# Patient Record
Sex: FEMALE | Race: WHITE | Employment: OTHER | URBAN - METROPOLITAN AREA
[De-identification: names, ages, dates, MRNs, and addresses within clinical notes are randomized per-mention and may not be internally consistent; named-entity substitution may affect disease eponyms.]

---

## 2019-09-11 ENCOUNTER — EXTERNAL FACILITY (OUTPATIENT)
Dept: FAMILY MEDICINE CLINIC | Facility: CLINIC | Age: 74
End: 2019-09-11

## 2019-09-11 DIAGNOSIS — I21.4 NSTEMI (NON-ST ELEVATED MYOCARDIAL INFARCTION) (HCC): Primary | ICD-10-CM

## 2019-09-11 DIAGNOSIS — I25.5 ISCHEMIC CARDIOMYOPATHY: ICD-10-CM

## 2019-09-11 DIAGNOSIS — N17.9 ACUTE RENAL FAILURE, UNSPECIFIED ACUTE RENAL FAILURE TYPE (HCC): ICD-10-CM

## 2019-09-11 PROCEDURE — 99305 1ST NF CARE MODERATE MDM 35: CPT | Performed by: FAMILY MEDICINE

## 2019-09-16 ENCOUNTER — HOSPITAL ENCOUNTER (INPATIENT)
Facility: HOSPITAL | Age: 74
LOS: 6 days | Discharge: NURSING, REHAB OR LONG-TERM CARE | DRG: 246 | End: 2019-09-23
Admitting: HOSPITALIST
Payer: MEDICARE

## 2019-09-16 ENCOUNTER — APPOINTMENT (OUTPATIENT)
Dept: GENERAL RADIOLOGY | Facility: HOSPITAL | Age: 74
DRG: 246 | End: 2019-09-16
Payer: MEDICARE

## 2019-09-16 ENCOUNTER — APPOINTMENT (OUTPATIENT)
Dept: CT IMAGING | Facility: HOSPITAL | Age: 74
DRG: 246 | End: 2019-09-16
Payer: MEDICARE

## 2019-09-16 ENCOUNTER — APPOINTMENT (OUTPATIENT)
Dept: CT IMAGING | Facility: HOSPITAL | Age: 74
DRG: 246 | End: 2019-09-16
Attending: EMERGENCY MEDICINE
Payer: MEDICARE

## 2019-09-16 DIAGNOSIS — I10 ACCELERATED HYPERTENSION: Primary | ICD-10-CM

## 2019-09-16 DIAGNOSIS — I21.4 NSTEMI (NON-ST ELEVATED MYOCARDIAL INFARCTION) (HCC): ICD-10-CM

## 2019-09-16 DIAGNOSIS — R41.0 DELIRIUM: ICD-10-CM

## 2019-09-16 DIAGNOSIS — I42.9 CARDIOMYOPATHY, UNSPECIFIED TYPE (HCC): ICD-10-CM

## 2019-09-16 LAB
ANION GAP SERPL CALC-SCNC: 16 MMOL/L (ref 0–18)
BACTERIA UR QL AUTO: NEGATIVE /HPF
BASOPHILS # BLD AUTO: 0.02 X10(3) UL (ref 0–0.2)
BASOPHILS NFR BLD AUTO: 0.1 %
BILIRUB UR QL: NEGATIVE
BUN BLD-MCNC: 16 MG/DL (ref 7–18)
BUN/CREAT SERPL: 14.2 (ref 10–20)
CALCIUM BLD-MCNC: 9.9 MG/DL (ref 8.5–10.1)
CHLORIDE SERPL-SCNC: 98 MMOL/L (ref 98–112)
CHOLEST SMN-MCNC: 278 MG/DL (ref ?–200)
CLARITY UR: CLEAR
CO2 SERPL-SCNC: 19 MMOL/L (ref 21–32)
COLOR UR: YELLOW
CREAT BLD-MCNC: 1.13 MG/DL (ref 0.55–1.02)
DEPRECATED RDW RBC AUTO: 38.3 FL (ref 35.1–46.3)
EOSINOPHIL # BLD AUTO: 0.01 X10(3) UL (ref 0–0.7)
EOSINOPHIL NFR BLD AUTO: 0.1 %
ERYTHROCYTE [DISTWIDTH] IN BLOOD BY AUTOMATED COUNT: 12.3 % (ref 11–15)
GLUCOSE BLD-MCNC: 156 MG/DL (ref 70–99)
GLUCOSE BLDC GLUCOMTR-MCNC: 179 MG/DL (ref 70–99)
GLUCOSE UR-MCNC: 50 MG/DL
HCT VFR BLD AUTO: 43.5 % (ref 35–48)
HDLC SERPL-MCNC: 100 MG/DL (ref 40–59)
HGB BLD-MCNC: 15.3 G/DL (ref 12–16)
HYALINE CASTS #/AREA URNS AUTO: 5 /LPF
IMM GRANULOCYTES # BLD AUTO: 0.06 X10(3) UL (ref 0–1)
IMM GRANULOCYTES NFR BLD: 0.3 %
KETONES UR-MCNC: 80 MG/DL
LDLC SERPL CALC-MCNC: 145 MG/DL (ref ?–100)
LEUKOCYTE ESTERASE UR QL STRIP.AUTO: NEGATIVE
LYMPHOCYTES # BLD AUTO: 1.16 X10(3) UL (ref 1–4)
LYMPHOCYTES NFR BLD AUTO: 6.7 %
MCH RBC QN AUTO: 30.1 PG (ref 26–34)
MCHC RBC AUTO-ENTMCNC: 35.2 G/DL (ref 31–37)
MCV RBC AUTO: 85.5 FL (ref 80–100)
MONOCYTES # BLD AUTO: 0.77 X10(3) UL (ref 0.1–1)
MONOCYTES NFR BLD AUTO: 4.4 %
NEUTROPHILS # BLD AUTO: 15.31 X10 (3) UL (ref 1.5–7.7)
NEUTROPHILS # BLD AUTO: 15.31 X10(3) UL (ref 1.5–7.7)
NEUTROPHILS NFR BLD AUTO: 88.4 %
NITRITE UR QL STRIP.AUTO: NEGATIVE
NONHDLC SERPL-MCNC: 178 MG/DL (ref ?–130)
OSMOLALITY SERPL CALC.SUM OF ELEC: 280 MOSM/KG (ref 275–295)
PH UR: 6 [PH] (ref 5–8)
PLATELET # BLD AUTO: 421 10(3)UL (ref 150–450)
POTASSIUM SERPL-SCNC: 4 MMOL/L (ref 3.5–5.1)
PROT UR-MCNC: >=500 MG/DL
RBC # BLD AUTO: 5.09 X10(6)UL (ref 3.8–5.3)
RBC #/AREA URNS AUTO: 3 /HPF
SODIUM SERPL-SCNC: 133 MMOL/L (ref 136–145)
SP GR UR STRIP: 1.02 (ref 1–1.03)
TRIGL SERPL-MCNC: 166 MG/DL (ref 30–149)
TROPONIN I SERPL-MCNC: 1.78 NG/ML (ref ?–0.04)
UROBILINOGEN UR STRIP-ACNC: <2
VLDLC SERPL CALC-MCNC: 33 MG/DL (ref 0–30)
WBC # BLD AUTO: 17.3 X10(3) UL (ref 4–11)
WBC #/AREA URNS AUTO: 3 /HPF

## 2019-09-16 PROCEDURE — 70450 CT HEAD/BRAIN W/O DYE: CPT | Performed by: EMERGENCY MEDICINE

## 2019-09-16 PROCEDURE — 71045 X-RAY EXAM CHEST 1 VIEW: CPT

## 2019-09-16 PROCEDURE — 70450 CT HEAD/BRAIN W/O DYE: CPT

## 2019-09-16 RX ORDER — GABAPENTIN 800 MG/1
900 TABLET ORAL 3 TIMES DAILY
Status: ON HOLD | COMMUNITY
End: 2019-09-23

## 2019-09-16 RX ORDER — METOPROLOL TARTRATE 5 MG/5ML
5 INJECTION INTRAVENOUS EVERY 5 MIN PRN
Status: DISCONTINUED | OUTPATIENT
Start: 2019-09-16 | End: 2019-09-23

## 2019-09-16 RX ORDER — ATORVASTATIN CALCIUM 40 MG/1
40 TABLET, FILM COATED ORAL NIGHTLY
Status: ON HOLD | COMMUNITY
End: 2020-09-15

## 2019-09-16 RX ORDER — LISINOPRIL 20 MG/1
20 TABLET ORAL DAILY
Status: ON HOLD | COMMUNITY
End: 2019-09-23

## 2019-09-16 RX ORDER — SUCRALFATE 1 G/1
1 TABLET ORAL NIGHTLY
COMMUNITY

## 2019-09-16 RX ORDER — BUPROPION HYDROCHLORIDE 150 MG/1
150 TABLET, EXTENDED RELEASE ORAL DAILY
COMMUNITY

## 2019-09-16 RX ORDER — TIZANIDINE 2 MG/1
2 TABLET ORAL NIGHTLY
COMMUNITY

## 2019-09-16 RX ORDER — CARVEDILOL 3.12 MG/1
3.12 TABLET ORAL 2 TIMES DAILY WITH MEALS
Status: ON HOLD | COMMUNITY
End: 2019-09-23

## 2019-09-16 RX ORDER — POTASSIUM CHLORIDE 1500 MG/1
TABLET, FILM COATED, EXTENDED RELEASE ORAL DAILY
Status: ON HOLD | COMMUNITY
End: 2019-09-19

## 2019-09-16 RX ORDER — CHOLECALCIFEROL (VITAMIN D3) 1250 MCG
CAPSULE ORAL WEEKLY
Status: ON HOLD | COMMUNITY
End: 2020-09-14

## 2019-09-16 RX ORDER — ENALAPRILAT 2.5 MG/2ML
1.25 INJECTION INTRAVENOUS ONCE
Status: COMPLETED | OUTPATIENT
Start: 2019-09-16 | End: 2019-09-17

## 2019-09-16 RX ORDER — OMEPRAZOLE 40 MG/1
40 CAPSULE, DELAYED RELEASE ORAL 2 TIMES DAILY
COMMUNITY

## 2019-09-17 ENCOUNTER — APPOINTMENT (OUTPATIENT)
Dept: INTERVENTIONAL RADIOLOGY/VASCULAR | Facility: HOSPITAL | Age: 74
DRG: 246 | End: 2019-09-17
Attending: HOSPITALIST
Payer: MEDICARE

## 2019-09-17 ENCOUNTER — APPOINTMENT (OUTPATIENT)
Dept: CV DIAGNOSTICS | Facility: HOSPITAL | Age: 74
DRG: 246 | End: 2019-09-17
Attending: HOSPITALIST
Payer: MEDICARE

## 2019-09-17 PROBLEM — I10 ACCELERATED HYPERTENSION: Status: ACTIVE | Noted: 2019-09-17

## 2019-09-17 PROBLEM — R41.0 DELIRIUM: Status: ACTIVE | Noted: 2019-09-17

## 2019-09-17 PROBLEM — D72.829 LEUKOCYTOSIS: Status: ACTIVE | Noted: 2019-09-17

## 2019-09-17 PROBLEM — E87.1 HYPONATREMIA: Status: ACTIVE | Noted: 2019-09-17

## 2019-09-17 PROBLEM — E87.20 METABOLIC ACIDOSIS: Status: ACTIVE | Noted: 2019-09-17

## 2019-09-17 PROBLEM — I21.4 NSTEMI (NON-ST ELEVATED MYOCARDIAL INFARCTION) (HCC): Status: ACTIVE | Noted: 2019-09-17

## 2019-09-17 PROBLEM — E87.2 METABOLIC ACIDOSIS: Status: ACTIVE | Noted: 2019-09-17

## 2019-09-17 LAB
ALBUMIN SERPL-MCNC: 4.2 G/DL (ref 3.4–5)
ALP LIVER SERPL-CCNC: 119 U/L (ref 55–142)
ALT SERPL-CCNC: 25 U/L (ref 13–56)
ANION GAP SERPL CALC-SCNC: 17 MMOL/L (ref 0–18)
APTT PPP: 30.3 SECONDS (ref 23.2–35.3)
AST SERPL-CCNC: 50 U/L (ref 15–37)
BASOPHILS # BLD AUTO: 0.03 X10(3) UL (ref 0–0.2)
BASOPHILS NFR BLD AUTO: 0.2 %
BILIRUB DIRECT SERPL-MCNC: 0.2 MG/DL (ref 0–0.2)
BILIRUB SERPL-MCNC: 1 MG/DL (ref 0.1–2)
BUN BLD-MCNC: 21 MG/DL (ref 7–18)
BUN/CREAT SERPL: 19.1 (ref 10–20)
CALCIUM BLD-MCNC: 9.6 MG/DL (ref 8.5–10.1)
CHLORIDE SERPL-SCNC: 100 MMOL/L (ref 98–112)
CO2 SERPL-SCNC: 18 MMOL/L (ref 21–32)
CREAT BLD-MCNC: 1.1 MG/DL (ref 0.55–1.02)
DEPRECATED RDW RBC AUTO: 38.7 FL (ref 35.1–46.3)
EOSINOPHIL # BLD AUTO: 0.01 X10(3) UL (ref 0–0.7)
EOSINOPHIL NFR BLD AUTO: 0.1 %
ERYTHROCYTE [DISTWIDTH] IN BLOOD BY AUTOMATED COUNT: 12.4 % (ref 11–15)
GLUCOSE BLD-MCNC: 166 MG/DL (ref 70–99)
HCT VFR BLD AUTO: 43 % (ref 35–48)
HGB BLD-MCNC: 15.2 G/DL (ref 12–16)
IMM GRANULOCYTES # BLD AUTO: 0.1 X10(3) UL (ref 0–1)
IMM GRANULOCYTES NFR BLD: 0.6 %
ISTAT ACTIVATED CLOTTING TIME: 235 SECONDS (ref 125–137)
ISTAT ACTIVATED CLOTTING TIME: 257 SECONDS (ref 125–137)
LYMPHOCYTES # BLD AUTO: 2.08 X10(3) UL (ref 1–4)
LYMPHOCYTES NFR BLD AUTO: 13 %
M PROTEIN MFR SERPL ELPH: 8.7 G/DL (ref 6.4–8.2)
MCH RBC QN AUTO: 30.4 PG (ref 26–34)
MCHC RBC AUTO-ENTMCNC: 35.3 G/DL (ref 31–37)
MCV RBC AUTO: 86 FL (ref 80–100)
MONOCYTES # BLD AUTO: 0.97 X10(3) UL (ref 0.1–1)
MONOCYTES NFR BLD AUTO: 6.1 %
NEUTROPHILS # BLD AUTO: 12.75 X10 (3) UL (ref 1.5–7.7)
NEUTROPHILS # BLD AUTO: 12.75 X10(3) UL (ref 1.5–7.7)
NEUTROPHILS NFR BLD AUTO: 80 %
OSMOLALITY SERPL CALC.SUM OF ELEC: 287 MOSM/KG (ref 275–295)
PLATELET # BLD AUTO: 451 10(3)UL (ref 150–450)
POTASSIUM SERPL-SCNC: 3.4 MMOL/L (ref 3.5–5.1)
POTASSIUM SERPL-SCNC: 4.8 MMOL/L (ref 3.5–5.1)
RBC # BLD AUTO: 5 X10(6)UL (ref 3.8–5.3)
SODIUM SERPL-SCNC: 135 MMOL/L (ref 136–145)
TROPONIN I SERPL-MCNC: 4.92 NG/ML (ref ?–0.04)
TROPONIN I SERPL-MCNC: 6.45 NG/ML (ref ?–0.04)
WBC # BLD AUTO: 15.9 X10(3) UL (ref 4–11)

## 2019-09-17 PROCEDURE — 99223 1ST HOSP IP/OBS HIGH 75: CPT | Performed by: OTHER

## 2019-09-17 PROCEDURE — 027034Z DILATION OF CORONARY ARTERY, ONE ARTERY WITH DRUG-ELUTING INTRALUMINAL DEVICE, PERCUTANEOUS APPROACH: ICD-10-PCS | Performed by: INTERNAL MEDICINE

## 2019-09-17 PROCEDURE — 99223 1ST HOSP IP/OBS HIGH 75: CPT | Performed by: HOSPITALIST

## 2019-09-17 PROCEDURE — 4A023N7 MEASUREMENT OF CARDIAC SAMPLING AND PRESSURE, LEFT HEART, PERCUTANEOUS APPROACH: ICD-10-PCS | Performed by: INTERNAL MEDICINE

## 2019-09-17 PROCEDURE — B2111ZZ FLUOROSCOPY OF MULTIPLE CORONARY ARTERIES USING LOW OSMOLAR CONTRAST: ICD-10-PCS | Performed by: INTERNAL MEDICINE

## 2019-09-17 PROCEDURE — 93306 TTE W/DOPPLER COMPLETE: CPT | Performed by: HOSPITALIST

## 2019-09-17 RX ORDER — ASPIRIN 81 MG/1
81 TABLET, CHEWABLE ORAL DAILY
Status: DISCONTINUED | OUTPATIENT
Start: 2019-09-18 | End: 2019-09-17

## 2019-09-17 RX ORDER — CLOPIDOGREL BISULFATE 75 MG/1
75 TABLET ORAL DAILY
Status: DISCONTINUED | OUTPATIENT
Start: 2019-09-18 | End: 2019-09-23

## 2019-09-17 RX ORDER — ESCITALOPRAM OXALATE 10 MG/1
20 TABLET ORAL DAILY
Status: DISCONTINUED | OUTPATIENT
Start: 2019-09-17 | End: 2019-09-23

## 2019-09-17 RX ORDER — ERGOCALCIFEROL 1.25 MG/1
CAPSULE ORAL
Refills: 0 | Status: ON HOLD | COMMUNITY
Start: 2019-08-26 | End: 2019-09-19

## 2019-09-17 RX ORDER — ASPIRIN 81 MG/1
324 TABLET, CHEWABLE ORAL DAILY
Status: DISCONTINUED | OUTPATIENT
Start: 2019-09-17 | End: 2019-09-17

## 2019-09-17 RX ORDER — MAGNESIUM 200 MG
400 TABLET ORAL
Status: ON HOLD | COMMUNITY
End: 2019-09-23

## 2019-09-17 RX ORDER — CARVEDILOL 6.25 MG/1
6.25 TABLET ORAL 2 TIMES DAILY WITH MEALS
Status: DISCONTINUED | OUTPATIENT
Start: 2019-09-17 | End: 2019-09-17

## 2019-09-17 RX ORDER — ENALAPRILAT 2.5 MG/2ML
1.25 INJECTION INTRAVENOUS ONCE
Status: DISCONTINUED | OUTPATIENT
Start: 2019-09-17 | End: 2019-09-17

## 2019-09-17 RX ORDER — LABETALOL HYDROCHLORIDE 5 MG/ML
10 INJECTION, SOLUTION INTRAVENOUS EVERY 4 HOURS PRN
Status: DISCONTINUED | OUTPATIENT
Start: 2019-09-17 | End: 2019-09-23

## 2019-09-17 RX ORDER — ATORVASTATIN CALCIUM 40 MG/1
80 TABLET, FILM COATED ORAL DAILY
Status: DISCONTINUED | OUTPATIENT
Start: 2019-09-17 | End: 2019-09-18

## 2019-09-17 RX ORDER — ASPIRIN 81 MG/1
81 TABLET, CHEWABLE ORAL DAILY
Status: DISCONTINUED | OUTPATIENT
Start: 2019-09-17 | End: 2019-09-17

## 2019-09-17 RX ORDER — CARVEDILOL 12.5 MG/1
12.5 TABLET ORAL 2 TIMES DAILY WITH MEALS
Status: DISCONTINUED | OUTPATIENT
Start: 2019-09-17 | End: 2019-09-21

## 2019-09-17 RX ORDER — CLOPIDOGREL BISULFATE 75 MG/1
600 TABLET ORAL ONCE
Status: COMPLETED | OUTPATIENT
Start: 2019-09-17 | End: 2019-09-17

## 2019-09-17 RX ORDER — CHLORHEXIDINE GLUCONATE 4 G/100ML
30 SOLUTION TOPICAL
Status: ACTIVE | OUTPATIENT
Start: 2019-09-18 | End: 2019-09-18

## 2019-09-17 RX ORDER — DULOXETIN HYDROCHLORIDE 30 MG/1
60 CAPSULE, DELAYED RELEASE ORAL DAILY
Status: DISCONTINUED | OUTPATIENT
Start: 2019-09-17 | End: 2019-09-19

## 2019-09-17 RX ORDER — LIDOCAINE HYDROCHLORIDE 20 MG/ML
INJECTION, SOLUTION EPIDURAL; INFILTRATION; INTRACAUDAL; PERINEURAL
Status: COMPLETED
Start: 2019-09-17 | End: 2019-09-17

## 2019-09-17 RX ORDER — PANTOPRAZOLE SODIUM 40 MG/1
40 TABLET, DELAYED RELEASE ORAL
Status: DISCONTINUED | OUTPATIENT
Start: 2019-09-17 | End: 2019-09-23

## 2019-09-17 RX ORDER — ATORVASTATIN CALCIUM 40 MG/1
40 TABLET, FILM COATED ORAL DAILY
Status: DISCONTINUED | OUTPATIENT
Start: 2019-09-17 | End: 2019-09-17

## 2019-09-17 RX ORDER — ATORVASTATIN CALCIUM 40 MG/1
TABLET, FILM COATED ORAL
Status: ON HOLD | COMMUNITY
End: 2019-09-19

## 2019-09-17 RX ORDER — ACETAMINOPHEN 325 MG/1
650 TABLET ORAL EVERY 6 HOURS PRN
Status: DISCONTINUED | OUTPATIENT
Start: 2019-09-17 | End: 2019-09-23

## 2019-09-17 RX ORDER — HEPARIN SODIUM AND DEXTROSE 10000; 5 [USP'U]/100ML; G/100ML
12 INJECTION INTRAVENOUS ONCE
Status: COMPLETED | OUTPATIENT
Start: 2019-09-17 | End: 2019-09-17

## 2019-09-17 RX ORDER — POTASSIUM CHLORIDE 20 MEQ/1
40 TABLET, EXTENDED RELEASE ORAL EVERY 4 HOURS
Status: COMPLETED | OUTPATIENT
Start: 2019-09-17 | End: 2019-09-17

## 2019-09-17 RX ORDER — HYDRALAZINE HYDROCHLORIDE 20 MG/ML
10 INJECTION INTRAMUSCULAR; INTRAVENOUS EVERY 4 HOURS PRN
Status: DISCONTINUED | OUTPATIENT
Start: 2019-09-17 | End: 2019-09-23

## 2019-09-17 RX ORDER — NITROGLYCERIN 20 MG/100ML
INJECTION INTRAVENOUS
Status: DISCONTINUED
Start: 2019-09-17 | End: 2019-09-17 | Stop reason: WASHOUT

## 2019-09-17 RX ORDER — NITROGLYCERIN 20 MG/100ML
INJECTION INTRAVENOUS
Status: COMPLETED
Start: 2019-09-17 | End: 2019-09-17

## 2019-09-17 RX ORDER — AMLODIPINE BESYLATE 5 MG/1
5 TABLET ORAL DAILY
Status: DISCONTINUED | OUTPATIENT
Start: 2019-09-17 | End: 2019-09-18

## 2019-09-17 RX ORDER — ASPIRIN 81 MG/1
324 TABLET, CHEWABLE ORAL ONCE
Status: COMPLETED | OUTPATIENT
Start: 2019-09-17 | End: 2019-09-17

## 2019-09-17 RX ORDER — ONDANSETRON 2 MG/ML
4 INJECTION INTRAMUSCULAR; INTRAVENOUS EVERY 6 HOURS PRN
Status: DISCONTINUED | OUTPATIENT
Start: 2019-09-17 | End: 2019-09-23

## 2019-09-17 RX ORDER — ASPIRIN 81 MG/1
81 TABLET ORAL DAILY
Status: DISCONTINUED | OUTPATIENT
Start: 2019-09-18 | End: 2019-09-23

## 2019-09-17 RX ORDER — HEPARIN SODIUM 1000 [USP'U]/ML
INJECTION, SOLUTION INTRAVENOUS; SUBCUTANEOUS
Status: COMPLETED
Start: 2019-09-17 | End: 2019-09-17

## 2019-09-17 RX ORDER — VERAPAMIL HYDROCHLORIDE 2.5 MG/ML
INJECTION, SOLUTION INTRAVENOUS
Status: DISCONTINUED
Start: 2019-09-17 | End: 2019-09-17 | Stop reason: WASHOUT

## 2019-09-17 RX ORDER — HEPARIN SODIUM AND DEXTROSE 10000; 5 [USP'U]/100ML; G/100ML
INJECTION INTRAVENOUS CONTINUOUS
Status: DISCONTINUED | OUTPATIENT
Start: 2019-09-17 | End: 2019-09-19

## 2019-09-17 RX ORDER — MIDAZOLAM HYDROCHLORIDE 1 MG/ML
INJECTION INTRAMUSCULAR; INTRAVENOUS
Status: COMPLETED
Start: 2019-09-17 | End: 2019-09-17

## 2019-09-17 RX ORDER — LISINOPRIL 20 MG/1
20 TABLET ORAL DAILY
Status: DISCONTINUED | OUTPATIENT
Start: 2019-09-17 | End: 2019-09-20

## 2019-09-17 RX ORDER — HEPARIN SODIUM 1000 [USP'U]/ML
60 INJECTION, SOLUTION INTRAVENOUS; SUBCUTANEOUS ONCE
Status: COMPLETED | OUTPATIENT
Start: 2019-09-17 | End: 2019-09-17

## 2019-09-17 RX ORDER — BUPROPION HYDROCHLORIDE 150 MG/1
150 TABLET, EXTENDED RELEASE ORAL DAILY
Status: DISCONTINUED | OUTPATIENT
Start: 2019-09-17 | End: 2019-09-23

## 2019-09-17 RX ORDER — METOPROLOL TARTRATE 5 MG/5ML
5 INJECTION INTRAVENOUS EVERY 6 HOURS
Status: DISCONTINUED | OUTPATIENT
Start: 2019-09-17 | End: 2019-09-18

## 2019-09-17 RX ORDER — SODIUM CHLORIDE 9 MG/ML
INJECTION, SOLUTION INTRAVENOUS
Status: ACTIVE | OUTPATIENT
Start: 2019-09-18 | End: 2019-09-18

## 2019-09-17 RX ORDER — CLOPIDOGREL BISULFATE 75 MG/1
75 TABLET ORAL DAILY
Status: DISCONTINUED | OUTPATIENT
Start: 2019-09-18 | End: 2019-09-17

## 2019-09-17 RX ORDER — OMEPRAZOLE 40 MG/1
CAPSULE, DELAYED RELEASE ORAL
Status: ON HOLD | COMMUNITY
End: 2019-09-17

## 2019-09-17 NOTE — H&P
[address] Edwardo Hinton Patient Status:  Emergency    1945 MRN [id number]   Location [address] Attending Sender, Polly Vickers MD   Hosp Day # 0 DOIR Perez     Date:  2019 Sig: Take by mouth once a week. Citalopram Hydrobromide (CELEXA) 20 MG Oral Tab   Yes No   Sig: Take 40 mg by mouth daily. DULoxetine HCl (CYMBALTA) 60 MG Oral Cap DR Particles   Yes No   Sig: Take 60 mg by mouth daily.    FENTANYL   Yes No   Sig: b Oral Tab Taking  Yes Yes   Sig: Take 1 g by mouth nightly. tiZANidine HCl 2 MG Oral Tab Taking  Yes Yes   Sig: Take 2 mg by mouth nightly as needed.       Facility-Administered Medications: None       Review of Systems:  Constitutional:  Weakness, Fatigue 09/16/2019     09/16/2019    CA 9.9 09/16/2019    TROP 4.920 09/16/2019       Imaging:  Ct Brain Or Head ([id number])    Result Date: 9/16/2019  CONCLUSION:  1. Cerebral atrophy, age appropriate. Moderate chronic white matter microvascular ischemia.  2.

## 2019-09-17 NOTE — PROCEDURES
[facility]    PATIENT'S NAME: Rian Terry   ATTENDING PHYSICIAN: Marie Hui MD   OPERATING PHYSICIAN: Juan José Herrera DO   PATIENT ACCOUNT#:   [de-identified]    LOCATION:  [address] [place] [address] RECORD #:   [id number]       DATE OF BIRTH:  [birth year] with a mean of 14. Left ventricular pressure was 118/7 with an end-diastolic of 12. The cardiac output was 2.6 liters/minute, with an index of 1.5 liters/minute/sq m. Contrast injection in the left main demonstrates patent left main.   Left anterior related. I recommend a followup echocardiogram in a few months after optimal medical management and hopefully improved dynamics of the left ventricle will reduce the mitral regurgitation.   At that point, a transesophageal echocardiogram can also be consid

## 2019-09-17 NOTE — PLAN OF CARE
Problem: Patient/Family Goals  Goal: Patient/Family Long Term Goal  Description  Patient's Long Term Goal: To continue to have independence and live on her own    Interventions:  -reorient patient to unit  -ambulate patient to decrease decline  - See add

## 2019-09-17 NOTE — PLAN OF CARE
Pt a/o x2 (person/place). Follows commands, speaking in clear sentences, appropriate answers/conversations. She is a little anxious and needs reassurance and reinforcement at times. Otherwise she is calm, cooperative. Heparin infusing.    Troponin trendin minimize risk of hemorrhage  - Monitor temperature, glucose, and sodium.  Initiate appropriate interventions as ordered  Outcome: Progressing  Goal: Absence of seizures  Description  INTERVENTIONS  - Monitor for seizure activity  - Administer anti-seizure m

## 2019-09-17 NOTE — PROGRESS NOTES
ADDENDUM:  patient seen by Dr. Charmaine Louise overnight. I saw patient in follow up. Patient still confused, but she knows she's in the hospital, the year, and that she's getting a test on her heart. Angiogram planned for today for NSTEMI.  Possible hypertensive ence

## 2019-09-17 NOTE — ED INITIAL ASSESSMENT (HPI)
Pt confused on arrival. Pt alert to self. Daughter called and stated pt was confused per telephone call. Unknown last well. Pt denies head injury or fall.

## 2019-09-17 NOTE — PROGRESS NOTES
Inpatient Throughput Communication:    Called inpatient RN Agueda Milan and notified of scheduled procedure [facility] today     Verified that appropriate consent is signed: No  Appropriate Consent Signed:  No will call dtr, POA  Access Site Hair Clipped and skin preppe

## 2019-09-17 NOTE — CONSULTS
[facility]    Report of Consultation    Lili Kurtz Patient Status:  Inpatient    1945 MRN [id number]   Location [facility] 2W/SW Attending Ernesto Rudolph MD   Hosp Day # 0 PCP Stefania Calhoun     Date of Admission:  2019 (COREG) tab 6.25 mg 6.25 mg Oral BID with meals   escitalopram (LEXAPRO) tablet 20 mg 20 mg Oral Daily   DULoxetine HCl (CYMBALTA) DR particles cap 60 mg 60 mg Oral Daily   Pantoprazole Sodium (PROTONIX) EC tab 40 mg 40 mg Oral QAM AC   lisinopril (PRINIVI Morphine Sulfate ER 15 MG Oral Tablet Extended Release 12 hour Abuse-Deterrent Take by mouth 2 (two) times daily. lisinopril 20 MG Oral Tab Take 20 mg by mouth daily. tiZANidine HCl 2 MG Oral Tab Take 2 mg by mouth nightly as needed.    AmLODIPine Bes commands of lifting eyes or arms and legs appropriately. But not able to provide much of the history    Cranial Nerves:  II.- Visual fields full to confrontation        Fundoscopically-unable to visualize. III, IV, VI- EOM intact, BERNARDA  V.  Facial sensat on 9/17/2019 at 6:29     Approved by (CST): Lenin Lake MD on 9/17/2019 at 6:33          Ct Brain Or Head ([id number])    Result Date: 9/16/2019  CONCLUSION:  1. Cerebral atrophy, age appropriate. Moderate chronic white matter microvascular ischemia.  2.

## 2019-09-17 NOTE — ED NOTES
Pt brought in by daughters for confusion and noncompliance with medications. Per daughters, pt lives alone and in the past has developed confusion and then stopped taking her medications and became dehydrated. Last known normal was Thursday (x4days ago).  U

## 2019-09-17 NOTE — CONSULTS
[facility]    Report of Consultation    Bob Ford Patient Status:  Inpatient    1945 MRN [id number]   Location [facility] 2W/SW Attending Hina Collazo MD   Hosp Day # 0 PCP Franklin Lazo     Date of Admission:  2019 Intravenous Q6H   Labetalol HCl (TRANDATE) injection 10 mg 10 mg Intravenous Q4H PRN   heparin (PORCINE) drip 39164isyen/250mL infusion CONTINUOUS 200-3,000 Units/hr Intravenous Continuous   atorvastatin (LIPITOR) tab 80 mg 80 mg Oral Daily   [START ON 9/1 Tab Take 4 mg by mouth every 8 (eight) hours as needed. DULoxetine HCl (CYMBALTA) 60 MG Oral Cap DR Particles Take 60 mg by mouth daily. Lidocaine (LIDODERM) 5 % [facility] 1 Patch onto the skin daily.    Metoclopramide HCl (REGLAN) 1 extremities normal, atraumatic, no cyanosis or edema    Results:     Laboratory Data:  Lab Results   Component Value Date    WBC 15.9 (H) 09/17/2019    HGB 15.2 09/17/2019    HCT 43.0 09/17/2019    .0 (H) 09/17/2019    CREATSERUM 1.10 (H) 09/17/2019 in a pattern of mild LVH. Systolic function was     moderately to severely reduced. The estimated ejection fraction     was 30-35%. Doppler parameters are consistent with abnormal left     ventricular relaxation (grade 1 diastolic dysfunction).   2. Aortic

## 2019-09-17 NOTE — OPERATIVE REPORT
Preop diagnosis: nstemi  Post op diagnosis: cad  Procedures: [facility] cors  Findings: rca 70% eccentric mid lesion s/p 2.75 x 12 Xience ZIYAD  EDP 12 PA 19/10/14  Rec: Med rx for cardiomyopathy for MR.  PA pressures and EDP not high currently and may be volume/HTN

## 2019-09-17 NOTE — ED PROVIDER NOTES
Patient Seen in: [facility] AND [facility] Emergency Department    History   Patient presents with:  Altered Mental Status (neurologic)    Stated Complaint: Confusion, AMS     HPI    Patient is a 72-year-old female who presents to the emergency department with h (36.7 °C)   Temp src Oral   SpO2 97 %   O2 Device None (Room air)       Current:BP (!) 196/120   Pulse 78   Temp 98 °F (36.7 °C) (Oral)   Resp 20   Ht 165.1 cm (5' 5\")   Wt 68 kg   SpO2 98%   BMI 24.96 kg/m²         Physical Exam   Constitutional: She thierry normal limits   POCT GLUCOSE - Abnormal; Notable for the following components:    POC Glucose  179 (*)     All other components within normal limits   CBC W/ DIFFERENTIAL - Abnormal; Notable for the following components:    WBC 17.3 (*)     Neutrophil Abso Medication List              Present on Admission           ICD-10-CM Noted POA    Accelerated hypertension I10 9/17/2019 Unknown    Hyponatremia E87.1 9/17/2019 Yes    Leukocytosis D72.829 0/56/4061 Yes    Metabolic acidosis X86.2 8/13/9879 Yes

## 2019-09-18 LAB
ANION GAP SERPL CALC-SCNC: 13 MMOL/L (ref 0–18)
BASOPHILS # BLD AUTO: 0.02 X10(3) UL (ref 0–0.2)
BASOPHILS NFR BLD AUTO: 0.1 %
BUN BLD-MCNC: 32 MG/DL (ref 7–18)
BUN/CREAT SERPL: 30.5 (ref 10–20)
CALCIUM BLD-MCNC: 9 MG/DL (ref 8.5–10.1)
CHLORIDE SERPL-SCNC: 105 MMOL/L (ref 98–112)
CO2 SERPL-SCNC: 15 MMOL/L (ref 21–32)
CREAT BLD-MCNC: 1.05 MG/DL (ref 0.55–1.02)
DEPRECATED RDW RBC AUTO: 40.2 FL (ref 35.1–46.3)
EOSINOPHIL # BLD AUTO: 0 X10(3) UL (ref 0–0.7)
EOSINOPHIL NFR BLD AUTO: 0 %
ERYTHROCYTE [DISTWIDTH] IN BLOOD BY AUTOMATED COUNT: 12.8 % (ref 11–15)
GLUCOSE BLD-MCNC: 95 MG/DL (ref 70–99)
HCT VFR BLD AUTO: 37.1 % (ref 35–48)
HGB BLD-MCNC: 13.1 G/DL (ref 12–16)
IMM GRANULOCYTES # BLD AUTO: 0.04 X10(3) UL (ref 0–1)
IMM GRANULOCYTES NFR BLD: 0.3 %
LYMPHOCYTES # BLD AUTO: 2.15 X10(3) UL (ref 1–4)
LYMPHOCYTES NFR BLD AUTO: 15.3 %
MCH RBC QN AUTO: 30.5 PG (ref 26–34)
MCHC RBC AUTO-ENTMCNC: 35.3 G/DL (ref 31–37)
MCV RBC AUTO: 86.5 FL (ref 80–100)
MONOCYTES # BLD AUTO: 0.83 X10(3) UL (ref 0.1–1)
MONOCYTES NFR BLD AUTO: 5.9 %
NEUTROPHILS # BLD AUTO: 11.04 X10 (3) UL (ref 1.5–7.7)
NEUTROPHILS # BLD AUTO: 11.04 X10(3) UL (ref 1.5–7.7)
NEUTROPHILS NFR BLD AUTO: 78.4 %
OSMOLALITY SERPL CALC.SUM OF ELEC: 283 MOSM/KG (ref 275–295)
PLATELET # BLD AUTO: 337 10(3)UL (ref 150–450)
POTASSIUM SERPL-SCNC: 3.8 MMOL/L (ref 3.5–5.1)
RBC # BLD AUTO: 4.29 X10(6)UL (ref 3.8–5.3)
SODIUM SERPL-SCNC: 133 MMOL/L (ref 136–145)
WBC # BLD AUTO: 14.1 X10(3) UL (ref 4–11)

## 2019-09-18 PROCEDURE — 99233 SBSQ HOSP IP/OBS HIGH 50: CPT | Performed by: HOSPITALIST

## 2019-09-18 PROCEDURE — 99232 SBSQ HOSP IP/OBS MODERATE 35: CPT | Performed by: OTHER

## 2019-09-18 RX ORDER — SPIRONOLACTONE 25 MG/1
25 TABLET ORAL DAILY
Status: DISCONTINUED | OUTPATIENT
Start: 2019-09-19 | End: 2019-09-21

## 2019-09-18 RX ORDER — ATORVASTATIN CALCIUM 40 MG/1
40 TABLET, FILM COATED ORAL DAILY
Status: DISCONTINUED | OUTPATIENT
Start: 2019-09-19 | End: 2019-09-19

## 2019-09-18 RX ORDER — POTASSIUM CHLORIDE 20 MEQ/1
40 TABLET, EXTENDED RELEASE ORAL ONCE
Status: COMPLETED | OUTPATIENT
Start: 2019-09-18 | End: 2019-09-18

## 2019-09-18 NOTE — PLAN OF CARE
Pt up to chair, cath rt groin site dry and intact, no hemotoma present. Pt had 2 stools dark brown to black in color, no tl blood noted. BP improved. Appetite fair, Refusing SCD's aware of use and preventative for blood clots.        Problem: Patient/Fam volume within ordered parameters to optimize cerebral perfusion and minimize risk of hemorrhage  - Monitor temperature, glucose, and sodium.  Initiate appropriate interventions as ordered  Outcome: Progressing  Goal: Absence of seizures  Description  INTERV on assessment  - Modify environment to reduce risk of injury  - Provide assistive devices as appropriate  - Consider OT/PT consult to assist with strengthening/mobility  - Encourage toileting schedule  Outcome: Progressing     Problem: Patient Centered Car

## 2019-09-18 NOTE — PROGRESS NOTES
[facility] AND [facility]  Neurology Progress Note    Kelsey Calle Patient Status:  Inpatient    1945 MRN [id number]   Location [facility] 2W/SW Attending Lacie Robbins, [address] [facility] [address] Day # 1 PCP Candis Ventura     Subjective:  Kelsey Calle is a(n Exam:   09/18/19  0800 09/18/19  0900 09/18/19  0906 09/18/19  0928   BP: (!) 150/94 (!) 157/92 (!) 152/94 139/88   Pulse: 71 75 68 67   Resp: 19 21 12 21   Temp: 97.6 °F (36.4 °C)      TempSrc: Temporal      SpO2: 98% 100% 100% 100%   Weight:       Height ALB 4.2 09/17/2019    ALKPHO 119 09/17/2019    BILT 1.0 09/17/2019    TP 8.7 (H) 09/17/2019    AST 50 (H) 09/17/2019    ALT 25 09/17/2019    PTT 30.3 09/17/2019    TROP 6.450 (HH) 09/17/2019       Ct Brain Or Head ([id number])    Result Date: 9/17/2019  CONCLUS Shante Corrales 12-lead    Result Date: 9/16/2019  ECG Report  Interpretation  -------------------------- Sinus Tachycardia -First degree A-V block Courtney = 223 Possible left ventricular hypertrophy on non-voltage basis.  -Old anteroseptal infarct.   -ST burnette assessment of that condition she will need to follow-up in the clinic in a month. Otherwise no other additional neurological work-up is needed at this time. Will sign off.     Renetta Mar  9/18/2019  10:01 AM

## 2019-09-18 NOTE — PLAN OF CARE
VSS. No complaints of chest pain. Pt neuro intact. Forgetful at times. Cath side CDI, no hematoma. Will continue to monitor.       Problem: CARDIOVASCULAR - ADULT  Goal: Maintains optimal cardiac output and hemodynamic stability  Description  INTERVENTI injury  Description  INTERVENTIONS:  - Assess pt frequently for physical needs  - Identify cognitive and physical deficits and behaviors that affect risk of falls.   - [place]idian fall precautions as indicated by assessment.  - Educate pt/family on patient sa

## 2019-09-19 LAB
ANION GAP SERPL CALC-SCNC: 11 MMOL/L (ref 0–18)
BASOPHILS # BLD AUTO: 0.02 X10(3) UL (ref 0–0.2)
BASOPHILS NFR BLD AUTO: 0.1 %
BILIRUB UR QL: NEGATIVE
BUN BLD-MCNC: 36 MG/DL (ref 7–18)
BUN/CREAT SERPL: 31.9 (ref 10–20)
CALCIUM BLD-MCNC: 8.7 MG/DL (ref 8.5–10.1)
CHLORIDE SERPL-SCNC: 105 MMOL/L (ref 98–112)
CO2 SERPL-SCNC: 17 MMOL/L (ref 21–32)
COLOR UR: YELLOW
CREAT BLD-MCNC: 1.13 MG/DL (ref 0.55–1.02)
DEPRECATED RDW RBC AUTO: 42.6 FL (ref 35.1–46.3)
EOSINOPHIL # BLD AUTO: 0.03 X10(3) UL (ref 0–0.7)
EOSINOPHIL NFR BLD AUTO: 0.2 %
ERYTHROCYTE [DISTWIDTH] IN BLOOD BY AUTOMATED COUNT: 12.7 % (ref 11–15)
GLUCOSE BLD-MCNC: 100 MG/DL (ref 70–99)
GLUCOSE UR-MCNC: NEGATIVE MG/DL
HCT VFR BLD AUTO: 39.3 % (ref 35–48)
HGB BLD-MCNC: 12.8 G/DL (ref 12–16)
IMM GRANULOCYTES # BLD AUTO: 0.06 X10(3) UL (ref 0–1)
IMM GRANULOCYTES NFR BLD: 0.4 %
LYMPHOCYTES # BLD AUTO: 2.26 X10(3) UL (ref 1–4)
LYMPHOCYTES NFR BLD AUTO: 16.5 %
MCH RBC QN AUTO: 30 PG (ref 26–34)
MCHC RBC AUTO-ENTMCNC: 32.6 G/DL (ref 31–37)
MCV RBC AUTO: 92 FL (ref 80–100)
MONOCYTES # BLD AUTO: 0.82 X10(3) UL (ref 0.1–1)
MONOCYTES NFR BLD AUTO: 6 %
NEUTROPHILS # BLD AUTO: 10.54 X10 (3) UL (ref 1.5–7.7)
NEUTROPHILS # BLD AUTO: 10.54 X10(3) UL (ref 1.5–7.7)
NEUTROPHILS NFR BLD AUTO: 76.8 %
NITRITE UR QL STRIP.AUTO: NEGATIVE
OSMOLALITY SERPL CALC.SUM OF ELEC: 284 MOSM/KG (ref 275–295)
PH UR: 5 [PH] (ref 5–8)
PLATELET # BLD AUTO: 294 10(3)UL (ref 150–450)
POTASSIUM SERPL-SCNC: 4.2 MMOL/L (ref 3.5–5.1)
PROT UR-MCNC: 30 MG/DL
RBC # BLD AUTO: 4.27 X10(6)UL (ref 3.8–5.3)
RBC #/AREA URNS AUTO: 373 /HPF
SODIUM SERPL-SCNC: 133 MMOL/L (ref 136–145)
SP GR UR STRIP: 1.02 (ref 1–1.03)
UROBILINOGEN UR STRIP-ACNC: <2
WBC # BLD AUTO: 13.7 X10(3) UL (ref 4–11)
WBC #/AREA URNS AUTO: 60 /HPF

## 2019-09-19 PROCEDURE — 99233 SBSQ HOSP IP/OBS HIGH 50: CPT | Performed by: HOSPITALIST

## 2019-09-19 RX ORDER — MORPHINE SULFATE 15 MG/1
15 TABLET ORAL EVERY 4 HOURS PRN
Status: ON HOLD | COMMUNITY
End: 2019-09-23

## 2019-09-19 RX ORDER — ATORVASTATIN CALCIUM 40 MG/1
40 TABLET, FILM COATED ORAL DAILY
Status: DISCONTINUED | OUTPATIENT
Start: 2019-09-20 | End: 2019-09-23

## 2019-09-19 NOTE — PROGRESS NOTES
[facility] HOSP - [facility]    This note is for 19       Progress Note    Juan Pablo Purcellen Patient Status:  Inpatient    1945 MRN [id number]   Location [facility] 2W/SW Attending Kim Carbajal MD   Hosp Day # 2 PCP Shira Ramos accelerated HTN.   - hold all psychoactive meds  - probably some level of baseline dementia  - PT/OT/SW consults  - neurology on consult  - MRI brain if pt does not improve    NSTEMI  - cards on consult  - s/p heparin drip  - s/p emergent cath with stent to

## 2019-09-19 NOTE — PHYSICAL THERAPY NOTE
PHYSICAL THERAPY EVALUATION - INPATIENT     Room Number: 222/222-A  Evaluation Date: 9/19/2019  Type of Evaluation: Initial   Physician Order: PT Eval and Treat    Presenting Problem: accelerated HTN and new life vest  Reason for Therapy: Mobility Dysfunc Good  Frequency (Obs): 5x/week       PHYSICAL THERAPY MEDICAL/SOCIAL HISTORY   Problem List  Principal Problem:    Accelerated hypertension  Active Problems:    Leukocytosis    Hyponatremia    Metabolic acidosis    NSTEMI (non-ST elevated myocardial infarc -   Moving from lying on back to sitting on the side of the bed?: A Little   How much help from another person does the patient currently need. ..   -   Moving to and from a bed to a chair (including a wheelchair)?: A Little   -   Need to walk in hospital

## 2019-09-19 NOTE — CM/SW NOTE
9/19: MICHELLE received MDO for discharge planning. Pt is from home alone and has been independent up to this point. MICHELLE received v/m from daughter, Caresse Hodgkin to discuss elder resources.      MICHELLE called back and l/m for dtr Caresse Hodgkin to discuss social history, home h

## 2019-09-19 NOTE — PROGRESS NOTES
Patient seen in follow up. Patient denies any chest pain or sob. Off O2. AAO today.    09/18/19 1927   BP: 119/80   Pulse: 77   Resp: 15   Temp:        Intake/Output Summary (Last 24 hours) at 9/18/2019 1936  Last data filed at 9/18/2019 1300  Gross Cholecalciferol (VITAMIN D3) 06168 units Oral Cap Take by mouth once a week. Omeprazole 40 MG Oral Capsule Delayed Release Take 40 mg by mouth 2 (two) times daily. gabapentin 800 MG Oral Tab Take 900 mg by mouth 3 (three) times daily.    atorvastatin 40 CONCLUSION:   No acute intracranial abnormality by noncontrast CT. Generalized atrophy with nonspecific white matter changes most likely related to chronic microvascular ischemia.    The preliminary report was provided by [facility] and there is no s Phone: [phone]  [email or web address]

## 2019-09-19 NOTE — PROGRESS NOTES
Patient seen in follow up. Patient denies any chest pain or sob. Off O2. AAO today.    09/19/19  0840   BP: 119/84   Pulse:    Resp: 22   Temp:        Intake/Output Summary (Last 24 hours) at 9/19/2019 1250  Last data filed at 9/18/2019 2200  Gross pe gabapentin 800 MG Oral Tab Take 900 mg by mouth 3 (three) times daily. atorvastatin 40 MG Oral Tab Take 40 mg by mouth daily. sucralfate 1 g Oral Tab Take 1 g by mouth nightly.    carvedilol 3.125 MG Oral Tab Take 3.125 mg by mouth 2 (two) times daily w Severe MR by echo. By cath normal filling pressures and right heart pressures. This is likely a volume related problem that probably changes with hemodynamics. PLAN:  Ok to discharge on current meds from cardio standpoint.     Juan José Singh DO FACC  340

## 2019-09-19 NOTE — PLAN OF CARE
VSS. Denies pain. Up to bathroom with walker. Tele overflow. PT/OT ordered for tmrw.       Problem: CARDIOVASCULAR - ADULT  Goal: Maintains optimal cardiac output and hemodynamic stability  Description  INTERVENTIONS:  - Monitor vital signs, rhythm, and changes in neurological status  - Encourage and assist patient to increase activity and self care with guidance from PT/OT  - Encourage visually impaired, hearing impaired and aphasic patients to use assistive/communication devices  Outcome: Progressing

## 2019-09-19 NOTE — OCCUPATIONAL THERAPY NOTE
OCCUPATIONAL THERAPY EVALUATION - INPATIENT     Room Number: 222/222-A  Evaluation Date: 9/19/2019  Type of Evaluation: Initial  Presenting Problem: (hypertensive emergency)    Physician Order: IP Consult to Occupational Therapy  Reason for Therapy: ADL/IA Research supports that patients with this level of impairment may benefit from [facility] and initial spv from family. Pt reports that her 2 daughters live nearby.       DISCHARGE RECOMMENDATIONS  OT Discharge Recommendations: Home with home health PT/OT;Other (C Risk: Standard fall risk    PAIN ASSESSMENT  Ratin          ACTIVITY TOLERANCE  Good    COGNITION  Overall Cognitive Status:  WFL - within functional limits    VISION  Current Vision: no visual deficits  Has reading glasses    Communication: Receptive self stated goal is: go home      Patient will complete functional transfer with SPV  Comment:     Patient will complete toileting with INDEP  Comment:     Patient will tolerate standing for 3-5 minutes in prep for adls with SPV   Comment:    Patient will

## 2019-09-19 NOTE — PROGRESS NOTES
[place] [facility] - [facility]    Progress Note    Rudy Booth Patient Status:  Inpatient    1945 MRN [id number]   Location [facility] 2W/SW Attending Blank Corral MD   [address] Day # 2 PCP Guy Escobar       Subjective:     Pt c/o weakness. as above  - LifeVest     Accelerated HTN  Improved   - cont coreg, lisinopril, aldactone     Severe MR. Per cards:    Severe MR by echo. By cath normal filling pressures and right heart pressures.  This is likely a volume related problem that probably oliva

## 2019-09-19 NOTE — PLAN OF CARE
Answered all questions correctly. Drowsy this afternoon but awakens to name. Patient provided list of her medications. Reviewed medication list  with her and Vickie mcleod called for verification. PTA med list in [place] reviewed and changed.  Dr. Keiko Parra Progressing  9/19/2019 1712 by Arlin Carmona RN  Outcome: Progressing  Goal: Absence of seizures  Description  INTERVENTIONS  - Monitor for seizure activity  - Administer anti-seizure medications as ordered  - Monitor neurological status  Outcome: Complet

## 2019-09-20 LAB
ANION GAP SERPL CALC-SCNC: 8 MMOL/L (ref 0–18)
BASOPHILS # BLD AUTO: 0.02 X10(3) UL (ref 0–0.2)
BASOPHILS NFR BLD AUTO: 0.2 %
BUN BLD-MCNC: 32 MG/DL (ref 7–18)
BUN/CREAT SERPL: 25.6 (ref 10–20)
CALCIUM BLD-MCNC: 8.9 MG/DL (ref 8.5–10.1)
CHLORIDE SERPL-SCNC: 104 MMOL/L (ref 98–112)
CO2 SERPL-SCNC: 23 MMOL/L (ref 21–32)
CREAT BLD-MCNC: 1.25 MG/DL (ref 0.55–1.02)
DEPRECATED RDW RBC AUTO: 41.5 FL (ref 35.1–46.3)
EOSINOPHIL # BLD AUTO: 0.08 X10(3) UL (ref 0–0.7)
EOSINOPHIL NFR BLD AUTO: 0.7 %
ERYTHROCYTE [DISTWIDTH] IN BLOOD BY AUTOMATED COUNT: 12.7 % (ref 11–15)
GLUCOSE BLD-MCNC: 113 MG/DL (ref 70–99)
HCT VFR BLD AUTO: 37.7 % (ref 35–48)
HGB BLD-MCNC: 12.7 G/DL (ref 12–16)
IMM GRANULOCYTES # BLD AUTO: 0.03 X10(3) UL (ref 0–1)
IMM GRANULOCYTES NFR BLD: 0.3 %
LYMPHOCYTES # BLD AUTO: 2.53 X10(3) UL (ref 1–4)
LYMPHOCYTES NFR BLD AUTO: 23.6 %
MCH RBC QN AUTO: 30.3 PG (ref 26–34)
MCHC RBC AUTO-ENTMCNC: 33.7 G/DL (ref 31–37)
MCV RBC AUTO: 90 FL (ref 80–100)
MONOCYTES # BLD AUTO: 0.65 X10(3) UL (ref 0.1–1)
MONOCYTES NFR BLD AUTO: 6.1 %
NEUTROPHILS # BLD AUTO: 7.39 X10 (3) UL (ref 1.5–7.7)
NEUTROPHILS # BLD AUTO: 7.39 X10(3) UL (ref 1.5–7.7)
NEUTROPHILS NFR BLD AUTO: 69.1 %
OSMOLALITY SERPL CALC.SUM OF ELEC: 288 MOSM/KG (ref 275–295)
PLATELET # BLD AUTO: 329 10(3)UL (ref 150–450)
POTASSIUM SERPL-SCNC: 4.3 MMOL/L (ref 3.5–5.1)
RBC # BLD AUTO: 4.19 X10(6)UL (ref 3.8–5.3)
SODIUM SERPL-SCNC: 135 MMOL/L (ref 136–145)
WBC # BLD AUTO: 10.7 X10(3) UL (ref 4–11)

## 2019-09-20 PROCEDURE — 99233 SBSQ HOSP IP/OBS HIGH 50: CPT | Performed by: HOSPITALIST

## 2019-09-20 RX ORDER — GABAPENTIN 600 MG/1
600 TABLET ORAL 3 TIMES DAILY
Status: DISCONTINUED | OUTPATIENT
Start: 2019-09-20 | End: 2019-09-23

## 2019-09-20 RX ORDER — MORPHINE SULFATE 2 MG/ML
2 INJECTION, SOLUTION INTRAMUSCULAR; INTRAVENOUS EVERY 4 HOURS PRN
Status: DISCONTINUED | OUTPATIENT
Start: 2019-09-20 | End: 2019-09-20

## 2019-09-20 RX ORDER — LISINOPRIL 10 MG/1
10 TABLET ORAL DAILY
Status: DISCONTINUED | OUTPATIENT
Start: 2019-09-21 | End: 2019-09-21

## 2019-09-20 RX ORDER — ROPINIROLE 1 MG/1
1 TABLET, FILM COATED ORAL NIGHTLY
Status: DISCONTINUED | OUTPATIENT
Start: 2019-09-20 | End: 2019-09-23

## 2019-09-20 RX ORDER — TRAMADOL HYDROCHLORIDE 50 MG/1
50 TABLET ORAL EVERY 6 HOURS PRN
Status: DISCONTINUED | OUTPATIENT
Start: 2019-09-20 | End: 2019-09-23

## 2019-09-20 NOTE — PROGRESS NOTES
Patient Bp low post Morphine injection. Pt awake and orient and not symptomatic. Personally assessed patient and updated daughter at bedside.

## 2019-09-20 NOTE — PLAN OF CARE
Problem: CARDIOVASCULAR - ADULT  Goal: Maintains optimal cardiac output and hemodynamic stability  Description  INTERVENTIONS:  - Monitor vital signs, rhythm, and trends  - Monitor for bleeding, hypotension and signs of decreased cardiac output  - Evalua Cognition  Goal: Patient will exhibit improved attention, thought processing and/or memory  Description  Interventions:  - Minimize distractions in the room when full attention is required  Outcome: Progressing     Problem: SAFETY ADULT - FALL  Goal: Free

## 2019-09-20 NOTE — PROGRESS NOTES
Heart failure packet given to patient. Time for reading material given. Will answer questions later in the day after she has read through some of it.  Patient understands

## 2019-09-20 NOTE — PROGRESS NOTES
Turned patient and checked skin post BSSR. Left inner buttock reddened stage I noted. Documented and patient turned. Skin is intact and is blanchable.

## 2019-09-20 NOTE — DISCHARGE PLANNING
OT bedside and Daughter Trish requesting Rehab for the patient. Requesting call from MICHELLE on possible placement to [facility]. Please call Daughter Augusta Talbot for information.   MICHELLE Cross notified of information and request call to Augusta Talbot

## 2019-09-20 NOTE — PHYSICAL THERAPY NOTE
Attempted to see patient for physical therapy session. Patient with blood pressure 78 systolic and then 81 systolic when re-taken. Patient supine in bed. Patient had been given morphine per RN.  Will attempt to see patient at later time for physical therapy

## 2019-09-20 NOTE — PROGRESS NOTES
[facility]    Progress Note    Quincy Sylvester Patient Status:  Inpatient    1945 MRN [id number]   Location [facility] 2W/SW Attending Buzz Wharton MD   Hosp Day # 3 PCP Venkata Romero       Subjective:     Pt feeling much LifeVest     Accelerated HTN  Resolved. BP on low side currently. - cont coreg, lisinopril, aldactone. Lisinopril reduced.     Severe MR. Per cards:    Severe MR by echo. By cath normal filling pressures and right heart pressures.  This is likely a vol

## 2019-09-20 NOTE — CM/SW NOTE
1100 [facility] P[facility] able to accept without heparin gtt. Per nurse informed patient and family requesting referral to [facility] in [place] for [address]. DON screen requested. MAYTE/Lynette sent referral to Jonny/Shravan Garduno/Jonny espinal

## 2019-09-20 NOTE — PROGRESS NOTES
Patient stated to this RN she has her Morphine sulfate in her purse & can she take it. I advised her that she cannot take any home medications she has on her possession.   I informed her I would page the Hospitalist and obtain pain medication orders for he

## 2019-09-20 NOTE — PROGRESS NOTES
Patient seen in follow up.  Patient denies any chest pain or sob.    09/20/19  1300   BP: 99/78   Pulse: 78   Resp: 17   Temp:        Intake/Output Summary (Last 24 hours) at 9/20/2019 1451  Last data filed at 9/20/2019 1400  Gross per 24 hour   Intak morphINE Sulfate IR 15 MG Oral Tab Take 15 mg by mouth every 4 (four) hours as needed for Pain. Magnesium 200 MG Oral Tab Take 400 mg by mouth. Cholecalciferol (VITAMIN D3) 23219 units Oral Cap Take by mouth once a week.    Omeprazole 40 MG Oral Capsule 9/20/19patietn doing well, BP mildly low continue same treatment, ok to discharge . Thank you for allowing me to participate in the care of your patient. please call if you have any question.      Kristyn Rothman MD   General Cardiology & Advanced Heart Fail

## 2019-09-20 NOTE — OCCUPATIONAL THERAPY NOTE
OCCUPATIONAL THERAPY TREATMENT NOTE - INPATIENT        Room Number: 222/222-A           Presenting Problem: (hypertensive emergency)    Problem List  Principal Problem:    Accelerated hypertension  Active Problems:    Leukocytosis    Hyponatremia    [place] techniques;ADL training;Functional transfer training; Endurance training;Patient/Family education;Patient/Family training;Equipment eval/education; Compensatory technique education    SUBJECTIVE  \"my legs feel like jello\"     OBJECTIVE  Precautions: Fely royer Gandara OTR/L  [facility]   #[id number]

## 2019-09-21 LAB
ANION GAP SERPL CALC-SCNC: 10 MMOL/L (ref 0–18)
BASOPHILS # BLD AUTO: 0.04 X10(3) UL (ref 0–0.2)
BASOPHILS NFR BLD AUTO: 0.4 %
BUN BLD-MCNC: 34 MG/DL (ref 7–18)
BUN/CREAT SERPL: 21.8 (ref 10–20)
CALCIUM BLD-MCNC: 8.5 MG/DL (ref 8.5–10.1)
CHLORIDE SERPL-SCNC: 106 MMOL/L (ref 98–112)
CO2 SERPL-SCNC: 17 MMOL/L (ref 21–32)
CREAT BLD-MCNC: 1.56 MG/DL (ref 0.55–1.02)
DEPRECATED RDW RBC AUTO: 42.5 FL (ref 35.1–46.3)
EOSINOPHIL # BLD AUTO: 0.13 X10(3) UL (ref 0–0.7)
EOSINOPHIL NFR BLD AUTO: 1.4 %
ERYTHROCYTE [DISTWIDTH] IN BLOOD BY AUTOMATED COUNT: 13.2 % (ref 11–15)
GLUCOSE BLD-MCNC: 102 MG/DL (ref 70–99)
HCT VFR BLD AUTO: 36 % (ref 35–48)
HGB BLD-MCNC: 12.4 G/DL (ref 12–16)
IMM GRANULOCYTES # BLD AUTO: 0.05 X10(3) UL (ref 0–1)
IMM GRANULOCYTES NFR BLD: 0.6 %
LYMPHOCYTES # BLD AUTO: 2.29 X10(3) UL (ref 1–4)
LYMPHOCYTES NFR BLD AUTO: 25.4 %
MCH RBC QN AUTO: 30.8 PG (ref 26–34)
MCHC RBC AUTO-ENTMCNC: 34.4 G/DL (ref 31–37)
MCV RBC AUTO: 89.3 FL (ref 80–100)
MONOCYTES # BLD AUTO: 0.59 X10(3) UL (ref 0.1–1)
MONOCYTES NFR BLD AUTO: 6.6 %
NEUTROPHILS # BLD AUTO: 5.9 X10 (3) UL (ref 1.5–7.7)
NEUTROPHILS # BLD AUTO: 5.9 X10(3) UL (ref 1.5–7.7)
NEUTROPHILS NFR BLD AUTO: 65.6 %
OSMOLALITY SERPL CALC.SUM OF ELEC: 284 MOSM/KG (ref 275–295)
PLATELET # BLD AUTO: 334 10(3)UL (ref 150–450)
POTASSIUM SERPL-SCNC: 4.4 MMOL/L (ref 3.5–5.1)
RBC # BLD AUTO: 4.03 X10(6)UL (ref 3.8–5.3)
SODIUM SERPL-SCNC: 133 MMOL/L (ref 136–145)
WBC # BLD AUTO: 9 X10(3) UL (ref 4–11)

## 2019-09-21 PROCEDURE — 99233 SBSQ HOSP IP/OBS HIGH 50: CPT | Performed by: HOSPITALIST

## 2019-09-21 RX ORDER — SODIUM CHLORIDE 9 MG/ML
INJECTION, SOLUTION INTRAVENOUS CONTINUOUS
Status: DISCONTINUED | OUTPATIENT
Start: 2019-09-21 | End: 2019-09-22

## 2019-09-21 RX ORDER — CARVEDILOL 6.25 MG/1
6.25 TABLET ORAL 2 TIMES DAILY WITH MEALS
Status: DISCONTINUED | OUTPATIENT
Start: 2019-09-22 | End: 2019-09-23

## 2019-09-21 NOTE — PROGRESS NOTES
[place] [facility] - [facility]    Progress Note    [facility] Patient Status:  Inpatient    1945 MRN [id number]   Location [facility] 2W/SW Attending Yousif Pardo, [address] [facility]  Day # 4 PCP Romero Mg       Subjective:     Pt feels well an coreg  - aldactone and lisinopril held for AFRAZ    FARAZ  Could be due to ATN due to transient hypotension as well as meds. - hold aldactone and lisinopril  - gentle IVF:  NS at 50 ml/hr  - bmp in am     Severe MR. Per cards:    Severe MR by echo.  By cath n

## 2019-09-21 NOTE — PROGRESS NOTES
Below note from [facility]. Patient was interviewed and examined. Agree with assessment and plan with edits to the document performed as necessary.     [facility]  Progress Note    Sang Dan Patient Status:  Inpatient    1945 MRN  Wall thickness was     increased in a pattern of mild LVH. Systolic function was     moderately to severely reduced. The estimated ejection fraction     was 30-35%.  Doppler parameters are consistent with abnormal left     ventricular relaxation (grade 1 di mg 40 mg Oral QAM AC   ondansetron HCl (ZOFRAN) injection 4 mg 4 mg Intravenous Q6H PRN   acetaminophen (TYLENOL) tab 650 mg 650 mg Oral Q6H PRN   hydrALAzine HCl (APRESOLINE) injection 10 mg 10 mg Intravenous Q4H PRN   Labetalol HCl (TRANDATE) injection 1

## 2019-09-21 NOTE — PHYSICAL THERAPY NOTE
PHYSICAL THERAPY TREATMENT NOTE - INPATIENT     Room Number: 222/222-A       Presenting Problem: accelerated HTN and new life vest    Problem List  Principal Problem:    Accelerated hypertension  Active Problems:    Leukocytosis    Hyponatremia    Metaboli tested    ACTIVITY TOLERANCE                         O2 WALK                  AM-PAC '6-Clicks' INPATIENT SHORT FORM - BASIC MOBILITY  How much difficulty does the patient currently have. ..  -   Turning over in bed (including adjusting bedclothes, sheets a Status

## 2019-09-21 NOTE — HOME CARE LIAISON
Received referral from [address] [place] [address]. Met with patient at the bedside. Patient is agreeable to [facility], pending orders. Residential brochure provided with contact information. All questions addressed and answered.      Patient will need

## 2019-09-22 LAB
ANION GAP SERPL CALC-SCNC: 8 MMOL/L (ref 0–18)
BUN BLD-MCNC: 26 MG/DL (ref 7–18)
BUN/CREAT SERPL: 19.8 (ref 10–20)
CALCIUM BLD-MCNC: 8.6 MG/DL (ref 8.5–10.1)
CHLORIDE SERPL-SCNC: 112 MMOL/L (ref 98–112)
CO2 SERPL-SCNC: 19 MMOL/L (ref 21–32)
CREAT BLD-MCNC: 1.31 MG/DL (ref 0.55–1.02)
DEPRECATED HBV CORE AB SER IA-ACNC: 92.8 NG/ML (ref 18–340)
GLUCOSE BLD-MCNC: 101 MG/DL (ref 70–99)
IRON SATURATION: 12 % (ref 15–50)
IRON SERPL-MCNC: 38 UG/DL (ref 50–170)
OSMOLALITY SERPL CALC.SUM OF ELEC: 293 MOSM/KG (ref 275–295)
POTASSIUM SERPL-SCNC: 4.5 MMOL/L (ref 3.5–5.1)
SODIUM SERPL-SCNC: 139 MMOL/L (ref 136–145)
TOTAL IRON BINDING CAPACITY: 325 UG/DL (ref 240–450)
TRANSFERRIN SERPL-MCNC: 218 MG/DL (ref 200–360)

## 2019-09-22 PROCEDURE — 99233 SBSQ HOSP IP/OBS HIGH 50: CPT | Performed by: HOSPITALIST

## 2019-09-22 RX ORDER — LISINOPRIL 2.5 MG/1
2.5 TABLET ORAL DAILY
Status: DISCONTINUED | OUTPATIENT
Start: 2019-09-23 | End: 2019-09-23

## 2019-09-22 NOTE — PLAN OF CARE
Pt transferred to room 342. Belongings sent with the pt. Life vest wearing. Skin checked with Mustapha REHMAN. No other new skin issues.

## 2019-09-22 NOTE — CM/SW NOTE
RN informed MICHELLE that pt's family now requested that pt go to [facility], due to Dr. Ty File follows at that SNF. Referral sent to [facility] for review. Updated clinicals attached.     PLAN: [place], pending review    Severiano Arias, YOHAN - ext. 13

## 2019-09-22 NOTE — PROGRESS NOTES
[facility]    Progress Note    Bob Ford Patient Status:  Inpatient    1945 MRN [id number]   Location [facility] 3W/SW Attending Tatianna Gregory MD   Hosp Day # 5 PCP Franklin Lazo       Subjective:     Pt feeling carin HTN  Resolved. BP on low side currently. - cont coreg  - stop aldactone  - cont lisinopril     FARAZ  Could be due to ATN due to transient hypotension as well as meds. Improved.   - hold aldactone   - resume lisinopril at low dose  - stop IVF  - bmp in am

## 2019-09-22 NOTE — PROGRESS NOTES
Below note from [facility]. Patient was interviewed and examined. Agree with assessment and plan with edits to the document performed as necessary.     [facility]  Progress Note    Jorgito Carter Patient Status:  Inpatient    1945 MRN  Wall thickness was     increased in a pattern of mild LVH. Systolic function was     moderately to severely reduced. The estimated ejection fraction     was 30-35%.  Doppler parameters are consistent with abnormal left     ventricular relaxation (grade 1 di Q6H PRN   acetaminophen (TYLENOL) tab 650 mg 650 mg Oral Q6H PRN   hydrALAzine HCl (APRESOLINE) injection 10 mg 10 mg Intravenous Q4H PRN   Labetalol HCl (TRANDATE) injection 10 mg 10 mg Intravenous Q4H PRN   aspirin EC tab 81 mg 81 mg Oral Daily   [place]

## 2019-09-22 NOTE — PLAN OF CARE
Upon arrival to floor skin check done with Fuad JOEL. Patient noted to have large red rash to abdominal area approx 7.5\" long by 1.5\" at its widest, with a slight yellow discharge. Area cleaned with foam cleanser and wipes, miconazole powder applied.

## 2019-09-22 NOTE — PLAN OF CARE
Problem: Patient/Family Goals  Goal: Patient/Family Long Term Goal  Description  Patient's Long Term Goal: go home safely after rehab center    Interventions:  - work with PT/OT  - take medications as directed  - See additional Care Plan goals for specif minimize risk of hemorrhage  - Monitor temperature, glucose, and sodium.  Initiate appropriate interventions as ordered  Outcome: Progressing  Goal: Achieves maximal functionality and self care  Description  INTERVENTIONS  - Monitor swallowing and airway pa

## 2019-09-23 VITALS
HEIGHT: 65 IN | TEMPERATURE: 99 F | SYSTOLIC BLOOD PRESSURE: 158 MMHG | HEART RATE: 70 BPM | DIASTOLIC BLOOD PRESSURE: 92 MMHG | WEIGHT: 142 LBS | OXYGEN SATURATION: 97 % | BODY MASS INDEX: 23.66 KG/M2 | RESPIRATION RATE: 16 BRPM

## 2019-09-23 LAB
ANION GAP SERPL CALC-SCNC: 8 MMOL/L (ref 0–18)
BUN BLD-MCNC: 17 MG/DL (ref 7–18)
BUN/CREAT SERPL: 15 (ref 10–20)
CALCIUM BLD-MCNC: 8.7 MG/DL (ref 8.5–10.1)
CHLORIDE SERPL-SCNC: 108 MMOL/L (ref 98–112)
CO2 SERPL-SCNC: 23 MMOL/L (ref 21–32)
CREAT BLD-MCNC: 1.13 MG/DL (ref 0.55–1.02)
GLUCOSE BLD-MCNC: 110 MG/DL (ref 70–99)
OSMOLALITY SERPL CALC.SUM OF ELEC: 290 MOSM/KG (ref 275–295)
POTASSIUM SERPL-SCNC: 4.2 MMOL/L (ref 3.5–5.1)
SODIUM SERPL-SCNC: 139 MMOL/L (ref 136–145)

## 2019-09-23 PROCEDURE — 99239 HOSP IP/OBS DSCHRG MGMT >30: CPT | Performed by: HOSPITALIST

## 2019-09-23 RX ORDER — SPIRONOLACTONE 25 MG/1
12.5 TABLET ORAL DAILY
Qty: 30 TABLET | Refills: 0 | Status: SHIPPED | OUTPATIENT
Start: 2019-09-24

## 2019-09-23 RX ORDER — ASPIRIN 81 MG/1
81 TABLET ORAL DAILY
Qty: 30 TABLET | Refills: 0 | Status: SHIPPED | OUTPATIENT
Start: 2019-09-24

## 2019-09-23 RX ORDER — ROPINIROLE 1 MG/1
1 TABLET, FILM COATED ORAL NIGHTLY
Qty: 30 TABLET | Refills: 0 | Status: SHIPPED | OUTPATIENT
Start: 2019-09-23

## 2019-09-23 RX ORDER — GABAPENTIN 600 MG/1
600 TABLET ORAL 3 TIMES DAILY
Qty: 90 TABLET | Refills: 0 | Status: SHIPPED | OUTPATIENT
Start: 2019-09-23

## 2019-09-23 RX ORDER — LISINOPRIL 2.5 MG/1
2.5 TABLET ORAL DAILY
Qty: 30 TABLET | Refills: 0 | Status: ON HOLD | OUTPATIENT
Start: 2019-09-24 | End: 2020-09-15

## 2019-09-23 RX ORDER — LEVOFLOXACIN 250 MG/1
250 TABLET ORAL DAILY
Qty: 4 TABLET | Refills: 0 | Status: SHIPPED | OUTPATIENT
Start: 2019-09-23 | End: 2019-09-27

## 2019-09-23 RX ORDER — SPIRONOLACTONE 25 MG/1
12.5 TABLET ORAL DAILY
Status: DISCONTINUED | OUTPATIENT
Start: 2019-09-23 | End: 2019-09-23

## 2019-09-23 RX ORDER — TRAMADOL HYDROCHLORIDE 50 MG/1
50 TABLET ORAL EVERY 6 HOURS PRN
Qty: 20 TABLET | Refills: 0 | Status: ON HOLD | OUTPATIENT
Start: 2019-09-23 | End: 2020-09-14

## 2019-09-23 RX ORDER — CLOPIDOGREL BISULFATE 75 MG/1
75 TABLET ORAL DAILY
Qty: 30 TABLET | Refills: 0 | Status: SHIPPED | OUTPATIENT
Start: 2019-09-24

## 2019-09-23 RX ORDER — CARVEDILOL 6.25 MG/1
6.25 TABLET ORAL 2 TIMES DAILY WITH MEALS
Qty: 60 TABLET | Refills: 0 | Status: ON HOLD | OUTPATIENT
Start: 2019-09-23 | End: 2020-09-15

## 2019-09-23 NOTE — CM/SW NOTE
12:00PM  Per RN rounds - pt ready for d/c today. MICHELLE contacted [facility] w/ admissions at [facility]. Per [facility] - any time after 1PM.     MICHELLE contacted pt's RN - RN to arsalan SORIA to confirm d/c today.     12:45PM  SW received call from pt's RN - ready for d/c toda

## 2019-09-23 NOTE — PLAN OF CARE
Patient discharged on 9/23 at 2:05 pm via [facility] to [facility]. Patient alert and oriented. Belongings gathered and sent with patient. Discharge instructions and prescriptions sent with patient. Tele monitor and IV removed.  Report given to [facility]

## 2019-09-23 NOTE — PLAN OF CARE
Problem: Patient/Family Goals  Goal: Patient/Family Long Term Goal  Description  Patient's Long Term Goal: go home safely after rehab center    Interventions:  - work with PT/OT  - take medications as directed  - See additional Care Plan goals for specif minimize risk of hemorrhage  - Monitor temperature, glucose, and sodium.  Initiate appropriate interventions as ordered  Outcome: Progressing  Goal: Achieves maximal functionality and self care  Description  INTERVENTIONS  - Monitor swallowing and airway pa used. Patient demonstrated insulin pen self injections. Patient being discharged.

## 2019-09-23 NOTE — OCCUPATIONAL THERAPY NOTE
OCCUPATIONAL THERAPY TREATMENT NOTE - INPATIENT        Room Number: 267/720-E           Presenting Problem: (hypertensive emergency)    Problem List  Principal Problem:    Accelerated hypertension  Active Problems:    Leukocytosis    Hyponatremia    [facility] rehab today. \"     OBJECTIVE  Precautions: Cardiac(HOB elevated 30 degrees)    WEIGHT BEARING RESTRICTION  Weight Bearing Restriction: None                PAIN ASSESSMENT  Ratin  Location: back (chronic)  Management Techniques:  Activity promotion;Repos minutes in prep for adls with SPV   Comment: ~2min with SB/CGA    Patient will complete item retrieval at mod I   Comment: Progressing            Goals  on: 19  Frequency: 3-5x week    Irving Leary MA, OTR/L  Occupational Therapist

## 2019-09-23 NOTE — PLAN OF CARE
Problem: Patient/Family Goals  Goal: Patient/Family Long Term Goal  Description  Patient's Long Term Goal: go home safely after rehab center    Interventions:  - work with PT/OT  - take medications as directed  - See additional Care Plan goals for specif Marilia Castelan RN  Outcome: Progressing     Problem: NEUROLOGICAL - ADULT  Goal: Achieves stable or improved neurological status  Description  INTERVENTIONS  - Assess for and report changes in neurological status  - Initiate measures to prevent increased i strengthening/mobility  - Encourage toileting schedule  9/23/2019 1252 by Benjamin Sánchez RN  Outcome: Progressing  9/23/2019 1046 by Benjamin Sánchez RN  Outcome: Progressing     Problem: Patient Centered Care  Goal: Patient preferences are identified and in

## 2019-09-23 NOTE — PLAN OF CARE
Problem: Patient/Family Goals  Goal: Patient/Family Long Term Goal  Description  Patient's Long Term Goal: go home safely after rehab center    Interventions:  - work with PT/OT  - take medications as directed  - See additional Care Plan goals for specif minimize risk of hemorrhage  - Monitor temperature, glucose, and sodium.  Initiate appropriate interventions as ordered  Outcome: Progressing  Goal: Achieves maximal functionality and self care  Description  INTERVENTIONS  - Monitor swallowing and airway pa patient appearing to understand better how to deal with the warning beeping. Info sent to [facility] extended care as patient's new choice for rehab at this time. Monitoring.

## 2019-09-23 NOTE — PROGRESS NOTES
[facility] AND [facility]  Progress Note    Quincy Sylvester Patient Status:  Inpatient    1945 MRN [id number]   Location [facility] 3W/SW Attending Buzz Wharton MD   Hosp Day # 6 PCP Venkata Romero     Subjective:  Patient seen in follow up.   Sh Mitral valve: Mildly calcified annulus. Normal thickened, mildly     calcified leaflets. Severe regurgitation.   4. Pulmonary arteries: Systolic pressure could not be accurately     estimated.     9/17/19 EKG  Sinus Rhythm -First degree A-V block  Courtney = 300 12 mm 9/17/19  - continue aspirin, atorvastatin 40 mg daily, plavix     2. HTN emergency  - normal     3. Acute systolic CHF  - EF 27-96%  - lifevest  - carvedilol 6.25 mg bid, not on lisinopril or spironolactone  - volume status stable     4.  Severe MR  -

## 2019-09-24 NOTE — DISCHARGE SUMMARY
[facility]    Discharge Summary    Stewart Akhtar Patient Status:  Inpatient    1945 MRN [id number]   Location [facility] 3W/SW Attending No att. providers found   Hosp Day # 6 PCP Deb Perez     Date of Admission:  why she is in the hospital, denies any complaints at this time. Hospital Course:     Acute encephalopathy  Resolved. Likely due to polypharmacy or not taking her meds together with her NSTEMI and accelerated HTN and UTI.   - cont lexapro (for celexa) an proph:   heparin     Code status:   Full    Consultations:   Cardiology, neurology     Discharge Condition:  Good    Discharge Medications:      Discharge Medications      START taking these medications      Instructions Prescription details   aspirin 81 M · medication strength  · how much to take      Take 1 tablet (2.5 mg total) by mouth daily.    Quantity:  30 tablet  Refills:  0        CONTINUE taking these medications      Instructions Prescription details   atorvastatin 40 MG Tabs  Commonly known as: medications  · levofloxacin 250 MG Tabs  · traMADol HCl 50 MG Tabs         Follow up Visits: Follow-up Information     Isaías Marina MD In 1 week.     Specialty:  Cardiovascular Diseases  Contact information:  [address]  [place] [address]

## 2019-09-27 ENCOUNTER — SNF VISIT (OUTPATIENT)
Dept: INTERNAL MEDICINE CLINIC | Facility: SKILLED NURSING FACILITY | Age: 74
End: 2019-09-27

## 2019-09-27 DIAGNOSIS — K59.00 CONSTIPATION, UNSPECIFIED CONSTIPATION TYPE: ICD-10-CM

## 2019-09-27 DIAGNOSIS — M54.50 CHRONIC BILATERAL LOW BACK PAIN WITHOUT SCIATICA: ICD-10-CM

## 2019-09-27 DIAGNOSIS — Z71.89 ENCOUNTER FOR MEDICATION REVIEW AND COUNSELING: ICD-10-CM

## 2019-09-27 DIAGNOSIS — G89.29 CHRONIC BILATERAL LOW BACK PAIN WITHOUT SCIATICA: ICD-10-CM

## 2019-09-27 PROCEDURE — 99310 SBSQ NF CARE HIGH MDM 45: CPT | Performed by: NURSE PRACTITIONER

## 2019-09-27 RX ORDER — ACETAMINOPHEN 500 MG
500 TABLET ORAL EVERY 6 HOURS PRN
COMMUNITY

## 2019-09-27 NOTE — PROGRESS NOTES
Dee Saucedo  : 1945  Age 69year old  female patient is admitted to [facility] for ZEINAB. Chief complaint: Initial APRN Assessment/follow up Acute Encephalopathy secondary to Polypharmacy, NSTEMI and UTI. C/O hard stools.     HP fever, chills, unintentional weight loss or gain. CV: Denies SOB, dizziness,palpitations or CP. Respiratory: Denies SOB, cough or wheezing   GI: + constipation with hard stools. Denies black or bloody stools. Denies Abd tenderness, or diarrhea .  Denies LifeVest     FARAZ-Resolved. Aldactone and lisinopril were held and then resumed. Pt given IVF.     Severe MR.   Severe MR by echo.      RLS  - CPM neurontin and requip  -CPM tizanidine PRN  - Pt says she was on MS contin for RLS however there is no indicat

## 2019-10-01 PROCEDURE — 99315 NF DSCHRG MGMT 30 MIN/LESS: CPT | Performed by: FAMILY MEDICINE

## 2019-10-04 ENCOUNTER — SNF VISIT (OUTPATIENT)
Dept: INTERNAL MEDICINE CLINIC | Facility: SKILLED NURSING FACILITY | Age: 74
End: 2019-10-04

## 2019-10-04 DIAGNOSIS — Z09 FOLLOW UP: ICD-10-CM

## 2019-10-04 DIAGNOSIS — K59.03 DRUG-INDUCED CONSTIPATION: ICD-10-CM

## 2019-10-04 PROCEDURE — 99309 SBSQ NF CARE MODERATE MDM 30: CPT | Performed by: NURSE PRACTITIONER

## 2019-10-04 RX ORDER — DOCUSATE SODIUM 100 MG/1
100 CAPSULE, LIQUID FILLED ORAL 2 TIMES DAILY PRN
COMMUNITY

## 2019-10-04 NOTE — PROGRESS NOTES
Martha Beach  : 1945  Age 69year old  female patient is admitted to [facility] for ZEINAB. Chief complaint: Follow up Acute Encephalopathy secondary to Polypharmacy, NSTEMI, UTI and hard stools.     HPI  Patient is a(n) 69 year o major abnormality. REVIEW OF SYSTEMS:   Constitutional: Denies fever, chills, unintentional weight loss or gain. CV: Denies SOB, dizziness,palpitations or CP. Respiratory: Denies SOB, cough or wheezing   GI: + constipation with hard stools. Denies to RCA  - cont aspirin, plavix, coreg  - cont aldactone  - cont lisinopril  - cont lipitor     Ischemic cardiomyopathy  - cards consult  -EF 30-35%  - LifeVest     FARAZ-Resolved. Aldactone and lisinopril were held and then resumed.   Pt given IVF.     Sever

## 2019-10-11 ENCOUNTER — SNF DISCHARGE (OUTPATIENT)
Dept: INTERNAL MEDICINE CLINIC | Facility: SKILLED NURSING FACILITY | Age: 74
End: 2019-10-11

## 2019-10-11 DIAGNOSIS — Z02.9 DISCHARGE PLANNING ISSUES: ICD-10-CM

## 2019-10-11 DIAGNOSIS — Z71.89 ENCOUNTER FOR MEDICATION REVIEW AND COUNSELING: ICD-10-CM

## 2019-10-11 DIAGNOSIS — Z09 FOLLOW UP: ICD-10-CM

## 2019-10-11 PROCEDURE — 99309 SBSQ NF CARE MODERATE MDM 30: CPT | Performed by: NURSE PRACTITIONER

## 2019-10-11 RX ORDER — FUROSEMIDE 20 MG/1
40 TABLET ORAL DAILY
Status: ON HOLD | COMMUNITY
End: 2020-09-15

## 2019-10-11 NOTE — PROGRESS NOTES
Ann Marie Ashley  : 1945  Age 69year old  female patient is admitted to [address] [place] [address] for ZEINAB. Chief complaint: Follow up fluid overload and Discharge planning.     HPI  Patient is a(n) 69year old female, with PMH significant for hy REVIEW OF SYSTEMS:   Constitutional: Denies fever, chills, unintentional weight loss or gain. CV: Denies SOB, dizziness,palpitations or CP. Respiratory: Denies SOB, cough or wheezing   GI: + constipation with hard stools. Denies black or bloody stools. encephalopathy-Resolved. Likely due to polypharmacy or not taking her meds together with her NSTEMI and accelerated HTN and UTI. - probably some level of baseline dementia     - neurology was on consult    UTI  Ucx +Proteus vulgaris.   Had ceftriaxone and

## 2020-09-14 ENCOUNTER — APPOINTMENT (OUTPATIENT)
Dept: GENERAL RADIOLOGY | Facility: HOSPITAL | Age: 75
End: 2020-09-14
Attending: EMERGENCY MEDICINE
Payer: MEDICARE

## 2020-09-14 ENCOUNTER — HOSPITAL ENCOUNTER (OUTPATIENT)
Facility: HOSPITAL | Age: 75
Setting detail: OBSERVATION
Discharge: HOME | End: 2020-09-15
Attending: EMERGENCY MEDICINE | Admitting: HOSPITALIST
Payer: MEDICARE

## 2020-09-14 DIAGNOSIS — R07.9 ACUTE CHEST PAIN: ICD-10-CM

## 2020-09-14 DIAGNOSIS — I44.7 NEW ONSET LEFT BUNDLE BRANCH BLOCK (LBBB): Primary | ICD-10-CM

## 2020-09-14 LAB
ALBUMIN SERPL-MCNC: 3.8 G/DL (ref 3.4–5)
ALP LIVER SERPL-CCNC: 152 U/L (ref 55–142)
ALT SERPL-CCNC: 20 U/L (ref 13–56)
ANION GAP SERPL CALC-SCNC: 16 MMOL/L (ref 0–18)
AST SERPL-CCNC: 27 U/L (ref 15–37)
BASOPHILS # BLD AUTO: 0.03 X10(3) UL (ref 0–0.2)
BASOPHILS NFR BLD AUTO: 0.4 %
BILIRUB DIRECT SERPL-MCNC: 0.2 MG/DL (ref 0–0.2)
BILIRUB SERPL-MCNC: 0.6 MG/DL (ref 0.1–2)
BUN BLD-MCNC: 17 MG/DL (ref 7–18)
BUN/CREAT SERPL: 14 (ref 10–20)
CALCIUM BLD-MCNC: 9.3 MG/DL (ref 8.5–10.1)
CHLORIDE SERPL-SCNC: 99 MMOL/L (ref 98–112)
CO2 SERPL-SCNC: 18 MMOL/L (ref 21–32)
CREAT BLD-MCNC: 1.21 MG/DL (ref 0.55–1.02)
DEPRECATED RDW RBC AUTO: 42.7 FL (ref 35.1–46.3)
EOSINOPHIL # BLD AUTO: 0.01 X10(3) UL (ref 0–0.7)
EOSINOPHIL NFR BLD AUTO: 0.1 %
ERYTHROCYTE [DISTWIDTH] IN BLOOD BY AUTOMATED COUNT: 13.6 % (ref 11–15)
GLUCOSE BLD-MCNC: 110 MG/DL (ref 70–99)
HCT VFR BLD AUTO: 35.4 % (ref 35–48)
HGB BLD-MCNC: 12.2 G/DL (ref 12–16)
IMM GRANULOCYTES # BLD AUTO: 0.02 X10(3) UL (ref 0–1)
IMM GRANULOCYTES NFR BLD: 0.3 %
LIPASE SERPL-CCNC: 99 U/L (ref 73–393)
LYMPHOCYTES # BLD AUTO: 1.1 X10(3) UL (ref 1–4)
LYMPHOCYTES NFR BLD AUTO: 14.9 %
M PROTEIN MFR SERPL ELPH: 7.9 G/DL (ref 6.4–8.2)
MCH RBC QN AUTO: 30.3 PG (ref 26–34)
MCHC RBC AUTO-ENTMCNC: 34.5 G/DL (ref 31–37)
MCV RBC AUTO: 87.8 FL (ref 80–100)
MONOCYTES # BLD AUTO: 0.35 X10(3) UL (ref 0.1–1)
MONOCYTES NFR BLD AUTO: 4.7 %
NEUTROPHILS # BLD AUTO: 5.88 X10 (3) UL (ref 1.5–7.7)
NEUTROPHILS # BLD AUTO: 5.88 X10(3) UL (ref 1.5–7.7)
NEUTROPHILS NFR BLD AUTO: 79.6 %
NT-PROBNP SERPL-MCNC: 1777 PG/ML (ref ?–125)
OSMOLALITY SERPL CALC.SUM OF ELEC: 278 MOSM/KG (ref 275–295)
PLATELET # BLD AUTO: 404 10(3)UL (ref 150–450)
POTASSIUM SERPL-SCNC: 3.9 MMOL/L (ref 3.5–5.1)
RBC # BLD AUTO: 4.03 X10(6)UL (ref 3.8–5.3)
SODIUM SERPL-SCNC: 133 MMOL/L (ref 136–145)
TROPONIN I SERPL-MCNC: <0.045 NG/ML (ref ?–0.04)
WBC # BLD AUTO: 7.4 X10(3) UL (ref 4–11)

## 2020-09-14 PROCEDURE — 71045 X-RAY EXAM CHEST 1 VIEW: CPT | Performed by: EMERGENCY MEDICINE

## 2020-09-14 PROCEDURE — 99220 INITIAL OBSERVATION CARE,LEVL III: CPT | Performed by: HOSPITALIST

## 2020-09-14 RX ORDER — BUPROPION HYDROCHLORIDE 150 MG/1
150 TABLET, EXTENDED RELEASE ORAL DAILY
Status: DISCONTINUED | OUTPATIENT
Start: 2020-09-15 | End: 2020-09-15

## 2020-09-14 RX ORDER — MAGNESIUM OXIDE 400 MG (241.3 MG MAGNESIUM) TABLET
400 TABLET DAILY
Status: DISCONTINUED | OUTPATIENT
Start: 2020-09-14 | End: 2020-09-15

## 2020-09-14 RX ORDER — LOSARTAN POTASSIUM 50 MG/1
50 TABLET ORAL DAILY
Status: DISCONTINUED | OUTPATIENT
Start: 2020-09-15 | End: 2020-09-15

## 2020-09-14 RX ORDER — ERYTHROMYCIN 5 MG/G
1 OINTMENT OPHTHALMIC EVERY 6 HOURS
Status: DISCONTINUED | OUTPATIENT
Start: 2020-09-14 | End: 2020-09-15

## 2020-09-14 RX ORDER — FUROSEMIDE 40 MG/1
40 TABLET ORAL DAILY
Status: DISCONTINUED | OUTPATIENT
Start: 2020-09-14 | End: 2020-09-15

## 2020-09-14 RX ORDER — MAGNESIUM 200 MG
400 TABLET ORAL DAILY
COMMUNITY

## 2020-09-14 RX ORDER — ASPIRIN 325 MG
325 TABLET ORAL DAILY
Status: DISCONTINUED | OUTPATIENT
Start: 2020-09-14 | End: 2020-09-14

## 2020-09-14 RX ORDER — MELATONIN
325
COMMUNITY

## 2020-09-14 RX ORDER — ACETAMINOPHEN 500 MG
500 TABLET ORAL EVERY 6 HOURS PRN
Status: DISCONTINUED | OUTPATIENT
Start: 2020-09-14 | End: 2020-09-15

## 2020-09-14 RX ORDER — DIFLUPREDNATE 0.5 MG/ML
1 EMULSION OPHTHALMIC DAILY
Status: DISCONTINUED | OUTPATIENT
Start: 2020-09-14 | End: 2020-09-14

## 2020-09-14 RX ORDER — PANTOPRAZOLE SODIUM 20 MG/1
20 TABLET, DELAYED RELEASE ORAL
Status: DISCONTINUED | OUTPATIENT
Start: 2020-09-15 | End: 2020-09-15

## 2020-09-14 RX ORDER — TRAMADOL HYDROCHLORIDE 300 MG/1
300 TABLET, EXTENDED RELEASE ORAL DAILY
COMMUNITY

## 2020-09-14 RX ORDER — MELATONIN
325
Status: DISCONTINUED | OUTPATIENT
Start: 2020-09-15 | End: 2020-09-15

## 2020-09-14 RX ORDER — PREDNISOLONE ACETATE 10 MG/ML
1 SUSPENSION/ DROPS OPHTHALMIC 2 TIMES DAILY
Status: DISCONTINUED | OUTPATIENT
Start: 2020-09-15 | End: 2020-09-15

## 2020-09-14 RX ORDER — LIDOCAINE 50 MG/G
OINTMENT TOPICAL AS NEEDED
COMMUNITY

## 2020-09-14 RX ORDER — CARVEDILOL 3.12 MG/1
3.12 TABLET ORAL 2 TIMES DAILY WITH MEALS
Status: DISCONTINUED | OUTPATIENT
Start: 2020-09-14 | End: 2020-09-15

## 2020-09-14 RX ORDER — ASPIRIN 81 MG/1
81 TABLET, CHEWABLE ORAL DAILY
Status: DISCONTINUED | OUTPATIENT
Start: 2020-09-15 | End: 2020-09-15

## 2020-09-14 RX ORDER — SODIUM CHLORIDE 0.9 % (FLUSH) 0.9 %
3 SYRINGE (ML) INJECTION AS NEEDED
Status: DISCONTINUED | OUTPATIENT
Start: 2020-09-14 | End: 2020-09-15

## 2020-09-14 RX ORDER — TIZANIDINE 2 MG/1
2 TABLET ORAL NIGHTLY
Status: DISCONTINUED | OUTPATIENT
Start: 2020-09-14 | End: 2020-09-15

## 2020-09-14 RX ORDER — ATORVASTATIN CALCIUM 40 MG/1
40 TABLET, FILM COATED ORAL NIGHTLY
Status: DISCONTINUED | OUTPATIENT
Start: 2020-09-14 | End: 2020-09-14

## 2020-09-14 RX ORDER — METOCLOPRAMIDE HYDROCHLORIDE 5 MG/ML
5 INJECTION INTRAMUSCULAR; INTRAVENOUS EVERY 8 HOURS PRN
Status: DISCONTINUED | OUTPATIENT
Start: 2020-09-14 | End: 2020-09-15

## 2020-09-14 RX ORDER — ATORVASTATIN CALCIUM 40 MG/1
80 TABLET, FILM COATED ORAL NIGHTLY
Status: DISCONTINUED | OUTPATIENT
Start: 2020-09-14 | End: 2020-09-15

## 2020-09-14 RX ORDER — HEPARIN SODIUM 5000 [USP'U]/ML
5000 INJECTION, SOLUTION INTRAVENOUS; SUBCUTANEOUS EVERY 12 HOURS SCHEDULED
Status: DISCONTINUED | OUTPATIENT
Start: 2020-09-14 | End: 2020-09-15

## 2020-09-14 RX ORDER — SUCRALFATE 1 G/1
1 TABLET ORAL NIGHTLY
Status: DISCONTINUED | OUTPATIENT
Start: 2020-09-14 | End: 2020-09-15

## 2020-09-14 RX ORDER — ERGOCALCIFEROL 1.25 MG/1
50000 CAPSULE ORAL WEEKLY
COMMUNITY
Start: 2020-09-13

## 2020-09-14 RX ORDER — NITROGLYCERIN 0.4 MG/1
0.4 TABLET SUBLINGUAL EVERY 5 MIN PRN
Status: DISCONTINUED | OUTPATIENT
Start: 2020-09-14 | End: 2020-09-15

## 2020-09-14 RX ORDER — GABAPENTIN 600 MG/1
600 TABLET ORAL 3 TIMES DAILY
Status: DISCONTINUED | OUTPATIENT
Start: 2020-09-14 | End: 2020-09-15

## 2020-09-14 RX ORDER — DOCUSATE SODIUM 100 MG/1
100 CAPSULE, LIQUID FILLED ORAL 2 TIMES DAILY PRN
Status: DISCONTINUED | OUTPATIENT
Start: 2020-09-14 | End: 2020-09-15

## 2020-09-14 RX ORDER — TRAMADOL HYDROCHLORIDE 50 MG/1
100 TABLET ORAL EVERY 8 HOURS PRN
Status: DISCONTINUED | OUTPATIENT
Start: 2020-09-15 | End: 2020-09-15

## 2020-09-14 RX ORDER — LOSARTAN POTASSIUM 50 MG/1
50 TABLET ORAL DAILY
COMMUNITY

## 2020-09-14 RX ORDER — ESCITALOPRAM OXALATE 10 MG/1
10 TABLET ORAL DAILY
Status: DISCONTINUED | OUTPATIENT
Start: 2020-09-15 | End: 2020-09-15

## 2020-09-14 RX ORDER — POTASSIUM CHLORIDE 1.5 G/1.77G
20 POWDER, FOR SOLUTION ORAL DAILY
COMMUNITY

## 2020-09-14 RX ORDER — CLOPIDOGREL BISULFATE 75 MG/1
75 TABLET ORAL DAILY
Status: DISCONTINUED | OUTPATIENT
Start: 2020-09-15 | End: 2020-09-15

## 2020-09-14 RX ORDER — TRAMADOL HYDROCHLORIDE 300 MG/1
300 TABLET, EXTENDED RELEASE ORAL DAILY
Status: DISCONTINUED | OUTPATIENT
Start: 2020-09-15 | End: 2020-09-14

## 2020-09-14 RX ORDER — ONDANSETRON 2 MG/ML
4 INJECTION INTRAMUSCULAR; INTRAVENOUS EVERY 6 HOURS PRN
Status: DISCONTINUED | OUTPATIENT
Start: 2020-09-14 | End: 2020-09-15

## 2020-09-14 RX ORDER — MORPHINE SULFATE 4 MG/ML
2 INJECTION, SOLUTION INTRAMUSCULAR; INTRAVENOUS ONCE
Status: COMPLETED | OUTPATIENT
Start: 2020-09-14 | End: 2020-09-14

## 2020-09-14 RX ORDER — ERYTHROMYCIN 5 MG/G
1 OINTMENT OPHTHALMIC EVERY 6 HOURS
COMMUNITY

## 2020-09-14 RX ORDER — DIFLUPREDNATE 0.5 MG/ML
1 EMULSION OPHTHALMIC DAILY
COMMUNITY

## 2020-09-14 RX ORDER — ROPINIROLE 1 MG/1
3 TABLET, FILM COATED ORAL NIGHTLY
Status: DISCONTINUED | OUTPATIENT
Start: 2020-09-14 | End: 2020-09-15

## 2020-09-14 RX ORDER — ACETAMINOPHEN 325 MG/1
650 TABLET ORAL EVERY 6 HOURS PRN
Status: DISCONTINUED | OUTPATIENT
Start: 2020-09-14 | End: 2020-09-15

## 2020-09-14 NOTE — ED INITIAL ASSESSMENT (HPI)
Pt to the ed with diarrhea x2 since yesterday and epigastric with mid sternal chest pain that started today.

## 2020-09-14 NOTE — CONSULTS
[facility]    Report of Consultation    Rober Morenci Patient Status:  Observation    1945 MRN [id number]   Location [facility] 3W/SW Attending Kendra Calero MD   Hosp Day # 0 PCP Patricia Knowlesr     Date of Admission:   Application into the right eye every 6 (six) hours. losartan Potassium 50 MG Oral Tab, Take 50 mg by mouth daily. lidocaine 5 % External Ointment, Apply topically as needed. traMADol HCl  MG Oral Tablet 24 Hr, Take 300 mg by mouth daily.   ferrous HPI.    Physical Exam:   Blood pressure (!) 174/85, pulse 92, temperature 97.9 °F (36.6 °C), temperature source Oral, resp. rate 18, height 165.1 cm (5' 5\"), weight 175 lb (79.4 kg), SpO2 99 %.   Intake/Output:   Last 3 shifts: No intake/output data record reduced. The estimated ejection fraction     was 30-35%. Doppler parameters are consistent with abnormal left     ventricular relaxation (grade 1 diastolic dysfunction). 2. Aortic valve: Trivial regurgitation. Peak velocity (S):     1.17m/sec.  Mean gradie mild MR      Thank you for allowing me to participate in the care of your patient.     Casper Madsen  9/14/2020

## 2020-09-14 NOTE — ED PROVIDER NOTES
Patient Seen in: [facility] AND [facility] Emergency Department      History   Patient presents with:  Chest Pain Angina    Stated Complaint: chest pain and diarrhea    HPI    53-year-old female presents the ER with complaint of chest pressure, shortness of harsh reactive to light. Neck:      Musculoskeletal: Normal range of motion and neck supple. Cardiovascular:      Rate and Rhythm: Normal rate and regular rhythm. Pulses: Normal pulses. Heart sounds: Normal heart sounds.    Pulmonary:      Effort: P I   TROPONIN I   RAINBOW DRAW BLUE   RAINBOW DRAW LAVENDER   RAINBOW DRAW LIGHT GREEN   RAINBOW DRAW GOLD   SARS-COV-2 BY PCR ()   CBC W/ DIFFERENTIAL     EKG    Rate, intervals and axes as noted on EKG Report.   Rate: 85  Rhythm: Sinus Rhythm  Reading

## 2020-09-14 NOTE — H&P
[facility]    PATIENT'S NAME: Pete Corrales   ATTENDING PHYSICIAN: Juan Farley MD   PATIENT ACCOUNT#:   [id number]    LOCATION:  [place] [address]  MEDICAL RECORD #:   [id number]       YOB: 1945  ADMISSION DATE:       09/14/2 her up from sleep last night and again was noted by her since this morning. Upon arrival to the emergency room, she was still having symptoms, but then symptoms resolved. She received full dose aspirin by paramedics en route. Currently asymptomatic.   Ot

## 2020-09-14 NOTE — ED NOTES
Orders for admission, patient is aware of plan and ready to go upstairs. Any questions, please call ED RN Charmaine Mitchell  at extension [phone].    Type of COVID test sent:  COVID Suspicion level: Low/High    Titratable drug(s) infusing:  Rate:    LOC at time of ricardo

## 2020-09-14 NOTE — PLAN OF CARE
Pt in room #317. Pt ambulated to bathroom with walker and became short of breath. O2 sats were rechecked and remained at 97-98%. Cardiac stress test ordered.        Problem: Patient Centered Care  Goal: Patient preferences are identified and integrated

## 2020-09-15 ENCOUNTER — APPOINTMENT (OUTPATIENT)
Dept: NUCLEAR MEDICINE | Facility: HOSPITAL | Age: 75
End: 2020-09-15
Attending: HOSPITALIST
Payer: MEDICARE

## 2020-09-15 ENCOUNTER — APPOINTMENT (OUTPATIENT)
Dept: CV DIAGNOSTICS | Facility: HOSPITAL | Age: 75
End: 2020-09-15
Attending: HOSPITALIST
Payer: MEDICARE

## 2020-09-15 VITALS
DIASTOLIC BLOOD PRESSURE: 78 MMHG | HEIGHT: 65 IN | SYSTOLIC BLOOD PRESSURE: 144 MMHG | OXYGEN SATURATION: 97 % | WEIGHT: 169.69 LBS | HEART RATE: 78 BPM | RESPIRATION RATE: 14 BRPM | BODY MASS INDEX: 28.27 KG/M2 | TEMPERATURE: 99 F

## 2020-09-15 LAB
ANION GAP SERPL CALC-SCNC: 7 MMOL/L (ref 0–18)
BASOPHILS # BLD AUTO: 0.06 X10(3) UL (ref 0–0.2)
BASOPHILS NFR BLD AUTO: 0.8 %
BUN BLD-MCNC: 19 MG/DL (ref 7–18)
BUN/CREAT SERPL: 16 (ref 10–20)
CALCIUM BLD-MCNC: 9 MG/DL (ref 8.5–10.1)
CHLORIDE SERPL-SCNC: 98 MMOL/L (ref 98–112)
CHOLEST SMN-MCNC: 221 MG/DL (ref ?–200)
CO2 SERPL-SCNC: 28 MMOL/L (ref 21–32)
CREAT BLD-MCNC: 1.19 MG/DL (ref 0.55–1.02)
D DIMER PPP FEU-MCNC: 0.35 UG/ML FEU (ref ?–0.73)
DEPRECATED RDW RBC AUTO: 45 FL (ref 35.1–46.3)
EOSINOPHIL # BLD AUTO: 0.09 X10(3) UL (ref 0–0.7)
EOSINOPHIL NFR BLD AUTO: 1.2 %
ERYTHROCYTE [DISTWIDTH] IN BLOOD BY AUTOMATED COUNT: 13.8 % (ref 11–15)
GLUCOSE BLD-MCNC: 104 MG/DL (ref 70–99)
HCT VFR BLD AUTO: 33.7 % (ref 35–48)
HDLC SERPL-MCNC: 62 MG/DL (ref 40–59)
HGB BLD-MCNC: 11.5 G/DL (ref 12–16)
IMM GRANULOCYTES # BLD AUTO: 0.03 X10(3) UL (ref 0–1)
IMM GRANULOCYTES NFR BLD: 0.4 %
LDLC SERPL CALC-MCNC: 104 MG/DL (ref ?–100)
LYMPHOCYTES # BLD AUTO: 1.72 X10(3) UL (ref 1–4)
LYMPHOCYTES NFR BLD AUTO: 23.9 %
MCH RBC QN AUTO: 30.8 PG (ref 26–34)
MCHC RBC AUTO-ENTMCNC: 34.1 G/DL (ref 31–37)
MCV RBC AUTO: 90.3 FL (ref 80–100)
MONOCYTES # BLD AUTO: 0.6 X10(3) UL (ref 0.1–1)
MONOCYTES NFR BLD AUTO: 8.3 %
NEUTROPHILS # BLD AUTO: 4.71 X10 (3) UL (ref 1.5–7.7)
NEUTROPHILS # BLD AUTO: 4.71 X10(3) UL (ref 1.5–7.7)
NEUTROPHILS NFR BLD AUTO: 65.4 %
NONHDLC SERPL-MCNC: 159 MG/DL (ref ?–130)
OSMOLALITY SERPL CALC.SUM OF ELEC: 279 MOSM/KG (ref 275–295)
PLATELET # BLD AUTO: 334 10(3)UL (ref 150–450)
POTASSIUM SERPL-SCNC: 4.1 MMOL/L (ref 3.5–5.1)
RBC # BLD AUTO: 3.73 X10(6)UL (ref 3.8–5.3)
SARS-COV-2 RNA RESP QL NAA+PROBE: NOT DETECTED
SODIUM SERPL-SCNC: 133 MMOL/L (ref 136–145)
TRIGL SERPL-MCNC: 277 MG/DL (ref 30–149)
VLDLC SERPL CALC-MCNC: 55 MG/DL (ref 0–30)
WBC # BLD AUTO: 7.2 X10(3) UL (ref 4–11)

## 2020-09-15 PROCEDURE — 93017 CV STRESS TEST TRACING ONLY: CPT | Performed by: HOSPITALIST

## 2020-09-15 PROCEDURE — 78452 HT MUSCLE IMAGE SPECT MULT: CPT | Performed by: HOSPITALIST

## 2020-09-15 PROCEDURE — 99217 OBSERVATION CARE DISCHARGE: CPT | Performed by: HOSPITALIST

## 2020-09-15 RX ORDER — ATORVASTATIN CALCIUM 40 MG/1
40 TABLET, FILM COATED ORAL NIGHTLY
COMMUNITY

## 2020-09-15 RX ORDER — FUROSEMIDE 40 MG/1
40 TABLET ORAL
Qty: 60 TABLET | Refills: 0 | Status: ON HOLD | OUTPATIENT
Start: 2020-09-16 | End: 2020-10-02

## 2020-09-15 RX ORDER — FUROSEMIDE 40 MG/1
40 TABLET ORAL
Status: DISCONTINUED | OUTPATIENT
Start: 2020-09-15 | End: 2020-09-15

## 2020-09-15 RX ORDER — ATORVASTATIN CALCIUM 40 MG/1
40 TABLET, FILM COATED ORAL NIGHTLY
Status: DISCONTINUED | OUTPATIENT
Start: 2020-09-15 | End: 2020-09-15

## 2020-09-15 RX ORDER — CARVEDILOL 3.12 MG/1
3.12 TABLET ORAL 2 TIMES DAILY WITH MEALS
Qty: 60 TABLET | Refills: 0 | Status: SHIPPED | OUTPATIENT
Start: 2020-09-16

## 2020-09-15 NOTE — PLAN OF CARE
Pt in room #317. A/O x4. Ambulated with walker to bathroom. Shortness of breath getting back into bed. O2 sats remain 97-98%. Cardiac stress test and D-Dimer was negative. Armando Duncan called for patient transportation to home.   Pt ready for discharge thi Progressing     Problem: CARDIOVASCULAR - ADULT  Goal: Maintains optimal cardiac output and hemodynamic stability  Description  INTERVENTIONS:  - Monitor vital signs, rhythm, and trends  - Monitor for bleeding, hypotension and signs of decreased cardiac ou

## 2020-09-15 NOTE — PROGRESS NOTES
[facility] AND CLINICS  Progress Note    Stewart Akhtar Patient Status:  Observation    1945 MRN [id number]   Location [facility] 3W/SW Attending Jarrell Peabody, MD   Hosp Day # 0 PCP Deb Perez     Subjective:  Patient continues to feel shor MCV 90.3 09/15/2020    MCH 30.8 09/15/2020    MCHC 34.1 09/15/2020    RDW 13.8 09/15/2020    .0 09/15/2020     No results found for: PT, INR    Medications:  Normal Saline Flush 0.9 % injection 3 mL, 3 mL, Intravenous, PRN  Heparin Sodium (Porcine) troponins negative  - echo to be repeated and stress test today  - continues with sob on exertion for she usually has some component of shortness of breath    2.  CAD  - 75% mid RCA stenosis at RV branch s/p 2.75 x 12 ZIYAD with dilation of RV branch with 2.0

## 2020-09-15 NOTE — CM/SW NOTE
Received call from pt's RN, pt is medically cleared for d/c today. Pt informed RN that pt may need a [facility] transport home.  set [address] on [address] for tonight 9/15.  PCS completed in Epic - pt's RN to print w/ pt's AVS. Pt is quoted at $39 for ride

## 2020-09-15 NOTE — PLAN OF CARE
Problem: Patient Centered Care  Goal: Patient preferences are identified and integrated in the patient's plan of care  Description  Interventions:  - What would you like us to know as we care for you?  Wants to understand why she gets so short of breath a weights  Outcome: Progressing  Goal: Absence of cardiac arrhythmias or at baseline  Description  INTERVENTIONS:  - Continuous cardiac monitoring, monitor vital signs, obtain 12 lead EKG if indicated  - Evaluate effectiveness of antiarrhythmic and heart rat

## 2020-09-15 NOTE — DISCHARGE SUMMARY
[facility] Discharge Summary   Patient ID:  Dee Saucedo  [id number]  62 year old  2/16/1945    Admit date: 9/14/2020  Discharge date: 9/21/2020  Primary Care Physician: Sean Hdez   Attending Physician: No att. providers found   Consults: ND  EXTREMITIES: no edema, no calf tenderness    Operative Procedures:   Radiology:   No results found.     Discharge Instructions     Medication List      CHANGE how you take these medications    atorvastatin 40 MG Tabs  Commonly known as: LIPITOR  What ch Tabs  Commonly known as: ALDACTONE  Take 0.5 tablets (12.5 mg total) by mouth daily.      sucralfate 1 g Tabs  Commonly known as: CARAFATE     tiZANidine HCl 2 MG Tabs  Commonly known as: ZANAFLEX     traMADol HCl  MG Tb24  Commonly known as: ULTRAM-E

## 2020-10-01 ENCOUNTER — APPOINTMENT (OUTPATIENT)
Dept: INTERVENTIONAL RADIOLOGY/VASCULAR | Facility: HOSPITAL | Age: 75
End: 2020-10-01
Attending: INTERNAL MEDICINE
Payer: MEDICARE

## 2020-10-01 ENCOUNTER — APPOINTMENT (OUTPATIENT)
Dept: GENERAL RADIOLOGY | Facility: HOSPITAL | Age: 75
End: 2020-10-01
Payer: MEDICARE

## 2020-10-01 ENCOUNTER — HOSPITAL ENCOUNTER (OUTPATIENT)
Facility: HOSPITAL | Age: 75
Setting detail: OBSERVATION
Discharge: HOME | End: 2020-10-02
Attending: EMERGENCY MEDICINE | Admitting: HOSPITALIST
Payer: MEDICARE

## 2020-10-01 DIAGNOSIS — R07.9 ACUTE CHEST PAIN: Primary | ICD-10-CM

## 2020-10-01 PROCEDURE — 71045 X-RAY EXAM CHEST 1 VIEW: CPT | Performed by: EMERGENCY MEDICINE

## 2020-10-01 PROCEDURE — B2111ZZ FLUOROSCOPY OF MULTIPLE CORONARY ARTERIES USING LOW OSMOLAR CONTRAST: ICD-10-PCS | Performed by: INTERNAL MEDICINE

## 2020-10-01 PROCEDURE — 4A023N7 MEASUREMENT OF CARDIAC SAMPLING AND PRESSURE, LEFT HEART, PERCUTANEOUS APPROACH: ICD-10-PCS | Performed by: INTERNAL MEDICINE

## 2020-10-01 PROCEDURE — 99220 INITIAL OBSERVATION CARE,LEVL III: CPT | Performed by: HOSPITALIST

## 2020-10-01 RX ORDER — ESCITALOPRAM OXALATE 20 MG/1
20 TABLET ORAL DAILY
Status: DISCONTINUED | OUTPATIENT
Start: 2020-10-01 | End: 2020-10-02

## 2020-10-01 RX ORDER — HEPARIN SODIUM 1000 [USP'U]/ML
INJECTION, SOLUTION INTRAVENOUS; SUBCUTANEOUS
Status: COMPLETED
Start: 2020-10-01 | End: 2020-10-01

## 2020-10-01 RX ORDER — ATORVASTATIN CALCIUM 40 MG/1
40 TABLET, FILM COATED ORAL NIGHTLY
Status: DISCONTINUED | OUTPATIENT
Start: 2020-10-01 | End: 2020-10-02

## 2020-10-01 RX ORDER — LIDOCAINE HYDROCHLORIDE 20 MG/ML
INJECTION, SOLUTION EPIDURAL; INFILTRATION; INTRACAUDAL; PERINEURAL
Status: COMPLETED
Start: 2020-10-01 | End: 2020-10-01

## 2020-10-01 RX ORDER — ACETAMINOPHEN 500 MG
500 TABLET ORAL EVERY 6 HOURS PRN
Status: DISCONTINUED | OUTPATIENT
Start: 2020-10-01 | End: 2020-10-02

## 2020-10-01 RX ORDER — SODIUM CHLORIDE 9 MG/ML
INJECTION, SOLUTION INTRAVENOUS
Status: ACTIVE | OUTPATIENT
Start: 2020-10-02 | End: 2020-10-02

## 2020-10-01 RX ORDER — MELATONIN
325
Status: DISCONTINUED | OUTPATIENT
Start: 2020-10-02 | End: 2020-10-02

## 2020-10-01 RX ORDER — SUCRALFATE 1 G/1
1 TABLET ORAL NIGHTLY
Status: DISCONTINUED | OUTPATIENT
Start: 2020-10-01 | End: 2020-10-02

## 2020-10-01 RX ORDER — HYDROCODONE BITARTRATE AND ACETAMINOPHEN 10; 325 MG/1; MG/1
1 TABLET ORAL EVERY 4 HOURS PRN
Status: DISCONTINUED | OUTPATIENT
Start: 2020-10-01 | End: 2020-10-02

## 2020-10-01 RX ORDER — VERAPAMIL HYDROCHLORIDE 2.5 MG/ML
INJECTION, SOLUTION INTRAVENOUS
Status: COMPLETED
Start: 2020-10-01 | End: 2020-10-01

## 2020-10-01 RX ORDER — GABAPENTIN 600 MG/1
600 TABLET ORAL 3 TIMES DAILY
Status: DISCONTINUED | OUTPATIENT
Start: 2020-10-01 | End: 2020-10-02

## 2020-10-01 RX ORDER — CARVEDILOL 3.12 MG/1
3.12 TABLET ORAL 2 TIMES DAILY WITH MEALS
Status: DISCONTINUED | OUTPATIENT
Start: 2020-10-01 | End: 2020-10-02

## 2020-10-01 RX ORDER — CLOPIDOGREL BISULFATE 75 MG/1
75 TABLET ORAL DAILY
Status: DISCONTINUED | OUTPATIENT
Start: 2020-10-01 | End: 2020-10-01

## 2020-10-01 RX ORDER — ROPINIROLE 1 MG/1
3 TABLET, FILM COATED ORAL NIGHTLY
Status: DISCONTINUED | OUTPATIENT
Start: 2020-10-01 | End: 2020-10-02

## 2020-10-01 RX ORDER — CHLORHEXIDINE GLUCONATE 4 G/100ML
30 SOLUTION TOPICAL
Status: ACTIVE | OUTPATIENT
Start: 2020-10-02 | End: 2020-10-02

## 2020-10-01 RX ORDER — MIDAZOLAM HYDROCHLORIDE 1 MG/ML
INJECTION INTRAMUSCULAR; INTRAVENOUS
Status: COMPLETED
Start: 2020-10-01 | End: 2020-10-01

## 2020-10-01 RX ORDER — PANTOPRAZOLE SODIUM 40 MG/1
40 TABLET, DELAYED RELEASE ORAL
Status: DISCONTINUED | OUTPATIENT
Start: 2020-10-02 | End: 2020-10-02

## 2020-10-01 RX ORDER — TIZANIDINE 2 MG/1
2 TABLET ORAL NIGHTLY
Status: DISCONTINUED | OUTPATIENT
Start: 2020-10-01 | End: 2020-10-02

## 2020-10-01 RX ORDER — SPIRONOLACTONE 25 MG/1
12.5 TABLET ORAL DAILY
Status: DISCONTINUED | OUTPATIENT
Start: 2020-10-01 | End: 2020-10-02

## 2020-10-01 RX ORDER — TRAMADOL HYDROCHLORIDE 50 MG/1
75 TABLET ORAL 4 TIMES DAILY
Status: DISCONTINUED | OUTPATIENT
Start: 2020-10-01 | End: 2020-10-01

## 2020-10-01 RX ORDER — MORPHINE SULFATE 15 MG/1
15 TABLET ORAL EVERY 12 HOURS
COMMUNITY

## 2020-10-01 RX ORDER — BUPROPION HYDROCHLORIDE 150 MG/1
150 TABLET ORAL DAILY
Status: DISCONTINUED | OUTPATIENT
Start: 2020-10-01 | End: 2020-10-02

## 2020-10-01 RX ORDER — ASPIRIN 81 MG/1
81 TABLET ORAL DAILY
Status: DISCONTINUED | OUTPATIENT
Start: 2020-10-01 | End: 2020-10-02

## 2020-10-01 RX ORDER — ERYTHROMYCIN 5 MG/G
1 OINTMENT OPHTHALMIC EVERY 6 HOURS
Status: DISCONTINUED | OUTPATIENT
Start: 2020-10-01 | End: 2020-10-02

## 2020-10-01 RX ORDER — DIFLUPREDNATE 0.5 MG/ML
1 EMULSION OPHTHALMIC DAILY
Status: DISCONTINUED | OUTPATIENT
Start: 2020-10-01 | End: 2020-10-02

## 2020-10-01 RX ORDER — DOCUSATE SODIUM 100 MG/1
100 CAPSULE, LIQUID FILLED ORAL 2 TIMES DAILY PRN
Status: DISCONTINUED | OUTPATIENT
Start: 2020-10-01 | End: 2020-10-02

## 2020-10-01 RX ORDER — LOSARTAN POTASSIUM 50 MG/1
50 TABLET ORAL DAILY
Status: DISCONTINUED | OUTPATIENT
Start: 2020-10-01 | End: 2020-10-02

## 2020-10-01 RX ORDER — FUROSEMIDE 40 MG/1
40 TABLET ORAL DAILY
Status: DISCONTINUED | OUTPATIENT
Start: 2020-10-02 | End: 2020-10-02

## 2020-10-01 RX ORDER — NITROGLYCERIN 20 MG/100ML
INJECTION INTRAVENOUS
Status: COMPLETED
Start: 2020-10-01 | End: 2020-10-01

## 2020-10-01 NOTE — H&P
[address] Patient Status:  Observation    1945 MRN [id number]   Location [facility] Attending John Mars MD   Hosp Day # 0 DIOR Nguyen     Date:  10/1 into the right eye every 6 (six) hours. •  losartan Potassium 50 MG Oral Tab, Take 50 mg by mouth daily. •  lidocaine 5 % External Ointment, Apply topically as needed. •  traMADol HCl  MG Oral Tablet 24 Hr, Take 300 mg by mouth daily.     • effort  CV: Heart with regular rate and rhythm  Abd: Abdomen soft, nontender, nondistended, bowel sounds present  Neuro: No acute focal deficits  MSK: Full range of motion in extremities  Skin: Warm and dry  Psych: Normal affect  Ext: no c/c/e      Cervica Francisco - no significant stenosis  - plan to cont medical management    HTN  - uncontrolled   - noncompliant with meds  - cont home meds for now    CKD stage 2-3  - stable     DVT proph: heparin sq    Dispo: pending        Chris Magana MD  10/1/2020

## 2020-10-01 NOTE — PROGRESS NOTES
[facility] Pharmacy Note:  Therapeutic Interchange    Sona Quesada was previously taking tramadol  mg PO q24h at home prior to admission. Per therapeutic interchange, the patient will be switched to tramadol 75 mg PO q6h during hospitalization.     Narinder Fernandez

## 2020-10-01 NOTE — ED PROVIDER NOTES
Patient Seen in: [facility] AND [facility] Emergency Department      History   Patient presents with:  Difficulty Breathing    Stated Complaint: sob    HPI    Patient is a 68-year-old female with a history of hypertension and coronary artery disease.   She had a Normocephalic and atraumatic. Right Ear: External ear normal.   Left Ear: External ear normal.   Nose: Nose normal.   Mouth/Throat: Oropharynx is clear and moist.   Eyes: Conjunctivae and EOM are normal. Pupils are equal, round, and reactive to light. limits   TROPONIN I - Normal   CBC WITH DIFFERENTIAL WITH PLATELET    Narrative: The following orders were created for panel order CBC WITH DIFFERENTIAL WITH PLATELET.   Procedure                               Abnormality         Status List                       Present on Admission           ICD-10-CM Noted POA    Acute chest pain R07.9 9/14/2020 Unknown

## 2020-10-01 NOTE — OPERATIVE REPORT
Preop diagnosis: ua  Post op diagnosis: cad  Procedures: [facility] cors  Findings: rca stent widely patent, edp 3, lcx and lad patent. Rec: Reduce lasix to once daily.    EBL: 20 mls  Specimens: None

## 2020-10-01 NOTE — ED NOTES
Orders for admission, patient is aware of plan and ready to go upstairs. Any questions, please call ED ORI Barboza [address]  at extension [phone]. .   Type of COVID test sent: rapid  COVID Suspicion level: Low    Titratable drug(s) infusing: N/A  Rate: N/A    LOC at ti

## 2020-10-01 NOTE — CONSULTS
[facility]    PATIENT'S NAME: Juliet Brown   ATTENDING PHYSICIAN: Tao Pradhan MD   CONSULTING PHYSICIAN: Juan José Singh DO   PATIENT ACCOUNT#:   [de-identified]    LOCATION:  [place] [address]  MEDICAL RECORD #:   [id number]       DATE VITAL SIGNS:  Blood pressure here 144/99 and subsequently 155/79, pulse 102 initially and subsequently 77. HEENT:    Head is atraumatic. No scleral icterus is appreciated. NECK:  Neck veins are not seen. LUNGS:  Clear to auscultation anteriorly. Since she is coming in with possible unstable angina symptoms versus other etiology as described above, we will proceed with a left heart catheterization possible intervention to make sure her stent is patent.   Otherwise, medication compliance will be stre

## 2020-10-01 NOTE — IVS NOTE
Procedure hand off report given to Nikkie Ny RN. Procedure site remains dry and intact with no signs and symptoms of bleeding and hematoma. Bedrest maintained. Dr Josephine Avina spoke with pt post procedure.

## 2020-10-02 VITALS
HEART RATE: 92 BPM | TEMPERATURE: 98 F | SYSTOLIC BLOOD PRESSURE: 128 MMHG | HEIGHT: 65 IN | OXYGEN SATURATION: 97 % | BODY MASS INDEX: 27.99 KG/M2 | RESPIRATION RATE: 18 BRPM | WEIGHT: 168 LBS | DIASTOLIC BLOOD PRESSURE: 62 MMHG

## 2020-10-02 PROCEDURE — 99217 OBSERVATION CARE DISCHARGE: CPT | Performed by: HOSPITALIST

## 2020-10-02 RX ORDER — FUROSEMIDE 40 MG/1
40 TABLET ORAL DAILY
Qty: 90 TABLET | Refills: 3 | Status: SHIPPED | OUTPATIENT
Start: 2020-10-03

## 2020-10-02 NOTE — PROGRESS NOTES
Spoke with DICK Solo for Dr. Alina Anne. Pt is ok to discharge. Pt made aware. Discharge instructions, medications, side effects, and follow up appointments all discussed with pt.  Right radial catheter insertion site discharge instructions discussed with pt

## 2020-10-02 NOTE — PLAN OF CARE
Pt received awake and alert. Denies chest pain. C/o slight SOB with exertion. S/P cath with right radial insertion site. Site CDI. No hematoma noted. Pt cleared for discharge. Waiting to be seen by Dr. Reagan Cobian this afternoon. Able to ambulate with walker. arterial and/or venous puncture sites for bleeding and/or hematoma  - Assess quality of pulses, skin color and temperature  - Assess for signs of decreased coronary artery perfusion - ex.  Angina  - Evaluate fluid balance, assess for edema, trend weights  O

## 2020-10-02 NOTE — PROCEDURES
[facility]    PATIENT'S NAME: Jarrodcasandraryanne Meza   ATTENDING PHYSICIAN: Velvet Alvarado MD   OPERATING PHYSICIAN: Juan José Wei DO   PATIENT ACCOUNT#:   [de-identified]    LOCATION:  [address] [facility]  MEDICAL RECORD #:   [id number]       DATE OF BIRTH appearance. IMPRESSION:    1. Patent right coronary artery stent. 2.   Normal left coronary system with extreme tortuosity. 3.   Systemic hypertension.     RECOMMENDATIONS:  Dual-antiplatelet therapy should be continued for 1 year and after that, asp

## 2020-10-02 NOTE — DISCHARGE SUMMARY
[facility]    Discharge Summary    Asia Pappas Patient Status:  Observation    1945 MRN [id number]   Location [facility] Attending Cyril Delgado MD   Hosp Day # 0 PCP Erin Manriquez for cardiac cath   - d/w Dr Soraya Fleming - no significant stenosis  - plan to cont medical management     HTN  - uncontrolled   - noncompliant with meds  - cont home meds for now - lasix dose adjust   - follow up with Dr Soraya Fleming on discharge     CKD stage 2-3 (two) times daily as needed. Refills: 0     ergocalciferol 1.25 MG (58052 UT) Caps  Commonly known as: DRISDOL/VITAMIN D2      Take 50,000 Units by mouth once a week.    Refills: 0     erythromycin 5 MG/GM Oint  Commonly known as: ROMYCIN      Place 1 Arabella 30 minutes spent on preparation and coordination of this discharge

## 2020-10-02 NOTE — PROGRESS NOTES
[facility]    Progress Note    Kodak Mooney Patient Status:  Observation    1945 MRN [id number]   Location 1 Reginaldo Heck Attending Onelia Cueva MD   Hosp Day # 0 PCP 1619 K 66 09/17/2019    LIP 99 09/14/2020    DDIMER 0.35 09/15/2020    TROP <0.045 10/01/2020       Current Medications:    •  Chlorhexidine Gluconate (HIBICLENS) 4 % liquid 30 mL, 30 mL, Topical, On Call    •  0.9% NaCl infusion, , Intravenous, On Call    •  acetam ergocalciferol 1.25 MG (63318 UT) Oral Cap, Take 50,000 Units by mouth once a week. •  erythromycin 5 MG/GM Ophthalmic Ointment, Place 1 Application into the right eye every 6 (six) hours.     •  losartan Potassium 50 MG Oral Tab, Take 50 mg by mouth da hernia unchanged.     Dictated by (CST): Rommel Bourne MD on 10/01/2020 at 8:29 AM     Finalized by (CST): Rommel Bourne MD on 10/01/2020 at 8:31 AM          Ekg 12-lead    Result Date: 10/1/2020  ECG Report  Interpretation  -------------------------- Sin

## 2020-10-02 NOTE — PROGRESS NOTES
[facility] [place]    Progress Note    Katherene Mail Patient Status:  Observation    1945 MRN [id number]   Location 1 Reginaldo Heck Attending Kelley Sanford MD   Hosp Day # 0 PCP 1619 K 66 09/17/2019    LIP 99 09/14/2020    DDIMER 0.35 09/15/2020    TROP <0.045 10/01/2020       Current Medications:    •  Chlorhexidine Gluconate (HIBICLENS) 4 % liquid 30 mL, 30 mL, Topical, On Call    •  0.9% NaCl infusion, , Intravenous, On Call    •  acetam ergocalciferol 1.25 MG (47009 UT) Oral Cap, Take 50,000 Units by mouth once a week. •  erythromycin 5 MG/GM Ophthalmic Ointment, Place 1 Application into the right eye every 6 (six) hours.     •  losartan Potassium 50 MG Oral Tab, Take 50 mg by mouth da hernia unchanged.     Dictated by (CST): Yvrose Cerrato MD on 10/01/2020 at 8:29 AM     Finalized by (CST): Yvrose Cerrato MD on 10/01/2020 at 8:31 AM          Ekg 12-lead    Result Date: 10/1/2020  ECG Report  Interpretation  -------------------------- Sin

## 2020-10-02 NOTE — CM/SW NOTE
10/02/20 1200   CM/SW Referral Data   Referral Source Physician   Reason for Referral Discharge planning   Informant Patient   Patient Info   Patient's Mental Status Alert;Oriented   Patient's [address]   Patient lives with Alone   Patient

## 2020-10-02 NOTE — PLAN OF CARE
Problem: Patient Centered Care  Goal: Patient preferences are identified and integrated in the patient's plan of care  Description: Interventions:  - What would you like us to know as we care for you?  Lives at home alone  - Provide timely, complete, and INTERVENTIONS:  - Continuous cardiac monitoring, monitor vital signs, obtain 12 lead EKG if indicated  - Evaluate effectiveness of antiarrhythmic and heart rate control medications as ordered  - Initiate emergency measures for life threatening arrhythmias

## 2020-10-02 NOTE — HOME CARE LIAISON
Received referral from [address]. Pt is agreeable to residential home chavo services. All questions and concerns addressed. [address] notified.

## 2020-10-29 NOTE — PROGRESS NOTES
[facility] HOSP - [facility]    Progress Note    Dago Hilton Patient Status:  Observation    1945 MRN [id number]   Location 1 Reginaldo Heck Attending Sloan Augustine MD   Hosp Day # 0 PCP 1619 K 66 Current Medications:  No current facility-administered medications for this encounter. No medications prior to admission. No results found. Assessment and Plan:    1.        Episode of chest pain and shortness of breath, whic

## 2021-11-18 ENCOUNTER — LAB ENCOUNTER (OUTPATIENT)
Dept: LAB | Facility: HOSPITAL | Age: 76
End: 2021-11-18
Attending: INTERNAL MEDICINE
Payer: MEDICARE

## 2021-11-18 DIAGNOSIS — I10 HTN (HYPERTENSION): Primary | ICD-10-CM

## 2021-11-18 PROCEDURE — 36415 COLL VENOUS BLD VENIPUNCTURE: CPT

## 2021-11-18 PROCEDURE — 80048 BASIC METABOLIC PNL TOTAL CA: CPT

## 2022-11-22 ENCOUNTER — LAB ENCOUNTER (OUTPATIENT)
Dept: LAB | Facility: HOSPITAL | Age: 77
End: 2022-11-22
Attending: INTERNAL MEDICINE
Payer: MEDICARE

## 2022-11-22 DIAGNOSIS — E78.5 HYPERLIPEMIA: ICD-10-CM

## 2022-11-22 LAB
CHOLEST SERPL-MCNC: 171 MG/DL (ref ?–200)
FASTING PATIENT LIPID ANSWER: YES
HDLC SERPL-MCNC: 59 MG/DL (ref 40–59)
LDLC SERPL CALC-MCNC: 72 MG/DL (ref ?–100)
NONHDLC SERPL-MCNC: 112 MG/DL (ref ?–130)
TRIGL SERPL-MCNC: 248 MG/DL (ref 30–149)
VLDLC SERPL CALC-MCNC: 38 MG/DL (ref 0–30)

## 2022-11-22 PROCEDURE — 36415 COLL VENOUS BLD VENIPUNCTURE: CPT

## 2022-11-22 PROCEDURE — 80061 LIPID PANEL: CPT

## 2022-12-20 ENCOUNTER — APPOINTMENT (OUTPATIENT)
Dept: GENERAL RADIOLOGY | Facility: HOSPITAL | Age: 77
End: 2022-12-20
Attending: EMERGENCY MEDICINE
Payer: MEDICARE

## 2022-12-20 ENCOUNTER — HOSPITAL ENCOUNTER (INPATIENT)
Facility: HOSPITAL | Age: 77
LOS: 3 days | Discharge: HOME HEALTH | End: 2022-12-23
Attending: EMERGENCY MEDICINE | Admitting: INTERNAL MEDICINE
Payer: MEDICARE

## 2022-12-20 DIAGNOSIS — I50.9 ACUTE HEART FAILURE, UNSPECIFIED HEART FAILURE TYPE (HCC): Primary | ICD-10-CM

## 2022-12-20 DIAGNOSIS — J96.01 ACUTE RESPIRATORY FAILURE WITH HYPOXIA (HCC): ICD-10-CM

## 2022-12-20 LAB
ANION GAP SERPL CALC-SCNC: 10 MMOL/L (ref 0–18)
BASOPHILS # BLD AUTO: 0.06 X10(3) UL (ref 0–0.2)
BASOPHILS NFR BLD AUTO: 0.7 %
BUN BLD-MCNC: 24 MG/DL (ref 7–18)
BUN/CREAT SERPL: 15.4 (ref 10–20)
CALCIUM BLD-MCNC: 8.8 MG/DL (ref 8.5–10.1)
CHLORIDE SERPL-SCNC: 107 MMOL/L (ref 98–112)
CO2 SERPL-SCNC: 22 MMOL/L (ref 21–32)
CREAT BLD-MCNC: 1.56 MG/DL
DEPRECATED RDW RBC AUTO: 45.1 FL (ref 35.1–46.3)
EOSINOPHIL # BLD AUTO: 0.32 X10(3) UL (ref 0–0.7)
EOSINOPHIL NFR BLD AUTO: 3.9 %
ERYTHROCYTE [DISTWIDTH] IN BLOOD BY AUTOMATED COUNT: 13.3 % (ref 11–15)
GFR SERPLBLD BASED ON 1.73 SQ M-ARVRAT: 34 ML/MIN/1.73M2 (ref 60–?)
GLUCOSE BLD-MCNC: 136 MG/DL (ref 70–99)
HCT VFR BLD AUTO: 35.5 %
HGB BLD-MCNC: 11.2 G/DL
IMM GRANULOCYTES # BLD AUTO: 0.01 X10(3) UL (ref 0–1)
IMM GRANULOCYTES NFR BLD: 0.1 %
LYMPHOCYTES # BLD AUTO: 1.61 X10(3) UL (ref 1–4)
LYMPHOCYTES NFR BLD AUTO: 19.5 %
MCH RBC QN AUTO: 29.2 PG (ref 26–34)
MCHC RBC AUTO-ENTMCNC: 31.5 G/DL (ref 31–37)
MCV RBC AUTO: 92.7 FL
MONOCYTES # BLD AUTO: 0.45 X10(3) UL (ref 0.1–1)
MONOCYTES NFR BLD AUTO: 5.5 %
NEUTROPHILS # BLD AUTO: 5.8 X10 (3) UL (ref 1.5–7.7)
NEUTROPHILS # BLD AUTO: 5.8 X10(3) UL (ref 1.5–7.7)
NEUTROPHILS NFR BLD AUTO: 70.3 %
NT-PROBNP SERPL-MCNC: 3240 PG/ML (ref ?–450)
OSMOLALITY SERPL CALC.SUM OF ELEC: 294 MOSM/KG (ref 275–295)
PLATELET # BLD AUTO: 261 10(3)UL (ref 150–450)
POTASSIUM SERPL-SCNC: 4.6 MMOL/L (ref 3.5–5.1)
PROCALCITONIN SERPL-MCNC: <0.02 NG/ML (ref ?–0.16)
RBC # BLD AUTO: 3.83 X10(6)UL
SARS-COV-2 RNA RESP QL NAA+PROBE: NOT DETECTED
SODIUM SERPL-SCNC: 139 MMOL/L (ref 136–145)
TROPONIN I HIGH SENSITIVITY: 18 NG/L
WBC # BLD AUTO: 8.3 X10(3) UL (ref 4–11)

## 2022-12-20 PROCEDURE — 93010 ELECTROCARDIOGRAM REPORT: CPT | Performed by: INTERNAL MEDICINE

## 2022-12-20 PROCEDURE — 94002 VENT MGMT INPAT INIT DAY: CPT

## 2022-12-20 PROCEDURE — 84484 ASSAY OF TROPONIN QUANT: CPT | Performed by: EMERGENCY MEDICINE

## 2022-12-20 PROCEDURE — 84145 PROCALCITONIN (PCT): CPT | Performed by: EMERGENCY MEDICINE

## 2022-12-20 PROCEDURE — 83880 ASSAY OF NATRIURETIC PEPTIDE: CPT | Performed by: EMERGENCY MEDICINE

## 2022-12-20 PROCEDURE — 85025 COMPLETE CBC W/AUTO DIFF WBC: CPT | Performed by: EMERGENCY MEDICINE

## 2022-12-20 PROCEDURE — 93005 ELECTROCARDIOGRAM TRACING: CPT

## 2022-12-20 PROCEDURE — 71045 X-RAY EXAM CHEST 1 VIEW: CPT | Performed by: EMERGENCY MEDICINE

## 2022-12-20 PROCEDURE — 99285 EMERGENCY DEPT VISIT HI MDM: CPT

## 2022-12-20 PROCEDURE — 93010 ELECTROCARDIOGRAM REPORT: CPT

## 2022-12-20 PROCEDURE — 96374 THER/PROPH/DIAG INJ IV PUSH: CPT

## 2022-12-20 PROCEDURE — 80048 BASIC METABOLIC PNL TOTAL CA: CPT | Performed by: EMERGENCY MEDICINE

## 2022-12-20 RX ORDER — LOSARTAN POTASSIUM 50 MG/1
50 TABLET ORAL DAILY
Status: DISCONTINUED | OUTPATIENT
Start: 2022-12-21 | End: 2022-12-21

## 2022-12-20 RX ORDER — ASPIRIN 81 MG/1
81 TABLET ORAL DAILY
Status: DISCONTINUED | OUTPATIENT
Start: 2022-12-21 | End: 2022-12-23

## 2022-12-20 RX ORDER — ROPINIROLE 1 MG/1
3 TABLET, FILM COATED ORAL NIGHTLY
Status: DISCONTINUED | OUTPATIENT
Start: 2022-12-20 | End: 2022-12-23

## 2022-12-20 RX ORDER — GABAPENTIN 600 MG/1
600 TABLET ORAL 3 TIMES DAILY
Status: DISCONTINUED | OUTPATIENT
Start: 2022-12-20 | End: 2022-12-23

## 2022-12-20 RX ORDER — FUROSEMIDE 20 MG/1
20 TABLET ORAL DAILY
COMMUNITY
End: 2022-12-23

## 2022-12-20 RX ORDER — IPRATROPIUM BROMIDE AND ALBUTEROL SULFATE 2.5; .5 MG/3ML; MG/3ML
3 SOLUTION RESPIRATORY (INHALATION) EVERY 6 HOURS PRN
Status: DISCONTINUED | OUTPATIENT
Start: 2022-12-20 | End: 2022-12-23

## 2022-12-20 RX ORDER — DIFLUPREDNATE 0.5 MG/ML
1 EMULSION OPHTHALMIC DAILY
Status: DISCONTINUED | OUTPATIENT
Start: 2022-12-20 | End: 2022-12-20

## 2022-12-20 RX ORDER — ERYTHROMYCIN 5 MG/G
1 OINTMENT OPHTHALMIC EVERY 6 HOURS
Status: DISCONTINUED | OUTPATIENT
Start: 2022-12-20 | End: 2022-12-20

## 2022-12-20 RX ORDER — METOCLOPRAMIDE HYDROCHLORIDE 5 MG/ML
5 INJECTION INTRAMUSCULAR; INTRAVENOUS EVERY 8 HOURS PRN
Status: DISCONTINUED | OUTPATIENT
Start: 2022-12-20 | End: 2022-12-23

## 2022-12-20 RX ORDER — DOCUSATE SODIUM 100 MG/1
100 CAPSULE, LIQUID FILLED ORAL 2 TIMES DAILY PRN
Status: DISCONTINUED | OUTPATIENT
Start: 2022-12-20 | End: 2022-12-23

## 2022-12-20 RX ORDER — MAGNESIUM OXIDE 400 MG/1
200 TABLET ORAL DAILY
Status: DISCONTINUED | OUTPATIENT
Start: 2022-12-21 | End: 2022-12-23

## 2022-12-20 RX ORDER — ATORVASTATIN CALCIUM 40 MG/1
40 TABLET, FILM COATED ORAL NIGHTLY
Status: DISCONTINUED | OUTPATIENT
Start: 2022-12-20 | End: 2022-12-23

## 2022-12-20 RX ORDER — SPIRONOLACTONE 25 MG/1
12.5 TABLET ORAL DAILY
Status: DISCONTINUED | OUTPATIENT
Start: 2022-12-21 | End: 2022-12-23

## 2022-12-20 RX ORDER — ACETAMINOPHEN 500 MG
500 TABLET ORAL EVERY 4 HOURS PRN
Status: DISCONTINUED | OUTPATIENT
Start: 2022-12-20 | End: 2022-12-23

## 2022-12-20 RX ORDER — PANTOPRAZOLE SODIUM 40 MG/1
40 TABLET, DELAYED RELEASE ORAL
Status: DISCONTINUED | OUTPATIENT
Start: 2022-12-21 | End: 2022-12-23

## 2022-12-20 RX ORDER — NITROGLYCERIN 0.4 MG/1
0.4 TABLET SUBLINGUAL EVERY 5 MIN PRN
Status: DISCONTINUED | OUTPATIENT
Start: 2022-12-20 | End: 2022-12-23

## 2022-12-20 RX ORDER — MELATONIN
325
Status: DISCONTINUED | OUTPATIENT
Start: 2022-12-21 | End: 2022-12-23

## 2022-12-20 RX ORDER — CLOPIDOGREL BISULFATE 75 MG/1
75 TABLET ORAL DAILY
Status: DISCONTINUED | OUTPATIENT
Start: 2022-12-21 | End: 2022-12-21

## 2022-12-20 RX ORDER — CELECOXIB 100 MG/1
100 CAPSULE ORAL DAILY
COMMUNITY

## 2022-12-20 RX ORDER — ACETAMINOPHEN AND CODEINE PHOSPHATE 300; 30 MG/1; MG/1
1 TABLET ORAL EVERY 4 HOURS PRN
Status: DISCONTINUED | OUTPATIENT
Start: 2022-12-20 | End: 2022-12-23

## 2022-12-20 RX ORDER — FUROSEMIDE 10 MG/ML
40 INJECTION INTRAMUSCULAR; INTRAVENOUS
Status: DISCONTINUED | OUTPATIENT
Start: 2022-12-20 | End: 2022-12-22

## 2022-12-20 RX ORDER — MORPHINE SULFATE 15 MG/1
15 TABLET ORAL EVERY 12 HOURS
Status: DISCONTINUED | OUTPATIENT
Start: 2022-12-21 | End: 2022-12-23

## 2022-12-20 RX ORDER — ONDANSETRON 2 MG/ML
4 INJECTION INTRAMUSCULAR; INTRAVENOUS EVERY 6 HOURS PRN
Status: DISCONTINUED | OUTPATIENT
Start: 2022-12-20 | End: 2022-12-23

## 2022-12-20 RX ORDER — SUCRALFATE 1 G/1
1 TABLET ORAL NIGHTLY
Status: DISCONTINUED | OUTPATIENT
Start: 2022-12-20 | End: 2022-12-23

## 2022-12-20 RX ORDER — ACETAMINOPHEN 325 MG/1
650 TABLET ORAL EVERY 4 HOURS PRN
Status: DISCONTINUED | OUTPATIENT
Start: 2022-12-20 | End: 2022-12-23

## 2022-12-20 RX ORDER — CARVEDILOL 3.12 MG/1
3.12 TABLET ORAL 2 TIMES DAILY WITH MEALS
Status: DISCONTINUED | OUTPATIENT
Start: 2022-12-20 | End: 2022-12-23

## 2022-12-20 RX ORDER — TRAMADOL HYDROCHLORIDE 300 MG/1
300 TABLET, EXTENDED RELEASE ORAL DAILY
Status: DISCONTINUED | OUTPATIENT
Start: 2022-12-20 | End: 2022-12-20

## 2022-12-20 RX ORDER — BUPROPION HYDROCHLORIDE 150 MG/1
150 TABLET ORAL DAILY
Status: DISCONTINUED | OUTPATIENT
Start: 2022-12-21 | End: 2022-12-23

## 2022-12-20 RX ORDER — TIZANIDINE 2 MG/1
2 TABLET ORAL NIGHTLY
Status: DISCONTINUED | OUTPATIENT
Start: 2022-12-20 | End: 2022-12-23

## 2022-12-20 RX ORDER — FUROSEMIDE 10 MG/ML
40 INJECTION INTRAMUSCULAR; INTRAVENOUS ONCE
Status: COMPLETED | OUTPATIENT
Start: 2022-12-20 | End: 2022-12-20

## 2022-12-20 RX ORDER — POTASSIUM CHLORIDE 1.5 G/1.77G
20 POWDER, FOR SOLUTION ORAL DAILY
Status: DISCONTINUED | OUTPATIENT
Start: 2022-12-21 | End: 2022-12-23

## 2022-12-20 RX ORDER — ACETAMINOPHEN AND CODEINE PHOSPHATE 300; 30 MG/1; MG/1
2 TABLET ORAL EVERY 4 HOURS PRN
Status: DISCONTINUED | OUTPATIENT
Start: 2022-12-20 | End: 2022-12-23

## 2022-12-20 NOTE — ED INITIAL ASSESSMENT (HPI)
Pt arrives for c/o sob x30 minutes. Pt 70% on RA per EMS, pt arrives on cpap. Pt aox4/4, reports hx of chf. Pt denies home oxygen use.

## 2022-12-21 LAB
ANION GAP SERPL CALC-SCNC: 4 MMOL/L (ref 0–18)
ATRIAL RATE: 105 BPM
BASOPHILS # BLD AUTO: 0.03 X10(3) UL (ref 0–0.2)
BASOPHILS NFR BLD AUTO: 0.4 %
BUN BLD-MCNC: 26 MG/DL (ref 7–18)
BUN/CREAT SERPL: 17.3 (ref 10–20)
CALCIUM BLD-MCNC: 8.5 MG/DL (ref 8.5–10.1)
CHLORIDE SERPL-SCNC: 105 MMOL/L (ref 98–112)
CO2 SERPL-SCNC: 25 MMOL/L (ref 21–32)
CREAT BLD-MCNC: 1.5 MG/DL
DEPRECATED RDW RBC AUTO: 47.9 FL (ref 35.1–46.3)
EOSINOPHIL # BLD AUTO: 0.03 X10(3) UL (ref 0–0.7)
EOSINOPHIL NFR BLD AUTO: 0.4 %
ERYTHROCYTE [DISTWIDTH] IN BLOOD BY AUTOMATED COUNT: 13.4 % (ref 11–15)
GFR SERPLBLD BASED ON 1.73 SQ M-ARVRAT: 36 ML/MIN/1.73M2 (ref 60–?)
GLUCOSE BLD-MCNC: 107 MG/DL (ref 70–99)
HCT VFR BLD AUTO: 34.7 %
HGB BLD-MCNC: 10.5 G/DL
IMM GRANULOCYTES # BLD AUTO: 0.01 X10(3) UL (ref 0–1)
IMM GRANULOCYTES NFR BLD: 0.1 %
LYMPHOCYTES # BLD AUTO: 1.25 X10(3) UL (ref 1–4)
LYMPHOCYTES NFR BLD AUTO: 16.6 %
MCH RBC QN AUTO: 29.6 PG (ref 26–34)
MCHC RBC AUTO-ENTMCNC: 30.3 G/DL (ref 31–37)
MCV RBC AUTO: 97.7 FL
MONOCYTES # BLD AUTO: 0.48 X10(3) UL (ref 0.1–1)
MONOCYTES NFR BLD AUTO: 6.4 %
NEUTROPHILS # BLD AUTO: 5.71 X10 (3) UL (ref 1.5–7.7)
NEUTROPHILS # BLD AUTO: 5.71 X10(3) UL (ref 1.5–7.7)
NEUTROPHILS NFR BLD AUTO: 76.1 %
OSMOLALITY SERPL CALC.SUM OF ELEC: 283 MOSM/KG (ref 275–295)
P AXIS: 59 DEGREES
P-R INTERVAL: 164 MS
PLATELET # BLD AUTO: 198 10(3)UL (ref 150–450)
POTASSIUM SERPL-SCNC: 4.8 MMOL/L (ref 3.5–5.1)
Q-T INTERVAL: 364 MS
QRS DURATION: 136 MS
QTC CALCULATION (BEZET): 481 MS
R AXIS: -3 DEGREES
RBC # BLD AUTO: 3.55 X10(6)UL
SODIUM SERPL-SCNC: 134 MMOL/L (ref 136–145)
T AXIS: 130 DEGREES
VENTRICULAR RATE: 105 BPM
WBC # BLD AUTO: 7.5 X10(3) UL (ref 4–11)

## 2022-12-21 PROCEDURE — 97166 OT EVAL MOD COMPLEX 45 MIN: CPT

## 2022-12-21 PROCEDURE — 80048 BASIC METABOLIC PNL TOTAL CA: CPT | Performed by: INTERNAL MEDICINE

## 2022-12-21 PROCEDURE — 85025 COMPLETE CBC W/AUTO DIFF WBC: CPT | Performed by: INTERNAL MEDICINE

## 2022-12-21 PROCEDURE — 97116 GAIT TRAINING THERAPY: CPT

## 2022-12-21 PROCEDURE — 97535 SELF CARE MNGMENT TRAINING: CPT

## 2022-12-21 PROCEDURE — 97161 PT EVAL LOW COMPLEX 20 MIN: CPT

## 2022-12-21 NOTE — ED QUICK NOTES
Orders for admission, patient is aware of plan and ready to go upstairs. Any questions, please call ED RN [facility] at [address].      Patient Covid vaccination status: Unvaccinated     COVID Test Ordered in ED: Rapid SARS-CoV-2 by PCR    COVID Suspicion at Admission: N/A    Running Infusions:  None    Mental Status/LOC at time of transport: aox4/4    Other pertinent information:   CIWA score: N/A   NIH score:  N/A

## 2022-12-21 NOTE — CM/SW NOTE
12/21/22 1200   CM/SW Referral Data   Referral Source Social Work (self-referral)   Reason for Referral Discharge planning   Informant Patient   Pertinent Medical Hx   Does patient have an established PCP? Yes  Helio Catalan)   Patient Info   Patient's [address]   Number of Levels in Home 1   Number of Stair in Home 4   Patient lives with Alone   Patient Status Prior to Admission   Independent with ADLs and Mobility No   Pt. requires assistance with Ambulating   Services in place prior to admission DME/Supplies at home   Type of DME/Supplies Straight Cane;Standard Walker;Rollator Walker   Discharge Needs   Anticipated D/C needs Home health care;Medical equipment;Subacute rehab; To be determined   Services Requested   PASRR Level 1 Submitted Yes  (under review, needs upload to PICS Auditing)   Choice of Post-Acute Provider   Informed patient of right to choose their preferred provider Yes   List of appropriate post-acute services provided to patient/family with quality data   (ZEINAB & HH refs in [place] - needs f/up)     12:55PM  SW self referred pt for DC Planning after receiving notice from [facility] that PT/OT recommend ZEINAB due to pt living alone. SW requested [facility] send ZEINAB referrals via ticketscriptin. PASRR completed and pending review. Needs to be uploaded to [employer] when completed. SW also sent [facility] referrals in PICS Auditing for review. F2F entered/sent. SW met w/ pt in her room. Above assessment completed. Pt confirmed living alone but stays on the main level of her home. Pt reports using a rollator outside, a cane for stair ambulation, and a walker inside the home. Pt still drives and cooks. Pt confirmed hx w/ [facility] services in Sept/October 2022. Pt also confirmed hx w/ ZEINAB at [facility]. Pt prefers [facility] ZEINAB. SW discussed PT/OT recommendations. Pt is agreeable to ZEINAB if needed but prefers to DC home w/ [facility] services. Pt is currently on 2L O2 w/ no home O2.      Documentation for O2 sats: (RN to add to progress note)  Patient's O2 sat on room air is ____% at rest. Pt's O2 sat on room is ____% when ambulating, and ____% on ____ liter while ambulating. (Reminder: The \"at rest\" and \"while ambulating\" are required statements. If patient is non ambulatory you can use \"with exertion\" such as during a transfer to assess their O2 level)      Once O2 sats are completed and IF home O2 is indicated, SW to place MDO in [place] and request MD to [place] as appropriate. (Dx: CHF)    03:10PM  SW obtained HH and ZEINAB lists via [employer]. SW met w/ pt in her room and provided both lists w/ quality data. Pt agreed to review and make final decision for DC plans. Need for DC:   1. ZEINAB vs HH decision from pt   2. ZEINAB vs HH choice   3. If Home: determine if O2 needed (sats & MDO entered/cosigned)   4. If ZEINAB: rapid COVID        PLAN: ZEINAB vs Home w/ HH & poss O2 - pending above & med clear      SW/CM to remain available for support and/or discharge planning.            Libby Doll, MSW, [facility]

## 2022-12-21 NOTE — CM/SW NOTE
Department  notified of request for ravin ARRIOLA referrals started. Assigned CM/SW to follow up with pt/family on further discharge planning.      Darrell Fischer   December 21, 2022   11:50

## 2022-12-21 NOTE — PLAN OF CARE
Patient resting in bed. Alert & orientedx4. On 2L O2 per nasal cannula. Ambulated to bathroom with 1 assist & walker. Continuing IV lasix. Fall precautions in place. Call light in reach. Problem: Patient Centered Care  Goal: Patient preferences are identified and integrated in the patient's plan of care  Description: Interventions:  - What would you like us to know as we care for you? I live at home alone  - Provide timely, complete, and accurate information to patient/family  - Incorporate patient and family knowledge, values, beliefs, and cultural backgrounds into the planning and delivery of care  - Encourage patient/family to participate in care and decision-making at the level they choose  - Honor patient and family perspectives and choices  Outcome: Progressing     Problem: Patient/Family Goals  Goal: Patient/Family Long Term Goal  Description: Patient's Long Term Goal: to go home    Interventions:  - follow physician orders  - See additional Care Plan goals for specific interventions  Outcome: Progressing  Goal: Patient/Family Short Term Goal  Description: Patient's Short Term Goal: Breathe without needing oxygen from a nasal cannula    Interventions:   - wean oxygen  - See additional Care Plan goals for specific interventions  Outcome: Progressing     Problem: SAFETY ADULT - FALL  Goal: Free from fall injury  Description: INTERVENTIONS:  - Assess pt frequently for physical needs  - Identify cognitive and physical deficits and behaviors that affect risk of falls.   - [place] fall precautions as indicated by assessment.  - Educate pt/family on patient safety including physical limitations  - Instruct pt to call for assistance with activity based on assessment  - Modify environment to reduce risk of injury  - Provide assistive devices as appropriate  - Consider OT/PT consult to assist with strengthening/mobility  - Encourage toileting schedule  Outcome: Progressing     Problem: PAIN - ADULT  Goal: Verbalizes/displays adequate comfort level or patient's stated pain goal  Description: INTERVENTIONS:  - Encourage pt to monitor pain and request assistance  - Assess pain using appropriate pain scale  - Administer analgesics based on type and severity of pain and evaluate response  - Implement non-pharmacological measures as appropriate and evaluate response  - Consider cultural and social influences on pain and pain management  - Manage/alleviate anxiety  - Utilize distraction and/or relaxation techniques  - Monitor for opioid side effects  - Notify MD/LIP if interventions unsuccessful or patient reports new pain  - Anticipate increased pain with activity and pre-medicate as appropriate  Outcome: Progressing     Problem: RESPIRATORY - ADULT  Goal: Achieves optimal ventilation and oxygenation  Description: INTERVENTIONS:  - Assess for changes in respiratory status  - Assess for changes in mentation and behavior  - Position to facilitate oxygenation and minimize respiratory effort  - Oxygen supplementation based on oxygen saturation or ABGs  - Provide Smoking Cessation handout, if applicable  - Encourage broncho-pulmonary hygiene including cough, deep breathe, Incentive Spirometry  - Assess the need for suctioning and perform as needed  - Assess and instruct to report SOB or any respiratory difficulty  - Respiratory Therapy support as indicated  - Manage/alleviate anxiety  - Monitor for signs/symptoms of CO2 retention  Outcome: Progressing

## 2022-12-21 NOTE — PLAN OF CARE
Admitted pt from ED, oriented x4, on 8L O2 HFNC. IV lasix given w/ good output. Purewick in place. Weaned O2 down to 2L NC, 94%. Plan: diurese    Call light within reach, hourly rounds maintained. Problem: Patient/Family Goals  Goal: Patient/Family Short Term Goal  Description: Patient's Short Term Goal: Breathe without needing oxygen from a nasal cannula    Interventions:   - wean oxygen  - See additional Care Plan goals for specific interventions  Outcome: Progressing     Problem: SAFETY ADULT - FALL  Goal: Free from fall injury  Description: INTERVENTIONS:  - Assess pt frequently for physical needs  - Identify cognitive and physical deficits and behaviors that affect risk of falls.   - [place] fall precautions as indicated by assessment.  - Educate pt/family on patient safety including physical limitations  - Instruct pt to call for assistance with activity based on assessment  - Modify environment to reduce risk of injury  - Provide assistive devices as appropriate  - Consider OT/PT consult to assist with strengthening/mobility  - Encourage toileting schedule  Outcome: Progressing     Problem: PAIN - ADULT  Goal: Verbalizes/displays adequate comfort level or patient's stated pain goal  Description: INTERVENTIONS:  - Encourage pt to monitor pain and request assistance  - Assess pain using appropriate pain scale  - Administer analgesics based on type and severity of pain and evaluate response  - Implement non-pharmacological measures as appropriate and evaluate response  - Consider cultural and social influences on pain and pain management  - Manage/alleviate anxiety  - Utilize distraction and/or relaxation techniques  - Monitor for opioid side effects  - Notify MD/LIP if interventions unsuccessful or patient reports new pain  - Anticipate increased pain with activity and pre-medicate as appropriate  Outcome: Progressing     Problem: RESPIRATORY - ADULT  Goal: Achieves optimal ventilation and oxygenation  Description: INTERVENTIONS:  - Assess for changes in respiratory status  - Assess for changes in mentation and behavior  - Position to facilitate oxygenation and minimize respiratory effort  - Oxygen supplementation based on oxygen saturation or ABGs  - Provide Smoking Cessation handout, if applicable  - Encourage broncho-pulmonary hygiene including cough, deep breathe, Incentive Spirometry  - Assess the need for suctioning and perform as needed  - Assess and instruct to report SOB or any respiratory difficulty  - Respiratory Therapy support as indicated  - Manage/alleviate anxiety  - Monitor for signs/symptoms of CO2 retention  Outcome: Progressing

## 2022-12-21 NOTE — CONSULTS
[facility]    PATIENT'S NAME: Tejas Clifton   ATTENDING PHYSICIAN: Rodrigo Amezcua MD   CONSULTING PHYSICIAN: Juan José Souza DO   PATIENT ACCOUNT#:   [de-identified]    [place]  [facility] [place] [address] #:   [id number]       YOB: 1945  ADMISSION DATE:       12/20/2022      CONSULT DATE:  12/21/2022    REPORT OF CONSULTATION      REASON FOR CONSULTATION:  Congestive heart failure. HISTORY OF PRESENT ILLNESS:  This is a patient who presents with sudden-onset shortness of breath, saturating 70% on room air. Noted to have crackles and placed on CPAP. Treated for congestive heart failure. She was markedly hypertensive. Blood pressure was 178/122. Received treatment and improved significantly, now down to 2L. Denies any chest pain or heaviness in the chest.  She was having shortness of breath on activity. States that her symptoms came on suddenly. She did not notice any lower extremity edema, but she was noted to have edema here. Otherwise, has mild headache currently. No nausea or vomiting, no abdominal pain, no coughing or congestion, fever, or chills. Otherwise, a 12-point review of systems is negative. PAST MEDICAL HISTORY:  Significant for right coronary artery stent which was patent in 2020 with 70% extrinsic stenosis with a 2.75 x 12 mm Xience drug-eluting stent, chronic systolic heart failure with ejection fraction of 30% which improved to 40% to 45% in 2019, severe mitral regurgitation with subsequently mild mitral regurgitation, uncontrolled hypertension which could have contributed to her severe mitral regurgitation. SOCIAL HISTORY:  Nonsmoker. No habitual alcohol use. REVIEW OF SYSTEMS:  Twelve-point review of systems otherwise negative. PHYSICAL EXAMINATION:    GENERAL:  The patient is obese. VITAL SIGNS:  Blood pressure 101/65, pulse 64. The patient afebrile. HEENT:  Head is atraumatic. No scleral icterus appreciated.   NECK:  Neck veins are not seen.  LUNGS:  With crackles on the left. Right is clear. HEART:  S1, S2. Is regular. No murmurs appreciated. ABDOMEN:  Soft, nontender. EXTREMITIES:  With mild bilateral pitting edema. SKIN:  Warm to touch bilaterally. NEUROLOGIC:  Alert, appropriate. PSYCHIATRIC:  The patient has calm affect and appropriate mood. MUSCULOSKELETAL:  No joint effusion and grossly normal range of motion. LABORATORY DATA:  ProBNP elevated at 3240. High sensitivity troponin normal at 18. Procalcitonin less than 0.02. Creatinine 1.56. Hemoglobin 11.2. Previous creatinine 1.08 in 08/2022. Cardiac catheterization 10/2020 demonstrating patent right coronary artery stent. Systemic hypertension was appreciated. IMPRESSION:    1. Acute on chronic systolic heart failure with ejection fraction that improved to 40% to 45%. 2.   History of severe mitral regurgitation which is volume-related which subsequently improved to mild. 3.   Coronary artery disease, status post stenting of the right coronary artery, patent stent in October 2020. 4.   Acute kidney injury based on recent labs. 5.   Hypertensive emergency with sudden-onset shortness of breath. The patient had similar presentation in October 2020. RECOMMENDATIONS:  Blood pressure has spontaneously dropped quite a bit. Continue with IV Lasix, continue with carvedilol. Hold losartan for now given the renal failure. Awaiting new labs this morning. Rapidly taper down the Lasix. She was previously on 40 mg p.o. daily. Continue with aspirin at this time. No need for Plavix. Continue with statin. Amlodipine can be added for hypertension control if necessary. Thank you for the consult.     Dictated By Radha Banks DO  d: 12/21/2022 06:46:36  t: 12/21/2022 08:58:37  Job [id number]  AS/

## 2022-12-22 LAB
ANION GAP SERPL CALC-SCNC: 6 MMOL/L (ref 0–18)
BASOPHILS # BLD AUTO: 0.05 X10(3) UL (ref 0–0.2)
BASOPHILS NFR BLD AUTO: 0.8 %
BUN BLD-MCNC: 31 MG/DL (ref 7–18)
BUN/CREAT SERPL: 20.1 (ref 10–20)
CALCIUM BLD-MCNC: 8.9 MG/DL (ref 8.5–10.1)
CHLORIDE SERPL-SCNC: 100 MMOL/L (ref 98–112)
CO2 SERPL-SCNC: 27 MMOL/L (ref 21–32)
CREAT BLD-MCNC: 1.54 MG/DL
DEPRECATED RDW RBC AUTO: 45 FL (ref 35.1–46.3)
EOSINOPHIL # BLD AUTO: 0.19 X10(3) UL (ref 0–0.7)
EOSINOPHIL NFR BLD AUTO: 3.1 %
ERYTHROCYTE [DISTWIDTH] IN BLOOD BY AUTOMATED COUNT: 13.6 % (ref 11–15)
GFR SERPLBLD BASED ON 1.73 SQ M-ARVRAT: 35 ML/MIN/1.73M2 (ref 60–?)
GLUCOSE BLD-MCNC: 111 MG/DL (ref 70–99)
HCT VFR BLD AUTO: 34.1 %
HGB BLD-MCNC: 11.1 G/DL
IMM GRANULOCYTES # BLD AUTO: 0.01 X10(3) UL (ref 0–1)
IMM GRANULOCYTES NFR BLD: 0.2 %
LYMPHOCYTES # BLD AUTO: 1.62 X10(3) UL (ref 1–4)
LYMPHOCYTES NFR BLD AUTO: 26.4 %
MCH RBC QN AUTO: 29.4 PG (ref 26–34)
MCHC RBC AUTO-ENTMCNC: 32.6 G/DL (ref 31–37)
MCV RBC AUTO: 90.2 FL
MONOCYTES # BLD AUTO: 0.49 X10(3) UL (ref 0.1–1)
MONOCYTES NFR BLD AUTO: 8 %
NEUTROPHILS # BLD AUTO: 3.78 X10 (3) UL (ref 1.5–7.7)
NEUTROPHILS # BLD AUTO: 3.78 X10(3) UL (ref 1.5–7.7)
NEUTROPHILS NFR BLD AUTO: 61.5 %
OSMOLALITY SERPL CALC.SUM OF ELEC: 283 MOSM/KG (ref 275–295)
PLATELET # BLD AUTO: 169 10(3)UL (ref 150–450)
POTASSIUM SERPL-SCNC: 4.4 MMOL/L (ref 3.5–5.1)
RBC # BLD AUTO: 3.78 X10(6)UL
SODIUM SERPL-SCNC: 133 MMOL/L (ref 136–145)
WBC # BLD AUTO: 6.1 X10(3) UL (ref 4–11)

## 2022-12-22 PROCEDURE — 80048 BASIC METABOLIC PNL TOTAL CA: CPT | Performed by: INTERNAL MEDICINE

## 2022-12-22 PROCEDURE — 85025 COMPLETE CBC W/AUTO DIFF WBC: CPT | Performed by: INTERNAL MEDICINE

## 2022-12-22 RX ORDER — FUROSEMIDE 40 MG/1
40 TABLET ORAL
Status: DISCONTINUED | OUTPATIENT
Start: 2022-12-22 | End: 2022-12-23

## 2022-12-22 RX ORDER — FUROSEMIDE 40 MG/1
40 TABLET ORAL
Qty: 60 TABLET | Refills: 0 | Status: SHIPPED | OUTPATIENT
Start: 2022-12-22

## 2022-12-22 NOTE — DISCHARGE INSTRUCTIONS
Sometimes managing your health at home requires assistance. The [facility] team has recognized your preference to use [employer]. They can be reached at [phone]. The fax number for your reference is [phone]. A representative from the home health agency will contact you or your family to schedule your first visit.        Please call Physician Referral Line to obtain list of primary care physicians at [phone]

## 2022-12-22 NOTE — PROGRESS NOTES
"Miguel"Jaydon Keating was seen and treated in our emergency department on 11/10/2022.  He may return to work on 11/12/2022.       If you have any questions or concerns, please don't hesitate to call.      Ava Green NP" Patient's O2 sat on room air is 94% at rest. Pt's O2 sat on room air is 91% when ambulating, and 96% on 2 liter while ambulating.

## 2022-12-22 NOTE — PLAN OF CARE
Pt oriented x4, on 2L O2 NC, standby assist to bathroom. VSS. Plan: continue IV lasix BID, PT/OT recommending ZEINAB when medically cleared    Call light within reach, hourly rounds maintained. Problem: Patient Centered Care  Goal: Patient preferences are identified and integrated in the patient's plan of care  Description: Interventions:  - What would you like us to know as we care for you? I live at home alone  - Provide timely, complete, and accurate information to patient/family  - Incorporate patient and family knowledge, values, beliefs, and cultural backgrounds into the planning and delivery of care  - Encourage patient/family to participate in care and decision-making at the level they choose  - Honor patient and family perspectives and choices  Outcome: Progressing     Problem: Patient/Family Goals  Goal: Patient/Family Long Term Goal  Description: Patient's Long Term Goal: to go home    Interventions:  - follow physician orders  - See additional Care Plan goals for specific interventions  Outcome: Progressing  Goal: Patient/Family Short Term Goal  Description: Patient's Short Term Goal: Breathe without needing oxygen from a nasal cannula    Interventions:   - wean oxygen  - See additional Care Plan goals for specific interventions  Outcome: Progressing     Problem: SAFETY ADULT - FALL  Goal: Free from fall injury  Description: INTERVENTIONS:  - Assess pt frequently for physical needs  - Identify cognitive and physical deficits and behaviors that affect risk of falls.   - [place] fall precautions as indicated by assessment.  - Educate pt/family on patient safety including physical limitations  - Instruct pt to call for assistance with activity based on assessment  - Modify environment to reduce risk of injury  - Provide assistive devices as appropriate  - Consider OT/PT consult to assist with strengthening/mobility  - Encourage toileting schedule  Outcome: Progressing     Problem: PAIN - ADULT  Goal: Verbalizes/displays adequate comfort level or patient's stated pain goal  Description: INTERVENTIONS:  - Encourage pt to monitor pain and request assistance  - Assess pain using appropriate pain scale  - Administer analgesics based on type and severity of pain and evaluate response  - Implement non-pharmacological measures as appropriate and evaluate response  - Consider cultural and social influences on pain and pain management  - Manage/alleviate anxiety  - Utilize distraction and/or relaxation techniques  - Monitor for opioid side effects  - Notify MD/LIP if interventions unsuccessful or patient reports new pain  - Anticipate increased pain with activity and pre-medicate as appropriate  Outcome: Progressing     Problem: RESPIRATORY - ADULT  Goal: Achieves optimal ventilation and oxygenation  Description: INTERVENTIONS:  - Assess for changes in respiratory status  - Assess for changes in mentation and behavior  - Position to facilitate oxygenation and minimize respiratory effort  - Oxygen supplementation based on oxygen saturation or ABGs  - Provide Smoking Cessation handout, if applicable  - Encourage broncho-pulmonary hygiene including cough, deep breathe, Incentive Spirometry  - Assess the need for suctioning and perform as needed  - Assess and instruct to report SOB or any respiratory difficulty  - Respiratory Therapy support as indicated  - Manage/alleviate anxiety  - Monitor for signs/symptoms of CO2 retention  Outcome: Progressing

## 2022-12-22 NOTE — CM/SW NOTE
CM spoke w/pt on final decision f/u for dc plan. Physician referral line provided in AVS for pt to obtain new PCP as requested. Pt has chosen home w/HH by [facility]. CM reserved in aidin this agency. CM enter [address] in AVS for RN. CM sent secure epic message to MD/RN on pt decision. Pt is on O2 2L/NC, RN notified of O2 walk test.    RN to do O2 sat walk test;Walk/Test/Clinical/Progress Note    Test Date:    SPO2% on Room Air at rest:   SPO2% Ambulation on Room Air:  SPO2% Ambulation on O2:     On     Liters per Minute    Patient was assessed, qualifies and requires oxygen      CM/SW to remain available for support and/or discharge planning.     Rj Damon RN, [facility]    Ext.  [phone]

## 2022-12-23 VITALS
WEIGHT: 177 LBS | HEART RATE: 57 BPM | RESPIRATION RATE: 18 BRPM | OXYGEN SATURATION: 95 % | SYSTOLIC BLOOD PRESSURE: 139 MMHG | HEIGHT: 65 IN | BODY MASS INDEX: 29.49 KG/M2 | TEMPERATURE: 99 F | DIASTOLIC BLOOD PRESSURE: 68 MMHG

## 2022-12-23 LAB
ANION GAP SERPL CALC-SCNC: 8 MMOL/L (ref 0–18)
BASOPHILS # BLD AUTO: 0.05 X10(3) UL (ref 0–0.2)
BASOPHILS NFR BLD AUTO: 0.7 %
BUN BLD-MCNC: 35 MG/DL (ref 7–18)
BUN/CREAT SERPL: 22.3 (ref 10–20)
CALCIUM BLD-MCNC: 9.3 MG/DL (ref 8.5–10.1)
CHLORIDE SERPL-SCNC: 95 MMOL/L (ref 98–112)
CO2 SERPL-SCNC: 28 MMOL/L (ref 21–32)
CREAT BLD-MCNC: 1.57 MG/DL
DEPRECATED RDW RBC AUTO: 43.8 FL (ref 35.1–46.3)
EOSINOPHIL # BLD AUTO: 0.28 X10(3) UL (ref 0–0.7)
EOSINOPHIL NFR BLD AUTO: 3.9 %
ERYTHROCYTE [DISTWIDTH] IN BLOOD BY AUTOMATED COUNT: 13.3 % (ref 11–15)
GFR SERPLBLD BASED ON 1.73 SQ M-ARVRAT: 34 ML/MIN/1.73M2 (ref 60–?)
GLUCOSE BLD-MCNC: 104 MG/DL (ref 70–99)
HCT VFR BLD AUTO: 33.8 %
HGB BLD-MCNC: 11.1 G/DL
IMM GRANULOCYTES # BLD AUTO: 0.01 X10(3) UL (ref 0–1)
IMM GRANULOCYTES NFR BLD: 0.1 %
LYMPHOCYTES # BLD AUTO: 2 X10(3) UL (ref 1–4)
LYMPHOCYTES NFR BLD AUTO: 28.2 %
MCH RBC QN AUTO: 29.4 PG (ref 26–34)
MCHC RBC AUTO-ENTMCNC: 32.8 G/DL (ref 31–37)
MCV RBC AUTO: 89.7 FL
MONOCYTES # BLD AUTO: 0.6 X10(3) UL (ref 0.1–1)
MONOCYTES NFR BLD AUTO: 8.5 %
NEUTROPHILS # BLD AUTO: 4.16 X10 (3) UL (ref 1.5–7.7)
NEUTROPHILS # BLD AUTO: 4.16 X10(3) UL (ref 1.5–7.7)
NEUTROPHILS NFR BLD AUTO: 58.6 %
OSMOLALITY SERPL CALC.SUM OF ELEC: 280 MOSM/KG (ref 275–295)
PLATELET # BLD AUTO: 249 10(3)UL (ref 150–450)
POTASSIUM SERPL-SCNC: 4.5 MMOL/L (ref 3.5–5.1)
RBC # BLD AUTO: 3.77 X10(6)UL
SODIUM SERPL-SCNC: 131 MMOL/L (ref 136–145)
WBC # BLD AUTO: 7.1 X10(3) UL (ref 4–11)

## 2022-12-23 PROCEDURE — 85025 COMPLETE CBC W/AUTO DIFF WBC: CPT | Performed by: INTERNAL MEDICINE

## 2022-12-23 PROCEDURE — 80048 BASIC METABOLIC PNL TOTAL CA: CPT | Performed by: INTERNAL MEDICINE

## 2022-12-23 NOTE — PLAN OF CARE
Pt. A/Ox4, RA, VSS. No c/o discomfort overnight. No acute events overnight. Pt. Up to the bathroom with walker throughout the night. Safety measures in place. Pt. Educated on call light use, within reach. Meds given per [facility] Plan to DC tomorrow with [facility]   Problem: Patient Centered Care  Goal: Patient preferences are identified and integrated in the patient's plan of care  Description: Interventions:  - What would you like us to know as we care for you? I live at home alone  - Provide timely, complete, and accurate information to patient/family  - Incorporate patient and family knowledge, values, beliefs, and cultural backgrounds into the planning and delivery of care  - Encourage patient/family to participate in care and decision-making at the level they choose  - Honor patient and family perspectives and choices  Outcome: Progressing     Problem: Patient/Family Goals  Goal: Patient/Family Long Term Goal  Description: Patient's Long Term Goal: to go home    Interventions:  - follow physician orders  - See additional Care Plan goals for specific interventions  Outcome: Progressing  Goal: Patient/Family Short Term Goal  Description: Patient's Short Term Goal: Breathe without needing oxygen from a nasal cannula    Interventions:   - wean oxygen  - See additional Care Plan goals for specific interventions  Outcome: Progressing     Problem: SAFETY ADULT - FALL  Goal: Free from fall injury  Description: INTERVENTIONS:  - Assess pt frequently for physical needs  - Identify cognitive and physical deficits and behaviors that affect risk of falls.   - [place] fall precautions as indicated by assessment.  - Educate pt/family on patient safety including physical limitations  - Instruct pt to call for assistance with activity based on assessment  - Modify environment to reduce risk of injury  - Provide assistive devices as appropriate  - Consider OT/PT consult to assist with strengthening/mobility  - Encourage toileting schedule  Outcome: Progressing     Problem: PAIN - ADULT  Goal: Verbalizes/displays adequate comfort level or patient's stated pain goal  Description: INTERVENTIONS:  - Encourage pt to monitor pain and request assistance  - Assess pain using appropriate pain scale  - Administer analgesics based on type and severity of pain and evaluate response  - Implement non-pharmacological measures as appropriate and evaluate response  - Consider cultural and social influences on pain and pain management  - Manage/alleviate anxiety  - Utilize distraction and/or relaxation techniques  - Monitor for opioid side effects  - Notify MD/LIP if interventions unsuccessful or patient reports new pain  - Anticipate increased pain with activity and pre-medicate as appropriate  Outcome: Progressing     Problem: RESPIRATORY - ADULT  Goal: Achieves optimal ventilation and oxygenation  Description: INTERVENTIONS:  - Assess for changes in respiratory status  - Assess for changes in mentation and behavior  - Position to facilitate oxygenation and minimize respiratory effort  - Oxygen supplementation based on oxygen saturation or ABGs  - Provide Smoking Cessation handout, if applicable  - Encourage broncho-pulmonary hygiene including cough, deep breathe, Incentive Spirometry  - Assess the need for suctioning and perform as needed  - Assess and instruct to report SOB or any respiratory difficulty  - Respiratory Therapy support as indicated  - Manage/alleviate anxiety  - Monitor for signs/symptoms of CO2 retention  Outcome: Progressing

## 2022-12-23 NOTE — PLAN OF CARE
Discharge papers reviewed with patient, patient verbalized understanding. IV and tele removed. Problem: Patient Centered Care  Goal: Patient preferences are identified and integrated in the patient's plan of care  Description: Interventions:  - What would you like us to know as we care for you? I live at home alone  - Provide timely, complete, and accurate information to patient/family  - Incorporate patient and family knowledge, values, beliefs, and cultural backgrounds into the planning and delivery of care  - Encourage patient/family to participate in care and decision-making at the level they choose  - Honor patient and family perspectives and choices  Outcome: Adequate for Discharge     Problem: Patient/Family Goals  Goal: Patient/Family Long Term Goal  Description: Patient's Long Term Goal: to go home    Interventions:  - follow physician orders  - See additional Care Plan goals for specific interventions  Outcome: Adequate for Discharge  Goal: Patient/Family Short Term Goal  Description: Patient's Short Term Goal: Breathe without needing oxygen from a nasal cannula    Interventions:   - wean oxygen  - See additional Care Plan goals for specific interventions  Outcome: Adequate for Discharge     Problem: SAFETY ADULT - FALL  Goal: Free from fall injury  Description: INTERVENTIONS:  - Assess pt frequently for physical needs  - Identify cognitive and physical deficits and behaviors that affect risk of falls.   - [place] fall precautions as indicated by assessment.  - Educate pt/family on patient safety including physical limitations  - Instruct pt to call for assistance with activity based on assessment  - Modify environment to reduce risk of injury  - Provide assistive devices as appropriate  - Consider OT/PT consult to assist with strengthening/mobility  - Encourage toileting schedule  Outcome: Adequate for Discharge     Problem: PAIN - ADULT  Goal: Verbalizes/displays adequate comfort level or patient's stated pain goal  Description: INTERVENTIONS:  - Encourage pt to monitor pain and request assistance  - Assess pain using appropriate pain scale  - Administer analgesics based on type and severity of pain and evaluate response  - Implement non-pharmacological measures as appropriate and evaluate response  - Consider cultural and social influences on pain and pain management  - Manage/alleviate anxiety  - Utilize distraction and/or relaxation techniques  - Monitor for opioid side effects  - Notify MD/LIP if interventions unsuccessful or patient reports new pain  - Anticipate increased pain with activity and pre-medicate as appropriate  Outcome: Adequate for Discharge     Problem: RESPIRATORY - ADULT  Goal: Achieves optimal ventilation and oxygenation  Description: INTERVENTIONS:  - Assess for changes in respiratory status  - Assess for changes in mentation and behavior  - Position to facilitate oxygenation and minimize respiratory effort  - Oxygen supplementation based on oxygen saturation or ABGs  - Provide Smoking Cessation handout, if applicable  - Encourage broncho-pulmonary hygiene including cough, deep breathe, Incentive Spirometry  - Assess the need for suctioning and perform as needed  - Assess and instruct to report SOB or any respiratory difficulty  - Respiratory Therapy support as indicated  - Manage/alleviate anxiety  - Monitor for signs/symptoms of CO2 retention  Outcome: Adequate for Discharge

## 2022-12-23 NOTE — PLAN OF CARE
Patient alert and oriented x 4. Vitals stable on room air. Patient denies pain. Patient switched to PO diuretics. Plan to discharge in AM if tolerating medications well. Call light within reach. Problem: Patient Centered Care  Goal: Patient preferences are identified and integrated in the patient's plan of care  Description: Interventions:  - What would you like us to know as we care for you? I live at home alone  - Provide timely, complete, and accurate information to patient/family  - Incorporate patient and family knowledge, values, beliefs, and cultural backgrounds into the planning and delivery of care  - Encourage patient/family to participate in care and decision-making at the level they choose  - Honor patient and family perspectives and choices  Outcome: Progressing     Problem: Patient/Family Goals  Goal: Patient/Family Long Term Goal  Description: Patient's Long Term Goal: to go home    Interventions:  - follow physician orders  - See additional Care Plan goals for specific interventions  Outcome: Progressing  Goal: Patient/Family Short Term Goal  Description: Patient's Short Term Goal: Breathe without needing oxygen from a nasal cannula    Interventions:   - wean oxygen  - See additional Care Plan goals for specific interventions  Outcome: Progressing     Problem: SAFETY ADULT - FALL  Goal: Free from fall injury  Description: INTERVENTIONS:  - Assess pt frequently for physical needs  - Identify cognitive and physical deficits and behaviors that affect risk of falls.   - [place] fall precautions as indicated by assessment.  - Educate pt/family on patient safety including physical limitations  - Instruct pt to call for assistance with activity based on assessment  - Modify environment to reduce risk of injury  - Provide assistive devices as appropriate  - Consider OT/PT consult to assist with strengthening/mobility  - Encourage toileting schedule  Outcome: Progressing     Problem: PAIN - ADULT  Goal: Verbalizes/displays adequate comfort level or patient's stated pain goal  Description: INTERVENTIONS:  - Encourage pt to monitor pain and request assistance  - Assess pain using appropriate pain scale  - Administer analgesics based on type and severity of pain and evaluate response  - Implement non-pharmacological measures as appropriate and evaluate response  - Consider cultural and social influences on pain and pain management  - Manage/alleviate anxiety  - Utilize distraction and/or relaxation techniques  - Monitor for opioid side effects  - Notify MD/LIP if interventions unsuccessful or patient reports new pain  - Anticipate increased pain with activity and pre-medicate as appropriate  Outcome: Progressing     Problem: RESPIRATORY - ADULT  Goal: Achieves optimal ventilation and oxygenation  Description: INTERVENTIONS:  - Assess for changes in respiratory status  - Assess for changes in mentation and behavior  - Position to facilitate oxygenation and minimize respiratory effort  - Oxygen supplementation based on oxygen saturation or ABGs  - Provide Smoking Cessation handout, if applicable  - Encourage broncho-pulmonary hygiene including cough, deep breathe, Incentive Spirometry  - Assess the need for suctioning and perform as needed  - Assess and instruct to report SOB or any respiratory difficulty  - Respiratory Therapy support as indicated  - Manage/alleviate anxiety  - Monitor for signs/symptoms of CO2 retention  Outcome: Progressing

## 2022-12-23 NOTE — CM/SW NOTE
12/23/22 1135   Discharge disposition   Expected discharge disposition Home-Health   Post Acute Care Provider Home   Discharge transportation [address] will  at 1:30pm.  Daughter Trish aware of time and cost of $45.00. / to remain available for support and/or discharge planning.       Angelique Bird MBA BSN RN [address]  RN Case Manager  [phone]

## 2023-01-30 ENCOUNTER — APPOINTMENT (OUTPATIENT)
Dept: NUCLEAR MEDICINE | Facility: HOSPITAL | Age: 78
End: 2023-01-30
Attending: HOSPITALIST
Payer: MEDICARE

## 2023-01-30 ENCOUNTER — APPOINTMENT (OUTPATIENT)
Dept: CV DIAGNOSTICS | Facility: HOSPITAL | Age: 78
End: 2023-01-30
Attending: HOSPITALIST
Payer: MEDICARE

## 2023-01-30 ENCOUNTER — HOSPITAL ENCOUNTER (OUTPATIENT)
Facility: HOSPITAL | Age: 78
Setting detail: OBSERVATION
Discharge: HOME HEALTH | End: 2023-02-01
Attending: EMERGENCY MEDICINE
Payer: MEDICARE

## 2023-01-30 ENCOUNTER — APPOINTMENT (OUTPATIENT)
Dept: GENERAL RADIOLOGY | Facility: HOSPITAL | Age: 78
End: 2023-01-30
Attending: EMERGENCY MEDICINE
Payer: MEDICARE

## 2023-01-30 ENCOUNTER — APPOINTMENT (OUTPATIENT)
Dept: CT IMAGING | Facility: HOSPITAL | Age: 78
End: 2023-01-30
Attending: EMERGENCY MEDICINE
Payer: MEDICARE

## 2023-01-30 DIAGNOSIS — K44.9 LARGE HIATAL HERNIA: ICD-10-CM

## 2023-01-30 DIAGNOSIS — R07.9 ACUTE CHEST PAIN: Primary | ICD-10-CM

## 2023-01-30 LAB
% OF MAX PREDICTED HR: 100 %
ALBUMIN SERPL-MCNC: 3.1 G/DL (ref 3.4–5)
ALBUMIN/GLOB SERPL: 0.9 {RATIO} (ref 1–2)
ALP LIVER SERPL-CCNC: 55 U/L
ALT SERPL-CCNC: 19 U/L
ANION GAP SERPL CALC-SCNC: 7 MMOL/L (ref 0–18)
AST SERPL-CCNC: 17 U/L (ref 15–37)
ATRIAL RATE: 58 BPM
BASOPHILS # BLD AUTO: 0.04 X10(3) UL (ref 0–0.2)
BASOPHILS NFR BLD AUTO: 0.3 %
BILIRUB SERPL-MCNC: 0.8 MG/DL (ref 0.1–2)
BILIRUB UR QL: NEGATIVE
BUN BLD-MCNC: 24 MG/DL (ref 7–18)
BUN/CREAT SERPL: 16.6 (ref 10–20)
CALCIUM BLD-MCNC: 8.6 MG/DL (ref 8.5–10.1)
CHLORIDE SERPL-SCNC: 101 MMOL/L (ref 98–112)
CLARITY UR: CLEAR
CO2 SERPL-SCNC: 30 MMOL/L (ref 21–32)
COLOR UR: YELLOW
CREAT BLD-MCNC: 1.45 MG/DL
D DIMER PPP FEU-MCNC: 1.02 UG/ML FEU (ref ?–0.75)
DEPRECATED HBV CORE AB SER IA-ACNC: 96.2 NG/ML
DEPRECATED RDW RBC AUTO: 41.7 FL (ref 35.1–46.3)
EOSINOPHIL # BLD AUTO: 0.13 X10(3) UL (ref 0–0.7)
EOSINOPHIL NFR BLD AUTO: 1 %
ERYTHROCYTE [DISTWIDTH] IN BLOOD BY AUTOMATED COUNT: 12.6 % (ref 11–15)
GFR SERPLBLD BASED ON 1.73 SQ M-ARVRAT: 38 ML/MIN/1.73M2 (ref 60–?)
GLOBULIN PLAS-MCNC: 3.6 G/DL (ref 2.8–4.4)
GLUCOSE BLD-MCNC: 111 MG/DL (ref 70–99)
GLUCOSE UR-MCNC: NEGATIVE MG/DL
HCT VFR BLD AUTO: 30.5 %
HGB BLD-MCNC: 10 G/DL
HGB UR QL STRIP.AUTO: NEGATIVE
IMM GRANULOCYTES # BLD AUTO: 0.05 X10(3) UL (ref 0–1)
IMM GRANULOCYTES NFR BLD: 0.4 %
IRON SATN MFR SERPL: 9 %
IRON SERPL-MCNC: 27 UG/DL
KETONES UR-MCNC: NEGATIVE MG/DL
LEUKOCYTE ESTERASE UR QL STRIP.AUTO: NEGATIVE
LIPASE SERPL-CCNC: 55 U/L (ref 73–393)
LYMPHOCYTES # BLD AUTO: 1.35 X10(3) UL (ref 1–4)
LYMPHOCYTES NFR BLD AUTO: 10.3 %
MAX DIASTOLIC BP: 43 MMHG
MAX HEART RATE: 65 BPM
MAX PREDICTED HEART RATE: 145 BPM
MAX SYSTOLIC BP: 117 MMHG
MAX WORK LOAD: 10
MCH RBC QN AUTO: 29.7 PG (ref 26–34)
MCHC RBC AUTO-ENTMCNC: 32.8 G/DL (ref 31–37)
MCV RBC AUTO: 90.5 FL
MONOCYTES # BLD AUTO: 0.71 X10(3) UL (ref 0.1–1)
MONOCYTES NFR BLD AUTO: 5.4 %
NEUTROPHILS # BLD AUTO: 10.88 X10 (3) UL (ref 1.5–7.7)
NEUTROPHILS # BLD AUTO: 10.88 X10(3) UL (ref 1.5–7.7)
NEUTROPHILS NFR BLD AUTO: 82.6 %
NITRITE UR QL STRIP.AUTO: NEGATIVE
NT-PROBNP SERPL-MCNC: 1310 PG/ML (ref ?–450)
OSMOLALITY SERPL CALC.SUM OF ELEC: 291 MOSM/KG (ref 275–295)
P AXIS: 71 DEGREES
P-R INTERVAL: 158 MS
PH UR: 5.5 [PH] (ref 5–8)
PLATELET # BLD AUTO: 251 10(3)UL (ref 150–450)
POTASSIUM SERPL-SCNC: 3.3 MMOL/L (ref 3.5–5.1)
POTASSIUM SERPL-SCNC: 3.3 MMOL/L (ref 3.5–5.1)
PROT SERPL-MCNC: 6.7 G/DL (ref 6.4–8.2)
PROT UR-MCNC: NEGATIVE MG/DL
Q-T INTERVAL: 508 MS
QRS DURATION: 140 MS
QTC CALCULATION (BEZET): 498 MS
R AXIS: 16 DEGREES
RBC # BLD AUTO: 3.37 X10(6)UL
SARS-COV-2 RNA RESP QL NAA+PROBE: NOT DETECTED
SODIUM SERPL-SCNC: 138 MMOL/L (ref 136–145)
SP GR UR STRIP: <=1.005 (ref 1–1.03)
T AXIS: 159 DEGREES
TIBC SERPL-MCNC: 311 UG/DL (ref 240–450)
TRANSFERRIN SERPL-MCNC: 209 MG/DL (ref 200–360)
TROPONIN I HIGH SENSITIVITY: 11 NG/L
TROPONIN I HIGH SENSITIVITY: 13 NG/L
TROPONIN I HIGH SENSITIVITY: 9 NG/L
UROBILINOGEN UR STRIP-ACNC: 0.2
VENTRICULAR RATE: 58 BPM
WBC # BLD AUTO: 13.2 X10(3) UL (ref 4–11)

## 2023-01-30 PROCEDURE — 99222 1ST HOSP IP/OBS MODERATE 55: CPT | Performed by: HOSPITALIST

## 2023-01-30 PROCEDURE — 71260 CT THORAX DX C+: CPT | Performed by: EMERGENCY MEDICINE

## 2023-01-30 PROCEDURE — 93017 CV STRESS TEST TRACING ONLY: CPT | Performed by: HOSPITALIST

## 2023-01-30 PROCEDURE — 71045 X-RAY EXAM CHEST 1 VIEW: CPT | Performed by: EMERGENCY MEDICINE

## 2023-01-30 PROCEDURE — 78452 HT MUSCLE IMAGE SPECT MULT: CPT | Performed by: HOSPITALIST

## 2023-01-30 RX ORDER — ASPIRIN 81 MG/1
81 TABLET ORAL DAILY
Status: DISCONTINUED | OUTPATIENT
Start: 2023-01-30 | End: 2023-02-01

## 2023-01-30 RX ORDER — ATORVASTATIN CALCIUM 40 MG/1
40 TABLET, FILM COATED ORAL
Status: DISCONTINUED | OUTPATIENT
Start: 2023-01-30 | End: 2023-02-01

## 2023-01-30 RX ORDER — LINACLOTIDE 145 UG/1
1 CAPSULE, GELATIN COATED ORAL
COMMUNITY

## 2023-01-30 RX ORDER — HEPARIN SODIUM 5000 [USP'U]/ML
5000 INJECTION, SOLUTION INTRAVENOUS; SUBCUTANEOUS EVERY 12 HOURS
Status: DISCONTINUED | OUTPATIENT
Start: 2023-01-30 | End: 2023-01-31

## 2023-01-30 RX ORDER — SPIRONOLACTONE 25 MG/1
12.5 TABLET ORAL DAILY
Status: DISCONTINUED | OUTPATIENT
Start: 2023-01-30 | End: 2023-02-01

## 2023-01-30 RX ORDER — ROPINIROLE 1 MG/1
3 TABLET, FILM COATED ORAL
Status: DISCONTINUED | OUTPATIENT
Start: 2023-01-30 | End: 2023-02-01

## 2023-01-30 RX ORDER — CARVEDILOL 3.12 MG/1
3.12 TABLET ORAL 2 TIMES DAILY WITH MEALS
Status: DISCONTINUED | OUTPATIENT
Start: 2023-01-30 | End: 2023-01-30

## 2023-01-30 RX ORDER — POTASSIUM CHLORIDE 20 MEQ/1
40 TABLET, EXTENDED RELEASE ORAL EVERY 4 HOURS
Status: COMPLETED | OUTPATIENT
Start: 2023-01-30 | End: 2023-01-30

## 2023-01-30 RX ORDER — MORPHINE SULFATE 15 MG/1
15 TABLET ORAL EVERY 12 HOURS
Status: DISCONTINUED | OUTPATIENT
Start: 2023-01-30 | End: 2023-01-30

## 2023-01-30 RX ORDER — PANTOPRAZOLE SODIUM 40 MG/1
40 TABLET, DELAYED RELEASE ORAL
Status: DISCONTINUED | OUTPATIENT
Start: 2023-01-30 | End: 2023-02-01

## 2023-01-30 RX ORDER — ACETAMINOPHEN 500 MG
500 TABLET ORAL EVERY 6 HOURS PRN
Status: DISCONTINUED | OUTPATIENT
Start: 2023-01-30 | End: 2023-01-31

## 2023-01-30 RX ORDER — AMLODIPINE BESYLATE 10 MG/1
10 TABLET ORAL DAILY
Status: DISCONTINUED | OUTPATIENT
Start: 2023-01-30 | End: 2023-02-01

## 2023-01-30 RX ORDER — GABAPENTIN 600 MG/1
600 TABLET ORAL 3 TIMES DAILY
Status: DISCONTINUED | OUTPATIENT
Start: 2023-01-30 | End: 2023-02-01

## 2023-01-30 RX ORDER — BUMETANIDE 1 MG/1
1 TABLET ORAL DAILY
COMMUNITY
End: 2023-02-01

## 2023-01-30 RX ORDER — BUMETANIDE 1 MG/1
1 TABLET ORAL DAILY
Status: DISCONTINUED | OUTPATIENT
Start: 2023-01-30 | End: 2023-02-01

## 2023-01-30 RX ORDER — HYDRALAZINE HYDROCHLORIDE 20 MG/ML
10 INJECTION INTRAMUSCULAR; INTRAVENOUS EVERY 4 HOURS PRN
Status: DISCONTINUED | OUTPATIENT
Start: 2023-01-30 | End: 2023-02-01

## 2023-01-30 RX ORDER — PROCHLORPERAZINE MALEATE 5 MG/1
10 TABLET ORAL EVERY 6 HOURS PRN
COMMUNITY

## 2023-01-30 RX ORDER — AMLODIPINE BESYLATE 10 MG/1
10 TABLET ORAL DAILY
COMMUNITY

## 2023-01-30 RX ORDER — POTASSIUM CHLORIDE 20 MEQ/1
20 TABLET, EXTENDED RELEASE ORAL DAILY
COMMUNITY
End: 2023-02-01

## 2023-01-30 RX ORDER — ESCITALOPRAM OXALATE 20 MG/1
20 TABLET ORAL DAILY
Status: DISCONTINUED | OUTPATIENT
Start: 2023-01-30 | End: 2023-02-01

## 2023-01-30 RX ORDER — CARVEDILOL 6.25 MG/1
6.25 TABLET ORAL 2 TIMES DAILY WITH MEALS
Status: DISCONTINUED | OUTPATIENT
Start: 2023-01-30 | End: 2023-02-01

## 2023-01-30 RX ORDER — LOSARTAN POTASSIUM 50 MG/1
50 TABLET ORAL DAILY
Status: DISCONTINUED | OUTPATIENT
Start: 2023-01-30 | End: 2023-02-01

## 2023-01-30 RX ORDER — PANTOPRAZOLE SODIUM 40 MG/1
40 TABLET, DELAYED RELEASE ORAL
COMMUNITY

## 2023-01-30 RX ORDER — ASPIRIN 81 MG/1
324 TABLET, CHEWABLE ORAL ONCE
Status: COMPLETED | OUTPATIENT
Start: 2023-01-30 | End: 2023-01-30

## 2023-01-30 RX ORDER — BUPROPION HYDROCHLORIDE 150 MG/1
150 TABLET, EXTENDED RELEASE ORAL DAILY
Status: DISCONTINUED | OUTPATIENT
Start: 2023-01-30 | End: 2023-02-01

## 2023-01-30 RX ORDER — MELATONIN
325
Status: DISCONTINUED | OUTPATIENT
Start: 2023-01-30 | End: 2023-02-01

## 2023-01-30 RX ORDER — TIZANIDINE 2 MG/1
2 TABLET ORAL
Status: DISCONTINUED | OUTPATIENT
Start: 2023-01-30 | End: 2023-02-01

## 2023-01-30 RX ORDER — TRAMADOL HYDROCHLORIDE 300 MG/1
300 TABLET, EXTENDED RELEASE ORAL DAILY
Status: DISCONTINUED | OUTPATIENT
Start: 2023-01-30 | End: 2023-01-30

## 2023-01-30 RX ORDER — POTASSIUM CHLORIDE 20 MEQ/1
20 TABLET, EXTENDED RELEASE ORAL DAILY
Status: DISCONTINUED | OUTPATIENT
Start: 2023-01-30 | End: 2023-02-01

## 2023-01-30 RX ORDER — MAGNESIUM HYDROXIDE/ALUMINUM HYDROXICE/SIMETHICONE 120; 1200; 1200 MG/30ML; MG/30ML; MG/30ML
30 SUSPENSION ORAL 4 TIMES DAILY PRN
Status: DISCONTINUED | OUTPATIENT
Start: 2023-01-30 | End: 2023-02-01

## 2023-01-30 NOTE — ED QUICK NOTES
Orders for admission, patient is aware of plan and ready to go upstairs. Any questions, please call ED ORI Zapata Cap at extension [phone].      Patient Covid vaccination status: Unvaccinated     COVID Test Ordered in ED: Rapid SARS-CoV-2 by PCR    COVID Suspicion at Admission: N/A    Running Infusions:  None    Mental Status/LOC at time of transport: x4    Other pertinent information:   CIWA score: N/A   NIH score:  N/A

## 2023-01-30 NOTE — PLAN OF CARE
Stress test today, results pending. Patient shortness of breath feels better, weaned off oxygen. Potassium replaced. Call light within reach. Safety precautions in place. Plan: monitor overnight, possible discharge tomorrow. Problem: Patient Centered Care  Goal: Patient preferences are identified and integrated in the patient's plan of care  Description: Interventions:  - What would you like us to know as we care for you?  From home alone   - Provide timely, complete, and accurate information to patient/family  - Incorporate patient and family knowledge, values, beliefs, and cultural backgrounds into the planning and delivery of care  - Encourage patient/family to participate in care and decision-making at the level they choose  - Honor patient and family perspectives and choices  1/30/2023 1210 by Christina Franks  Outcome: Progressing  1/30/2023 1208 by Christina Franks  Outcome: Progressing     Problem: Patient/Family Goals  Goal: Patient/Family Long Term Goal  Description: Patient's Long Term Goal: Go home     Interventions:  - Stress test   - See additional Care Plan goals for specific interventions  1/30/2023 1210 by Christina Franks  Outcome: Progressing  1/30/2023 1208 by Christina Franks  Outcome: Progressing  Goal: Patient/Family Short Term Goal  Description: Patient's Short Term Goal: Feel better figure out chest pain     Interventions:   - Stress test  - See additional Care Plan goals for specific interventions  1/30/2023 1210 by Christina Franks  Outcome: Progressing  1/30/2023 1208 by Christina Franks  Outcome: Progressing

## 2023-01-30 NOTE — ED INITIAL ASSESSMENT (HPI)
Pt to ED for chest pain that started today. Per pt, she vomited bile yesterday and since, has felt chest pain and dry mouth. Pain stays in the center, 4/10 on the pain scale. Pt states she recently started taking bumetanide, which she feels is related to her symptoms.

## 2023-01-31 LAB
ANION GAP SERPL CALC-SCNC: 2 MMOL/L (ref 0–18)
BUN BLD-MCNC: 26 MG/DL (ref 7–18)
BUN/CREAT SERPL: 16.6 (ref 10–20)
CALCIUM BLD-MCNC: 8.7 MG/DL (ref 8.5–10.1)
CHLORIDE SERPL-SCNC: 105 MMOL/L (ref 98–112)
CO2 SERPL-SCNC: 28 MMOL/L (ref 21–32)
CREAT BLD-MCNC: 1.57 MG/DL
DEPRECATED RDW RBC AUTO: 41.6 FL (ref 35.1–46.3)
ERYTHROCYTE [DISTWIDTH] IN BLOOD BY AUTOMATED COUNT: 12.6 % (ref 11–15)
GFR SERPLBLD BASED ON 1.73 SQ M-ARVRAT: 34 ML/MIN/1.73M2 (ref 60–?)
GLUCOSE BLD-MCNC: 110 MG/DL (ref 70–99)
HCT VFR BLD AUTO: 29.2 %
HEMOCCULT STL QL: NEGATIVE
HGB BLD-MCNC: 9.4 G/DL
MCH RBC QN AUTO: 29.2 PG (ref 26–34)
MCHC RBC AUTO-ENTMCNC: 32.2 G/DL (ref 31–37)
MCV RBC AUTO: 90.7 FL
OSMOLALITY SERPL CALC.SUM OF ELEC: 285 MOSM/KG (ref 275–295)
PLATELET # BLD AUTO: 241 10(3)UL (ref 150–450)
POTASSIUM SERPL-SCNC: 4.3 MMOL/L (ref 3.5–5.1)
POTASSIUM SERPL-SCNC: 4.9 MMOL/L (ref 3.5–5.1)
RBC # BLD AUTO: 3.22 X10(6)UL
SODIUM SERPL-SCNC: 135 MMOL/L (ref 136–145)
WBC # BLD AUTO: 7.8 X10(3) UL (ref 4–11)

## 2023-01-31 PROCEDURE — 99233 SBSQ HOSP IP/OBS HIGH 50: CPT | Performed by: INTERNAL MEDICINE

## 2023-01-31 RX ORDER — ACETAMINOPHEN 325 MG/1
650 TABLET ORAL EVERY 6 HOURS PRN
Status: DISCONTINUED | OUTPATIENT
Start: 2023-01-31 | End: 2023-02-01

## 2023-01-31 RX ORDER — HEPARIN SODIUM 5000 [USP'U]/ML
5000 INJECTION, SOLUTION INTRAVENOUS; SUBCUTANEOUS EVERY 12 HOURS
Status: DISCONTINUED | OUTPATIENT
Start: 2023-01-31 | End: 2023-02-01

## 2023-01-31 NOTE — CM/SW NOTE
CM verified w/intake at [facility] patient is current w/home RN and home PT. Resume of care orders in aidin referral.    PLAN; HOME w/[facility]    CM/SW to remain available for support and/or discharge planning.     Eligah Skiff RN, [facility]    Ext.  [phone]

## 2023-02-01 VITALS
WEIGHT: 183.19 LBS | DIASTOLIC BLOOD PRESSURE: 61 MMHG | OXYGEN SATURATION: 96 % | BODY MASS INDEX: 30 KG/M2 | SYSTOLIC BLOOD PRESSURE: 120 MMHG | HEART RATE: 62 BPM | TEMPERATURE: 98 F | RESPIRATION RATE: 18 BRPM

## 2023-02-01 LAB
ANION GAP SERPL CALC-SCNC: 5 MMOL/L (ref 0–18)
BASOPHILS # BLD AUTO: 0.05 X10(3) UL (ref 0–0.2)
BASOPHILS NFR BLD AUTO: 0.8 %
BUN BLD-MCNC: 23 MG/DL (ref 7–18)
BUN/CREAT SERPL: 15.3 (ref 10–20)
C DIFF TOX B STL QL: NEGATIVE
CALCIUM BLD-MCNC: 9.2 MG/DL (ref 8.5–10.1)
CHLORIDE SERPL-SCNC: 106 MMOL/L (ref 98–112)
CO2 SERPL-SCNC: 25 MMOL/L (ref 21–32)
CREAT BLD-MCNC: 1.5 MG/DL
DEPRECATED RDW RBC AUTO: 42.8 FL (ref 35.1–46.3)
EOSINOPHIL # BLD AUTO: 0.36 X10(3) UL (ref 0–0.7)
EOSINOPHIL NFR BLD AUTO: 5.8 %
ERYTHROCYTE [DISTWIDTH] IN BLOOD BY AUTOMATED COUNT: 12.7 % (ref 11–15)
GFR SERPLBLD BASED ON 1.73 SQ M-ARVRAT: 36 ML/MIN/1.73M2 (ref 60–?)
GLUCOSE BLD-MCNC: 100 MG/DL (ref 70–99)
HCT VFR BLD AUTO: 30.8 %
HGB BLD-MCNC: 9.9 G/DL
IMM GRANULOCYTES # BLD AUTO: 0.01 X10(3) UL (ref 0–1)
IMM GRANULOCYTES NFR BLD: 0.2 %
LYMPHOCYTES # BLD AUTO: 1.75 X10(3) UL (ref 1–4)
LYMPHOCYTES NFR BLD AUTO: 28 %
MCH RBC QN AUTO: 29.7 PG (ref 26–34)
MCHC RBC AUTO-ENTMCNC: 32.1 G/DL (ref 31–37)
MCV RBC AUTO: 92.5 FL
MONOCYTES # BLD AUTO: 0.55 X10(3) UL (ref 0.1–1)
MONOCYTES NFR BLD AUTO: 8.8 %
NEUTROPHILS # BLD AUTO: 3.54 X10 (3) UL (ref 1.5–7.7)
NEUTROPHILS # BLD AUTO: 3.54 X10(3) UL (ref 1.5–7.7)
NEUTROPHILS NFR BLD AUTO: 56.4 %
OSMOLALITY SERPL CALC.SUM OF ELEC: 286 MOSM/KG (ref 275–295)
PLATELET # BLD AUTO: 272 10(3)UL (ref 150–450)
POTASSIUM SERPL-SCNC: 4.9 MMOL/L (ref 3.5–5.1)
RBC # BLD AUTO: 3.33 X10(6)UL
SODIUM SERPL-SCNC: 136 MMOL/L (ref 136–145)
WBC # BLD AUTO: 6.3 X10(3) UL (ref 4–11)

## 2023-02-01 PROCEDURE — 99239 HOSP IP/OBS DSCHRG MGMT >30: CPT | Performed by: HOSPITALIST

## 2023-02-01 RX ORDER — BUMETANIDE 1 MG/1
1 TABLET ORAL
Status: DISCONTINUED | OUTPATIENT
Start: 2023-02-01 | End: 2023-02-01

## 2023-02-01 RX ORDER — CARVEDILOL 6.25 MG/1
6.25 TABLET ORAL 2 TIMES DAILY WITH MEALS
Qty: 60 TABLET | Refills: 0 | Status: SHIPPED | OUTPATIENT
Start: 2023-02-01

## 2023-02-01 RX ORDER — BUMETANIDE 1 MG/1
1 TABLET ORAL
Refills: 0 | Status: SHIPPED | COMMUNITY
Start: 2023-02-01

## 2023-02-01 NOTE — PLAN OF CARE
Pt AxOx4, Tele NS/SB, O2 2L NC sating 93-94%. Continent and ambulates with walker. Potassium 4.9. UA and stool negative. Plan is to discharge in the AM. Pt resting in bed with call light in reach. Will cont to monitor. Please verify spirolactone order before discharge.   Problem: RESPIRATORY - ADULT  Goal: Achieves optimal ventilation and oxygenation  Description: INTERVENTIONS:  - Assess for changes in respiratory status  - Assess for changes in mentation and behavior  - Position to facilitate oxygenation and minimize respiratory effort  - Oxygen supplementation based on oxygen saturation or ABGs  - Provide Smoking Cessation handout, if applicable  - Encourage broncho-pulmonary hygiene including cough, deep breathe, Incentive Spirometry  - Assess the need for suctioning and perform as needed  - Assess and instruct to report SOB or any respiratory difficulty  - Respiratory Therapy support as indicated  - Manage/alleviate anxiety  - Monitor for signs/symptoms of CO2 retention  Outcome: Progressing     Problem: PAIN - ADULT  Goal: Verbalizes/displays adequate comfort level or patient's stated pain goal  Description: INTERVENTIONS:  - Encourage pt to monitor pain and request assistance  - Assess pain using appropriate pain scale  - Administer analgesics based on type and severity of pain and evaluate response  - Implement non-pharmacological measures as appropriate and evaluate response  - Consider cultural and social influences on pain and pain management  - Manage/alleviate anxiety  - Utilize distraction and/or relaxation techniques  - Monitor for opioid side effects  - Notify MD/LIP if interventions unsuccessful or patient reports new pain  - Anticipate increased pain with activity and pre-medicate as appropriate  Outcome: Progressing     Problem: METABOLIC/FLUID AND ELECTROLYTES - ADULT  Goal: Electrolytes maintained within normal limits  Description: INTERVENTIONS:  - Monitor labs and rhythm and assess patient for signs and symptoms of electrolyte imbalances  - Administer electrolyte replacement as ordered  - Monitor response to electrolyte replacements, including rhythm and repeat lab results as appropriate  - Fluid restriction as ordered  - Instruct patient on fluid and nutrition restrictions as appropriate  Outcome: Progressing  Goal: Hemodynamic stability and optimal renal function maintained  Description: INTERVENTIONS:  - Monitor labs and assess for signs and symptoms of volume excess or deficit  - Monitor intake, output and patient weight  - Monitor urine specific gravity, serum osmolarity and serum sodium as indicated or ordered  - Monitor response to interventions for patient's volume status, including labs, urine output, blood pressure (other measures as available)  - Encourage oral intake as appropriate  - Instruct patient on fluid and nutrition restrictions as appropriate  Outcome: Progressing

## 2023-02-01 NOTE — CM/SW NOTE
02/01/23 1138   Discharge disposition   Expected discharge disposition [address] Provider   ([place])   Discharge transportation [address]     Pt discussed during nursing rounds. Pt is stable for dc today. MD staples order entered. [facility] will resume HH services at [facility], agency notified of pt's dc home today. Pt requesting assist w/transport home and agreeable to cost of medicar which will be $45. [address] scheduled for 3pm . Plan: Home w/[facility] for [facility] today. / to remain available for support and/or discharge planning.      JAY JAY VerduzcoN    [phone]

## 2023-02-01 NOTE — PROGRESS NOTES
Went over discharge instructions with pt. Went over follow up appointments and medications and when to take them next. Returned all bedside belongings and home medication. Removed IV and tele.  Pt discharging with medicar at 3 pm.

## 2023-02-01 NOTE — DISCHARGE INSTRUCTIONS
BUMEX: take 1mg twice a day for 1 week then daily    Sometimes managing your health at home requires assistance. The [facility] team has recognized your preference to use [facility], Phone: [phone]. A representative from the home health agency will contact you or your family to schedule your first visit.

## 2023-02-01 NOTE — PLAN OF CARE
Problem: Patient Centered Care  Goal: Patient preferences are identified and integrated in the patient's plan of care  Description: Interventions:  - What would you like us to know as we care for you?  From home alone   - Provide timely, complete, and accurate information to patient/family  - Incorporate patient and family knowledge, values, beliefs, and cultural backgrounds into the planning and delivery of care  - Encourage patient/family to participate in care and decision-making at the level they choose  - Honor patient and family perspectives and choices  Outcome: Progressing     Problem: Patient/Family Goals  Goal: Patient/Family Long Term Goal  Description: Patient's Long Term Goal: Go home     Interventions:  - Stress test   - See additional Care Plan goals for specific interventions  Outcome: Progressing  Goal: Patient/Family Short Term Goal  Description: Patient's Short Term Goal: Feel better figure out chest pain     Interventions:   - Stress test  - See additional Care Plan goals for specific interventions  Outcome: Progressing     Problem: RESPIRATORY - ADULT  Goal: Achieves optimal ventilation and oxygenation  Description: INTERVENTIONS:  - Assess for changes in respiratory status  - Assess for changes in mentation and behavior  - Position to facilitate oxygenation and minimize respiratory effort  - Oxygen supplementation based on oxygen saturation or ABGs  - Provide Smoking Cessation handout, if applicable  - Encourage broncho-pulmonary hygiene including cough, deep breathe, Incentive Spirometry  - Assess the need for suctioning and perform as needed  - Assess and instruct to report SOB or any respiratory difficulty  - Respiratory Therapy support as indicated  - Manage/alleviate anxiety  - Monitor for signs/symptoms of CO2 retention  Outcome: Progressing     Problem: PAIN - ADULT  Goal: Verbalizes/displays adequate comfort level or patient's stated pain goal  Description: INTERVENTIONS:  - Encourage pt to monitor pain and request assistance  - Assess pain using appropriate pain scale  - Administer analgesics based on type and severity of pain and evaluate response  - Implement non-pharmacological measures as appropriate and evaluate response  - Consider cultural and social influences on pain and pain management  - Manage/alleviate anxiety  - Utilize distraction and/or relaxation techniques  - Monitor for opioid side effects  - Notify MD/LIP if interventions unsuccessful or patient reports new pain  - Anticipate increased pain with activity and pre-medicate as appropriate  Outcome: Progressing     Problem: METABOLIC/FLUID AND ELECTROLYTES - ADULT  Goal: Electrolytes maintained within normal limits  Description: INTERVENTIONS:  - Monitor labs and rhythm and assess patient for signs and symptoms of electrolyte imbalances  - Administer electrolyte replacement as ordered  - Monitor response to electrolyte replacements, including rhythm and repeat lab results as appropriate  - Fluid restriction as ordered  - Instruct patient on fluid and nutrition restrictions as appropriate  Outcome: Progressing  Goal: Hemodynamic stability and optimal renal function maintained  Description: INTERVENTIONS:  - Monitor labs and assess for signs and symptoms of volume excess or deficit  - Monitor intake, output and patient weight  - Monitor urine specific gravity, serum osmolarity and serum sodium as indicated or ordered  - Monitor response to interventions for patient's volume status, including labs, urine output, blood pressure (other measures as available)  - Encourage oral intake as appropriate  - Instruct patient on fluid and nutrition restrictions as appropriate  Outcome: Progressing     Pt alert and oriented X 4. Pt on room air. Pt had complaints of hip pain, PRN medication given- see MAR. Safety precautions in place, call light within reach. Plan is discharge today.

## 2023-02-01 NOTE — PLAN OF CARE
Patient is a/ox4. RA. Up with assist x1 with the walker. Stress test yesterday; see results. Plan for discharge likely tomorrow. Will continue to monitor. Problem: Patient Centered Care  Goal: Patient preferences are identified and integrated in the patient's plan of care  Description: Interventions:  - What would you like us to know as we care for you?  From home alone   - Provide timely, complete, and accurate information to patient/family  - Incorporate patient and family knowledge, values, beliefs, and cultural backgrounds into the planning and delivery of care  - Encourage patient/family to participate in care and decision-making at the level they choose  - Honor patient and family perspectives and choices  Outcome: Progressing     Problem: Patient/Family Goals  Goal: Patient/Family Long Term Goal  Description: Patient's Long Term Goal: Go home     Interventions:  - Stress test   - See additional Care Plan goals for specific interventions  Outcome: Progressing  Goal: Patient/Family Short Term Goal  Description: Patient's Short Term Goal: Feel better figure out chest pain     Interventions:   - Stress test  - See additional Care Plan goals for specific interventions  Outcome: Progressing     Problem: RESPIRATORY - ADULT  Goal: Achieves optimal ventilation and oxygenation  Description: INTERVENTIONS:  - Assess for changes in respiratory status  - Assess for changes in mentation and behavior  - Position to facilitate oxygenation and minimize respiratory effort  - Oxygen supplementation based on oxygen saturation or ABGs  - Provide Smoking Cessation handout, if applicable  - Encourage broncho-pulmonary hygiene including cough, deep breathe, Incentive Spirometry  - Assess the need for suctioning and perform as needed  - Assess and instruct to report SOB or any respiratory difficulty  - Respiratory Therapy support as indicated  - Manage/alleviate anxiety  - Monitor for signs/symptoms of CO2 retention  Outcome: Progressing

## 2023-03-02 ENCOUNTER — LAB ENCOUNTER (OUTPATIENT)
Dept: LAB | Facility: HOSPITAL | Age: 78
End: 2023-03-02
Attending: INTERNAL MEDICINE
Payer: MEDICARE

## 2023-03-02 DIAGNOSIS — I25.10 CORONARY ATHEROSCLEROSIS OF NATIVE CORONARY ARTERY: Primary | ICD-10-CM

## 2023-03-02 LAB
ANION GAP SERPL CALC-SCNC: 8 MMOL/L (ref 0–18)
BUN BLD-MCNC: 24 MG/DL (ref 7–18)
BUN/CREAT SERPL: 13 (ref 10–20)
CALCIUM BLD-MCNC: 9.3 MG/DL (ref 8.5–10.1)
CHLORIDE SERPL-SCNC: 97 MMOL/L (ref 98–112)
CO2 SERPL-SCNC: 31 MMOL/L (ref 21–32)
CREAT BLD-MCNC: 1.85 MG/DL
FASTING STATUS PATIENT QL REPORTED: NO
GFR SERPLBLD BASED ON 1.73 SQ M-ARVRAT: 28 ML/MIN/1.73M2 (ref 60–?)
GLUCOSE BLD-MCNC: 103 MG/DL (ref 70–99)
OSMOLALITY SERPL CALC.SUM OF ELEC: 286 MOSM/KG (ref 275–295)
POTASSIUM SERPL-SCNC: 3.2 MMOL/L (ref 3.5–5.1)
SODIUM SERPL-SCNC: 136 MMOL/L (ref 136–145)

## 2023-03-02 PROCEDURE — 36415 COLL VENOUS BLD VENIPUNCTURE: CPT

## 2023-03-02 PROCEDURE — 80048 BASIC METABOLIC PNL TOTAL CA: CPT

## 2023-03-07 ENCOUNTER — APPOINTMENT (OUTPATIENT)
Dept: GENERAL RADIOLOGY | Facility: HOSPITAL | Age: 78
End: 2023-03-07
Attending: STUDENT IN AN ORGANIZED HEALTH CARE EDUCATION/TRAINING PROGRAM
Payer: MEDICARE

## 2023-03-07 ENCOUNTER — HOSPITAL ENCOUNTER (INPATIENT)
Facility: HOSPITAL | Age: 78
LOS: 3 days | Discharge: HOME HEALTH | End: 2023-03-10
Attending: STUDENT IN AN ORGANIZED HEALTH CARE EDUCATION/TRAINING PROGRAM | Admitting: HOSPITALIST
Payer: MEDICARE

## 2023-03-07 DIAGNOSIS — I50.9 ACUTE ON CHRONIC CONGESTIVE HEART FAILURE, UNSPECIFIED HEART FAILURE TYPE (HCC): Primary | ICD-10-CM

## 2023-03-07 DIAGNOSIS — I50.9 ACUTE HEART FAILURE, UNSPECIFIED HEART FAILURE TYPE (HCC): ICD-10-CM

## 2023-03-07 LAB
ANION GAP SERPL CALC-SCNC: 6 MMOL/L (ref 0–18)
ATRIAL RATE: 79 BPM
BASOPHILS # BLD AUTO: 0.04 X10(3) UL (ref 0–0.2)
BASOPHILS NFR BLD AUTO: 0.3 %
BILIRUB UR QL: NEGATIVE
BUN BLD-MCNC: 26 MG/DL (ref 7–18)
BUN/CREAT SERPL: 13.8 (ref 10–20)
CALCIUM BLD-MCNC: 9.9 MG/DL (ref 8.5–10.1)
CHLORIDE SERPL-SCNC: 102 MMOL/L (ref 98–112)
CLARITY UR: CLEAR
CO2 SERPL-SCNC: 28 MMOL/L (ref 21–32)
COLOR UR: COLORLESS
CREAT BLD-MCNC: 1.88 MG/DL
DEPRECATED RDW RBC AUTO: 39.4 FL (ref 35.1–46.3)
EOSINOPHIL # BLD AUTO: 0 X10(3) UL (ref 0–0.7)
EOSINOPHIL NFR BLD AUTO: 0 %
ERYTHROCYTE [DISTWIDTH] IN BLOOD BY AUTOMATED COUNT: 12 % (ref 11–15)
GFR SERPLBLD BASED ON 1.73 SQ M-ARVRAT: 27 ML/MIN/1.73M2 (ref 60–?)
GLUCOSE BLD-MCNC: 122 MG/DL (ref 70–99)
GLUCOSE UR-MCNC: NORMAL MG/DL
HCT VFR BLD AUTO: 30.1 %
HGB BLD-MCNC: 9.9 G/DL
HGB UR QL STRIP.AUTO: NEGATIVE
IMM GRANULOCYTES # BLD AUTO: 0.05 X10(3) UL (ref 0–1)
IMM GRANULOCYTES NFR BLD: 0.4 %
KETONES UR-MCNC: NEGATIVE MG/DL
LEUKOCYTE ESTERASE UR QL STRIP.AUTO: NEGATIVE
LYMPHOCYTES # BLD AUTO: 1.09 X10(3) UL (ref 1–4)
LYMPHOCYTES NFR BLD AUTO: 9.3 %
MCH RBC QN AUTO: 29.6 PG (ref 26–34)
MCHC RBC AUTO-ENTMCNC: 32.9 G/DL (ref 31–37)
MCV RBC AUTO: 90.1 FL
MONOCYTES # BLD AUTO: 0.42 X10(3) UL (ref 0.1–1)
MONOCYTES NFR BLD AUTO: 3.6 %
NEUTROPHILS # BLD AUTO: 10.15 X10 (3) UL (ref 1.5–7.7)
NEUTROPHILS # BLD AUTO: 10.15 X10(3) UL (ref 1.5–7.7)
NEUTROPHILS NFR BLD AUTO: 86.4 %
NITRITE UR QL STRIP.AUTO: NEGATIVE
NT-PROBNP SERPL-MCNC: 2075 PG/ML (ref ?–450)
OSMOLALITY SERPL CALC.SUM OF ELEC: 288 MOSM/KG (ref 275–295)
P AXIS: 54 DEGREES
P-R INTERVAL: 176 MS
PH UR: 7.5 [PH] (ref 5–8)
PLATELET # BLD AUTO: 333 10(3)UL (ref 150–450)
POTASSIUM SERPL-SCNC: 4.6 MMOL/L (ref 3.5–5.1)
PROT UR-MCNC: NEGATIVE MG/DL
Q-T INTERVAL: 430 MS
QRS DURATION: 150 MS
QTC CALCULATION (BEZET): 493 MS
R AXIS: -9 DEGREES
RBC # BLD AUTO: 3.34 X10(6)UL
SARS-COV-2 RNA RESP QL NAA+PROBE: NOT DETECTED
SODIUM SERPL-SCNC: 136 MMOL/L (ref 136–145)
SP GR UR STRIP: 1 (ref 1–1.03)
T AXIS: 106 DEGREES
TROPONIN I HIGH SENSITIVITY: 6 NG/L
UROBILINOGEN UR STRIP-ACNC: NORMAL
VENTRICULAR RATE: 79 BPM
WBC # BLD AUTO: 11.8 X10(3) UL (ref 4–11)

## 2023-03-07 PROCEDURE — 99223 1ST HOSP IP/OBS HIGH 75: CPT | Performed by: HOSPITALIST

## 2023-03-07 PROCEDURE — 71045 X-RAY EXAM CHEST 1 VIEW: CPT | Performed by: STUDENT IN AN ORGANIZED HEALTH CARE EDUCATION/TRAINING PROGRAM

## 2023-03-07 RX ORDER — BUPROPION HYDROCHLORIDE 150 MG/1
150 TABLET, EXTENDED RELEASE ORAL DAILY
Status: DISCONTINUED | OUTPATIENT
Start: 2023-03-07 | End: 2023-03-10

## 2023-03-07 RX ORDER — BUMETANIDE 0.25 MG/ML
1 INJECTION INTRAMUSCULAR; INTRAVENOUS ONCE
Status: COMPLETED | OUTPATIENT
Start: 2023-03-07 | End: 2023-03-07

## 2023-03-07 RX ORDER — GABAPENTIN 300 MG/1
300 CAPSULE ORAL 2 TIMES DAILY
Status: DISCONTINUED | OUTPATIENT
Start: 2023-03-07 | End: 2023-03-10

## 2023-03-07 RX ORDER — TRAMADOL HYDROCHLORIDE 50 MG/1
100 TABLET ORAL 2 TIMES DAILY
Status: DISCONTINUED | OUTPATIENT
Start: 2023-03-07 | End: 2023-03-10

## 2023-03-07 RX ORDER — ASCORBIC ACID 500 MG
500 TABLET ORAL DAILY
COMMUNITY
Start: 2022-09-01

## 2023-03-07 RX ORDER — MULTIVITAMIN/IRON/FOLIC ACID 18MG-0.4MG
500 TABLET ORAL DAILY
Status: DISCONTINUED | OUTPATIENT
Start: 2023-03-07 | End: 2023-03-10

## 2023-03-07 RX ORDER — ATORVASTATIN CALCIUM 40 MG/1
40 TABLET, FILM COATED ORAL
Status: DISCONTINUED | OUTPATIENT
Start: 2023-03-07 | End: 2023-03-10

## 2023-03-07 RX ORDER — TIZANIDINE 2 MG/1
2 TABLET ORAL
Status: DISCONTINUED | OUTPATIENT
Start: 2023-03-07 | End: 2023-03-10

## 2023-03-07 RX ORDER — HYDRALAZINE HYDROCHLORIDE 10 MG/1
10 TABLET, FILM COATED ORAL EVERY 8 HOURS SCHEDULED
Status: DISCONTINUED | OUTPATIENT
Start: 2023-03-07 | End: 2023-03-10

## 2023-03-07 RX ORDER — PANTOPRAZOLE SODIUM 40 MG/1
40 TABLET, DELAYED RELEASE ORAL
Status: DISCONTINUED | OUTPATIENT
Start: 2023-03-08 | End: 2023-03-10

## 2023-03-07 RX ORDER — ASPIRIN 81 MG/1
81 TABLET ORAL DAILY
Status: DISCONTINUED | OUTPATIENT
Start: 2023-03-07 | End: 2023-03-10

## 2023-03-07 RX ORDER — ACETAMINOPHEN 500 MG
500 TABLET ORAL EVERY 4 HOURS PRN
Status: DISCONTINUED | OUTPATIENT
Start: 2023-03-07 | End: 2023-03-10

## 2023-03-07 RX ORDER — BUMETANIDE 0.25 MG/ML
1 INJECTION INTRAMUSCULAR; INTRAVENOUS
Status: DISCONTINUED | OUTPATIENT
Start: 2023-03-07 | End: 2023-03-10

## 2023-03-07 RX ORDER — ROPINIROLE 1 MG/1
3 TABLET, FILM COATED ORAL
Status: DISCONTINUED | OUTPATIENT
Start: 2023-03-07 | End: 2023-03-10

## 2023-03-07 RX ORDER — ONDANSETRON 2 MG/ML
4 INJECTION INTRAMUSCULAR; INTRAVENOUS EVERY 6 HOURS PRN
Status: DISCONTINUED | OUTPATIENT
Start: 2023-03-07 | End: 2023-03-10

## 2023-03-07 RX ORDER — HEPARIN SODIUM 5000 [USP'U]/ML
5000 INJECTION, SOLUTION INTRAVENOUS; SUBCUTANEOUS EVERY 12 HOURS SCHEDULED
Status: DISCONTINUED | OUTPATIENT
Start: 2023-03-07 | End: 2023-03-10

## 2023-03-07 RX ORDER — NITROGLYCERIN 0.4 MG/1
0.4 TABLET SUBLINGUAL EVERY 5 MIN PRN
Status: DISCONTINUED | OUTPATIENT
Start: 2023-03-07 | End: 2023-03-10

## 2023-03-07 RX ORDER — CARVEDILOL 6.25 MG/1
6.25 TABLET ORAL 2 TIMES DAILY WITH MEALS
Status: DISCONTINUED | OUTPATIENT
Start: 2023-03-07 | End: 2023-03-07

## 2023-03-07 RX ORDER — MELATONIN
325
Status: DISCONTINUED | OUTPATIENT
Start: 2023-03-08 | End: 2023-03-10

## 2023-03-07 RX ORDER — DOCUSATE SODIUM 100 MG/1
100 CAPSULE, LIQUID FILLED ORAL 2 TIMES DAILY PRN
Status: DISCONTINUED | OUTPATIENT
Start: 2023-03-07 | End: 2023-03-10

## 2023-03-07 RX ORDER — LUBIPROSTONE 24 UG/1
24 CAPSULE ORAL 2 TIMES DAILY WITH MEALS
COMMUNITY

## 2023-03-07 RX ORDER — TEMAZEPAM 15 MG/1
15 CAPSULE ORAL NIGHTLY PRN
Status: DISCONTINUED | OUTPATIENT
Start: 2023-03-07 | End: 2023-03-10

## 2023-03-07 RX ORDER — ESCITALOPRAM OXALATE 20 MG/1
20 TABLET ORAL DAILY
Status: DISCONTINUED | OUTPATIENT
Start: 2023-03-07 | End: 2023-03-10

## 2023-03-07 RX ORDER — ISOSORBIDE DINITRATE 10 MG/1
10 TABLET ORAL
Status: DISCONTINUED | OUTPATIENT
Start: 2023-03-07 | End: 2023-03-10

## 2023-03-07 RX ORDER — AMLODIPINE BESYLATE 10 MG/1
10 TABLET ORAL DAILY
Status: DISCONTINUED | OUTPATIENT
Start: 2023-03-07 | End: 2023-03-08

## 2023-03-07 RX ORDER — CARVEDILOL 6.25 MG/1
6.25 TABLET ORAL 2 TIMES DAILY WITH MEALS
Status: DISCONTINUED | OUTPATIENT
Start: 2023-03-07 | End: 2023-03-10

## 2023-03-07 RX ORDER — METOCLOPRAMIDE HYDROCHLORIDE 5 MG/ML
5 INJECTION INTRAMUSCULAR; INTRAVENOUS EVERY 8 HOURS PRN
Status: DISCONTINUED | OUTPATIENT
Start: 2023-03-07 | End: 2023-03-10

## 2023-03-07 RX ORDER — POTASSIUM CHLORIDE 1500 MG/1
20 TABLET, FILM COATED, EXTENDED RELEASE ORAL DAILY
COMMUNITY

## 2023-03-07 NOTE — H&P
[facility]    PATIENT'S NAME: [facility]   ATTENDING PHYSICIAN: Lucio Kothari MD   PATIENT ACCOUNT#:   [id number]    LOCATION:  [address]  MEDICAL RECORD #:   [id number]       YOB: 1945  ADMISSION DATE:       03/07/2023    HISTORY AND PHYSICAL EXAMINATION    CHIEF COMPLAINT:  Acute on chronic diastolic heart failure. HISTORY OF PRESENT ILLNESS:  Patient is a 68-year-old  female with known history of coronary artery disease, left ventricular diastolic dysfunction, and severe mitral regurgitation, who has been progressively getting swollen legs with dyspnea on exertion. She had increased her Bumex dose at home and then she was instructed by a cardiologist per her report to stop the Bumex and start spironolactone. Leg edema did not improve, and today, she was sent to the emergency department for evaluation. CBC showed white blood cell count of 11.8 with left shift. Chemistry was unremarkable. GFR is 27, which is slightly below her baseline. ProBNP 2000. COVID testing was negative. EKG showed sinus rhythm with left bundle-branch block, which is also chronic. Chest x-ray showed no edema or bacterial pneumonia. Started on IV Bumex, and she will be admitted to the hospital for further management. PAST MEDICAL HISTORY:  Coronary artery disease, status post PCI stent to the RCA in 2019. She had stress test in January around 1-1/2 months ago, which showed no reversible ischemia. 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 PAST SURGICAL HISTORY:  Multiple lumbar laminectomies and fusions including surgery for discitis and vertebral osteomyelitis. She had 2 C-sections, hemicolectomy for perforated diverticulitis, spinal cord stimulator.       MEDICATIONS:  Please see medication reconciliation list.     ALLERGIES:  No known drug allergies. FAMILY HISTORY:  Both parents  of old age. She is not able to give me any further details. SOCIAL HISTORY:  No tobacco, alcohol, or drug use. Lives by herself. Independent for basic activities of daily living. Sedentary lifestyle. REVIEW OF SYSTEMS:  Progressive leg edema with dyspnea on exertion. Patient said she lives by herself, and she is compliant with low-salt diet. She has been increasing her Bumex dose without significant improvement in her symptoms. No chest pain. She does have orthopnea. Other 12-point review of systems is negative. PHYSICAL EXAMINATION:    GENERAL:  Alert and oriented to time, place and person. Mild distress. VITAL SIGNS:  Temperature 98.8, pulse 81, respiratory rate 16, blood pressure 124/62, pulse ox 94% on room air. HEENT:  Atraumatic. Oropharynx clear. Moist mucous membranes. Ears and nose normal.  Eyes:  Anicteric sclerae. NECK:  Supple. No lymphadenopathy. Mild jugular venous distention. LUNGS:  Diminished breathing sounds, both lung bases. Otherwise clear to auscultation bilaterally. Normal respiratory effort. HEART:  Regular rate and rhythm. S1 and S2 auscultated. Systolic murmur best heard at the apex. ABDOMEN:  Soft, nondistended. Obese. Positive bowel sounds. EXTREMITIES:  There is +2 to 3 edema, both legs. No clubbing or cyanosis. NEUROLOGIC:  Motor and sensory intact. ASSESSMENT:    1. Fluid overload status with underlying left ventricular diastolic dysfunction and severe mitral regurgitation. 2.   Hypertension. 3.   History of coronary artery disease. 4.   Chronic kidney disease stage 3 to 4. PLAN:  Patient will be admitted to telemetry floor. IV Bumex. Obtain 2D echocardiogram with Doppler and cardiology consult. Monitor her kidney function and electrolytes. Monitor respiratory status and hemodynamic status.   Obtain urinalysis with reflex culture. Fall precautions. Further recommendations to follow.      Dictated By Gloria Bush MD  d: 03/07/2023 15:33:17  t: 03/07/2023 16:05:34  Job [id number]  /    cc: Cayden Arango MD

## 2023-03-07 NOTE — ED QUICK NOTES
Orders for admission, patient is aware of plan and ready to go upstairs. Any questions, please call ED ORI Christopher at extension [phone].      Patient Covid vaccination status: Unvaccinated     COVID Test Ordered in ED: Rapid SARS-CoV-2 by PCR    COVID Suspicion at Admission: Low clinical suspicion for COVID    Running Infusions:  None    Mental Status/LOC at time of transport: A&Ox4     Other pertinent information: Difficult PIV   CIWA score: N/A   NIH score:  N/A

## 2023-03-07 NOTE — ED INITIAL ASSESSMENT (HPI)
Patient sent to ER by Cardiologist for 15lb weight gain in 10 days. History of CHF. Patient reports she is complaint with medication.

## 2023-03-08 ENCOUNTER — APPOINTMENT (OUTPATIENT)
Dept: CV DIAGNOSTICS | Facility: HOSPITAL | Age: 78
End: 2023-03-08
Attending: HOSPITALIST
Payer: MEDICARE

## 2023-03-08 LAB
ANION GAP SERPL CALC-SCNC: 5 MMOL/L (ref 0–18)
BASOPHILS # BLD AUTO: 0.03 X10(3) UL (ref 0–0.2)
BASOPHILS NFR BLD AUTO: 0.4 %
BUN BLD-MCNC: 27 MG/DL (ref 7–18)
BUN/CREAT SERPL: 15.4 (ref 10–20)
CALCIUM BLD-MCNC: 9.2 MG/DL (ref 8.5–10.1)
CHLORIDE SERPL-SCNC: 101 MMOL/L (ref 98–112)
CO2 SERPL-SCNC: 31 MMOL/L (ref 21–32)
CREAT BLD-MCNC: 1.75 MG/DL
DEPRECATED RDW RBC AUTO: 41.3 FL (ref 35.1–46.3)
EOSINOPHIL # BLD AUTO: 0.05 X10(3) UL (ref 0–0.7)
EOSINOPHIL NFR BLD AUTO: 0.6 %
ERYTHROCYTE [DISTWIDTH] IN BLOOD BY AUTOMATED COUNT: 12.3 % (ref 11–15)
GFR SERPLBLD BASED ON 1.73 SQ M-ARVRAT: 30 ML/MIN/1.73M2 (ref 60–?)
GLUCOSE BLD-MCNC: 129 MG/DL (ref 70–99)
HCT VFR BLD AUTO: 27.5 %
HGB BLD-MCNC: 8.8 G/DL
IMM GRANULOCYTES # BLD AUTO: 0.02 X10(3) UL (ref 0–1)
IMM GRANULOCYTES NFR BLD: 0.2 %
LYMPHOCYTES # BLD AUTO: 1.26 X10(3) UL (ref 1–4)
LYMPHOCYTES NFR BLD AUTO: 15.6 %
MAGNESIUM SERPL-MCNC: 2.3 MG/DL (ref 1.6–2.6)
MCH RBC QN AUTO: 29.2 PG (ref 26–34)
MCHC RBC AUTO-ENTMCNC: 32 G/DL (ref 31–37)
MCV RBC AUTO: 91.4 FL
MONOCYTES # BLD AUTO: 0.56 X10(3) UL (ref 0.1–1)
MONOCYTES NFR BLD AUTO: 6.9 %
NEUTROPHILS # BLD AUTO: 6.17 X10 (3) UL (ref 1.5–7.7)
NEUTROPHILS # BLD AUTO: 6.17 X10(3) UL (ref 1.5–7.7)
NEUTROPHILS NFR BLD AUTO: 76.3 %
OSMOLALITY SERPL CALC.SUM OF ELEC: 291 MOSM/KG (ref 275–295)
PLATELET # BLD AUTO: 295 10(3)UL (ref 150–450)
POTASSIUM SERPL-SCNC: 4.7 MMOL/L (ref 3.5–5.1)
RBC # BLD AUTO: 3.01 X10(6)UL
SODIUM SERPL-SCNC: 137 MMOL/L (ref 136–145)
WBC # BLD AUTO: 8.1 X10(3) UL (ref 4–11)

## 2023-03-08 PROCEDURE — 93306 TTE W/DOPPLER COMPLETE: CPT | Performed by: HOSPITALIST

## 2023-03-08 PROCEDURE — 99232 SBSQ HOSP IP/OBS MODERATE 35: CPT | Performed by: INTERNAL MEDICINE

## 2023-03-08 RX ORDER — METOLAZONE 2.5 MG/1
5 TABLET ORAL ONCE
Status: COMPLETED | OUTPATIENT
Start: 2023-03-08 | End: 2023-03-08

## 2023-03-08 RX ORDER — AMLODIPINE BESYLATE 5 MG/1
5 TABLET ORAL DAILY
Status: DISCONTINUED | OUTPATIENT
Start: 2023-03-09 | End: 2023-03-08

## 2023-03-08 NOTE — PLAN OF CARE
Patient admitted to room 322 from ED. IV bumex given in ED. Patient diuresing well. Patient has 8 puncture sites covered with bandaids to lower back. Patient reports she had pain injections done yesterday. Patient updated on POC and verbalized understanding. Problem: Patient Centered Care  Goal: Patient preferences are identified and integrated in the patient's plan of care  Description: Interventions:  - What would you like us to know as we care for you? I live at home alone.   - Provide timely, complete, and accurate information to patient/family  - Incorporate patient and family knowledge, values, beliefs, and cultural backgrounds into the planning and delivery of care  - Encourage patient/family to participate in care and decision-making at the level they choose  - Honor patient and family perspectives and choices  Outcome: Progressing     Problem: Patient/Family Goals  Goal: Patient/Family Long Term Goal  Description: Patient's Long Term Goal: To go home    Interventions:  - Diurese - IV bumex, fluid restriction  - Echo  - Medication compliance  - Follow provider recommendations  - See additional Care Plan goals for specific interventions  Outcome: Progressing  Goal: Patient/Family Short Term Goal  Description: Patient's Short Term Goal: To breathe better    Interventions:   - IV bumex BID  - Strict I & Os  - Medication compliance  - Follow provider recommendations  - See additional Care Plan goals for specific interventions  Outcome: Progressing

## 2023-03-08 NOTE — PLAN OF CARE
Problem: Patient Centered Care  Goal: Patient preferences are identified and integrated in the patient's plan of care  Description: Interventions:  - What would you like us to know as we care for you? I live at home alone.   - Provide timely, complete, and accurate information to patient/family  - Incorporate patient and family knowledge, values, beliefs, and cultural backgrounds into the planning and delivery of care  - Encourage patient/family to participate in care and decision-making at the level they choose  - Honor patient and family perspectives and choices  Outcome: Progressing     Problem: Patient/Family Goals  Goal: Patient/Family Long Term Goal  Description: Patient's Long Term Goal: To go home    Interventions:  - Diurese - IV bumex, fluid restriction  - Echo  - Medication compliance  - Follow provider recommendations  - See additional Care Plan goals for specific interventions  Outcome: Progressing  Goal: Patient/Family Short Term Goal  Description: Patient's Short Term Goal: To breathe better    Interventions:   - IV bumex BID  - Strict I & Os  - Medication compliance  - Follow provider recommendations  - See additional Care Plan goals for specific interventions  Outcome: Progressing     Problem: PAIN - ADULT  Goal: Verbalizes/displays adequate comfort level or patient's stated pain goal  Description: INTERVENTIONS:  - Encourage pt to monitor pain and request assistance  - Assess pain using appropriate pain scale  - Administer analgesics based on type and severity of pain and evaluate response  - Implement non-pharmacological measures as appropriate and evaluate response  - Consider cultural and social influences on pain and pain management  - Manage/alleviate anxiety  - Utilize distraction and/or relaxation techniques  - Monitor for opioid side effects  - Notify MD/LIP if interventions unsuccessful or patient reports new pain  - Anticipate increased pain with activity and pre-medicate as appropriate  Outcome: Progressing     Problem: RISK FOR INFECTION - ADULT  Goal: Absence of fever/infection during anticipated neutropenic period  Description: INTERVENTIONS  - Monitor WBC  - Administer growth factors as ordered  - Implement neutropenic guidelines  Outcome: Progressing     Problem: SAFETY ADULT - FALL  Goal: Free from fall injury  Description: INTERVENTIONS:  - Assess pt frequently for physical needs  - Identify cognitive and physical deficits and behaviors that affect risk of falls. - [place] fall precautions as indicated by assessment.  - Educate pt/family on patient safety including physical limitations  - Instruct pt to call for assistance with activity based on assessment  - Modify environment to reduce risk of injury  - Provide assistive devices as appropriate  - Consider OT/PT consult to assist with strengthening/mobility  - Encourage toileting schedule  Outcome: Progressing     Problem: DISCHARGE PLANNING  Goal: Discharge to home or other facility with appropriate resources  Description: INTERVENTIONS:  - Identify barriers to discharge w/pt and caregiver  - Include patient/family/discharge partner in discharge planning  - Arrange for needed discharge resources and transportation as appropriate  - Identify discharge learning needs (meds, wound care, etc)  - Arrange for interpreters to assist at discharge as needed  - Consider post-discharge preferences of patient/family/discharge partner  - Complete POLST form as appropriate  - Assess patient's ability to be responsible for managing their own health  - Refer to Case Management Department for coordinating discharge planning if the patient needs post-hospital services based on physician/LIP order or complex needs related to functional status, cognitive ability or social support system  Outcome: Progressing     Patient is alert and oriented, RA, VSS, minimal back pain, tramadol given. X1 with a walker to the chair.  IV bumex BID. ECHO pending completion.

## 2023-03-08 NOTE — PLAN OF CARE
Problem: Patient Centered Care  Goal: Patient preferences are identified and integrated in the patient's plan of care  Description: Interventions:  - What would you like us to know as we care for you? I live at home alone.   - Provide timely, complete, and accurate information to patient/family  - Incorporate patient and family knowledge, values, beliefs, and cultural backgrounds into the planning and delivery of care  - Encourage patient/family to participate in care and decision-making at the level they choose  - Honor patient and family perspectives and choices  Outcome: Progressing     Problem: Patient/Family Goals  Goal: Patient/Family Long Term Goal  Description: Patient's Long Term Goal: To go home    Interventions:  - Diurese - IV bumex, fluid restriction  - Echo  - Medication compliance  - Follow provider recommendations  - See additional Care Plan goals for specific interventions  Outcome: Progressing  Goal: Patient/Family Short Term Goal  Description: Patient's Short Term Goal: To breathe better    Interventions:   - IV bumex BID  - Strict I & Os  - Medication compliance  - Follow provider recommendations  - See additional Care Plan goals for specific interventions  Outcome: Progressing     Problem: PAIN - ADULT  Goal: Verbalizes/displays adequate comfort level or patient's stated pain goal  Description: INTERVENTIONS:  - Encourage pt to monitor pain and request assistance  - Assess pain using appropriate pain scale  - Administer analgesics based on type and severity of pain and evaluate response  - Implement non-pharmacological measures as appropriate and evaluate response  - Consider cultural and social influences on pain and pain management  - Manage/alleviate anxiety  - Utilize distraction and/or relaxation techniques  - Monitor for opioid side effects  - Notify MD/LIP if interventions unsuccessful or patient reports new pain  - Anticipate increased pain with activity and pre-medicate as appropriate  Outcome: Progressing     Problem: RISK FOR INFECTION - ADULT  Goal: Absence of fever/infection during anticipated neutropenic period  Description: INTERVENTIONS  - Monitor WBC  - Administer growth factors as ordered  - Implement neutropenic guidelines  Outcome: Progressing     Problem: SAFETY ADULT - FALL  Goal: Free from fall injury  Description: INTERVENTIONS:  - Assess pt frequently for physical needs  - Identify cognitive and physical deficits and behaviors that affect risk of falls. - Blunt fall precautions as indicated by assessment.  - Educate pt/family on patient safety including physical limitations  - Instruct pt to call for assistance with activity based on assessment  - Modify environment to reduce risk of injury  - Provide assistive devices as appropriate  - Consider OT/PT consult to assist with strengthening/mobility  - Encourage toileting schedule  Outcome: Progressing     Problem: DISCHARGE PLANNING  Goal: Discharge to home or other facility with appropriate resources  Description: INTERVENTIONS:  - Identify barriers to discharge w/pt and caregiver  - Include patient/family/discharge partner in discharge planning  - Arrange for needed discharge resources and transportation as appropriate  - Identify discharge learning needs (meds, wound care, etc)  - Arrange for interpreters to assist at discharge as needed  - Consider post-discharge preferences of patient/family/discharge partner  - Complete POLST form as appropriate  - Assess patient's ability to be responsible for managing their own health  - Refer to Case Management Department for coordinating discharge planning if the patient needs post-hospital services based on physician/LIP order or complex needs related to functional status, cognitive ability or social support system  Outcome: Progressing    Patient is A&Ox4. Pain managed with spinal cord stimulator and scheduled tramadol and tizanidine. Heparin for DVT prophylaxis.  Patient voiding with purewick, bumex BID. No acute changes. Vitals signs as charted. Patient in lowest position, bed alarm on, call light within reach, using appropriately. Plan for ECHO today.

## 2023-03-08 NOTE — CM/SW NOTE
03/07/23 1800   CM/SW Referral Data   Referral Source Physician   Reason for Referral Discharge planning   Informant Patient   Medical Hx   Does patient have an established PCP? Yes  (LOUIS Garza)   Significant Past Medical/Mental Health Hx CHF   Patient Info   Advanced directives? Yes  (daughter is POA)   Patient's Current Mental Status at Time of Assessment Alert   Patient's [address]   Number of Levels in Home 2  (stays just on 1st floor)   Number of Stair in Home 3 to enter home   Patient lives with Alone   Patient Status Prior to Admission   Independent with ADLs and Mobility Yes   Services in place prior to admission [address] Provider Info [facility]   Discharge Needs   Anticipated D/C needs Home health care   Choice of Post-Acute Provider   Informed patient of right to choose their preferred provider Yes     Patient is currently not on oxygen here and at home. Drove herself to the hospital due to LE edema. Current with [facility] and wishes to continue with them. F2F order completed and referral sent to [facility] agency. PLAN:  Home with [facility]    / to remain available for support and/or discharge planning.       Eligio Rubin MBA BSN RN [address]  RN Case Manager  [phone]

## 2023-03-09 LAB
ANION GAP SERPL CALC-SCNC: 6 MMOL/L (ref 0–18)
BASOPHILS # BLD AUTO: 0.04 X10(3) UL (ref 0–0.2)
BASOPHILS NFR BLD AUTO: 0.5 %
BUN BLD-MCNC: 29 MG/DL (ref 7–18)
BUN/CREAT SERPL: 16.3 (ref 10–20)
CALCIUM BLD-MCNC: 9.3 MG/DL (ref 8.5–10.1)
CHLORIDE SERPL-SCNC: 100 MMOL/L (ref 98–112)
CO2 SERPL-SCNC: 30 MMOL/L (ref 21–32)
CREAT BLD-MCNC: 1.78 MG/DL
DEPRECATED RDW RBC AUTO: 39.5 FL (ref 35.1–46.3)
EOSINOPHIL # BLD AUTO: 0.07 X10(3) UL (ref 0–0.7)
EOSINOPHIL NFR BLD AUTO: 1 %
ERYTHROCYTE [DISTWIDTH] IN BLOOD BY AUTOMATED COUNT: 11.9 % (ref 11–15)
GFR SERPLBLD BASED ON 1.73 SQ M-ARVRAT: 29 ML/MIN/1.73M2 (ref 60–?)
GLUCOSE BLD-MCNC: 99 MG/DL (ref 70–99)
HCT VFR BLD AUTO: 29.6 %
HGB BLD-MCNC: 9.6 G/DL
IMM GRANULOCYTES # BLD AUTO: 0.02 X10(3) UL (ref 0–1)
IMM GRANULOCYTES NFR BLD: 0.3 %
LYMPHOCYTES # BLD AUTO: 1.55 X10(3) UL (ref 1–4)
LYMPHOCYTES NFR BLD AUTO: 21.3 %
MCH RBC QN AUTO: 29.4 PG (ref 26–34)
MCHC RBC AUTO-ENTMCNC: 32.4 G/DL (ref 31–37)
MCV RBC AUTO: 90.5 FL
MONOCYTES # BLD AUTO: 0.49 X10(3) UL (ref 0.1–1)
MONOCYTES NFR BLD AUTO: 6.7 %
NEUTROPHILS # BLD AUTO: 5.11 X10 (3) UL (ref 1.5–7.7)
NEUTROPHILS # BLD AUTO: 5.11 X10(3) UL (ref 1.5–7.7)
NEUTROPHILS NFR BLD AUTO: 70.2 %
OSMOLALITY SERPL CALC.SUM OF ELEC: 288 MOSM/KG (ref 275–295)
PLATELET # BLD AUTO: 325 10(3)UL (ref 150–450)
POTASSIUM SERPL-SCNC: 4.9 MMOL/L (ref 3.5–5.1)
RBC # BLD AUTO: 3.27 X10(6)UL
SODIUM SERPL-SCNC: 136 MMOL/L (ref 136–145)
WBC # BLD AUTO: 7.3 X10(3) UL (ref 4–11)

## 2023-03-09 PROCEDURE — 99233 SBSQ HOSP IP/OBS HIGH 50: CPT | Performed by: HOSPITALIST

## 2023-03-09 NOTE — PLAN OF CARE
Patient is A&Ox4, RA. Minimal back pain, tramadol given. Denied SOB. No acute changes overnight. Call light within reach and monitoring will continue. Problem: Patient Centered Care  Goal: Patient preferences are identified and integrated in the patient's plan of care  Description: Interventions:  - What would you like us to know as we care for you? I live at home alone.   - Provide timely, complete, and accurate information to patient/family  - Incorporate patient and family knowledge, values, beliefs, and cultural backgrounds into the planning and delivery of care  - Encourage patient/family to participate in care and decision-making at the level they choose  - Honor patient and family perspectives and choices  Outcome: Progressing     Problem: Patient/Family Goals  Goal: Patient/Family Long Term Goal  Description: Patient's Long Term Goal: To go home    Interventions:  - Diurese - IV bumex, fluid restriction  - Echo  - Medication compliance  - Follow provider recommendations  - See additional Care Plan goals for specific interventions  Outcome: Progressing  Goal: Patient/Family Short Term Goal  Description: Patient's Short Term Goal: To breathe better    Interventions:   - IV bumex BID  - Strict I & Os  - Medication compliance  - Follow provider recommendations  - See additional Care Plan goals for specific interventions  Outcome: Progressing     Problem: PAIN - ADULT  Goal: Verbalizes/displays adequate comfort level or patient's stated pain goal  Description: INTERVENTIONS:  - Encourage pt to monitor pain and request assistance  - Assess pain using appropriate pain scale  - Administer analgesics based on type and severity of pain and evaluate response  - Implement non-pharmacological measures as appropriate and evaluate response  - Consider cultural and social influences on pain and pain management  - Manage/alleviate anxiety  - Utilize distraction and/or relaxation techniques  - Monitor for opioid side effects  - Notify MD/LIP if interventions unsuccessful or patient reports new pain  - Anticipate increased pain with activity and pre-medicate as appropriate  Outcome: Progressing     Problem: RISK FOR INFECTION - ADULT  Goal: Absence of fever/infection during anticipated neutropenic period  Description: INTERVENTIONS  - Monitor WBC  - Administer growth factors as ordered  - Implement neutropenic guidelines  Outcome: Progressing     Problem: SAFETY ADULT - FALL  Goal: Free from fall injury  Description: INTERVENTIONS:  - Assess pt frequently for physical needs  - Identify cognitive and physical deficits and behaviors that affect risk of falls.   - Stanley fall precautions as indicated by assessment.  - Educate pt/family on patient safety including physical limitations  - Instruct pt to call for assistance with activity based on assessment  - Modify environment to reduce risk of injury  - Provide assistive devices as appropriate  - Consider OT/PT consult to assist with strengthening/mobility  - Encourage toileting schedule  Outcome: Progressing     Problem: DISCHARGE PLANNING  Goal: Discharge to home or other facility with appropriate resources  Description: INTERVENTIONS:  - Identify barriers to discharge w/pt and caregiver  - Include patient/family/discharge partner in discharge planning  - Arrange for needed discharge resources and transportation as appropriate  - Identify discharge learning needs (meds, wound care, etc)  - Arrange for interpreters to assist at discharge as needed  - Consider post-discharge preferences of patient/family/discharge partner  - Complete POLST form as appropriate  - Assess patient's ability to be responsible for managing their own health  - Refer to Case Management Department for coordinating discharge planning if the patient needs post-hospital services based on physician/LIP order or complex needs related to functional status, cognitive ability or social support system  Outcome: Progressing

## 2023-03-09 NOTE — CM/SW NOTE
02: 15PM  Received MDO for [facility] services/HH RN. Pt is current w/ [facility]. They will be notified when pt is medically cleared for DC so they can resume care at home. 03:25PM  Received another MDO for [facility] RN. Pt is current w/ [facility] and [facility] from [address] [place] [address]. PLAN: Home w/ [facility] - pending med clear      SW/CM to remain available for support and/or discharge planning.        Jodi Chavarria, MSW, [facility]

## 2023-03-09 NOTE — CARDIAC REHAB
CARDIAC REHAB HEART FAILURE EDUCATION    Handouts provided and reviewed: [facility] Booklet. Activity: Chair for all meals:        Ambulation:        Tolerated Activity:          Disease Process: Disease process reviewed. Reviewed the following: DAILY WEIGHT MONITORING: Reviewed      SODIUM RESTRICTION: Reviewed      FLUID RESTRICTION: Reviewed      RISK FACTORS: Reviewed      SMOKING CESSATION: Reviewed      HOME EXERCISE ACTIVITY: Reviewed      OUTPATIENT CARDIAC REHAB: Referred to Cardiac Rehabilitation Phase 2.       WHEN TO CONTACT YOUR PHYSICIAN: Reviewed      HEART FAILURE CLINIC: [phone]

## 2023-03-10 VITALS
OXYGEN SATURATION: 93 % | DIASTOLIC BLOOD PRESSURE: 65 MMHG | WEIGHT: 185.38 LBS | SYSTOLIC BLOOD PRESSURE: 111 MMHG | HEART RATE: 60 BPM | BODY MASS INDEX: 30.89 KG/M2 | RESPIRATION RATE: 16 BRPM | HEIGHT: 65 IN | TEMPERATURE: 98 F

## 2023-03-10 LAB
ANION GAP SERPL CALC-SCNC: 7 MMOL/L (ref 0–18)
BUN BLD-MCNC: 36 MG/DL (ref 7–18)
BUN/CREAT SERPL: 19.3 (ref 10–20)
CALCIUM BLD-MCNC: 9 MG/DL (ref 8.5–10.1)
CHLORIDE SERPL-SCNC: 97 MMOL/L (ref 98–112)
CO2 SERPL-SCNC: 27 MMOL/L (ref 21–32)
CREAT BLD-MCNC: 1.87 MG/DL
DEPRECATED RDW RBC AUTO: 38.1 FL (ref 35.1–46.3)
ERYTHROCYTE [DISTWIDTH] IN BLOOD BY AUTOMATED COUNT: 11.7 % (ref 11–15)
GFR SERPLBLD BASED ON 1.73 SQ M-ARVRAT: 28 ML/MIN/1.73M2 (ref 60–?)
GLUCOSE BLD-MCNC: 196 MG/DL (ref 70–99)
HCT VFR BLD AUTO: 30 %
HGB BLD-MCNC: 9.9 G/DL
MCH RBC QN AUTO: 29.6 PG (ref 26–34)
MCHC RBC AUTO-ENTMCNC: 33 G/DL (ref 31–37)
MCV RBC AUTO: 89.8 FL
NT-PROBNP SERPL-MCNC: 866 PG/ML (ref ?–450)
OSMOLALITY SERPL CALC.SUM OF ELEC: 286 MOSM/KG (ref 275–295)
PLATELET # BLD AUTO: 355 10(3)UL (ref 150–450)
POTASSIUM SERPL-SCNC: 4 MMOL/L (ref 3.5–5.1)
RBC # BLD AUTO: 3.34 X10(6)UL
SODIUM SERPL-SCNC: 131 MMOL/L (ref 136–145)
WBC # BLD AUTO: 7.2 X10(3) UL (ref 4–11)

## 2023-03-10 PROCEDURE — 99239 HOSP IP/OBS DSCHRG MGMT >30: CPT | Performed by: HOSPITALIST

## 2023-03-10 RX ORDER — ISOSORBIDE DINITRATE 10 MG/1
10 TABLET ORAL
Qty: 90 TABLET | Refills: 0 | Status: SHIPPED | OUTPATIENT
Start: 2023-03-10 | End: 2023-04-09

## 2023-03-10 RX ORDER — ROPINIROLE 3 MG/1
3 TABLET, FILM COATED ORAL
Refills: 0 | Status: SHIPPED | COMMUNITY
Start: 2023-03-10

## 2023-03-10 NOTE — CM/SW NOTE
03/10/23 1300   Discharge disposition   Expected discharge disposition Home-Health   Post Acute Care Provider   ([facility])   Discharge transportation Private car     Per chart review, pt has DC order for today. [facility] notified via [facility]. Received response back that [facility] RN will see pt on Saturday 3/11. Pt is cleared from SW/CM stand point.        PLAN: Home w/ [facility] Evelyn Garza 80, [address] [facility]

## 2023-03-10 NOTE — DISCHARGE INSTRUCTIONS
FU with PCP in 1 week  FU with Dr Mago Toscano in 1- 2 weeks.    Weigh yourself daily if you gain 5lbs over 2 days call Dr Ferris Neither office to adjust your diuretics (torsemide) this may be a sign of water retention

## 2023-03-10 NOTE — PLAN OF CARE
Patient alert and oriented x 4. Vitals stable on room air. Patient denies pain. Patient switched to PO diuretics. Patient cleared for discharge. AVS discussed in detail with patient. Patient had no further questions. Problem: Patient Centered Care  Goal: Patient preferences are identified and integrated in the patient's plan of care  Description: Interventions:  - What would you like us to know as we care for you? I live at home alone.   - Provide timely, complete, and accurate information to patient/family  - Incorporate patient and family knowledge, values, beliefs, and cultural backgrounds into the planning and delivery of care  - Encourage patient/family to participate in care and decision-making at the level they choose  - Honor patient and family perspectives and choices  Outcome: Adequate for Discharge     Problem: Patient/Family Goals  Goal: Patient/Family Long Term Goal  Description: Patient's Long Term Goal: To go home    Interventions:  - Diurese - IV bumex, fluid restriction  - Echo  - Medication compliance  - Follow provider recommendations  - See additional Care Plan goals for specific interventions  Outcome: Adequate for Discharge  Goal: Patient/Family Short Term Goal  Description: Patient's Short Term Goal: To breathe better    Interventions:   - IV bumex BID  - Strict I & Os  - Medication compliance  - Follow provider recommendations  - See additional Care Plan goals for specific interventions  Outcome: Adequate for Discharge     Problem: PAIN - ADULT  Goal: Verbalizes/displays adequate comfort level or patient's stated pain goal  Description: INTERVENTIONS:  - Encourage pt to monitor pain and request assistance  - Assess pain using appropriate pain scale  - Administer analgesics based on type and severity of pain and evaluate response  - Implement non-pharmacological measures as appropriate and evaluate response  - Consider cultural and social influences on pain and pain management  - Manage/alleviate anxiety  - Utilize distraction and/or relaxation techniques  - Monitor for opioid side effects  - Notify MD/LIP if interventions unsuccessful or patient reports new pain  - Anticipate increased pain with activity and pre-medicate as appropriate  Outcome: Adequate for Discharge     Problem: RISK FOR INFECTION - ADULT  Goal: Absence of fever/infection during anticipated neutropenic period  Description: INTERVENTIONS  - Monitor WBC  - Administer growth factors as ordered  - Implement neutropenic guidelines  Outcome: Adequate for Discharge     Problem: SAFETY ADULT - FALL  Goal: Free from fall injury  Description: INTERVENTIONS:  - Assess pt frequently for physical needs  - Identify cognitive and physical deficits and behaviors that affect risk of falls.   - [place] fall precautions as indicated by assessment.  - Educate pt/family on patient safety including physical limitations  - Instruct pt to call for assistance with activity based on assessment  - Modify environment to reduce risk of injury  - Provide assistive devices as appropriate  - Consider OT/PT consult to assist with strengthening/mobility  - Encourage toileting schedule  Outcome: Adequate for Discharge     Problem: DISCHARGE PLANNING  Goal: Discharge to home or other facility with appropriate resources  Description: INTERVENTIONS:  - Identify barriers to discharge w/pt and caregiver  - Include patient/family/discharge partner in discharge planning  - Arrange for needed discharge resources and transportation as appropriate  - Identify discharge learning needs (meds, wound care, etc)  - Arrange for interpreters to assist at discharge as needed  - Consider post-discharge preferences of patient/family/discharge partner  - Complete POLST form as appropriate  - Assess patient's ability to be responsible for managing their own health  - Refer to Case Management Department for coordinating discharge planning if the patient needs post-hospital services based on physician/LIP order or complex needs related to functional status, cognitive ability or social support system  Outcome: Adequate for Discharge

## 2023-03-10 NOTE — PLAN OF CARE
Patient is A&Ox4, RA. Denied SOB. No acute changes overnight. Deniz Jair in place with good urine output. Call light within reach and monitoring will continue. Problem: Patient Centered Care  Goal: Patient preferences are identified and integrated in the patient's plan of care  Description: Interventions:  - What would you like us to know as we care for you? I live at home alone.   - Provide timely, complete, and accurate information to patient/family  - Incorporate patient and family knowledge, values, beliefs, and cultural backgrounds into the planning and delivery of care  - Encourage patient/family to participate in care and decision-making at the level they choose  - Honor patient and family perspectives and choices  Outcome: Progressing     Problem: Patient/Family Goals  Goal: Patient/Family Long Term Goal  Description: Patient's Long Term Goal: To go home    Interventions:  - Diurese - IV bumex, fluid restriction  - Echo  - Medication compliance  - Follow provider recommendations  - See additional Care Plan goals for specific interventions  Outcome: Progressing  Goal: Patient/Family Short Term Goal  Description: Patient's Short Term Goal: To breathe better    Interventions:   - IV bumex BID  - Strict I & Os  - Medication compliance  - Follow provider recommendations  - See additional Care Plan goals for specific interventions  Outcome: Progressing     Problem: PAIN - ADULT  Goal: Verbalizes/displays adequate comfort level or patient's stated pain goal  Description: INTERVENTIONS:  - Encourage pt to monitor pain and request assistance  - Assess pain using appropriate pain scale  - Administer analgesics based on type and severity of pain and evaluate response  - Implement non-pharmacological measures as appropriate and evaluate response  - Consider cultural and social influences on pain and pain management  - Manage/alleviate anxiety  - Utilize distraction and/or relaxation techniques  - Monitor for opioid side effects  - Notify MD/LIP if interventions unsuccessful or patient reports new pain  - Anticipate increased pain with activity and pre-medicate as appropriate  Outcome: Progressing     Problem: RISK FOR INFECTION - ADULT  Goal: Absence of fever/infection during anticipated neutropenic period  Description: INTERVENTIONS  - Monitor WBC  - Administer growth factors as ordered  - Implement neutropenic guidelines  Outcome: Progressing     Problem: SAFETY ADULT - FALL  Goal: Free from fall injury  Description: INTERVENTIONS:  - Assess pt frequently for physical needs  - Identify cognitive and physical deficits and behaviors that affect risk of falls.   - [place] fall precautions as indicated by assessment.  - Educate pt/family on patient safety including physical limitations  - Instruct pt to call for assistance with activity based on assessment  - Modify environment to reduce risk of injury  - Provide assistive devices as appropriate  - Consider OT/PT consult to assist with strengthening/mobility  - Encourage toileting schedule  Outcome: Progressing     Problem: DISCHARGE PLANNING  Goal: Discharge to home or other facility with appropriate resources  Description: INTERVENTIONS:  - Identify barriers to discharge w/pt and caregiver  - Include patient/family/discharge partner in discharge planning  - Arrange for needed discharge resources and transportation as appropriate  - Identify discharge learning needs (meds, wound care, etc)  - Arrange for interpreters to assist at discharge as needed  - Consider post-discharge preferences of patient/family/discharge partner  - Complete POLST form as appropriate  - Assess patient's ability to be responsible for managing their own health  - Refer to Case Management Department for coordinating discharge planning if the patient needs post-hospital services based on physician/LIP order or complex needs related to functional status, cognitive ability or social support system  Outcome: Progressing

## 2023-03-27 ENCOUNTER — LAB ENCOUNTER (OUTPATIENT)
Dept: LAB | Facility: HOSPITAL | Age: 78
End: 2023-03-27
Attending: INTERNAL MEDICINE
Payer: MEDICARE

## 2023-03-27 DIAGNOSIS — I25.10 CORONARY ATHEROSCLEROSIS OF NATIVE CORONARY ARTERY: Primary | ICD-10-CM

## 2023-03-27 LAB
ANION GAP SERPL CALC-SCNC: 8 MMOL/L (ref 0–18)
BUN BLD-MCNC: 27 MG/DL (ref 7–18)
BUN/CREAT SERPL: 14.6 (ref 10–20)
CALCIUM BLD-MCNC: 7.1 MG/DL (ref 8.5–10.1)
CHLORIDE SERPL-SCNC: 104 MMOL/L (ref 98–112)
CO2 SERPL-SCNC: 23 MMOL/L (ref 21–32)
CREAT BLD-MCNC: 1.85 MG/DL
FASTING STATUS PATIENT QL REPORTED: YES
GFR SERPLBLD BASED ON 1.73 SQ M-ARVRAT: 28 ML/MIN/1.73M2 (ref 60–?)
GLUCOSE BLD-MCNC: 101 MG/DL (ref 70–99)
OSMOLALITY SERPL CALC.SUM OF ELEC: 285 MOSM/KG (ref 275–295)
POTASSIUM SERPL-SCNC: 4.1 MMOL/L (ref 3.5–5.1)
SODIUM SERPL-SCNC: 135 MMOL/L (ref 136–145)

## 2023-03-27 PROCEDURE — 80048 BASIC METABOLIC PNL TOTAL CA: CPT

## 2023-03-27 PROCEDURE — 36415 COLL VENOUS BLD VENIPUNCTURE: CPT

## 2023-05-27 ENCOUNTER — HOSPITAL ENCOUNTER (INPATIENT)
Facility: HOSPITAL | Age: 78
LOS: 3 days | Discharge: NURSING, REHAB OR LONG-TERM CARE | End: 2023-05-31
Attending: EMERGENCY MEDICINE | Admitting: STUDENT IN AN ORGANIZED HEALTH CARE EDUCATION/TRAINING PROGRAM
Payer: MEDICARE

## 2023-05-27 DIAGNOSIS — E87.6 HYPOKALEMIA: ICD-10-CM

## 2023-05-27 DIAGNOSIS — N17.9 AKI (ACUTE KIDNEY INJURY) (HCC): Primary | ICD-10-CM

## 2023-05-27 DIAGNOSIS — I95.1 ORTHOSTATIC HYPOTENSION: ICD-10-CM

## 2023-05-27 DIAGNOSIS — E87.1 HYPONATREMIA: ICD-10-CM

## 2023-05-28 ENCOUNTER — APPOINTMENT (OUTPATIENT)
Dept: CT IMAGING | Facility: HOSPITAL | Age: 78
End: 2023-05-28
Attending: EMERGENCY MEDICINE
Payer: MEDICARE

## 2023-05-28 ENCOUNTER — APPOINTMENT (OUTPATIENT)
Dept: GENERAL RADIOLOGY | Facility: HOSPITAL | Age: 78
End: 2023-05-28
Attending: EMERGENCY MEDICINE
Payer: MEDICARE

## 2023-05-28 PROBLEM — E87.6 HYPOKALEMIA: Status: ACTIVE | Noted: 2023-05-28

## 2023-05-28 PROBLEM — N17.9 AKI (ACUTE KIDNEY INJURY): Status: ACTIVE | Noted: 2023-05-28

## 2023-05-28 PROBLEM — I95.1 ORTHOSTATIC HYPOTENSION: Status: ACTIVE | Noted: 2023-05-28

## 2023-05-28 PROBLEM — N17.9 AKI (ACUTE KIDNEY INJURY) (HCC): Status: ACTIVE | Noted: 2023-05-28

## 2023-05-28 LAB
ANION GAP SERPL CALC-SCNC: 7 MMOL/L (ref 0–18)
ANION GAP SERPL CALC-SCNC: 9 MMOL/L (ref 0–18)
ATRIAL RATE: 60 BPM
BASOPHILS # BLD AUTO: 0.04 X10(3) UL (ref 0–0.2)
BASOPHILS NFR BLD AUTO: 0.4 %
BILIRUB UR QL: NEGATIVE
BUN BLD-MCNC: 85 MG/DL (ref 7–18)
BUN BLD-MCNC: 88 MG/DL (ref 7–18)
BUN/CREAT SERPL: 26.4 (ref 10–20)
BUN/CREAT SERPL: 28.3 (ref 10–20)
CALCIUM BLD-MCNC: 8.9 MG/DL (ref 8.5–10.1)
CALCIUM BLD-MCNC: 9.2 MG/DL (ref 8.5–10.1)
CHLORIDE SERPL-SCNC: 84 MMOL/L (ref 98–112)
CHLORIDE SERPL-SCNC: 90 MMOL/L (ref 98–112)
CLARITY UR: CLEAR
CO2 SERPL-SCNC: 32 MMOL/L (ref 21–32)
CO2 SERPL-SCNC: 33 MMOL/L (ref 21–32)
COLOR UR: COLORLESS
CORTIS SERPL-MCNC: 26.6 UG/DL
CREAT BLD-MCNC: 3 MG/DL
CREAT BLD-MCNC: 3.33 MG/DL
DEPRECATED RDW RBC AUTO: 43.3 FL (ref 35.1–46.3)
EOSINOPHIL # BLD AUTO: 0.12 X10(3) UL (ref 0–0.7)
EOSINOPHIL NFR BLD AUTO: 1.2 %
ERYTHROCYTE [DISTWIDTH] IN BLOOD BY AUTOMATED COUNT: 13.3 % (ref 11–15)
GFR SERPLBLD BASED ON 1.73 SQ M-ARVRAT: 14 ML/MIN/1.73M2 (ref 60–?)
GFR SERPLBLD BASED ON 1.73 SQ M-ARVRAT: 16 ML/MIN/1.73M2 (ref 60–?)
GLUCOSE BLD-MCNC: 119 MG/DL (ref 70–99)
GLUCOSE BLD-MCNC: 130 MG/DL (ref 70–99)
GLUCOSE UR-MCNC: NORMAL MG/DL
HCT VFR BLD AUTO: 31.5 %
HGB BLD-MCNC: 10.6 G/DL
HGB UR QL STRIP.AUTO: NEGATIVE
IMM GRANULOCYTES # BLD AUTO: 0.03 X10(3) UL (ref 0–1)
IMM GRANULOCYTES NFR BLD: 0.3 %
KETONES UR-MCNC: NEGATIVE MG/DL
LEUKOCYTE ESTERASE UR QL STRIP.AUTO: NEGATIVE
LYMPHOCYTES # BLD AUTO: 1.2 X10(3) UL (ref 1–4)
LYMPHOCYTES NFR BLD AUTO: 12 %
MCH RBC QN AUTO: 29.9 PG (ref 26–34)
MCHC RBC AUTO-ENTMCNC: 33.7 G/DL (ref 31–37)
MCV RBC AUTO: 89 FL
MONOCYTES # BLD AUTO: 0.72 X10(3) UL (ref 0.1–1)
MONOCYTES NFR BLD AUTO: 7.2 %
NEUTROPHILS # BLD AUTO: 7.86 X10 (3) UL (ref 1.5–7.7)
NEUTROPHILS # BLD AUTO: 7.86 X10(3) UL (ref 1.5–7.7)
NEUTROPHILS NFR BLD AUTO: 78.9 %
NITRITE UR QL STRIP.AUTO: NEGATIVE
OSMOLALITY SERPL CALC.SUM OF ELEC: 291 MOSM/KG (ref 275–295)
OSMOLALITY SERPL CALC.SUM OF ELEC: 295 MOSM/KG (ref 275–295)
OSMOLALITY UR: 257 MOSM/KG (ref 300–1100)
P AXIS: 60 DEGREES
P-R INTERVAL: 170 MS
PH UR: 5.5 [PH] (ref 5–8)
PLATELET # BLD AUTO: 246 10(3)UL (ref 150–450)
POTASSIUM SERPL-SCNC: 2.8 MMOL/L (ref 3.5–5.1)
POTASSIUM SERPL-SCNC: 2.9 MMOL/L (ref 3.5–5.1)
POTASSIUM SERPL-SCNC: 3.1 MMOL/L (ref 3.5–5.1)
PROT UR-MCNC: NEGATIVE MG/DL
Q-T INTERVAL: 534 MS
QRS DURATION: 162 MS
QTC CALCULATION (BEZET): 534 MS
R AXIS: 12 DEGREES
RBC # BLD AUTO: 3.54 X10(6)UL
SODIUM SERPL-SCNC: 126 MMOL/L (ref 136–145)
SODIUM SERPL-SCNC: 129 MMOL/L (ref 136–145)
SODIUM SERPL-SCNC: 64 MMOL/L
SP GR UR STRIP: 1.01 (ref 1–1.03)
T AXIS: 130 DEGREES
TROPONIN I HIGH SENSITIVITY: 10 NG/L
TSI SER-ACNC: 3.32 MIU/ML (ref 0.36–3.74)
UROBILINOGEN UR STRIP-ACNC: NORMAL
VENTRICULAR RATE: 60 BPM
WBC # BLD AUTO: 10 X10(3) UL (ref 4–11)

## 2023-05-28 PROCEDURE — 73030 X-RAY EXAM OF SHOULDER: CPT | Performed by: EMERGENCY MEDICINE

## 2023-05-28 PROCEDURE — 99223 1ST HOSP IP/OBS HIGH 75: CPT | Performed by: INTERNAL MEDICINE

## 2023-05-28 PROCEDURE — 70450 CT HEAD/BRAIN W/O DYE: CPT | Performed by: EMERGENCY MEDICINE

## 2023-05-28 RX ORDER — ATORVASTATIN CALCIUM 40 MG/1
40 TABLET, FILM COATED ORAL
Status: DISCONTINUED | OUTPATIENT
Start: 2023-05-28 | End: 2023-05-31

## 2023-05-28 RX ORDER — GABAPENTIN 600 MG/1
600 TABLET ORAL 3 TIMES DAILY
Status: DISCONTINUED | OUTPATIENT
Start: 2023-05-28 | End: 2023-05-31

## 2023-05-28 RX ORDER — ESCITALOPRAM OXALATE 10 MG/1
20 TABLET ORAL DAILY
Status: DISCONTINUED | OUTPATIENT
Start: 2023-05-28 | End: 2023-05-31

## 2023-05-28 RX ORDER — ASPIRIN 81 MG/1
81 TABLET ORAL DAILY
Status: DISCONTINUED | OUTPATIENT
Start: 2023-05-28 | End: 2023-05-31

## 2023-05-28 RX ORDER — SODIUM CHLORIDE 9 MG/ML
INJECTION, SOLUTION INTRAVENOUS CONTINUOUS
Status: DISCONTINUED | OUTPATIENT
Start: 2023-05-28 | End: 2023-05-30

## 2023-05-28 RX ORDER — MELATONIN
325
Status: DISCONTINUED | OUTPATIENT
Start: 2023-05-28 | End: 2023-05-31

## 2023-05-28 RX ORDER — TIZANIDINE 2 MG/1
2 TABLET ORAL
Status: DISCONTINUED | OUTPATIENT
Start: 2023-05-28 | End: 2023-05-31

## 2023-05-28 RX ORDER — PANTOPRAZOLE SODIUM 40 MG/1
40 TABLET, DELAYED RELEASE ORAL
Status: DISCONTINUED | OUTPATIENT
Start: 2023-05-28 | End: 2023-05-31

## 2023-05-28 RX ORDER — ACETAMINOPHEN 500 MG
500 TABLET ORAL EVERY 4 HOURS PRN
Status: DISCONTINUED | OUTPATIENT
Start: 2023-05-28 | End: 2023-05-31

## 2023-05-28 RX ORDER — METOLAZONE 2.5 MG/1
2.5 TABLET ORAL DAILY
COMMUNITY
End: 2023-05-31

## 2023-05-28 RX ORDER — ROPINIROLE 1 MG/1
3 TABLET, FILM COATED ORAL
Status: DISCONTINUED | OUTPATIENT
Start: 2023-05-28 | End: 2023-05-31

## 2023-05-28 RX ORDER — LUBIPROSTONE 24 UG/1
24 CAPSULE ORAL 2 TIMES DAILY WITH MEALS
Status: DISCONTINUED | OUTPATIENT
Start: 2023-05-28 | End: 2023-05-31

## 2023-05-28 RX ORDER — BUPROPION HYDROCHLORIDE 150 MG/1
150 TABLET, EXTENDED RELEASE ORAL DAILY
Status: DISCONTINUED | OUTPATIENT
Start: 2023-05-28 | End: 2023-05-31

## 2023-05-28 RX ORDER — SPIRONOLACTONE 25 MG/1
25 TABLET ORAL DAILY
COMMUNITY
End: 2023-05-31

## 2023-05-28 RX ORDER — POTASSIUM CHLORIDE 20 MEQ/1
40 TABLET, EXTENDED RELEASE ORAL ONCE
Status: COMPLETED | OUTPATIENT
Start: 2023-05-28 | End: 2023-05-28

## 2023-05-28 RX ORDER — POTASSIUM CHLORIDE 1.5 G/1.77G
20 POWDER, FOR SOLUTION ORAL DAILY
Status: DISCONTINUED | OUTPATIENT
Start: 2023-05-28 | End: 2023-05-31

## 2023-05-28 RX ORDER — MULTIVITAMIN/IRON/FOLIC ACID 18MG-0.4MG
500 TABLET ORAL DAILY
Status: DISCONTINUED | OUTPATIENT
Start: 2023-05-28 | End: 2023-05-31

## 2023-05-28 RX ORDER — HEPARIN SODIUM 5000 [USP'U]/ML
5000 INJECTION, SOLUTION INTRAVENOUS; SUBCUTANEOUS EVERY 8 HOURS SCHEDULED
Status: DISCONTINUED | OUTPATIENT
Start: 2023-05-28 | End: 2023-05-31

## 2023-05-28 RX ORDER — ONDANSETRON 2 MG/ML
4 INJECTION INTRAMUSCULAR; INTRAVENOUS EVERY 6 HOURS PRN
Status: DISCONTINUED | OUTPATIENT
Start: 2023-05-28 | End: 2023-05-31

## 2023-05-28 RX ORDER — METOCLOPRAMIDE HYDROCHLORIDE 5 MG/ML
5 INJECTION INTRAMUSCULAR; INTRAVENOUS EVERY 8 HOURS PRN
Status: DISCONTINUED | OUTPATIENT
Start: 2023-05-28 | End: 2023-05-31

## 2023-05-28 RX ORDER — TRAMADOL HYDROCHLORIDE 50 MG/1
100 TABLET ORAL 3 TIMES DAILY PRN
Status: DISCONTINUED | OUTPATIENT
Start: 2023-05-28 | End: 2023-05-31

## 2023-05-28 NOTE — PLAN OF CARE
Problem: Patient Centered Care  Goal: Patient preferences are identified and integrated in the patient's plan of care  Description: Interventions:  - What would you like us to know as we care for you? From home alone   - Provide timely, complete, and accurate information to patient/family  - Incorporate patient and family knowledge, values, beliefs, and cultural backgrounds into the planning and delivery of care  - Encourage patient/family to participate in care and decision-making at the level they choose  - Honor patient and family perspectives and choices  Outcome: Progressing     Problem: Patient/Family Goals  Goal: Patient/Family Long Term Goal  Description: Patient's Long Term Goal: return home     Interventions:  - PT/OT evaluation   - Fall precautions   - nephrology on consult     - See additional Care Plan goals for specific interventions  Outcome: Progressing  Goal: Patient/Family Short Term Goal  Description: Patient's Short Term Goal: feel better     Interventions:   -   - See additional Care Plan goals for specific interventions  Outcome: Progressing     Problem: SAFETY ADULT - FALL  Goal: Free from fall injury  Description: INTERVENTIONS:  - Assess pt frequently for physical needs  - Identify cognitive and physical deficits and behaviors that affect risk of falls.   - [place] fall precautions as indicated by assessment.  - Educate pt/family on patient safety including physical limitations  - Instruct pt to call for assistance with activity based on assessment  - Modify environment to reduce risk of injury  - Provide assistive devices as appropriate  - Consider OT/PT consult to assist with strengthening/mobility  - Encourage toileting schedule  Outcome: Progressing     Problem: PAIN - ADULT  Goal: Verbalizes/displays adequate comfort level or patient's stated pain goal  Description: INTERVENTIONS:  - Encourage pt to monitor pain and request assistance  - Assess pain using appropriate pain scale  - Administer analgesics based on type and severity of pain and evaluate response  - Implement non-pharmacological measures as appropriate and evaluate response  - Consider cultural and social influences on pain and pain management  - Manage/alleviate anxiety  - Utilize distraction and/or relaxation techniques  - Monitor for opioid side effects  - Notify MD/LIP if interventions unsuccessful or patient reports new pain  - Anticipate increased pain with activity and pre-medicate as appropriate  Outcome: Progressing   Patient alert and oriented x 4, patient from home alone,  potassium 2.8 replaced by doctor order, fall precautions in place ,call light within reach

## 2023-05-28 NOTE — OCCUPATIONAL THERAPY NOTE
Attempted to see patient for occupational therapy evaluation. Patient with low BP, symptomatic when standing with RN. Will check back as schedule permits and pt feels better, RN aware.      Naun Dorado OTR/L  [id number]  [facility]

## 2023-05-28 NOTE — ED INITIAL ASSESSMENT (HPI)
Feeling lightheaded all day, about 1hr pta got up and fell, hitting the back of her head and c/o of pain on the right shoulder and behind he ear. EMS thought speech was slurred on arrival but [place] was - and pt. States it's because her mouth is dry. +baby asa every day, pt thinks she is dehydrated.

## 2023-05-28 NOTE — PLAN OF CARE
Orthostatic blood pressure completed patient symptomatic from sitting to standing position. Gentle hydration given per MAR. Potassium replaced. No complaints of pain at rest. Fall risk precautions maintained. Plan of care reviewed with patient, daughter and son-in-law. Problem: Patient Centered Care  Goal: Patient preferences are identified and integrated in the patient's plan of care  Description: Interventions:  - What would you like us to know as we care for you? \"I would like help calling my daughter\"  - Provide timely, complete, and accurate information to patient/family  - Incorporate patient and family knowledge, values, beliefs, and cultural backgrounds into the planning and delivery of care  - Encourage patient/family to participate in care and decision-making at the level they choose  - Honor patient and family perspectives and choices  Outcome: Progressing     Problem: Patient/Family Goals  Goal: Patient/Family Long Term Goal  Description: Patient's Long Term Goal: discharge home    Interventions:  - implement plan of care  - See additional Care Plan goals for specific interventions  Outcome: Progressing  Goal: Patient/Family Short Term Goal  Description: Patient's Short Term Goal: \"call my daughters and update them\"    Interventions:   - give number from demographics  - See additional Care Plan goals for specific interventions  Outcome: Progressing     Problem: SAFETY ADULT - FALL  Goal: Free from fall injury  Description: INTERVENTIONS:  - Assess pt frequently for physical needs  - Identify cognitive and physical deficits and behaviors that affect risk of falls.   - [place] fall precautions as indicated by assessment.  - Educate pt/family on patient safety including physical limitations  - Instruct pt to call for assistance with activity based on assessment  - Modify environment to reduce risk of injury  - Provide assistive devices as appropriate  - Consider OT/PT consult to assist with strengthening/mobility  - Encourage toileting schedule  Outcome: Progressing     Problem: PAIN - ADULT  Goal: Verbalizes/displays adequate comfort level or patient's stated pain goal  Description: INTERVENTIONS:  - Encourage pt to monitor pain and request assistance  - Assess pain using appropriate pain scale  - Administer analgesics based on type and severity of pain and evaluate response  - Implement non-pharmacological measures as appropriate and evaluate response  - Consider cultural and social influences on pain and pain management  - Manage/alleviate anxiety  - Utilize distraction and/or relaxation techniques  - Monitor for opioid side effects  - Notify MD/LIP if interventions unsuccessful or patient reports new pain  - Anticipate increased pain with activity and pre-medicate as appropriate  Outcome: Progressing

## 2023-05-28 NOTE — ED QUICK NOTES
Orders for admission, patient is aware of plan and ready to go upstairs. Any questions, please call ED RN Carli Calhoun at extension [phone].      Patient Covid vaccination status: Unvaccinated     COVID Test Ordered in ED: None    COVID Suspicion at Admission: N/A    Running Infusions:      Mental Status/LOC at time of transport: AOx3    Other pertinent information:   CIWA score: N/A   NIH score:  N/A

## 2023-05-29 LAB
ALBUMIN SERPL-MCNC: 3 G/DL (ref 3.4–5)
ALBUMIN/GLOB SERPL: 0.7 {RATIO} (ref 1–2)
ALP LIVER SERPL-CCNC: 75 U/L
ALT SERPL-CCNC: 18 U/L
ANION GAP SERPL CALC-SCNC: 6 MMOL/L (ref 0–18)
AST SERPL-CCNC: 15 U/L (ref 15–37)
BILIRUB SERPL-MCNC: 0.4 MG/DL (ref 0.1–2)
BUN BLD-MCNC: 69 MG/DL (ref 7–18)
BUN/CREAT SERPL: 31.1 (ref 10–20)
C DIFF TOX B STL QL: NEGATIVE
CALCIUM BLD-MCNC: 8.1 MG/DL (ref 8.5–10.1)
CHLORIDE SERPL-SCNC: 94 MMOL/L (ref 98–112)
CO2 SERPL-SCNC: 31 MMOL/L (ref 21–32)
CREAT BLD-MCNC: 2.22 MG/DL
DEPRECATED RDW RBC AUTO: 44.5 FL (ref 35.1–46.3)
ERYTHROCYTE [DISTWIDTH] IN BLOOD BY AUTOMATED COUNT: 13.6 % (ref 11–15)
GFR SERPLBLD BASED ON 1.73 SQ M-ARVRAT: 22 ML/MIN/1.73M2 (ref 60–?)
GLOBULIN PLAS-MCNC: 4.1 G/DL (ref 2.8–4.4)
GLUCOSE BLD-MCNC: 111 MG/DL (ref 70–99)
HCT VFR BLD AUTO: 31.7 %
HGB BLD-MCNC: 10.5 G/DL
MAGNESIUM SERPL-MCNC: 2 MG/DL (ref 1.6–2.6)
MCH RBC QN AUTO: 30 PG (ref 26–34)
MCHC RBC AUTO-ENTMCNC: 33.1 G/DL (ref 31–37)
MCV RBC AUTO: 90.6 FL
OSMOLALITY SERPL CALC.SUM OF ELEC: 293 MOSM/KG (ref 275–295)
PHOSPHATE SERPL-MCNC: 1.7 MG/DL (ref 2.5–4.9)
PLATELET # BLD AUTO: 232 10(3)UL (ref 150–450)
POTASSIUM SERPL-SCNC: 3.7 MMOL/L (ref 3.5–5.1)
PROT SERPL-MCNC: 7.1 G/DL (ref 6.4–8.2)
RBC # BLD AUTO: 3.5 X10(6)UL
SODIUM SERPL-SCNC: 131 MMOL/L (ref 136–145)
TROPONIN I HIGH SENSITIVITY: 7 NG/L
WBC # BLD AUTO: 8.8 X10(3) UL (ref 4–11)

## 2023-05-29 PROCEDURE — 99233 SBSQ HOSP IP/OBS HIGH 50: CPT | Performed by: INTERNAL MEDICINE

## 2023-05-29 NOTE — PLAN OF CARE
Patient is AO x4, vitals stable on room air. No complaints of pain. Reposition patient frequently. IV fluids ongoing. Frequent rounding done on pt and needs attended to. Problem: Patient Centered Care  Goal: Patient preferences are identified and integrated in the patient's plan of care  Description: Interventions:  - What would you like us to know as we care for you?   - Provide timely, complete, and accurate information to patient/family  - Incorporate patient and family knowledge, values, beliefs, and cultural backgrounds into the planning and delivery of care  - Encourage patient/family to participate in care and decision-making at the level they choose  - Honor patient and family perspectives and choices  Outcome: Progressing     Problem: SAFETY ADULT - FALL  Goal: Free from fall injury  Description: INTERVENTIONS:  - Assess pt frequently for physical needs  - Identify cognitive and physical deficits and behaviors that affect risk of falls.   - [place] fall precautions as indicated by assessment.  - Educate pt/family on patient safety including physical limitations  - Instruct pt to call for assistance with activity based on assessment  - Modify environment to reduce risk of injury  - Provide assistive devices as appropriate  - Consider OT/PT consult to assist with strengthening/mobility  - Encourage toileting schedule  Outcome: Progressing     Problem: PAIN - ADULT  Goal: Verbalizes/displays adequate comfort level or patient's stated pain goal  Description: INTERVENTIONS:  - Encourage pt to monitor pain and request assistance  - Assess pain using appropriate pain scale  - Administer analgesics based on type and severity of pain and evaluate response  - Implement non-pharmacological measures as appropriate and evaluate response  - Consider cultural and social influences on pain and pain management  - Manage/alleviate anxiety  - Utilize distraction and/or relaxation techniques  - Monitor for opioid side effects  - Notify MD/LIP if interventions unsuccessful or patient reports new pain  - Anticipate increased pain with activity and pre-medicate as appropriate  Outcome: Progressing

## 2023-05-29 NOTE — PLAN OF CARE
Problem: Patient Centered Care  Goal: Patient preferences are identified and integrated in the patient's plan of care  Description: Interventions:  - What would you like us to know as we care for you? I am from home , I have to daughters  - Provide timely, complete, and accurate information to patient/family  - Incorporate patient and family knowledge, values, beliefs, and cultural backgrounds into the planning and delivery of care  - Encourage patient/family to participate in care and decision-making at the level they choose  - Honor patient and family perspectives and choices  Outcome: Progressing     Problem: Patient/Family Goals  Goal: Patient/Family Long Term Goal  Description: Patient's Long Term Goal: get my strength back and return home      Interventions:  - follow doctors recommendations   - See additional Care Plan goals for specific interventions  Outcome: Progressing  Goal: Patient/Family Short Term Goal  Description: Patient's Short Term Goal: get my strength back    Interventions:   - PT/OT evaluation and recommendations   - See additional Care Plan goals for specific interventions  Outcome: Progressing     Problem: SAFETY ADULT - FALL  Goal: Free from fall injury  Description: INTERVENTIONS:  - Assess pt frequently for physical needs  - Identify cognitive and physical deficits and behaviors that affect risk of falls.   - [place] fall precautions as indicated by assessment.  - Educate pt/family on patient safety including physical limitations  - Instruct pt to call for assistance with activity based on assessment  - Modify environment to reduce risk of injury  - Provide assistive devices as appropriate  - Consider OT/PT consult to assist with strengthening/mobility  - Encourage toileting schedule  Outcome: Progressing     Problem: PAIN - ADULT  Goal: Verbalizes/displays adequate comfort level or patient's stated pain goal  Description: INTERVENTIONS:  - Encourage pt to monitor pain and request assistance  - Assess pain using appropriate pain scale  - Administer analgesics based on type and severity of pain and evaluate response  - Implement non-pharmacological measures as appropriate and evaluate response  - Consider cultural and social influences on pain and pain management  - Manage/alleviate anxiety  - Utilize distraction and/or relaxation techniques  - Monitor for opioid side effects  - Notify MD/LIP if interventions unsuccessful or patient reports new pain  - Anticipate increased pain with activity and pre-medicate as appropriate  Outcome: Progressing   No acute changes over the night, vital signs stable, patient up to the bathroom with assist and rolling chair, fall precautions in place, call light within reach

## 2023-05-30 LAB
ALBUMIN SERPL-MCNC: 2.9 G/DL (ref 3.4–5)
ANION GAP SERPL CALC-SCNC: 1 MMOL/L (ref 0–18)
BASOPHILS # BLD AUTO: 0.04 X10(3) UL (ref 0–0.2)
BASOPHILS NFR BLD AUTO: 0.6 %
BUN BLD-MCNC: 51 MG/DL (ref 7–18)
BUN/CREAT SERPL: 29.5 (ref 10–20)
CALCIUM BLD-MCNC: 8.2 MG/DL (ref 8.5–10.1)
CHLORIDE SERPL-SCNC: 100 MMOL/L (ref 98–112)
CO2 SERPL-SCNC: 32 MMOL/L (ref 21–32)
CREAT BLD-MCNC: 1.73 MG/DL
DEPRECATED RDW RBC AUTO: 44.7 FL (ref 35.1–46.3)
EOSINOPHIL # BLD AUTO: 0.33 X10(3) UL (ref 0–0.7)
EOSINOPHIL NFR BLD AUTO: 5 %
ERYTHROCYTE [DISTWIDTH] IN BLOOD BY AUTOMATED COUNT: 13.7 % (ref 11–15)
GFR SERPLBLD BASED ON 1.73 SQ M-ARVRAT: 30 ML/MIN/1.73M2 (ref 60–?)
GLUCOSE BLD-MCNC: 115 MG/DL (ref 70–99)
HCT VFR BLD AUTO: 30.5 %
HGB BLD-MCNC: 10.2 G/DL
IMM GRANULOCYTES # BLD AUTO: 0.02 X10(3) UL (ref 0–1)
IMM GRANULOCYTES NFR BLD: 0.3 %
IRON SATN MFR SERPL: 21 %
IRON SERPL-MCNC: 62 UG/DL
LYMPHOCYTES # BLD AUTO: 1.29 X10(3) UL (ref 1–4)
LYMPHOCYTES NFR BLD AUTO: 19.5 %
MAGNESIUM SERPL-MCNC: 1.9 MG/DL (ref 1.6–2.6)
MCH RBC QN AUTO: 30.1 PG (ref 26–34)
MCHC RBC AUTO-ENTMCNC: 33.4 G/DL (ref 31–37)
MCV RBC AUTO: 90 FL
MONOCYTES # BLD AUTO: 0.43 X10(3) UL (ref 0.1–1)
MONOCYTES NFR BLD AUTO: 6.5 %
NEUTROPHILS # BLD AUTO: 4.5 X10 (3) UL (ref 1.5–7.7)
NEUTROPHILS # BLD AUTO: 4.5 X10(3) UL (ref 1.5–7.7)
NEUTROPHILS NFR BLD AUTO: 68.1 %
OSMOLALITY SERPL CALC.SUM OF ELEC: 291 MOSM/KG (ref 275–295)
PHOSPHATE SERPL-MCNC: 2.3 MG/DL (ref 2.5–4.9)
PHOSPHATE SERPL-MCNC: 2.3 MG/DL (ref 2.5–4.9)
PLATELET # BLD AUTO: 236 10(3)UL (ref 150–450)
POTASSIUM SERPL-SCNC: 4.1 MMOL/L (ref 3.5–5.1)
RBC # BLD AUTO: 3.39 X10(6)UL
SODIUM SERPL-SCNC: 133 MMOL/L (ref 136–145)
TIBC SERPL-MCNC: 299 UG/DL (ref 240–450)
TRANSFERRIN SERPL-MCNC: 201 MG/DL (ref 200–360)
WBC # BLD AUTO: 6.6 X10(3) UL (ref 4–11)

## 2023-05-30 PROCEDURE — 99232 SBSQ HOSP IP/OBS MODERATE 35: CPT | Performed by: INTERNAL MEDICINE

## 2023-05-30 NOTE — PLAN OF CARE
Problem: Patient Centered Care  Goal: Patient preferences are identified and integrated in the patient's plan of care  Description: Interventions:  - What would you like us to know as we care for you? From home alone   - Provide timely, complete, and accurate information to patient/family  - Incorporate patient and family knowledge, values, beliefs, and cultural backgrounds into the planning and delivery of care  - Encourage patient/family to participate in care and decision-making at the level they choose  - Honor patient and family perspectives and choices  Outcome: Progressing     Problem: Patient/Family Goals  Goal: Patient/Family Long Term Goal  Description: Patient's Long Term Goal: return home     Interventions:  - follow doctors orders   - See additional Care Plan goals for specific interventions  Outcome: Progressing  Goal: Patient/Family Short Term Goal  Description: Patient's Short Term Goal: feel better     Interventions:   -   - See additional Care Plan goals for specific interventions  Outcome: Progressing     Problem: SAFETY ADULT - FALL  Goal: Free from fall injury  Description: INTERVENTIONS:  - Assess pt frequently for physical needs  - Identify cognitive and physical deficits and behaviors that affect risk of falls.   - [place] fall precautions as indicated by assessment.  - Educate pt/family on patient safety including physical limitations  - Instruct pt to call for assistance with activity based on assessment  - Modify environment to reduce risk of injury  - Provide assistive devices as appropriate  - Consider OT/PT consult to assist with strengthening/mobility  - Encourage toileting schedule  Outcome: Progressing     Problem: PAIN - ADULT  Goal: Verbalizes/displays adequate comfort level or patient's stated pain goal  Description: INTERVENTIONS:  - Encourage pt to monitor pain and request assistance  - Assess pain using appropriate pain scale  - Administer analgesics based on type and severity of pain and evaluate response  - Implement non-pharmacological measures as appropriate and evaluate response  - Consider cultural and social influences on pain and pain management  - Manage/alleviate anxiety  - Utilize distraction and/or relaxation techniques  - Monitor for opioid side effects  - Notify MD/LIP if interventions unsuccessful or patient reports new pain  - Anticipate increased pain with activity and pre-medicate as appropriate  Outcome: Progressing   No change in patient status, vital signs stable , up to the bathroom with rolling chair and assist, fall precautions in place , call light within reach, possible discharge today

## 2023-05-30 NOTE — DIETARY NOTE
Brief Nutrition Note:    Pt admitted for FARAZ. Pt screened at no nutrition risk at admission. RD called as pt requesting to speak with RD regarding renal diet. Pt visited, no family in room. Verbally reviewed renal diet restrictions. Answered questions and provided handout for later reference. Pt reports she sees a nephrologist regularly and no one has told her to be on specific diet in the past and appreciated clarification on diet restrictions. Labs reviewed, hyponatremia and hypophosphatemia noted. Hypokalemia resolved. Discussed with MD to liberalize diet restriction. Diet liberalized to Regular/General diet. Pt reports good appetite and no weight loss. F/u at length of stay per protocol. Please consult nutrition services if earlier intervention is indicated. Wt Readings from Last 6 Encounters:  05/28/23 : 80.8 kg (178 lb 2.1 oz)  03/10/23 : 84.1 kg (185 lb 6.4 oz)  02/01/23 : 83.1 kg (183 lb 3.2 oz)  12/23/22 : 80.3 kg (177 lb)  10/01/20 : 76.2 kg (168 lb)  09/15/20 : 77 kg (169 lb 11.2 oz)    Percent Meals Eaten (last 6 days)     Date/Time Percent Meals Eaten (%)    05/28/23 0941 100 %    05/28/23 1302 100 %    05/28/23 1811 95 %    05/29/23 0802 60 %    05/29/23 1300 90 %    05/29/23 1900 90 %        Dietary Orders (From admission, onward)     Start     Ordered    05/30/23 1456  Regular/General diet Is Patient on Accuchecks? No  Diet effective now        Question:  Is Patient on Accuchecks? Answer:  No    05/30/23 1455    05/28/23 0350  Room Service Eligibility Until Discontinued  Until discontinued        Question:  Is the patient able and willing to participate in room service? Answer:  Yes    05/28/23 0350    05/28/23 0350  Room Service Notify RN Until Discontinued  Until discontinued        Question:  Notify RN when tray delivered?   Answer:  No    05/28/23 0350                Ricky Silva MS, JAIME, RDN, LDN  Clinical Dietitian  P: [phone]

## 2023-05-30 NOTE — CM/SW NOTE
05/30/23 1400   CM/SW Screening   Referral [facility] staff; Chart review;Nursing rounds   Patient's Current Mental Status at Time of Assessment Alert;Oriented   Patient's [address]   Number of Levels in Home 2   Number of Stair in Home 4   Patient lives with Alone   Patient Status Prior to Admission   Independent with ADLs and Mobility No   Pt. requires assistance with Ambulating  (HAS WALKER AND ROLLATOR)   Discharge Needs   Anticipated D/C needs Subacute rehab   CM met with pt who verified address. Patient has hx with HH and SNF at [facility]. Hilda dejesus sent  EDOUARD queued for review/CM-Sw to follow up. CM/SW to remain available for support and/or discharge planning.     Radha Delvalle RN, [facility]    Ext.  [phone]

## 2023-05-30 NOTE — PLAN OF CARE
Patient is Aox4, vitals stable on room air. Meds given as ordered. Repositioned patient thought out shift. Frequent rounding done on pt and needs attended to. Problem: Patient Centered Care  Goal: Patient preferences are identified and integrated in the patient's plan of care  Description: Interventions:    - Provide timely, complete, and accurate information to patient/family  - Incorporate patient and family knowledge, values, beliefs, and cultural backgrounds into the planning and delivery of care  - Encourage patient/family to participate in care and decision-making at the level they choose  - Honor patient and family perspectives and choices  Outcome: Progressing     Problem: SAFETY ADULT - FALL  Goal: Free from fall injury  Description: INTERVENTIONS:  - Assess pt frequently for physical needs  - Identify cognitive and physical deficits and behaviors that affect risk of falls.   - [place] fall precautions as indicated by assessment.  - Educate pt/family on patient safety including physical limitations  - Instruct pt to call for assistance with activity based on assessment  - Modify environment to reduce risk of injury  - Provide assistive devices as appropriate  - Consider OT/PT consult to assist with strengthening/mobility  - Encourage toileting schedule  Outcome: Progressing     Problem: PAIN - ADULT  Goal: Verbalizes/displays adequate comfort level or patient's stated pain goal  Description: INTERVENTIONS:  - Encourage pt to monitor pain and request assistance  - Assess pain using appropriate pain scale  - Administer analgesics based on type and severity of pain and evaluate response  - Implement non-pharmacological measures as appropriate and evaluate response  - Consider cultural and social influences on pain and pain management  - Manage/alleviate anxiety  - Utilize distraction and/or relaxation techniques  - Monitor for opioid side effects  - Notify MD/LIP if interventions unsuccessful or patient reports new pain  - Anticipate increased pain with activity and pre-medicate as appropriate  Outcome: Progressing     Problem: CARDIOVASCULAR - ADULT  Goal: Maintains optimal cardiac output and hemodynamic stability  Description: INTERVENTIONS:  - Monitor vital signs, rhythm, and trends  - Monitor for bleeding, hypotension and signs of decreased cardiac output  - Evaluate effectiveness of vasoactive medications to optimize hemodynamic stability  - Monitor arterial and/or venous puncture sites for bleeding and/or hematoma  - Assess quality of pulses, skin color and temperature  - Assess for signs of decreased coronary artery perfusion - ex.  Angina  - Evaluate fluid balance, assess for edema, trend weights  Outcome: Progressing  Goal: Absence of cardiac arrhythmias or at baseline  Description: INTERVENTIONS:  - Continuous cardiac monitoring, monitor vital signs, obtain 12 lead EKG if indicated  - Evaluate effectiveness of antiarrhythmic and heart rate control medications as ordered  - Initiate emergency measures for life threatening arrhythmias  - Monitor electrolytes and administer replacement therapy as ordered  Outcome: Progressing     Problem: RESPIRATORY - ADULT  Goal: Achieves optimal ventilation and oxygenation  Description: INTERVENTIONS:  - Assess for changes in respiratory status  - Assess for changes in mentation and behavior  - Position to facilitate oxygenation and minimize respiratory effort  - Oxygen supplementation based on oxygen saturation or ABGs  - Provide Smoking Cessation handout, if applicable  - Encourage broncho-pulmonary hygiene including cough, deep breathe, Incentive Spirometry  - Assess the need for suctioning and perform as needed  - Assess and instruct to report SOB or any respiratory difficulty  - Respiratory Therapy support as indicated  - Manage/alleviate anxiety  - Monitor for signs/symptoms of CO2 retention  Outcome: Progressing

## 2023-05-31 VITALS
TEMPERATURE: 98 F | SYSTOLIC BLOOD PRESSURE: 136 MMHG | HEIGHT: 64 IN | OXYGEN SATURATION: 96 % | DIASTOLIC BLOOD PRESSURE: 73 MMHG | BODY MASS INDEX: 30.75 KG/M2 | HEART RATE: 66 BPM | RESPIRATION RATE: 17 BRPM | WEIGHT: 180.13 LBS

## 2023-05-31 LAB
ALBUMIN SERPL-MCNC: 2.8 G/DL (ref 3.4–5)
ANION GAP SERPL CALC-SCNC: 3 MMOL/L (ref 0–18)
BASOPHILS # BLD AUTO: 0.04 X10(3) UL (ref 0–0.2)
BASOPHILS NFR BLD AUTO: 0.6 %
BUN BLD-MCNC: 31 MG/DL (ref 7–18)
BUN/CREAT SERPL: 22.3 (ref 10–20)
CALCIUM BLD-MCNC: 7.8 MG/DL (ref 8.5–10.1)
CHLORIDE SERPL-SCNC: 104 MMOL/L (ref 98–112)
CO2 SERPL-SCNC: 28 MMOL/L (ref 21–32)
CREAT BLD-MCNC: 1.39 MG/DL
DEPRECATED RDW RBC AUTO: 46 FL (ref 35.1–46.3)
EOSINOPHIL # BLD AUTO: 0.28 X10(3) UL (ref 0–0.7)
EOSINOPHIL NFR BLD AUTO: 4 %
ERYTHROCYTE [DISTWIDTH] IN BLOOD BY AUTOMATED COUNT: 13.9 % (ref 11–15)
GFR SERPLBLD BASED ON 1.73 SQ M-ARVRAT: 39 ML/MIN/1.73M2 (ref 60–?)
GLUCOSE BLD-MCNC: 103 MG/DL (ref 70–99)
HCT VFR BLD AUTO: 30.6 %
HGB BLD-MCNC: 9.9 G/DL
IMM GRANULOCYTES # BLD AUTO: 0.02 X10(3) UL (ref 0–1)
IMM GRANULOCYTES NFR BLD: 0.3 %
LYMPHOCYTES # BLD AUTO: 1.98 X10(3) UL (ref 1–4)
LYMPHOCYTES NFR BLD AUTO: 28.5 %
MAGNESIUM SERPL-MCNC: 2.1 MG/DL (ref 1.6–2.6)
MCH RBC QN AUTO: 29.5 PG (ref 26–34)
MCHC RBC AUTO-ENTMCNC: 32.4 G/DL (ref 31–37)
MCV RBC AUTO: 91.1 FL
MONOCYTES # BLD AUTO: 0.46 X10(3) UL (ref 0.1–1)
MONOCYTES NFR BLD AUTO: 6.6 %
NEUTROPHILS # BLD AUTO: 4.16 X10 (3) UL (ref 1.5–7.7)
NEUTROPHILS # BLD AUTO: 4.16 X10(3) UL (ref 1.5–7.7)
NEUTROPHILS NFR BLD AUTO: 60 %
NT-PROBNP SERPL-MCNC: 963 PG/ML (ref ?–450)
OSMOLALITY SERPL CALC.SUM OF ELEC: 287 MOSM/KG (ref 275–295)
PHOSPHATE SERPL-MCNC: 1.6 MG/DL (ref 2.5–4.9)
PHOSPHATE SERPL-MCNC: 1.6 MG/DL (ref 2.5–4.9)
PLATELET # BLD AUTO: 261 10(3)UL (ref 150–450)
POTASSIUM SERPL-SCNC: 4 MMOL/L (ref 3.5–5.1)
RBC # BLD AUTO: 3.36 X10(6)UL
SARS-COV-2 RNA RESP QL NAA+PROBE: NOT DETECTED
SODIUM SERPL-SCNC: 135 MMOL/L (ref 136–145)
WBC # BLD AUTO: 6.9 X10(3) UL (ref 4–11)

## 2023-05-31 PROCEDURE — 99232 SBSQ HOSP IP/OBS MODERATE 35: CPT | Performed by: INTERNAL MEDICINE

## 2023-05-31 RX ORDER — TRAMADOL HYDROCHLORIDE 50 MG/1
100 TABLET ORAL 3 TIMES DAILY PRN
Qty: 10 TABLET | Refills: 0 | Status: SHIPPED | OUTPATIENT
Start: 2023-05-31

## 2023-05-31 NOTE — CM/SW NOTE
05/31/23 1200   Discharge disposition   Expected discharge disposition [id number] Provider [place] Ext   Discharge transportation [address] order entered by Md  Per [facility], pt can arrive by 4pm today    [facility] ETA 4p-4:30pm  PCS form done. RN REPORT PH# [phone]    UQ and DC RN notified of arrangements. CM/SW to remain available for support and/or discharge planning.     Teri Cabrera RN, [facility]    Ext.  [phone]

## 2023-05-31 NOTE — PROGRESS NOTES
Patient okay to be discharged per MD order, report to be called by primary RN, all paperwork filled out and printed to be picked up along with paper script by [facility] at time of transport. Pt to be picked up by [facility] this evening with all belongings, stable at this time.

## 2023-05-31 NOTE — CM/SW NOTE
Pt chose [facility] for [facility]. [facility] [address]. CM/SW to remain available for support and/or discharge planning.     Maik English RN, [facility]    Ext.  [phone]

## 2023-05-31 NOTE — PLAN OF CARE
Patient able to state desired goals, Vital signs stable, Ambulating within the room, Up to the chair with meals, Utilizing call light correctly. Safety precautions in place. Problem: Patient Centered Care  Goal: Patient preferences are identified and integrated in the patient's plan of care  Description: Interventions:  - What would you like us to know as we care for you? I live at home alone   - Provide timely, complete, and accurate information to patient/family  - Incorporate patient and family knowledge, values, beliefs, and cultural backgrounds into the planning and delivery of care  - Encourage patient/family to participate in care and decision-making at the level they choose  - Honor patient and family perspectives and choices  Outcome: Progressing     Problem: Patient/Family Goals  Goal: Patient/Family Long Term Goal  Description: Patient's Long Term Goal:     Interventions:  -   - See additional Care Plan goals for specific interventions  Outcome: Progressing  Goal: Patient/Family Short Term Goal  Description: Patient's Short Term Goal:     Interventions:   -   - See additional Care Plan goals for specific interventions  Outcome: Progressing     Problem: SAFETY ADULT - FALL  Goal: Free from fall injury  Description: INTERVENTIONS:  - Assess pt frequently for physical needs  - Identify cognitive and physical deficits and behaviors that affect risk of falls.   - [place] fall precautions as indicated by assessment.  - Educate pt/family on patient safety including physical limitations  - Instruct pt to call for assistance with activity based on assessment  - Modify environment to reduce risk of injury  - Provide assistive devices as appropriate  - Consider OT/PT consult to assist with strengthening/mobility  - Encourage toileting schedule  Outcome: Progressing     Problem: PAIN - ADULT  Goal: Verbalizes/displays adequate comfort level or patient's stated pain goal  Description: INTERVENTIONS:  - Encourage pt to monitor pain and request assistance  - Assess pain using appropriate pain scale  - Administer analgesics based on type and severity of pain and evaluate response  - Implement non-pharmacological measures as appropriate and evaluate response  - Consider cultural and social influences on pain and pain management  - Manage/alleviate anxiety  - Utilize distraction and/or relaxation techniques  - Monitor for opioid side effects  - Notify MD/LIP if interventions unsuccessful or patient reports new pain  - Anticipate increased pain with activity and pre-medicate as appropriate  Outcome: Progressing     Problem: CARDIOVASCULAR - ADULT  Goal: Maintains optimal cardiac output and hemodynamic stability  Description: INTERVENTIONS:  - Monitor vital signs, rhythm, and trends  - Monitor for bleeding, hypotension and signs of decreased cardiac output  - Evaluate effectiveness of vasoactive medications to optimize hemodynamic stability  - Monitor arterial and/or venous puncture sites for bleeding and/or hematoma  - Assess quality of pulses, skin color and temperature  - Assess for signs of decreased coronary artery perfusion - ex.  Angina  - Evaluate fluid balance, assess for edema, trend weights  Outcome: Progressing  Goal: Absence of cardiac arrhythmias or at baseline  Description: INTERVENTIONS:  - Continuous cardiac monitoring, monitor vital signs, obtain 12 lead EKG if indicated  - Evaluate effectiveness of antiarrhythmic and heart rate control medications as ordered  - Initiate emergency measures for life threatening arrhythmias  - Monitor electrolytes and administer replacement therapy as ordered  Outcome: Progressing     Problem: RESPIRATORY - ADULT  Goal: Achieves optimal ventilation and oxygenation  Description: INTERVENTIONS:  - Assess for changes in respiratory status  - Assess for changes in mentation and behavior  - Position to facilitate oxygenation and minimize respiratory effort  - Oxygen supplementation based on oxygen saturation or ABGs  - Provide Smoking Cessation handout, if applicable  - Encourage broncho-pulmonary hygiene including cough, deep breathe, Incentive Spirometry  - Assess the need for suctioning and perform as needed  - Assess and instruct to report SOB or any respiratory difficulty  - Respiratory Therapy support as indicated  - Manage/alleviate anxiety  - Monitor for signs/symptoms of CO2 retention  Outcome: Progressing

## 2023-06-01 ENCOUNTER — INITIAL APN SNF VISIT (OUTPATIENT)
Dept: INTERNAL MEDICINE CLINIC | Facility: SKILLED NURSING FACILITY | Age: 78
End: 2023-06-01

## 2023-06-01 DIAGNOSIS — I95.9 HYPOTENSION, UNSPECIFIED HYPOTENSION TYPE: ICD-10-CM

## 2023-06-01 DIAGNOSIS — N18.9 CHRONIC KIDNEY DISEASE, UNSPECIFIED CKD STAGE: ICD-10-CM

## 2023-06-01 DIAGNOSIS — M54.40 CHRONIC BILATERAL LOW BACK PAIN WITH SCIATICA, SCIATICA LATERALITY UNSPECIFIED: ICD-10-CM

## 2023-06-01 DIAGNOSIS — G89.29 CHRONIC BILATERAL LOW BACK PAIN WITH SCIATICA, SCIATICA LATERALITY UNSPECIFIED: ICD-10-CM

## 2023-06-01 DIAGNOSIS — N17.9 AKI (ACUTE KIDNEY INJURY) (HCC): ICD-10-CM

## 2023-06-01 DIAGNOSIS — Z79.899 ENCOUNTER FOR MEDICATION REVIEW: ICD-10-CM

## 2023-06-01 DIAGNOSIS — R53.81 PHYSICAL DECONDITIONING: ICD-10-CM

## 2023-06-05 ENCOUNTER — SNF VISIT (OUTPATIENT)
Dept: INTERNAL MEDICINE CLINIC | Facility: SKILLED NURSING FACILITY | Age: 78
End: 2023-06-05

## 2023-06-05 DIAGNOSIS — M54.50 CHRONIC MIDLINE LOW BACK PAIN, UNSPECIFIED WHETHER SCIATICA PRESENT: ICD-10-CM

## 2023-06-05 DIAGNOSIS — I95.9 HYPOTENSION, UNSPECIFIED HYPOTENSION TYPE: ICD-10-CM
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
## 2023-06-05 DIAGNOSIS — Z09 ENCOUNTER FOR FOLLOW-UP: ICD-10-CM

## 2023-06-05 DIAGNOSIS — N17.9 AKI (ACUTE KIDNEY INJURY) (HCC): ICD-10-CM

## 2023-06-05 DIAGNOSIS — G89.29 CHRONIC MIDLINE LOW BACK PAIN, UNSPECIFIED WHETHER SCIATICA PRESENT: ICD-10-CM

## 2023-06-05 PROCEDURE — 99309 SBSQ NF CARE MODERATE MDM 30: CPT | Performed by: CLINICAL NURSE SPECIALIST

## 2023-06-05 PROCEDURE — 1111F DSCHRG MED/CURRENT MED MERGE: CPT | Performed by: CLINICAL NURSE SPECIALIST

## 2023-09-03 ENCOUNTER — HOSPITAL ENCOUNTER (OUTPATIENT)
Facility: HOSPITAL | Age: 78
Setting detail: OBSERVATION
Discharge: HOME | End: 2023-09-05
Attending: STUDENT IN AN ORGANIZED HEALTH CARE EDUCATION/TRAINING PROGRAM | Admitting: HOSPITALIST
Payer: MEDICARE

## 2023-09-03 ENCOUNTER — APPOINTMENT (OUTPATIENT)
Dept: GENERAL RADIOLOGY | Facility: HOSPITAL | Age: 78
End: 2023-09-03
Attending: STUDENT IN AN ORGANIZED HEALTH CARE EDUCATION/TRAINING PROGRAM
Payer: MEDICARE

## 2023-09-03 DIAGNOSIS — R07.9 ACUTE CHEST PAIN: Primary | ICD-10-CM

## 2023-09-03 LAB
ANION GAP SERPL CALC-SCNC: 9 MMOL/L (ref 0–18)
BASOPHILS # BLD AUTO: 0.05 X10(3) UL (ref 0–0.2)
BASOPHILS NFR BLD AUTO: 0.5 %
BUN BLD-MCNC: 26 MG/DL (ref 7–18)
BUN/CREAT SERPL: 12.7 (ref 10–20)
CALCIUM BLD-MCNC: 8.6 MG/DL (ref 8.5–10.1)
CHLORIDE SERPL-SCNC: 100 MMOL/L (ref 98–112)
CO2 SERPL-SCNC: 28 MMOL/L (ref 21–32)
CREAT BLD-MCNC: 2.04 MG/DL
DEPRECATED RDW RBC AUTO: 41.6 FL (ref 35.1–46.3)
EGFRCR SERPLBLD CKD-EPI 2021: 25 ML/MIN/1.73M2 (ref 60–?)
EOSINOPHIL # BLD AUTO: 0.16 X10(3) UL (ref 0–0.7)
EOSINOPHIL NFR BLD AUTO: 1.7 %
ERYTHROCYTE [DISTWIDTH] IN BLOOD BY AUTOMATED COUNT: 12.1 % (ref 11–15)
GLUCOSE BLD-MCNC: 145 MG/DL (ref 70–99)
HCT VFR BLD AUTO: 32.8 %
HGB BLD-MCNC: 10.9 G/DL
IMM GRANULOCYTES # BLD AUTO: 0.03 X10(3) UL (ref 0–1)
IMM GRANULOCYTES NFR BLD: 0.3 %
LYMPHOCYTES # BLD AUTO: 1.76 X10(3) UL (ref 1–4)
LYMPHOCYTES NFR BLD AUTO: 19 %
MCH RBC QN AUTO: 31.1 PG (ref 26–34)
MCHC RBC AUTO-ENTMCNC: 33.2 G/DL (ref 31–37)
MCV RBC AUTO: 93.4 FL
MONOCYTES # BLD AUTO: 0.74 X10(3) UL (ref 0.1–1)
MONOCYTES NFR BLD AUTO: 8 %
NEUTROPHILS # BLD AUTO: 6.53 X10 (3) UL (ref 1.5–7.7)
NEUTROPHILS # BLD AUTO: 6.53 X10(3) UL (ref 1.5–7.7)
NEUTROPHILS NFR BLD AUTO: 70.5 %
OSMOLALITY SERPL CALC.SUM OF ELEC: 291 MOSM/KG (ref 275–295)
PLATELET # BLD AUTO: 264 10(3)UL (ref 150–450)
POTASSIUM SERPL-SCNC: 3.8 MMOL/L (ref 3.5–5.1)
RBC # BLD AUTO: 3.51 X10(6)UL
SARS-COV-2 RNA RESP QL NAA+PROBE: NOT DETECTED
SODIUM SERPL-SCNC: 137 MMOL/L (ref 136–145)
TROPONIN I HIGH SENSITIVITY: 7 NG/L
WBC # BLD AUTO: 9.3 X10(3) UL (ref 4–11)

## 2023-09-03 PROCEDURE — 71045 X-RAY EXAM CHEST 1 VIEW: CPT | Performed by: STUDENT IN AN ORGANIZED HEALTH CARE EDUCATION/TRAINING PROGRAM

## 2023-09-03 PROCEDURE — 99223 1ST HOSP IP/OBS HIGH 75: CPT | Performed by: HOSPITALIST

## 2023-09-04 LAB
ANION GAP SERPL CALC-SCNC: 8 MMOL/L (ref 0–18)
BASOPHILS # BLD AUTO: 0.04 X10(3) UL (ref 0–0.2)
BASOPHILS NFR BLD AUTO: 0.5 %
BILIRUB UR QL: NEGATIVE
BUN BLD-MCNC: 28 MG/DL (ref 7–18)
BUN/CREAT SERPL: 15.6 (ref 10–20)
C DIFF TOX B STL QL: NEGATIVE
CALCIUM BLD-MCNC: 8.5 MG/DL (ref 8.5–10.1)
CHLORIDE SERPL-SCNC: 104 MMOL/L (ref 98–112)
CLARITY UR: CLEAR
CO2 SERPL-SCNC: 26 MMOL/L (ref 21–32)
COLOR UR: COLORLESS
CREAT BLD-MCNC: 1.8 MG/DL
CREAT UR-SCNC: 53.1 MG/DL
DEPRECATED RDW RBC AUTO: 41.1 FL (ref 35.1–46.3)
EGFRCR SERPLBLD CKD-EPI 2021: 29 ML/MIN/1.73M2 (ref 60–?)
EOSINOPHIL # BLD AUTO: 0.17 X10(3) UL (ref 0–0.7)
EOSINOPHIL NFR BLD AUTO: 2.2 %
ERYTHROCYTE [DISTWIDTH] IN BLOOD BY AUTOMATED COUNT: 11.9 % (ref 11–15)
GLUCOSE BLD-MCNC: 115 MG/DL (ref 70–99)
GLUCOSE UR-MCNC: NORMAL MG/DL
HCT VFR BLD AUTO: 30.9 %
HGB BLD-MCNC: 10 G/DL
HGB UR QL STRIP.AUTO: NEGATIVE
IMM GRANULOCYTES # BLD AUTO: 0.02 X10(3) UL (ref 0–1)
IMM GRANULOCYTES NFR BLD: 0.3 %
KETONES UR-MCNC: NEGATIVE MG/DL
LEUKOCYTE ESTERASE UR QL STRIP.AUTO: NEGATIVE
LYMPHOCYTES # BLD AUTO: 1.64 X10(3) UL (ref 1–4)
LYMPHOCYTES NFR BLD AUTO: 21 %
MCH RBC QN AUTO: 30.4 PG (ref 26–34)
MCHC RBC AUTO-ENTMCNC: 32.4 G/DL (ref 31–37)
MCV RBC AUTO: 93.9 FL
MONOCYTES # BLD AUTO: 0.69 X10(3) UL (ref 0.1–1)
MONOCYTES NFR BLD AUTO: 8.8 %
NEUTROPHILS # BLD AUTO: 5.25 X10 (3) UL (ref 1.5–7.7)
NEUTROPHILS # BLD AUTO: 5.25 X10(3) UL (ref 1.5–7.7)
NEUTROPHILS NFR BLD AUTO: 67.2 %
NITRITE UR QL STRIP.AUTO: NEGATIVE
OSMOLALITY SERPL CALC.SUM OF ELEC: 292 MOSM/KG (ref 275–295)
PH UR: 6 [PH] (ref 5–8)
PLATELET # BLD AUTO: 234 10(3)UL (ref 150–450)
POTASSIUM SERPL-SCNC: 3.9 MMOL/L (ref 3.5–5.1)
PROT UR-MCNC: NEGATIVE MG/DL
RBC # BLD AUTO: 3.29 X10(6)UL
SODIUM SERPL-SCNC: 138 MMOL/L (ref 136–145)
SODIUM SERPL-SCNC: 24 MMOL/L
SP GR UR STRIP: 1.01 (ref 1–1.03)
TROPONIN I HIGH SENSITIVITY: 6 NG/L
TROPONIN I HIGH SENSITIVITY: 8 NG/L
UROBILINOGEN UR STRIP-ACNC: NORMAL
WBC # BLD AUTO: 7.8 X10(3) UL (ref 4–11)

## 2023-09-04 PROCEDURE — 99233 SBSQ HOSP IP/OBS HIGH 50: CPT | Performed by: HOSPITALIST

## 2023-09-04 RX ORDER — ASPIRIN 81 MG/1
81 TABLET ORAL DAILY
Status: DISCONTINUED | OUTPATIENT
Start: 2023-09-04 | End: 2023-09-05

## 2023-09-04 RX ORDER — HYDRALAZINE HYDROCHLORIDE 20 MG/ML
10 INJECTION INTRAMUSCULAR; INTRAVENOUS EVERY 4 HOURS PRN
Status: DISCONTINUED | OUTPATIENT
Start: 2023-09-04 | End: 2023-09-05

## 2023-09-04 RX ORDER — ROPINIROLE 1 MG/1
3 TABLET, FILM COATED ORAL
Status: DISCONTINUED | OUTPATIENT
Start: 2023-09-04 | End: 2023-09-05

## 2023-09-04 RX ORDER — MAGNESIUM HYDROXIDE/ALUMINUM HYDROXICE/SIMETHICONE 120; 1200; 1200 MG/30ML; MG/30ML; MG/30ML
30 SUSPENSION ORAL 4 TIMES DAILY PRN
Status: DISCONTINUED | OUTPATIENT
Start: 2023-09-04 | End: 2023-09-05

## 2023-09-04 RX ORDER — TORSEMIDE 40 MG/1
40 TABLET, FILM COATED ORAL DAILY
COMMUNITY

## 2023-09-04 RX ORDER — ISOSORBIDE MONONITRATE 20 MG/1
10 TABLET ORAL 2 TIMES DAILY
Status: DISCONTINUED | OUTPATIENT
Start: 2023-09-04 | End: 2023-09-05

## 2023-09-04 RX ORDER — ISOSORBIDE MONONITRATE 10 MG/1
10 TABLET ORAL 2 TIMES DAILY
COMMUNITY

## 2023-09-04 RX ORDER — HEPARIN SODIUM 5000 [USP'U]/ML
5000 INJECTION, SOLUTION INTRAVENOUS; SUBCUTANEOUS EVERY 12 HOURS SCHEDULED
Status: DISCONTINUED | OUTPATIENT
Start: 2023-09-04 | End: 2023-09-05

## 2023-09-04 RX ORDER — LUBIPROSTONE 24 UG/1
24 CAPSULE ORAL 2 TIMES DAILY WITH MEALS
Status: DISCONTINUED | OUTPATIENT
Start: 2023-09-04 | End: 2023-09-05

## 2023-09-04 RX ORDER — SPIRONOLACTONE 25 MG/1
25 TABLET ORAL DAILY
Status: DISCONTINUED | OUTPATIENT
Start: 2023-09-04 | End: 2023-09-05

## 2023-09-04 RX ORDER — ESCITALOPRAM OXALATE 10 MG/1
10 TABLET ORAL DAILY
Status: DISCONTINUED | OUTPATIENT
Start: 2023-09-04 | End: 2023-09-05

## 2023-09-04 RX ORDER — DOCUSATE SODIUM 100 MG/1
100 CAPSULE, LIQUID FILLED ORAL 2 TIMES DAILY PRN
Status: DISCONTINUED | OUTPATIENT
Start: 2023-09-04 | End: 2023-09-05

## 2023-09-04 RX ORDER — ATORVASTATIN CALCIUM 40 MG/1
40 TABLET, FILM COATED ORAL
Status: DISCONTINUED | OUTPATIENT
Start: 2023-09-04 | End: 2023-09-05

## 2023-09-04 RX ORDER — GABAPENTIN 600 MG/1
600 TABLET ORAL NIGHTLY
Status: DISCONTINUED | OUTPATIENT
Start: 2023-09-04 | End: 2023-09-05

## 2023-09-04 RX ORDER — PANTOPRAZOLE SODIUM 40 MG/1
40 TABLET, DELAYED RELEASE ORAL
Status: DISCONTINUED | OUTPATIENT
Start: 2023-09-04 | End: 2023-09-05

## 2023-09-04 RX ORDER — ONDANSETRON 2 MG/ML
4 INJECTION INTRAMUSCULAR; INTRAVENOUS EVERY 6 HOURS PRN
Status: DISCONTINUED | OUTPATIENT
Start: 2023-09-04 | End: 2023-09-05

## 2023-09-04 RX ORDER — SPIRONOLACTONE 25 MG/1
25 TABLET ORAL DAILY
COMMUNITY

## 2023-09-04 RX ORDER — HYDROCODONE BITARTRATE AND ACETAMINOPHEN 5; 325 MG/1; MG/1
1 TABLET ORAL EVERY 6 HOURS PRN
Status: DISCONTINUED | OUTPATIENT
Start: 2023-09-04 | End: 2023-09-05

## 2023-09-04 RX ORDER — SODIUM CHLORIDE 450 MG/100ML
INJECTION, SOLUTION INTRAVENOUS CONTINUOUS
Status: DISCONTINUED | OUTPATIENT
Start: 2023-09-04 | End: 2023-09-04

## 2023-09-04 RX ORDER — ZOLPIDEM TARTRATE 5 MG/1
5 TABLET ORAL NIGHTLY PRN
Status: DISCONTINUED | OUTPATIENT
Start: 2023-09-04 | End: 2023-09-05

## 2023-09-04 RX ORDER — TRAMADOL HYDROCHLORIDE 50 MG/1
100 TABLET ORAL 3 TIMES DAILY PRN
Status: DISCONTINUED | OUTPATIENT
Start: 2023-09-04 | End: 2023-09-05

## 2023-09-04 RX ORDER — BUPROPION HYDROCHLORIDE 150 MG/1
150 TABLET, EXTENDED RELEASE ORAL DAILY
Status: DISCONTINUED | OUTPATIENT
Start: 2023-09-04 | End: 2023-09-05

## 2023-09-04 RX ORDER — ACETAMINOPHEN 325 MG/1
650 TABLET ORAL EVERY 6 HOURS PRN
Status: DISCONTINUED | OUTPATIENT
Start: 2023-09-04 | End: 2023-09-05

## 2023-09-04 RX ORDER — CARVEDILOL 25 MG/1
25 TABLET ORAL 2 TIMES DAILY WITH MEALS
Status: DISCONTINUED | OUTPATIENT
Start: 2023-09-04 | End: 2023-09-05

## 2023-09-04 RX ORDER — CARVEDILOL 25 MG/1
25 TABLET ORAL DAILY
COMMUNITY
End: 2023-09-05

## 2023-09-04 RX ORDER — MELATONIN
325
Status: DISCONTINUED | OUTPATIENT
Start: 2023-09-04 | End: 2023-09-05

## 2023-09-04 NOTE — PLAN OF CARE
Patient denies pain and discomfort to the chest. Stress test in the morning. Patient verbalizes understanding to not take nicotine. Safety measures in place. call light and  personal belonging within reach. bed locked at lowest level. Problem: Patient Centered Care  Goal: Patient preferences are identified and integrated in the patient's plan of care  Description: Interventions:  - What would you like us to know as we care for you?  Has two Daughters  - Provide timely, complete, and accurate information to patient/family  - Incorporate patient and family knowledge, values, beliefs, and cultural backgrounds into the planning and delivery of care  - Encourage patient/family to participate in care and decision-making at the level they choose  - Honor patient and family perspectives and choices  Outcome: Progressing     Problem: Patient/Family Goals  Goal: Patient/Family Long Term Goal  Description: Patient's Long Term Goal: To go Home    Interventions:  -see MD orders   - See additional Care Plan goals for specific interventions  Outcome: Progressing  Goal: Patient/Family Short Term Goal  Description: Patient's Short Term Goal: To feel better    Interventions:   - see MD orders  - See additional Care Plan goals for specific interventions  Outcome: Progressing     Problem: PAIN - ADULT  Goal: Verbalizes/displays adequate comfort level or patient's stated pain goal  Description: INTERVENTIONS:  - Encourage pt to monitor pain and request assistance  - Assess pain using appropriate pain scale  - Administer analgesics based on type and severity of pain and evaluate response  - Implement non-pharmacological measures as appropriate and evaluate response  - Consider cultural and social influences on pain and pain management  - Manage/alleviate anxiety  - Utilize distraction and/or relaxation techniques  - Monitor for opioid side effects  - Notify MD/LIP if interventions unsuccessful or patient reports new pain  - Anticipate increased pain with activity and pre-medicate as appropriate  Outcome: Progressing     Problem: SAFETY ADULT - FALL  Goal: Free from fall injury  Description: INTERVENTIONS:  - Assess pt frequently for physical needs  - Identify cognitive and physical deficits and behaviors that affect risk of falls.   - [place] fall precautions as indicated by assessment.  - Educate pt/family on patient safety including physical limitations  - Instruct pt to call for assistance with activity based on assessment  - Modify environment to reduce risk of injury  - Provide assistive devices as appropriate  - Consider OT/PT consult to assist with strengthening/mobility  - Encourage toileting schedule  Outcome: Progressing

## 2023-09-04 NOTE — PLAN OF CARE
Problem: Patient Centered Care  Goal: Patient preferences are identified and integrated in the patient's plan of care  Description: Interventions:  - What would you like us to know as we care for you?   - Provide timely, complete, and accurate information to patient/family  - Incorporate patient and family knowledge, values, beliefs, and cultural backgrounds into the planning and delivery of care  - Encourage patient/family to participate in care and decision-making at the level they choose  - Honor patient and family perspectives and choices  Outcome: Progressing     Problem: Patient/Family Goals  Goal: Patient/Family Long Term Goal  Description: Patient's Long Term Goal: Discharge home safely    Interventions:  - medications  -labs  -vitals  - See additional Care Plan goals for specific interventions  Outcome: Progressing  Goal: Patient/Family Short Term Goal  Description: Patient's Short Term Goal: Remain free from chest pain    Interventions:   - vitals  -stress test  - See additional Care Plan goals for specific interventions  Outcome: Progressing     Problem: PAIN - ADULT  Goal: Verbalizes/displays adequate comfort level or patient's stated pain goal  Description: INTERVENTIONS:  - Encourage pt to monitor pain and request assistance  - Assess pain using appropriate pain scale  - Administer analgesics based on type and severity of pain and evaluate response  - Implement non-pharmacological measures as appropriate and evaluate response  - Consider cultural and social influences on pain and pain management  - Manage/alleviate anxiety  - Utilize distraction and/or relaxation techniques  - Monitor for opioid side effects  - Notify MD/LIP if interventions unsuccessful or patient reports new pain  - Anticipate increased pain with activity and pre-medicate as appropriate  Outcome: Progressing     Problem: SAFETY ADULT - FALL  Goal: Free from fall injury  Description: INTERVENTIONS:  - Assess pt frequently for physical needs  - Identify cognitive and physical deficits and behaviors that affect risk of falls. - [place] fall precautions as indicated by assessment.  - Educate pt/family on patient safety including physical limitations  - Instruct pt to call for assistance with activity based on assessment  - Modify environment to reduce risk of injury  - Provide assistive devices as appropriate  - Consider OT/PT consult to assist with strengthening/mobility  - Encourage toileting schedule  Outcome: Progressing     Patient is alert and orientated x 4. Patient is on RA. IVF stopped per orders.  Plan for stress test

## 2023-09-04 NOTE — ED QUICK NOTES
Orders for admission, patient is aware of plan and ready to go upstairs. Any questions, please call ED RN Delmus Ganser  at extension [phone].    Type of COVID test sent:Rapid  COVID Suspicion level: Low    Titratable drug(s) infusing:N/A  Rate:    LOC at time of transport:A&O x 4    Other pertinent information: 2hr repeat troponin due at 00:09    CIWA score=  NIH score=

## 2023-09-04 NOTE — ED INITIAL ASSESSMENT (HPI)
Pt received via EMS from home for eval of CP. Pt reports constant mid-sternal pain 1hr PTA. + Nausea. Also reports dyspnea with exertion, but states this is constant. EMS administered 324mg Aspirin and 1 nitroglycerin with some relief.  Here for further eval.

## 2023-09-05 ENCOUNTER — APPOINTMENT (OUTPATIENT)
Dept: CV DIAGNOSTICS | Facility: HOSPITAL | Age: 78
End: 2023-09-05
Attending: HOSPITALIST
Payer: MEDICARE

## 2023-09-05 ENCOUNTER — APPOINTMENT (OUTPATIENT)
Dept: NUCLEAR MEDICINE | Facility: HOSPITAL | Age: 78
End: 2023-09-05
Attending: HOSPITALIST
Payer: MEDICARE

## 2023-09-05 VITALS
HEIGHT: 65 IN | BODY MASS INDEX: 29.91 KG/M2 | WEIGHT: 179.5 LBS | RESPIRATION RATE: 18 BRPM | HEART RATE: 60 BPM | SYSTOLIC BLOOD PRESSURE: 146 MMHG | TEMPERATURE: 99 F | DIASTOLIC BLOOD PRESSURE: 79 MMHG | OXYGEN SATURATION: 94 %

## 2023-09-05 LAB
% OF MAX PREDICTED HR: 100 %
ANION GAP SERPL CALC-SCNC: 4 MMOL/L (ref 0–18)
BUN BLD-MCNC: 23 MG/DL (ref 7–18)
BUN/CREAT SERPL: 15.4 (ref 10–20)
CALCIUM BLD-MCNC: 8.9 MG/DL (ref 8.5–10.1)
CHLORIDE SERPL-SCNC: 105 MMOL/L (ref 98–112)
CO2 SERPL-SCNC: 29 MMOL/L (ref 21–32)
CREAT BLD-MCNC: 1.49 MG/DL
DEPRECATED RDW RBC AUTO: 44.2 FL (ref 35.1–46.3)
EGFRCR SERPLBLD CKD-EPI 2021: 36 ML/MIN/1.73M2 (ref 60–?)
ERYTHROCYTE [DISTWIDTH] IN BLOOD BY AUTOMATED COUNT: 12 % (ref 11–15)
GLUCOSE BLD-MCNC: 94 MG/DL (ref 70–99)
HCT VFR BLD AUTO: 35.7 %
HGB BLD-MCNC: 11.2 G/DL
MAX DIASTOLIC BP: 51 MMHG
MAX HEART RATE: 73 BPM
MAX PREDICTED HEART RATE: 144 BPM
MAX SYSTOLIC BP: 149 MMHG
MAX WORK LOAD: 10
MCH RBC QN AUTO: 31.2 PG (ref 26–34)
MCHC RBC AUTO-ENTMCNC: 31.4 G/DL (ref 31–37)
MCV RBC AUTO: 99.4 FL
OSMOLALITY SERPL CALC.SUM OF ELEC: 289 MOSM/KG (ref 275–295)
PLATELET # BLD AUTO: 240 10(3)UL (ref 150–450)
POTASSIUM SERPL-SCNC: 4.4 MMOL/L (ref 3.5–5.1)
RBC # BLD AUTO: 3.59 X10(6)UL
SODIUM SERPL-SCNC: 138 MMOL/L (ref 136–145)
WBC # BLD AUTO: 5.8 X10(3) UL (ref 4–11)

## 2023-09-05 PROCEDURE — 93017 CV STRESS TEST TRACING ONLY: CPT | Performed by: HOSPITALIST

## 2023-09-05 PROCEDURE — 78452 HT MUSCLE IMAGE SPECT MULT: CPT | Performed by: HOSPITALIST

## 2023-09-05 RX ORDER — CARVEDILOL 12.5 MG/1
12.5 TABLET ORAL 2 TIMES DAILY WITH MEALS
Qty: 60 TABLET | Refills: 0 | Status: SHIPPED | OUTPATIENT
Start: 2023-09-05

## 2023-09-05 RX ORDER — REGADENOSON 0.08 MG/ML
INJECTION, SOLUTION INTRAVENOUS
Status: COMPLETED
Start: 2023-09-05 | End: 2023-09-05

## 2023-09-05 RX ORDER — CARVEDILOL 12.5 MG/1
12.5 TABLET ORAL 2 TIMES DAILY WITH MEALS
Status: DISCONTINUED | OUTPATIENT
Start: 2023-09-05 | End: 2023-09-05

## 2023-09-05 NOTE — PLAN OF CARE
Problem: PAIN - ADULT  Goal: Verbalizes/displays adequate comfort level or patient's stated pain goal  Description: INTERVENTIONS:  - Encourage pt to monitor pain and request assistance  - Assess pain using appropriate pain scale  - Administer analgesics based on type and severity of pain and evaluate response  - Implement non-pharmacological measures as appropriate and evaluate response  - Consider cultural and social influences on pain and pain management  - Manage/alleviate anxiety  - Utilize distraction and/or relaxation techniques  - Monitor for opioid side effects  - Notify MD/LIP if interventions unsuccessful or patient reports new pain  - Anticipate increased pain with activity and pre-medicate as appropriate  9/5/2023 0431 by Leonor Nguyen RN  Outcome: Progressing     Problem: SAFETY ADULT - FALL  Goal: Free from fall injury  Description: INTERVENTIONS:  - Assess pt frequently for physical needs  - Identify cognitive and physical deficits and behaviors that affect risk of falls.   - [place] fall precautions as indicated by assessment.  - Educate pt/family on patient safety including physical limitations  - Instruct pt to call for assistance with activity based on assessment  - Modify environment to reduce risk of injury  - Provide assistive devices as appropriate  - Consider OT/PT consult to assist with strengthening/mobility  - Encourage toileting schedule  9/5/2023 0431 by Leonor Nguyen RN  Outcome: Progressing     Problem: CARDIOVASCULAR - ADULT  Goal: Maintains optimal cardiac output and hemodynamic stability  Description: INTERVENTIONS:  - Monitor vital signs, rhythm, and trends  - Monitor for bleeding, hypotension and signs of decreased cardiac output  - Evaluate effectiveness of vasoactive medications to optimize hemodynamic stability  - Monitor arterial and/or venous puncture sites for bleeding and/or hematoma  - Assess quality of pulses, skin color and temperature  - Assess for signs of decreased coronary artery perfusion - ex. Angina  - Evaluate fluid balance, assess for edema, trend weights  Outcome: Progressing  Goal: Absence of cardiac arrhythmias or at baseline  Description: INTERVENTIONS:  - Continuous cardiac monitoring, monitor vital signs, obtain 12 lead EKG if indicated  - Evaluate effectiveness of antiarrhythmic and heart rate control medications as ordered  - Initiate emergency measures for life threatening arrhythmias  - Monitor electrolytes and administer replacement therapy as ordered  Outcome: Progressing     Received awake,oriented. Slept fairly during the night. No complaints of chest pain. Plans for stress test in am.Will continue to monitor patient.

## 2023-09-05 NOTE — DISCHARGE PLANNING
Patient requests a [facility] set up for discharge home. Damaris Mena confirmed the address on file and reports that there's three steps to get into the house which she can manage.  called and Geneva Hooper asked to set up the next available Medicar for the patient. The cost quoted is $45 and ETA 4pm. Damaris Mena and her nurse notified of the cost and Medicar ETA.        Marla Damon RN, Discharge Leader [id number]

## 2023-09-05 NOTE — PLAN OF CARE
Stress test completed. Patient medically cleared for discharge. Tele and IV removed. Transported home via [facility]. Problem: Patient Centered Care  Goal: Patient preferences are identified and integrated in the patient's plan of care  Description: Interventions:  - Provide timely, complete, and accurate information to patient/family  - Incorporate patient and family knowledge, values, beliefs, and cultural backgrounds into the planning and delivery of care  - Encourage patient/family to participate in care and decision-making at the level they choose  - Honor patient and family perspectives and choices  Outcome: Completed      Problem: PAIN - ADULT  Goal: Verbalizes/displays adequate comfort level or patient's stated pain goal  Description: INTERVENTIONS:  - Encourage pt to monitor pain and request assistance  - Assess pain using appropriate pain scale  - Administer analgesics based on type and severity of pain and evaluate response  - Implement non-pharmacological measures as appropriate and evaluate response  - Consider cultural and social influences on pain and pain management  - Manage/alleviate anxiety  - Utilize distraction and/or relaxation techniques  - Monitor for opioid side effects  - Notify MD/LIP if interventions unsuccessful or patient reports new pain  - Anticipate increased pain with activity and pre-medicate as appropriate  Outcome: Completed     Problem: SAFETY ADULT - FALL  Goal: Free from fall injury  Description: INTERVENTIONS:  - Assess pt frequently for physical needs  - Identify cognitive and physical deficits and behaviors that affect risk of falls.   - [place] fall precautions as indicated by assessment.  - Educate pt/family on patient safety including physical limitations  - Instruct pt to call for assistance with activity based on assessment  - Modify environment to reduce risk of injury  - Provide assistive devices as appropriate  - Consider OT/PT consult to assist with strengthening/mobility  - Encourage toileting schedule  Outcome: Completed     Problem: CARDIOVASCULAR - ADULT  Goal: Maintains optimal cardiac output and hemodynamic stability  Description: INTERVENTIONS:  - Monitor vital signs, rhythm, and trends  - Monitor for bleeding, hypotension and signs of decreased cardiac output  - Evaluate effectiveness of vasoactive medications to optimize hemodynamic stability  - Monitor arterial and/or venous puncture sites for bleeding and/or hematoma  - Assess quality of pulses, skin color and temperature  - Assess for signs of decreased coronary artery perfusion - ex.  Angina  - Evaluate fluid balance, assess for edema, trend weights  Outcome: Completed  Goal: Absence of cardiac arrhythmias or at baseline  Description: INTERVENTIONS:  - Continuous cardiac monitoring, monitor vital signs, obtain 12 lead EKG if indicated  - Evaluate effectiveness of antiarrhythmic and heart rate control medications as ordered  - Initiate emergency measures for life threatening arrhythmias  - Monitor electrolytes and administer replacement therapy as ordered  Outcome: Completed

## 2023-09-05 NOTE — DISCHARGE SUMMARY
[facility]    Discharge Summary    Shira Crooks Patient Status:  Observation    1945 MRN [id number]   Location [facility] 3W/SW Attending Julienne Owen MD   Hosp Day # 0 PCP Arnold Cough     Date of Admission: 9/3/2023   Date of Discharge: No discharge date for patient encounter. Admitting Diagnosis: Acute chest pain [R07.9]    Disposition: Home    Discharge Diagnosis: . Principal Problem:    Acute chest pain      Hospital Course:   Reason for Admission: ***    Discharge Physical Exam:   Physical Exam:    General: No acute distress. Respiratory: Clear to auscultation bilaterally. No wheezes. No rhonchi. Cardiovascular: S1, S2. Regular rate and rhythm. No murmurs, rubs or gallops. Abdomen: Soft, nontender, nondistended. Positive bowel sounds. No rebound or guarding. Neurologic: No focal neurological deficits. Musculoskeletal: Moves all extremities. Hospital Course: ***    Complications:     Consultants         Provider   Role Specialty     Dann Olszewski, MD  Consulting Physician Cardiovascular Diseases     Evelina Batista MD  Consulting Physician HOSPITALIST                Discharge Plan:   Discharge Condition: Stable    Current Discharge Medication List    Home Meds - Modified    carvedilol 12.5 MG Oral Tab  Take 1 tablet (12.5 mg total) by mouth 2 (two) times daily with meals. Home Meds - Unchanged    isosorbide mononitrate 10 MG Oral Tab  Take 1 tablet (10 mg total) by mouth 2 (two) times daily. spironolactone 25 MG Oral Tab  Take 1 tablet (25 mg total) by mouth daily. Torsemide (SOAANZ) 40 MG Oral Tab  Take 40 mg by mouth daily. NON FORMULARY  Place 1 drop into the right eye daily. Ivizia eye drops uses daily to right eye    lubiprostone 24 MCG Oral Cap  Take 1 capsule (24 mcg total) by mouth 2 (two) times daily with meals. lidocaine-menthol 4-1 % External Patch  Place 1 patch onto the skin daily.     pantoprazole 40 MG Oral Tab EC  Take 1 tablet (40 mg total) by mouth 2 (two) times daily before meals. prochlorperazine (COMPAZINE) 10 mg tablet  Take 1 tablet (10 mg total) by mouth every 6 (six) hours as needed for Nausea. atorvastatin 40 MG Oral Tab  Take 1 tablet (40 mg total) by mouth daily with dinner. ergocalciferol 1.25 MG (20868 UT) Oral Cap  Take 1 capsule (50,000 Units total) by mouth once a week. On Sundays    ferrous sulfate 325 (65 FE) MG Oral Tab EC  Take 1 tablet (325 mg total) by mouth daily with breakfast.    Magnesium 200 MG Oral Tab  Take 2.5 tablets (500 mg total) by mouth daily. acetaminophen 500 MG Oral Tab  Take 1 tablet (500 mg total) by mouth every 6 (six) hours as needed for Pain.    gabapentin 600 MG Oral Tab  Take 1 tablet (600 mg total) by mouth 3 (three) times daily. aspirin 81 MG Oral Tab EC  Take 1 tablet (81 mg total) by mouth daily. buPROPion HCl ER, SR, 150 MG Oral Tablet 12 Hr  Take 1 tablet (150 mg total) by mouth daily. citalopram 20 MG Oral Tab  Take 2 tablets (40 mg total) by mouth daily. traMADol 50 MG Oral Tab  Take 2 tablets (100 mg total) by mouth 3 (three) times daily as needed. rOPINIRole 3 MG Oral Tab  Take 1 tablet (3 mg total) by mouth After dinner. docusate sodium 100 MG Oral Cap  Take 1 capsule (100 mg total) by mouth 2 (two) times daily as needed. tiZANidine HCl 2 MG Oral Tab  Take 1 tablet (2 mg total) by mouth daily with dinner. Discharge Diet: {Discharge Diet:5710}    Discharge Activity: As tolerated       Discharge Medications        CHANGE how you take these medications        Instructions Prescription details   carvedilol 12.5 MG Tabs  Commonly known as: Coreg  What changed:   medication strength  how much to take  when to take this      Take 1 tablet (12.5 mg total) by mouth 2 (two) times daily with meals.    Quantity: 60 tablet  Refills: 0            CONTINUE taking these medications        Instructions Prescription details   acetaminophen 500 MG Tabs  Commonly known as: Tylenol Extra Strength      Take 1 tablet (500 mg total) by mouth every 6 (six) hours as needed for Pain. Refills: 0     aspirin 81 MG Tbec      Take 1 tablet (81 mg total) by mouth daily. Quantity: 30 tablet  Refills: 0     atorvastatin 40 MG Tabs  Commonly known as: Lipitor      Take 1 tablet (40 mg total) by mouth daily with dinner. Refills: 0     buPROPion  MG Tb12  Commonly known as: WELLBUTRIN SR      Take 1 tablet (150 mg total) by mouth daily. Refills: 0     citalopram 20 MG Tabs  Commonly known as: CeleXA      Take 2 tablets (40 mg total) by mouth daily. Refills: 0     docusate sodium 100 MG Caps  Commonly known as: Colace      Take 1 capsule (100 mg total) by mouth 2 (two) times daily as needed. Refills: 0     ergocalciferol 1.25 MG (84333 UT) Caps  Commonly known as: Vitamin D2      Take 1 capsule (50,000 Units total) by mouth once a week. On Sundays   Refills: 0     ferrous sulfate 325 (65 FE) MG Tbec      Take 1 tablet (325 mg total) by mouth daily with breakfast.   Refills: 0     gabapentin 600 MG Tabs  Commonly known as: Neurontin      Take 1 tablet (600 mg total) by mouth 3 (three) times daily. Quantity: 90 tablet  Refills: 0     isosorbide mononitrate 10 MG Tabs  Commonly known as: ISMO      Take 1 tablet (10 mg total) by mouth 2 (two) times daily. Refills: 0     lidocaine-menthol 4-1 % Ptch      Place 1 patch onto the skin daily. Quantity: 30 patch  Refills: 0     lubiprostone 24 MCG Caps  Commonly known as: Amitiza      Take 1 capsule (24 mcg total) by mouth 2 (two) times daily with meals. Refills: 0     Magnesium 200 MG Tabs      Take 2.5 tablets (500 mg total) by mouth daily. Refills: 0     NON FORMULARY      Place 1 drop into the right eye daily. Ivizia eye drops uses daily to right eye   Refills: 0     pantoprazole 40 MG Tbec  Commonly known as: Protonix      Take 1 tablet (40 mg total) by mouth 2 (two) times daily before meals.    Refills: 0     prochlorperazine 10 mg tablet  Commonly known as: Compazine      Take 1 tablet (10 mg total) by mouth every 6 (six) hours as needed for Nausea. Refills: 0     rOPINIRole HCl 3 MG Tabs  Commonly known as: REQUIP      Take 1 tablet (3 mg total) by mouth After dinner. Refills: 0     Soaanz 40 MG Tabs  Generic drug: Torsemide      Take 40 mg by mouth daily. Refills: 0     spironolactone 25 MG Tabs  Commonly known as: Aldactone      Take 1 tablet (25 mg total) by mouth daily. Refills: 0     tiZANidine 2 MG Tabs  Commonly known as: Zanaflex      Take 1 tablet (2 mg total) by mouth daily with dinner. Refills: 0     traMADol 50 MG Tabs  Commonly known as: Ultram      Take 2 tablets (100 mg total) by mouth 3 (three) times daily as needed. Quantity: 10 tablet  Refills: 0               Where to Get Your Medications        Please  your prescriptions at the location directed by your doctor or nurse    Bring a paper prescription for each of these medications  carvedilol 12.5 MG Tabs         Follow up:        Follow up Labs and imaging:         Time spent:  > 30 minutes    Saad Charles MD  9/5/2023

## 2023-09-06 LAB
ATRIAL RATE: 77 BPM
P AXIS: 44 DEGREES
P-R INTERVAL: 158 MS
Q-T INTERVAL: 446 MS
QRS DURATION: 148 MS
QTC CALCULATION (BEZET): 504 MS
R AXIS: -9 DEGREES
T AXIS: 128 DEGREES
VENTRICULAR RATE: 77 BPM

## 2023-09-24 ENCOUNTER — APPOINTMENT (OUTPATIENT)
Dept: GENERAL RADIOLOGY | Facility: HOSPITAL | Age: 78
End: 2023-09-24
Attending: EMERGENCY MEDICINE
Payer: MEDICARE

## 2023-09-24 ENCOUNTER — APPOINTMENT (OUTPATIENT)
Dept: ULTRASOUND IMAGING | Facility: HOSPITAL | Age: 78
End: 2023-09-24
Attending: INTERNAL MEDICINE
Payer: MEDICARE

## 2023-09-24 ENCOUNTER — HOSPITAL ENCOUNTER (INPATIENT)
Facility: HOSPITAL | Age: 78
LOS: 6 days | Discharge: HOME HEALTH | End: 2023-09-30
Attending: EMERGENCY MEDICINE | Admitting: HOSPITALIST
Payer: MEDICARE

## 2023-09-24 ENCOUNTER — APPOINTMENT (OUTPATIENT)
Dept: INTERVENTIONAL RADIOLOGY/VASCULAR | Facility: HOSPITAL | Age: 78
End: 2023-09-24
Attending: INTERNAL MEDICINE
Payer: MEDICARE

## 2023-09-24 DIAGNOSIS — G51.39 HEMIFACIAL SPASM: ICD-10-CM

## 2023-09-24 DIAGNOSIS — J81.0 ACUTE PULMONARY EDEMA (HCC): Primary | ICD-10-CM

## 2023-09-24 DIAGNOSIS — M54.50 CHRONIC LOW BACK PAIN: ICD-10-CM

## 2023-09-24 DIAGNOSIS — G89.29 CHRONIC LOW BACK PAIN: ICD-10-CM

## 2023-09-24 PROBLEM — J96.01 ACUTE HYPOXEMIC RESPIRATORY FAILURE (HCC): Status: ACTIVE | Noted: 2022-12-20

## 2023-09-24 PROBLEM — I50.33 ACUTE ON CHRONIC DIASTOLIC (CONGESTIVE) HEART FAILURE (HCC): Status: ACTIVE | Noted: 2023-09-24

## 2023-09-24 PROBLEM — J81.1 PULMONARY EDEMA: Status: ACTIVE | Noted: 2023-09-24

## 2023-09-24 PROBLEM — J81.1 PULMONARY EDEMA (HCC): Status: ACTIVE | Noted: 2023-09-24

## 2023-09-24 LAB
ALBUMIN SERPL-MCNC: 3.5 G/DL (ref 3.4–5)
ALBUMIN/GLOB SERPL: 0.8 {RATIO} (ref 1–2)
ALP LIVER SERPL-CCNC: 141 U/L
ALT SERPL-CCNC: 17 U/L
ANION GAP SERPL CALC-SCNC: 8 MMOL/L (ref 0–18)
APTT PPP: 36.5 SECONDS (ref 23.3–35.6)
AST SERPL-CCNC: 14 U/L (ref 15–37)
ATRIAL RATE: 130 BPM
ATRIAL RATE: 83 BPM
BASOPHILS # BLD AUTO: 0.05 X10(3) UL (ref 0–0.2)
BASOPHILS NFR BLD AUTO: 0.4 %
BILIRUB SERPL-MCNC: 0.6 MG/DL (ref 0.1–2)
BILIRUB UR QL: NEGATIVE
BUN BLD-MCNC: 29 MG/DL (ref 7–18)
BUN/CREAT SERPL: 15 (ref 10–20)
CALCIUM BLD-MCNC: 9.7 MG/DL (ref 8.5–10.1)
CHLORIDE SERPL-SCNC: 105 MMOL/L (ref 98–112)
CHOLEST SERPL-MCNC: 208 MG/DL (ref ?–200)
CLARITY UR: CLEAR
CO2 SERPL-SCNC: 24 MMOL/L (ref 21–32)
COLOR UR: COLORLESS
CREAT BLD-MCNC: 1.93 MG/DL
DEPRECATED RDW RBC AUTO: 40.4 FL (ref 35.1–46.3)
EGFRCR SERPLBLD CKD-EPI 2021: 27 ML/MIN/1.73M2 (ref 60–?)
EOSINOPHIL # BLD AUTO: 0.15 X10(3) UL (ref 0–0.7)
EOSINOPHIL NFR BLD AUTO: 1.2 %
ERYTHROCYTE [DISTWIDTH] IN BLOOD BY AUTOMATED COUNT: 11.9 % (ref 11–15)
FLUAV + FLUBV RNA SPEC NAA+PROBE: NEGATIVE
FLUAV + FLUBV RNA SPEC NAA+PROBE: NEGATIVE
GLOBULIN PLAS-MCNC: 4.4 G/DL (ref 2.8–4.4)
GLUCOSE BLD-MCNC: 148 MG/DL (ref 70–99)
GLUCOSE UR-MCNC: NORMAL MG/DL
HCT VFR BLD AUTO: 38.2 %
HDLC SERPL-MCNC: 59 MG/DL (ref 40–59)
HGB BLD-MCNC: 12.8 G/DL
HGB UR QL STRIP.AUTO: NEGATIVE
IMM GRANULOCYTES # BLD AUTO: 0.05 X10(3) UL (ref 0–1)
IMM GRANULOCYTES NFR BLD: 0.4 %
ISTAT ACTIVATED CLOTTING TIME: 161 SECONDS (ref 125–137)
KETONES UR-MCNC: NEGATIVE MG/DL
LDLC SERPL CALC-MCNC: 100 MG/DL (ref ?–100)
LEUKOCYTE ESTERASE UR QL STRIP.AUTO: NEGATIVE
LIPASE SERPL-CCNC: 33 U/L (ref 13–75)
LYMPHOCYTES # BLD AUTO: 1.47 X10(3) UL (ref 1–4)
LYMPHOCYTES NFR BLD AUTO: 11.6 %
MCH RBC QN AUTO: 31.1 PG (ref 26–34)
MCHC RBC AUTO-ENTMCNC: 33.5 G/DL (ref 31–37)
MCV RBC AUTO: 92.7 FL
MONOCYTES # BLD AUTO: 0.62 X10(3) UL (ref 0.1–1)
MONOCYTES NFR BLD AUTO: 4.9 %
NEUTROPHILS # BLD AUTO: 10.36 X10 (3) UL (ref 1.5–7.7)
NEUTROPHILS # BLD AUTO: 10.36 X10(3) UL (ref 1.5–7.7)
NEUTROPHILS NFR BLD AUTO: 81.5 %
NITRITE UR QL STRIP.AUTO: NEGATIVE
NONHDLC SERPL-MCNC: 149 MG/DL (ref ?–130)
NT-PROBNP SERPL-MCNC: 1511 PG/ML (ref ?–450)
OSMOLALITY SERPL CALC.SUM OF ELEC: 293 MOSM/KG (ref 275–295)
P AXIS: 63 DEGREES
P-R INTERVAL: 152 MS
P-R INTERVAL: 168 MS
PH UR: 7.5 [PH] (ref 5–8)
PLATELET # BLD AUTO: 339 10(3)UL (ref 150–450)
POTASSIUM SERPL-SCNC: 4.1 MMOL/L (ref 3.5–5.1)
PROT SERPL-MCNC: 7.9 G/DL (ref 6.4–8.2)
PROT UR-MCNC: NEGATIVE MG/DL
Q-T INTERVAL: 330 MS
Q-T INTERVAL: 404 MS
QRS DURATION: 132 MS
QRS DURATION: 144 MS
QTC CALCULATION (BEZET): 474 MS
QTC CALCULATION (BEZET): 485 MS
R AXIS: -4 DEGREES
R AXIS: 13 DEGREES
RBC # BLD AUTO: 4.12 X10(6)UL
RSV RNA SPEC NAA+PROBE: NEGATIVE
SARS-COV-2 RNA RESP QL NAA+PROBE: NOT DETECTED
SODIUM SERPL-SCNC: 137 MMOL/L (ref 136–145)
SP GR UR STRIP: 1.01 (ref 1–1.03)
T AXIS: 100 DEGREES
T AXIS: 94 DEGREES
TRIGL SERPL-MCNC: 292 MG/DL (ref 30–149)
TROPONIN I HIGH SENSITIVITY: 1379 NG/L
TROPONIN I HIGH SENSITIVITY: 328 NG/L
TROPONIN I HIGH SENSITIVITY: 68 NG/L
UROBILINOGEN UR STRIP-ACNC: NORMAL
VENTRICULAR RATE: 130 BPM
VENTRICULAR RATE: 83 BPM
VLDLC SERPL CALC-MCNC: 49 MG/DL (ref 0–30)
WBC # BLD AUTO: 12.7 X10(3) UL (ref 4–11)

## 2023-09-24 PROCEDURE — 71045 X-RAY EXAM CHEST 1 VIEW: CPT | Performed by: EMERGENCY MEDICINE

## 2023-09-24 PROCEDURE — 99223 1ST HOSP IP/OBS HIGH 75: CPT | Performed by: HOSPITALIST

## 2023-09-24 PROCEDURE — 4A023N7 MEASUREMENT OF CARDIAC SAMPLING AND PRESSURE, LEFT HEART, PERCUTANEOUS APPROACH: ICD-10-PCS | Performed by: INTERNAL MEDICINE

## 2023-09-24 PROCEDURE — 4A033BC MEASUREMENT OF ARTERIAL PRESSURE, CORONARY, PERCUTANEOUS APPROACH: ICD-10-PCS | Performed by: INTERNAL MEDICINE

## 2023-09-24 PROCEDURE — 99223 1ST HOSP IP/OBS HIGH 75: CPT | Performed by: INTERNAL MEDICINE

## 2023-09-24 PROCEDURE — 76770 US EXAM ABDO BACK WALL COMP: CPT | Performed by: INTERNAL MEDICINE

## 2023-09-24 PROCEDURE — B2111ZZ FLUOROSCOPY OF MULTIPLE CORONARY ARTERIES USING LOW OSMOLAR CONTRAST: ICD-10-PCS | Performed by: INTERNAL MEDICINE

## 2023-09-24 RX ORDER — ONDANSETRON 2 MG/ML
4 INJECTION INTRAMUSCULAR; INTRAVENOUS ONCE
Status: COMPLETED | OUTPATIENT
Start: 2023-09-24 | End: 2023-09-24

## 2023-09-24 RX ORDER — ENOXAPARIN SODIUM 100 MG/ML
40 INJECTION SUBCUTANEOUS EVERY 24 HOURS
Status: DISCONTINUED | OUTPATIENT
Start: 2023-09-24 | End: 2023-09-24

## 2023-09-24 RX ORDER — CARVEDILOL 12.5 MG/1
12.5 TABLET ORAL 2 TIMES DAILY WITH MEALS
Status: DISCONTINUED | OUTPATIENT
Start: 2023-09-24 | End: 2023-09-26

## 2023-09-24 RX ORDER — HEPARIN SODIUM AND DEXTROSE 10000; 5 [USP'U]/100ML; G/100ML
INJECTION INTRAVENOUS CONTINUOUS
Status: DISCONTINUED | OUTPATIENT
Start: 2023-09-24 | End: 2023-09-24

## 2023-09-24 RX ORDER — ATORVASTATIN CALCIUM 40 MG/1
40 TABLET, FILM COATED ORAL NIGHTLY
Status: DISCONTINUED | OUTPATIENT
Start: 2023-09-24 | End: 2023-09-30

## 2023-09-24 RX ORDER — MORPHINE SULFATE 4 MG/ML
4 INJECTION, SOLUTION INTRAMUSCULAR; INTRAVENOUS EVERY 2 HOUR PRN
Status: DISCONTINUED | OUTPATIENT
Start: 2023-09-24 | End: 2023-09-30

## 2023-09-24 RX ORDER — HEPARIN SODIUM 5000 [USP'U]/ML
5000 INJECTION, SOLUTION INTRAVENOUS; SUBCUTANEOUS EVERY 8 HOURS SCHEDULED
Status: DISCONTINUED | OUTPATIENT
Start: 2023-09-24 | End: 2023-09-24

## 2023-09-24 RX ORDER — MORPHINE SULFATE 2 MG/ML
1 INJECTION, SOLUTION INTRAMUSCULAR; INTRAVENOUS EVERY 2 HOUR PRN
Status: DISCONTINUED | OUTPATIENT
Start: 2023-09-24 | End: 2023-09-30

## 2023-09-24 RX ORDER — FAMOTIDINE 10 MG/ML
20 INJECTION, SOLUTION INTRAVENOUS ONCE
Status: COMPLETED | OUTPATIENT
Start: 2023-09-24 | End: 2023-09-24

## 2023-09-24 RX ORDER — HYDROCODONE BITARTRATE AND ACETAMINOPHEN 5; 325 MG/1; MG/1
2 TABLET ORAL EVERY 4 HOURS PRN
Status: DISCONTINUED | OUTPATIENT
Start: 2023-09-24 | End: 2023-09-30

## 2023-09-24 RX ORDER — HEPARIN SODIUM 1000 [USP'U]/ML
5000 INJECTION, SOLUTION INTRAVENOUS; SUBCUTANEOUS ONCE
Status: COMPLETED | OUTPATIENT
Start: 2023-09-24 | End: 2023-09-24

## 2023-09-24 RX ORDER — NITROGLYCERIN 0.4 MG/1
0.4 TABLET SUBLINGUAL ONCE
Status: DISCONTINUED | OUTPATIENT
Start: 2023-09-24 | End: 2023-09-24

## 2023-09-24 RX ORDER — ALBUMIN (HUMAN) 12.5 G/50ML
SOLUTION INTRAVENOUS
Status: COMPLETED
Start: 2023-09-24 | End: 2023-09-24

## 2023-09-24 RX ORDER — GABAPENTIN 600 MG/1
300 TABLET ORAL 2 TIMES DAILY
Status: DISCONTINUED | OUTPATIENT
Start: 2023-09-24 | End: 2023-09-24 | Stop reason: ALTCHOICE

## 2023-09-24 RX ORDER — NITROGLYCERIN 20 MG/100ML
INJECTION INTRAVENOUS CONTINUOUS
Status: DISCONTINUED | OUTPATIENT
Start: 2023-09-24 | End: 2023-09-24

## 2023-09-24 RX ORDER — NITROGLYCERIN 0.4 MG/1
TABLET SUBLINGUAL
Status: COMPLETED
Start: 2023-09-24 | End: 2023-09-24

## 2023-09-24 RX ORDER — TIZANIDINE 2 MG/1
2 TABLET ORAL
Status: DISCONTINUED | OUTPATIENT
Start: 2023-09-24 | End: 2023-09-30

## 2023-09-24 RX ORDER — MIDAZOLAM HYDROCHLORIDE 1 MG/ML
INJECTION INTRAMUSCULAR; INTRAVENOUS
Status: DISCONTINUED
Start: 2023-09-24 | End: 2023-09-24 | Stop reason: WASHOUT

## 2023-09-24 RX ORDER — LIDOCAINE HYDROCHLORIDE 20 MG/ML
INJECTION, SOLUTION EPIDURAL; INFILTRATION; INTRACAUDAL; PERINEURAL
Status: COMPLETED
Start: 2023-09-24 | End: 2023-09-24

## 2023-09-24 RX ORDER — NITROGLYCERIN 20 MG/100ML
INJECTION INTRAVENOUS
Status: COMPLETED
Start: 2023-09-24 | End: 2023-09-24

## 2023-09-24 RX ORDER — ESCITALOPRAM OXALATE 20 MG/1
20 TABLET ORAL DAILY
Status: DISCONTINUED | OUTPATIENT
Start: 2023-09-24 | End: 2023-09-30

## 2023-09-24 RX ORDER — NITROGLYCERIN 0.4 MG/1
0.4 TABLET SUBLINGUAL ONCE
Status: COMPLETED | OUTPATIENT
Start: 2023-09-24 | End: 2023-09-24

## 2023-09-24 RX ORDER — ACETAMINOPHEN 325 MG/1
650 TABLET ORAL EVERY 4 HOURS PRN
Status: DISCONTINUED | OUTPATIENT
Start: 2023-09-24 | End: 2023-09-30

## 2023-09-24 RX ORDER — ASPIRIN 81 MG/1
324 TABLET, CHEWABLE ORAL DAILY
Status: DISCONTINUED | OUTPATIENT
Start: 2023-09-25 | End: 2023-09-26

## 2023-09-24 RX ORDER — HYDROCODONE BITARTRATE AND ACETAMINOPHEN 5; 325 MG/1; MG/1
1 TABLET ORAL EVERY 4 HOURS PRN
Status: DISCONTINUED | OUTPATIENT
Start: 2023-09-24 | End: 2023-09-30

## 2023-09-24 RX ORDER — CALCIUM CARBONATE 500 MG/1
500 TABLET, CHEWABLE ORAL 3 TIMES DAILY PRN
Status: DISCONTINUED | OUTPATIENT
Start: 2023-09-24 | End: 2023-09-30

## 2023-09-24 RX ORDER — ONDANSETRON 2 MG/ML
4 INJECTION INTRAMUSCULAR; INTRAVENOUS EVERY 6 HOURS PRN
Status: DISCONTINUED | OUTPATIENT
Start: 2023-09-24 | End: 2023-09-27

## 2023-09-24 RX ORDER — LUBIPROSTONE 24 UG/1
24 CAPSULE ORAL 2 TIMES DAILY WITH MEALS
Status: DISCONTINUED | OUTPATIENT
Start: 2023-09-24 | End: 2023-09-30

## 2023-09-24 RX ORDER — FUROSEMIDE 10 MG/ML
40 INJECTION INTRAMUSCULAR; INTRAVENOUS
Status: DISCONTINUED | OUTPATIENT
Start: 2023-09-24 | End: 2023-09-26

## 2023-09-24 RX ORDER — HEPARIN SODIUM 1000 [USP'U]/ML
INJECTION, SOLUTION INTRAVENOUS; SUBCUTANEOUS
Status: COMPLETED
Start: 2023-09-24 | End: 2023-09-24

## 2023-09-24 RX ORDER — ASPIRIN 81 MG/1
162 TABLET, CHEWABLE ORAL ONCE
Status: COMPLETED | OUTPATIENT
Start: 2023-09-24 | End: 2023-09-24

## 2023-09-24 RX ORDER — ONDANSETRON 2 MG/ML
INJECTION INTRAMUSCULAR; INTRAVENOUS
Status: COMPLETED
Start: 2023-09-24 | End: 2023-09-24

## 2023-09-24 RX ORDER — MORPHINE SULFATE 2 MG/ML
2 INJECTION, SOLUTION INTRAMUSCULAR; INTRAVENOUS EVERY 2 HOUR PRN
Status: DISCONTINUED | OUTPATIENT
Start: 2023-09-24 | End: 2023-09-30

## 2023-09-24 RX ORDER — IPRATROPIUM BROMIDE AND ALBUTEROL SULFATE 2.5; .5 MG/3ML; MG/3ML
3 SOLUTION RESPIRATORY (INHALATION) ONCE
Status: COMPLETED | OUTPATIENT
Start: 2023-09-24 | End: 2023-09-24

## 2023-09-24 RX ORDER — ROPINIROLE 1 MG/1
3 TABLET, FILM COATED ORAL
Status: DISCONTINUED | OUTPATIENT
Start: 2023-09-24 | End: 2023-09-30

## 2023-09-24 RX ORDER — ISOSORBIDE DINITRATE 20 MG/1
20 TABLET ORAL
Status: DISCONTINUED | OUTPATIENT
Start: 2023-09-24 | End: 2023-09-24

## 2023-09-24 RX ORDER — BUPROPION HYDROCHLORIDE 150 MG/1
150 TABLET ORAL DAILY
Status: DISCONTINUED | OUTPATIENT
Start: 2023-09-24 | End: 2023-09-30

## 2023-09-24 RX ORDER — GABAPENTIN 300 MG/1
300 CAPSULE ORAL 2 TIMES DAILY
Status: DISCONTINUED | OUTPATIENT
Start: 2023-09-24 | End: 2023-09-30

## 2023-09-24 RX ORDER — MAGNESIUM HYDROXIDE/ALUMINUM HYDROXICE/SIMETHICONE 120; 1200; 1200 MG/30ML; MG/30ML; MG/30ML
30 SUSPENSION ORAL ONCE
Status: COMPLETED | OUTPATIENT
Start: 2023-09-24 | End: 2023-09-24

## 2023-09-24 RX ORDER — VERAPAMIL HYDROCHLORIDE 2.5 MG/ML
INJECTION, SOLUTION INTRAVENOUS
Status: COMPLETED
Start: 2023-09-24 | End: 2023-09-24

## 2023-09-24 RX ORDER — FUROSEMIDE 10 MG/ML
30 INJECTION INTRAMUSCULAR; INTRAVENOUS ONCE
Status: COMPLETED | OUTPATIENT
Start: 2023-09-24 | End: 2023-09-24

## 2023-09-24 RX ORDER — DOCUSATE SODIUM 100 MG/1
100 CAPSULE, LIQUID FILLED ORAL 2 TIMES DAILY
Status: DISCONTINUED | OUTPATIENT
Start: 2023-09-24 | End: 2023-09-30

## 2023-09-24 RX ORDER — HEPARIN SODIUM AND DEXTROSE 10000; 5 [USP'U]/100ML; G/100ML
12 INJECTION INTRAVENOUS ONCE
Status: COMPLETED | OUTPATIENT
Start: 2023-09-24 | End: 2023-09-24

## 2023-09-24 RX ORDER — ISOSORBIDE MONONITRATE 30 MG/1
30 TABLET, EXTENDED RELEASE ORAL DAILY
Status: DISCONTINUED | OUTPATIENT
Start: 2023-09-24 | End: 2023-09-29

## 2023-09-24 RX ORDER — GABAPENTIN 600 MG/1
600 TABLET ORAL 3 TIMES DAILY
Status: DISCONTINUED | OUTPATIENT
Start: 2023-09-24 | End: 2023-09-24

## 2023-09-24 RX ORDER — ALBUMIN (HUMAN) 12.5 G/50ML
25 SOLUTION INTRAVENOUS ONCE
Status: COMPLETED | OUTPATIENT
Start: 2023-09-24 | End: 2023-09-24

## 2023-09-24 NOTE — ED QUICK NOTES
Patient brought to floor by ED RN and RT on bipap. Patient tolerated well, no issues during transport. Bedside handoff done with RN. Belongings brought to floor with patient.

## 2023-09-24 NOTE — ED QUICK NOTES
Assumed care of patient at this time. Patient notes difficulty breathing despite 5L high flow. MD notified. Orders placed.

## 2023-09-24 NOTE — BRIEF OP NOTE
Preop diagnosis: nstemi  Post op diagnosis: moderate cad  Procedures: lhc, cor angio, ifr of rca  Findings: edp 25, moderate rca disease confirmed by negative ifr at 0.98, lm, lcx and lad are patent.   EBL: 10 mls  Specimens: None

## 2023-09-24 NOTE — ED QUICK NOTES
Respiratory at bedside, patient placed on Bipap. Patients work of breathing improving post nitroglycerin administration. Patient admits to improvement in symptoms.

## 2023-09-24 NOTE — ED INITIAL ASSESSMENT (HPI)
Pt to ed c/o jose alfredo and nausea that woke her up. Per medics she was 92% on ra. Denies chest pain.  Pt states she has hx chf.

## 2023-09-25 ENCOUNTER — APPOINTMENT (OUTPATIENT)
Dept: CV DIAGNOSTICS | Facility: HOSPITAL | Age: 78
End: 2023-09-25
Attending: INTERNAL MEDICINE
Payer: MEDICARE

## 2023-09-25 ENCOUNTER — APPOINTMENT (OUTPATIENT)
Dept: GENERAL RADIOLOGY | Facility: HOSPITAL | Age: 78
End: 2023-09-25
Attending: INTERNAL MEDICINE
Payer: MEDICARE

## 2023-09-25 LAB
ALBUMIN SERPL-MCNC: 3.2 G/DL (ref 3.4–5)
ANION GAP SERPL CALC-SCNC: 6 MMOL/L (ref 0–18)
BASOPHILS # BLD AUTO: 0.04 X10(3) UL (ref 0–0.2)
BASOPHILS NFR BLD AUTO: 0.4 %
BUN BLD-MCNC: 34 MG/DL (ref 7–18)
BUN/CREAT SERPL: 15.6 (ref 10–20)
CALCIUM BLD-MCNC: 8.5 MG/DL (ref 8.5–10.1)
CHLORIDE SERPL-SCNC: 99 MMOL/L (ref 98–112)
CO2 SERPL-SCNC: 31 MMOL/L (ref 21–32)
CREAT BLD-MCNC: 2.18 MG/DL
DEPRECATED RDW RBC AUTO: 41.9 FL (ref 35.1–46.3)
EGFRCR SERPLBLD CKD-EPI 2021: 23 ML/MIN/1.73M2 (ref 60–?)
EOSINOPHIL # BLD AUTO: 0.07 X10(3) UL (ref 0–0.7)
EOSINOPHIL NFR BLD AUTO: 0.7 %
ERYTHROCYTE [DISTWIDTH] IN BLOOD BY AUTOMATED COUNT: 12 % (ref 11–15)
GLUCOSE BLD-MCNC: 183 MG/DL (ref 70–99)
HCT VFR BLD AUTO: 31.2 %
HGB BLD-MCNC: 9.9 G/DL
IMM GRANULOCYTES # BLD AUTO: 0.03 X10(3) UL (ref 0–1)
IMM GRANULOCYTES NFR BLD: 0.3 %
IRON SATN MFR SERPL: 39 %
IRON SERPL-MCNC: 88 UG/DL
LYMPHOCYTES # BLD AUTO: 1.87 X10(3) UL (ref 1–4)
LYMPHOCYTES NFR BLD AUTO: 19.5 %
MCH RBC QN AUTO: 30.4 PG (ref 26–34)
MCHC RBC AUTO-ENTMCNC: 31.7 G/DL (ref 31–37)
MCV RBC AUTO: 95.7 FL
MONOCYTES # BLD AUTO: 0.74 X10(3) UL (ref 0.1–1)
MONOCYTES NFR BLD AUTO: 7.7 %
NEUTROPHILS # BLD AUTO: 6.84 X10 (3) UL (ref 1.5–7.7)
NEUTROPHILS # BLD AUTO: 6.84 X10(3) UL (ref 1.5–7.7)
NEUTROPHILS NFR BLD AUTO: 71.4 %
OSMOLALITY SERPL CALC.SUM OF ELEC: 294 MOSM/KG (ref 275–295)
PHOSPHATE SERPL-MCNC: 4.3 MG/DL (ref 2.5–4.9)
PLATELET # BLD AUTO: 291 10(3)UL (ref 150–450)
POTASSIUM SERPL-SCNC: 4.3 MMOL/L (ref 3.5–5.1)
RBC # BLD AUTO: 3.26 X10(6)UL
SODIUM SERPL-SCNC: 136 MMOL/L (ref 136–145)
TIBC SERPL-MCNC: 226 UG/DL (ref 240–450)
TRANSFERRIN SERPL-MCNC: 152 MG/DL (ref 200–360)
WBC # BLD AUTO: 9.6 X10(3) UL (ref 4–11)

## 2023-09-25 PROCEDURE — 93306 TTE W/DOPPLER COMPLETE: CPT | Performed by: INTERNAL MEDICINE

## 2023-09-25 PROCEDURE — 71045 X-RAY EXAM CHEST 1 VIEW: CPT | Performed by: INTERNAL MEDICINE

## 2023-09-25 PROCEDURE — 99233 SBSQ HOSP IP/OBS HIGH 50: CPT | Performed by: HOSPITALIST

## 2023-09-25 PROCEDURE — 99233 SBSQ HOSP IP/OBS HIGH 50: CPT | Performed by: INTERNAL MEDICINE

## 2023-09-25 RX ORDER — HEPARIN SODIUM 5000 [USP'U]/ML
5000 INJECTION, SOLUTION INTRAVENOUS; SUBCUTANEOUS EVERY 12 HOURS SCHEDULED
Status: DISCONTINUED | OUTPATIENT
Start: 2023-09-25 | End: 2023-09-30

## 2023-09-25 RX ORDER — SODIUM CHLORIDE 9 MG/ML
INJECTION, SOLUTION INTRAVENOUS CONTINUOUS
Status: DISCONTINUED | OUTPATIENT
Start: 2023-09-25 | End: 2023-09-26

## 2023-09-25 RX ORDER — PANTOPRAZOLE SODIUM 40 MG/1
40 TABLET, DELAYED RELEASE ORAL
Status: DISCONTINUED | OUTPATIENT
Start: 2023-09-26 | End: 2023-09-30

## 2023-09-25 RX ORDER — MIDODRINE HYDROCHLORIDE 5 MG/1
5 TABLET ORAL 3 TIMES DAILY PRN
Status: DISCONTINUED | OUTPATIENT
Start: 2023-09-25 | End: 2023-09-30

## 2023-09-25 NOTE — PROGRESS NOTES
Patient required second straight cath (per urinary retention protocol). Has no sensation of fullness or needing to void. When discussing the situation with patient she states, \"It's like this at home. I don't know I need to go to the bathroom when I'm laying or sitting down, but then when I stand up it's too late and I just go. So I've had to wear pads and Depends.  I've been thinking about going to see a Urologist.\"

## 2023-09-25 NOTE — PROGRESS NOTES
Assumed care of patient at 2045, upon assessment found R radial TR band/LHC site oozing with TR band in place but at 0 ml. Reinserted 2 ml but continued to ooze. Inserted additional 2 ml at that time. Will continue to monitor and remove air as tolerated and per protocol. 2100 - 2 ml removed from TR band, current total at 2 ml    2130 - 2 ml removed after it was left additional time d/t recurrent oozing. No further oozing noted, TR band removed after 10 minutes. Dressing applied.

## 2023-09-25 NOTE — PROCEDURES
[facility]    PATIENT'S NAME: Aggie Torres   ATTENDING PHYSICIAN: Rick Heller MD   OPERATING PHYSICIAN: Juan José Snell DO   PATIENT ACCOUNT#:   [de-identified]    LOCATION:  [place] [address]. #:   [id number]       YOB: 1945  ADMISSION DATE:       09/24/2023      OPERATION DATE:  09/24/2023    CARDIAC PROCEDURE TRANSCRIPTION    CARDIAC CATHETERIZATION  PREOPERATIVE DIAGNOSIS:    POSTOPERATIVE DIAGNOSIS:    PROCEDURE PERFORMED:    1. Left heart catheterization. 2.   Bilateral coronary angiogram.  3.   IFR of the mid-right coronary artery. SEDATION:  Conscious sedation was performed for 30 minutes by an independent nurse monitoring the patient's blood pressure, oximetry, and heart rate. Femoral central venous access was performed as patient was requiring Levophed which was being administered through a peripheral IV until we switched over. INDICATIONS:  Briefly, this is a patient who came in with sudden onset chest pain and heaviness in the chest, shortness of breath, diagnosed with congestive heart failure but continued to have heaviness in both sides of her chest with doubling troponins from 600 to 1300. Patient also had renal failure and there was concern for contrast-induced nephropathy. I discussed the procedure with the patient because of rising troponin, ongoing symptoms, risks being bleeding, infection, injury to artery, vein, and nerve, rarely stroke, heart attack or death. Specifically, I also discussed contrast-induced nephropathy. We could not pre-hydrate her because she was already with volume overload. She received IV Lasix. While in the ICU, she was with marked hypertension, then suddenly she dropped her pressure into the 70s requiring Levophed in escalating doses. Patient received albumin as well as 250 mL of normal saline bolus.   Because of the hypotension, there was also concern if this was, in fact, stent thrombosis or coronary occlusion with shock as etiology. Patient was urgently taken to the cardiac catheterization lab. Right femoral vein access was performed for the femoral vein. A 4-Romansh sheath was inserted to the right wrist.  A right radial sheath was inserted. We used a 5-Romansh TIG catheter for left heart catheterization and bilateral coronary angiography. At the end of the procedure, radial band was applied to achieve hemostasis. Patient received heparin, verapamil, and nitroglycerin during the procedure. We reduced the dose of the verapamil and nitroglycerin because of ongoing hypotension requiring pressor support. FINDINGS:  There was a right subclavian loop that was navigated. The coronary arteries are calcified and tortuous suggestive of hypertensive heart disease. Right coronary angiography demonstrates a 40% to 50% mid-vessel stenosis with the vessel being aneurysmal immediately after the stenosis. There is a mid-vessel right coronary artery stent which is widely patent. RV marginal branch in that same area is also patent. In some views that 40% to 50% lesion looked tighter involving the proximal to mid-right coronary artery. The left main is short and normal in appearance. The circumflex coronary artery is a large and non-dominant vessel but, size-wise, it is very large. It is also ectatic and aneurysmal in the proximal to mid-portion. There are 3 major marginal branches, all very tortuous in appearance but are patent. The left anterior descending artery is with moderate calcification; it is a small to moderate-size vessel in comparison to the circumflex coronary artery. Again, it is very tortuous but no obstruction is appreciated. AGUILA-3 flow is noted in all the vessels. The left ventricular end-diastolic pressure is moderately elevated, 25 mmHg, and no gradient across the aortic valve. INTERVENTIONAL PROCEDURE:  There was some question in some of the views of the proximal to mid right coronary artery. Therefore, we used an Ikari right 1.5-Kazakh guide catheter. We used the [facility] iFR wire. The iFR was performed after the wire was taken across the lesion. The iFR was negative at 0.98 with an ischemic threshold being less than 0.90. IMPRESSION:  1.   Non-obstructive coronary artery disease with a negative iFR. 2.   Moderately elevated left ventricular filling pressures.     Dictated By Blinda Bamberger, DO  d: 09/24/2023 17:24:16  t: 09/24/2023 17:41:23  [facility] [id number]  SALOME/

## 2023-09-25 NOTE — PROGRESS NOTES
Late entry note. Patient was revisited and seen prior to cardiac catheterization lab due to clinical condition change earlier in the day. Patient started to become hypotensive into the 70s. At first 250 mL normal saline bolus was given. This was unsuccessful at resuscitating blood pressure. Nitroglycerin drip was dropped already. Patient had escalating dose of Levophed. Because of this there was a plan for central venous access d uring cardiac catheterization. Patient had improvement of her chest pain after nitroglycerin drip and did not have worsening during the hypotensive episode. Patient also received 25 g of albumin. Subsequently blood pressure started to improve. All blood pressure medications to be held. Critical care time 35 minutes outside of any procedures that were performed today.

## 2023-09-26 LAB
ALBUMIN SERPL-MCNC: 2.8 G/DL (ref 3.4–5)
ALBUMIN/GLOB SERPL: 0.9 {RATIO} (ref 1–2)
ALP LIVER SERPL-CCNC: 84 U/L
ALT SERPL-CCNC: 13 U/L
ANION GAP SERPL CALC-SCNC: 3 MMOL/L (ref 0–18)
AST SERPL-CCNC: 13 U/L (ref 15–37)
BASOPHILS # BLD AUTO: 0.04 X10(3) UL (ref 0–0.2)
BASOPHILS NFR BLD AUTO: 0.6 %
BILIRUB SERPL-MCNC: 0.3 MG/DL (ref 0.1–2)
BUN BLD-MCNC: 31 MG/DL (ref 7–18)
BUN/CREAT SERPL: 17.4 (ref 10–20)
CALCIUM BLD-MCNC: 7.6 MG/DL (ref 8.5–10.1)
CHLORIDE SERPL-SCNC: 104 MMOL/L (ref 98–112)
CO2 SERPL-SCNC: 31 MMOL/L (ref 21–32)
CREAT BLD-MCNC: 1.78 MG/DL
DEPRECATED RDW RBC AUTO: 42.6 FL (ref 35.1–46.3)
EGFRCR SERPLBLD CKD-EPI 2021: 29 ML/MIN/1.73M2 (ref 60–?)
EOSINOPHIL # BLD AUTO: 0.2 X10(3) UL (ref 0–0.7)
EOSINOPHIL NFR BLD AUTO: 3.2 %
ERYTHROCYTE [DISTWIDTH] IN BLOOD BY AUTOMATED COUNT: 12 % (ref 11–15)
GLOBULIN PLAS-MCNC: 3 G/DL (ref 2.8–4.4)
GLUCOSE BLD-MCNC: 103 MG/DL (ref 70–99)
HCT VFR BLD AUTO: 29.4 %
HGB BLD-MCNC: 9.1 G/DL
IMM GRANULOCYTES # BLD AUTO: 0.01 X10(3) UL (ref 0–1)
IMM GRANULOCYTES NFR BLD: 0.2 %
LYMPHOCYTES # BLD AUTO: 1.83 X10(3) UL (ref 1–4)
LYMPHOCYTES NFR BLD AUTO: 29 %
MAGNESIUM SERPL-MCNC: 1.7 MG/DL (ref 1.6–2.6)
MCH RBC QN AUTO: 30.2 PG (ref 26–34)
MCHC RBC AUTO-ENTMCNC: 31 G/DL (ref 31–37)
MCV RBC AUTO: 97.7 FL
MONOCYTES # BLD AUTO: 0.52 X10(3) UL (ref 0.1–1)
MONOCYTES NFR BLD AUTO: 8.2 %
NEUTROPHILS # BLD AUTO: 3.71 X10 (3) UL (ref 1.5–7.7)
NEUTROPHILS # BLD AUTO: 3.71 X10(3) UL (ref 1.5–7.7)
NEUTROPHILS NFR BLD AUTO: 58.8 %
OSMOLALITY SERPL CALC.SUM OF ELEC: 293 MOSM/KG (ref 275–295)
PHOSPHATE SERPL-MCNC: 3.7 MG/DL (ref 2.5–4.9)
PLATELET # BLD AUTO: 189 10(3)UL (ref 150–450)
POTASSIUM SERPL-SCNC: 4.1 MMOL/L (ref 3.5–5.1)
PROT SERPL-MCNC: 5.8 G/DL (ref 6.4–8.2)
RBC # BLD AUTO: 3.01 X10(6)UL
SODIUM SERPL-SCNC: 138 MMOL/L (ref 136–145)
WBC # BLD AUTO: 6.3 X10(3) UL (ref 4–11)

## 2023-09-26 PROCEDURE — 99233 SBSQ HOSP IP/OBS HIGH 50: CPT | Performed by: INTERNAL MEDICINE

## 2023-09-26 PROCEDURE — 99233 SBSQ HOSP IP/OBS HIGH 50: CPT | Performed by: HOSPITALIST

## 2023-09-26 RX ORDER — CARVEDILOL 6.25 MG/1
6.25 TABLET ORAL 2 TIMES DAILY WITH MEALS
Status: DISCONTINUED | OUTPATIENT
Start: 2023-09-26 | End: 2023-09-28

## 2023-09-26 RX ORDER — FUROSEMIDE 40 MG/1
40 TABLET ORAL DAILY
Status: DISCONTINUED | OUTPATIENT
Start: 2023-09-26 | End: 2023-09-30

## 2023-09-26 RX ORDER — MAGNESIUM OXIDE 400 MG/1
400 TABLET ORAL ONCE
Status: COMPLETED | OUTPATIENT
Start: 2023-09-26 | End: 2023-09-26

## 2023-09-26 RX ORDER — ASPIRIN 81 MG/1
81 TABLET, CHEWABLE ORAL DAILY
Status: DISCONTINUED | OUTPATIENT
Start: 2023-09-26 | End: 2023-09-30

## 2023-09-26 NOTE — PLAN OF CARE
Patient Aox 4 and on 4LNC. Levo gtt remained off overnight. PRN norco given for pain (see MAR). Purewick in place. Urinary retention alleviated by standing or sitting at bed side; unable to urinate while laying in bed per patient. Problem: CARDIOVASCULAR - ADULT  Goal: Maintains optimal cardiac output and hemodynamic stability  Description: INTERVENTIONS:  - Monitor vital signs, rhythm, and trends  - Monitor for bleeding, hypotension and signs of decreased cardiac output  - Evaluate effectiveness of vasoactive medications to optimize hemodynamic stability  - Monitor arterial and/or venous puncture sites for bleeding and/or hematoma  - Assess quality of pulses, skin color and temperature  - Assess for signs of decreased coronary artery perfusion - ex.  Angina  - Evaluate fluid balance, assess for edema, trend weights  Outcome: Progressing  Goal: Absence of cardiac arrhythmias or at baseline  Description: INTERVENTIONS:  - Continuous cardiac monitoring, monitor vital signs, obtain 12 lead EKG if indicated  - Evaluate effectiveness of antiarrhythmic and heart rate control medications as ordered  - Initiate emergency measures for life threatening arrhythmias  - Monitor electrolytes and administer replacement therapy as ordered  Outcome: Progressing     Problem: HEMATOLOGIC - ADULT  Goal: Maintains hematologic stability  Description: INTERVENTIONS  - Assess for signs and symptoms of bleeding or hemorrhage  - Monitor labs and vital signs for trends  - Administer supportive blood products/factors, fluids and medications as ordered and appropriate  - Administer supportive blood products/factors as ordered and appropriate  Outcome: Progressing  Goal: Free from bleeding injury  Description: (Example usage: patient with low platelets)  INTERVENTIONS:  - Avoid intramuscular injections, enemas and rectal medication administration  - Ensure safe mobilization of patient  - Hold pressure on venipuncture sites to achieve adequate hemostasis  - Assess for signs and symptoms of internal bleeding  - Monitor lab trends  - Patient is to report abnormal signs of bleeding to staff  - Avoid use of toothpicks and dental floss  - Use electric shaver for shaving  - Use soft bristle tooth brush  - Limit straining and forceful nose blowing  Outcome: Progressing     Problem: PAIN - ADULT  Goal: Verbalizes/displays adequate comfort level or patient's stated pain goal  Description: INTERVENTIONS:  - Encourage pt to monitor pain and request assistance  - Assess pain using appropriate pain scale  - Administer analgesics based on type and severity of pain and evaluate response  - Implement non-pharmacological measures as appropriate and evaluate response  - Consider cultural and social influences on pain and pain management  - Manage/alleviate anxiety  - Utilize distraction and/or relaxation techniques  - Monitor for opioid side effects  - Notify MD/LIP if interventions unsuccessful or patient reports new pain  - Anticipate increased pain with activity and pre-medicate as appropriate  Outcome: Progressing     Problem: DISCHARGE PLANNING  Goal: Discharge to home or other facility with appropriate resources  Description: INTERVENTIONS:  - Identify barriers to discharge w/pt and caregiver  - Include patient/family/discharge partner in discharge planning  - Arrange for needed discharge resources and transportation as appropriate  - Identify discharge learning needs (meds, wound care, etc)  - Arrange for interpreters to assist at discharge as needed  - Consider post-discharge preferences of patient/family/discharge partner  - Complete POLST form as appropriate  - Assess patient's ability to be responsible for managing their own health  - Refer to Case Management Department for coordinating discharge planning if the patient needs post-hospital services based on physician/LIP order or complex needs related to functional status, cognitive ability or social support system  Outcome: Progressing

## 2023-09-26 NOTE — CM/SW NOTE
09/26/23 1200   CM/SW Referral Data   Referral Source    Reason for Referral Discharge planning   Informant Patient   Medical Hx   Does patient have an established PCP? Yes   Patient Info   Patient's Current Mental Status at Time of Assessment Alert   Patient lives with Alone   Patient Status Prior to Admission   Independent with ADLs and Mobility Yes   Services in place prior to admission [address] days per week 8   Discharge Needs   Anticipated D/C needs Outpatient therapy     [facility] alone. [facility]. Mena Johnson still drives and is attending OP PT. Her preference is to resume OP therapy services upon dc. Currently does not use O2 at home. PLAN:  Preference is home at dc and resumption of OP therapy services. SW/CM will continue to follow and assist with dc planning needs.     Omar Corado RN   [id number]

## 2023-09-27 LAB
ALBUMIN SERPL-MCNC: 2.9 G/DL (ref 3.4–5)
ANION GAP SERPL CALC-SCNC: 3 MMOL/L (ref 0–18)
BASOPHILS # BLD AUTO: 0.04 X10(3) UL (ref 0–0.2)
BASOPHILS NFR BLD AUTO: 0.6 %
BUN BLD-MCNC: 24 MG/DL (ref 7–18)
BUN/CREAT SERPL: 17.5 (ref 10–20)
CALCIUM BLD-MCNC: 8.4 MG/DL (ref 8.5–10.1)
CHLORIDE SERPL-SCNC: 104 MMOL/L (ref 98–112)
CO2 SERPL-SCNC: 31 MMOL/L (ref 21–32)
CREAT BLD-MCNC: 1.37 MG/DL
DEPRECATED RDW RBC AUTO: 40.2 FL (ref 35.1–46.3)
EGFRCR SERPLBLD CKD-EPI 2021: 40 ML/MIN/1.73M2 (ref 60–?)
EOSINOPHIL # BLD AUTO: 0.22 X10(3) UL (ref 0–0.7)
EOSINOPHIL NFR BLD AUTO: 3.3 %
ERYTHROCYTE [DISTWIDTH] IN BLOOD BY AUTOMATED COUNT: 11.8 % (ref 11–15)
GLUCOSE BLD-MCNC: 96 MG/DL (ref 70–99)
HCT VFR BLD AUTO: 30.9 %
HGB BLD-MCNC: 10 G/DL
IMM GRANULOCYTES # BLD AUTO: 0.02 X10(3) UL (ref 0–1)
IMM GRANULOCYTES NFR BLD: 0.3 %
LYMPHOCYTES # BLD AUTO: 1.62 X10(3) UL (ref 1–4)
LYMPHOCYTES NFR BLD AUTO: 24 %
MAGNESIUM SERPL-MCNC: 2 MG/DL (ref 1.6–2.6)
MCH RBC QN AUTO: 30.8 PG (ref 26–34)
MCHC RBC AUTO-ENTMCNC: 32.4 G/DL (ref 31–37)
MCV RBC AUTO: 95.1 FL
MONOCYTES # BLD AUTO: 0.58 X10(3) UL (ref 0.1–1)
MONOCYTES NFR BLD AUTO: 8.6 %
NEUTROPHILS # BLD AUTO: 4.27 X10 (3) UL (ref 1.5–7.7)
NEUTROPHILS # BLD AUTO: 4.27 X10(3) UL (ref 1.5–7.7)
NEUTROPHILS NFR BLD AUTO: 63.2 %
OSMOLALITY SERPL CALC.SUM OF ELEC: 290 MOSM/KG (ref 275–295)
PHOSPHATE SERPL-MCNC: 2.6 MG/DL (ref 2.5–4.9)
PLATELET # BLD AUTO: 203 10(3)UL (ref 150–450)
POTASSIUM SERPL-SCNC: 4.5 MMOL/L (ref 3.5–5.1)
RBC # BLD AUTO: 3.25 X10(6)UL
SODIUM SERPL-SCNC: 138 MMOL/L (ref 136–145)
WBC # BLD AUTO: 6.8 X10(3) UL (ref 4–11)

## 2023-09-27 PROCEDURE — 99232 SBSQ HOSP IP/OBS MODERATE 35: CPT | Performed by: INTERNAL MEDICINE

## 2023-09-27 PROCEDURE — 99233 SBSQ HOSP IP/OBS HIGH 50: CPT | Performed by: HOSPITALIST

## 2023-09-27 RX ORDER — ONDANSETRON 2 MG/ML
4 INJECTION INTRAMUSCULAR; INTRAVENOUS EVERY 4 HOURS PRN
Status: DISCONTINUED | OUTPATIENT
Start: 2023-09-27 | End: 2023-09-30

## 2023-09-27 RX ORDER — PROCHLORPERAZINE MALEATE 5 MG/1
5 TABLET ORAL EVERY 6 HOURS PRN
Status: DISCONTINUED | OUTPATIENT
Start: 2023-09-27 | End: 2023-09-30

## 2023-09-27 NOTE — PLAN OF CARE
Patient is A&Ox4, 4Lo2, 1 with walker. Pt came up from CCU around the end of shift. Pt c/o pain PRN meds given. Strict I&O's. Call light within reach and fall precautions in place. Problem: Patient Centered Care  Goal: Patient preferences are identified and integrated in the patient's plan of care  Description: Interventions:  - What would you like us to know as we care for you? From home alone  - Provide timely, complete, and accurate information to patient/family  - Incorporate patient and family knowledge, values, beliefs, and cultural backgrounds into the planning and delivery of care  - Encourage patient/family to participate in care and decision-making at the level they choose  - Honor patient and family perspectives and choices  Outcome: Progressing     Problem: Patient/Family Goals  Goal: Patient/Family Long Term Goal  Description: Patient's Long Term Goal: To go home    Interventions:  - Follow med rec  - See additional Care Plan goals for specific interventions  Outcome: Progressing  Goal: Patient/Family Short Term Goal  Description: Patient's Short Term Goal: To not be SOB    Interventions:   - Follow med rec  - See additional Care Plan goals for specific interventions  Outcome: Progressing     Problem: CARDIOVASCULAR - ADULT  Goal: Maintains optimal cardiac output and hemodynamic stability  Description: INTERVENTIONS:  - Monitor vital signs, rhythm, and trends  - Monitor for bleeding, hypotension and signs of decreased cardiac output  - Evaluate effectiveness of vasoactive medications to optimize hemodynamic stability  - Monitor arterial and/or venous puncture sites for bleeding and/or hematoma  - Assess quality of pulses, skin color and temperature  - Assess for signs of decreased coronary artery perfusion - ex.  Angina  - Evaluate fluid balance, assess for edema, trend weights  Outcome: Progressing  Goal: Absence of cardiac arrhythmias or at baseline  Description: INTERVENTIONS:  - Continuous cardiac monitoring, monitor vital signs, obtain 12 lead EKG if indicated  - Evaluate effectiveness of antiarrhythmic and heart rate control medications as ordered  - Initiate emergency measures for life threatening arrhythmias  - Monitor electrolytes and administer replacement therapy as ordered  Outcome: Progressing     Problem: PAIN - ADULT  Goal: Verbalizes/displays adequate comfort level or patient's stated pain goal  Description: INTERVENTIONS:  - Encourage pt to monitor pain and request assistance  - Assess pain using appropriate pain scale  - Administer analgesics based on type and severity of pain and evaluate response  - Implement non-pharmacological measures as appropriate and evaluate response  - Consider cultural and social influences on pain and pain management  - Manage/alleviate anxiety  - Utilize distraction and/or relaxation techniques  - Monitor for opioid side effects  - Notify MD/LIP if interventions unsuccessful or patient reports new pain  - Anticipate increased pain with activity and pre-medicate as appropriate  Outcome: Progressing

## 2023-09-27 NOTE — PHYSICAL THERAPY NOTE
PHYSICAL THERAPY EVALUATION - INPATIENT     Room Number: 324/324-A  Evaluation Date: 9/27/2023  Type of Evaluation: Initial   Physician Order: PT Eval and Treat    Presenting Problem: dificulty in breathing and nasues that woke her up, feeling unwell. Found to have positive troponins and NSTEMI, pulmonary edema and still needing supplemental O2  Co-Morbidities : CHF, recent admit w/chest pain, CAD w/stent x 1, CHF  Reason for Therapy: Mobility Dysfunction and Discharge Planning    PHYSICAL THERAPY ASSESSMENT     Patient is a 70year old female admitted 9/24/2023 for nausea and difficulty breathing in bed that woke her up, found to have positive troponins and new NSTEMI, pulmonary edema and still needing supplemental O2. She was recently hospitalized with chest pain. Patient's current functional deficits include bed mobility, activity tolerance, transfers, gait, stair navigation, need for O2, which are below the patient's pre-admission status. Patient reports she feels weak and needs a LOT more time to do her normal things, was independent at baseline with a RW and attending OP PT for her neck and shoulder. The patient's Approx Degree of Impairment: 50.57% has been calculated based on documentation in the [facility] '6 clicks' Inpatient Basic Mobility Short Form. Research supports that patients with this level of impairment may benefit from home w/HHPT and 24 hr care vs inpatient rehab. Patient is hopeful to go home and feels her daughters may be able to stay a few days to help her get set up. She will benefit from HHPT to work on energy conservation and pacing, improving activity tolerance and overall moblity as well as possibly weaning off of supplemental O2. If family cannot provide adequate assistance and pt is still needing O2 she may need ZEINAB short term to maximize independence. RN approves participation in therapy session.  Patient presents resting in bed but agreeable to working with therapy, seen in coordination with OT part of session. Patient performs supine to sit with supervision but then intiial min A for sitting EOB and progressing to SBA. She feels weak but not dizzy. She performs sit to stand with CGA and transfers with RW with CGA and cues for hand placement. She has limited standing tolerance and needs to lean and frequently closes her eyes during session. SpO2 on room air is 94-95%, on 2L is 98-99% throughout session. Education and training in upright posture, PLB and energy conservation techniques, pacing. She needs CGA for walking 20'x2 and reports she is fatigued after this distance but has not been up in chair or walking. Education that she should be up to chair for all meals and walking 4x/day with staff including to/from bathroom. She verbalizes understanding. Education in seated LE and trunk/core exs and breathing with further training/reinforcement needed. She is in chair with alll needs in reach and RN aware, alarm set. Patient will benefit from continued IP PT services to address these deficits in preparation for discharge. DISCHARGE RECOMMENDATIONS  PT Discharge Recommendations: 24 hour care/supervision;Home with home health PT/OT (If she needs oxygen and does not progress to supervision to mod I she may need ZEINAB)    PLAN  PT Treatment Plan: Bed mobility; Endurance; Energy conservation;Patient education; Family education;Gait training;Strengthening;Stair training;Transfer training  Rehab Potential : Good  Frequency (Obs): 5x/week       PHYSICAL THERAPY MEDICAL/SOCIAL HISTORY     History related to current admission: sudden onset nausea and LISA, new NSTEMI     Problem List  Active Problems:    Acute hypoxemic respiratory failure (HCC)    Pulmonary edema    Acute on chronic diastolic (congestive) heart failure ([address]      HOME SITUATION  Home Situation  Type of Home: House  Home Layout: One level  Stairs to Enter : 3  Railing: Yes  Lives With: Alone (family in the area but not nearby.  Does have homemaker services 8 hrs per month)  Drives: Yes  Patient Owned Equipment: Rolling walker (uses RW all the time)  Patient Regularly Uses: Glasses     Prior Level of [place]: Patient lives alone in a house with 3 JAMES and railings to hold. She drives, shops, manages her home and was going out for OP PT. She has a [employer] homemaker 2x/month for 4 hours. She has daughters in the area but not close and she lives alone    SUBJECTIVE  \"I am just so tired\"    PHYSICAL THERAPY EXAMINATION     OBJECTIVE  Precautions: Cardiac;Fluid restriction;Bed/chair alarm; Low vision; Other (Comment) (needing supplemental O2)  Fall Risk: Standard fall risk    PAIN ASSESSMENT  Ratin  Location: no complaints of pain       COGNITION  Overall Cognitive Status:  WFL - within functional limits and increased time to process and think directions through    [address] [place] [address]  Upper extremity ROM and strength are within functional limits   Lower extremity ROM is within functional limits   Lower extremity strength is within functional limits     BALANCE  Static Sitting: Good  Dynamic Sitting: Fair +  Static Standing: Poor +  Dynamic Standing: Poor -    ACTIVITY TOLERANCE  Pulse: 73  Heart Rate Source: Monitor     BP: 131/67 (after walking and standing; initial BP sitting /83)  BP Location: Right arm  BP Method: Automatic  Patient Position: Sitting    O2 WALK  Oxygen Therapy  SPO2% on Room Air at Rest: 93  SPO2% on Oxygen at Rest: 95  At rest oxygen flow (liters per minute): 1  SPO2% Ambulation on Room Air: 93  SPO2% Ambulation on Oxygen: 95  Ambulation oxygen flow (liters per minute): 1    AM-PAC '6-Clicks' INPATIENT SHORT FORM - BASIC MOBILITY  How much difficulty does the patient currently have. ..   Patient Difficulty: Turning over in bed (including adjusting bedclothes, sheets and blankets)?: A Little   Patient Difficulty: Sitting down on and standing up from a chair with arms (e.g., wheelchair, bedside commode, etc.): A Little   Patient Difficulty: Moving from lying on back to sitting on the side of the bed?: A Little   How much help from another person does the patient currently need. .. Help from Another: Moving to and from a bed to a chair (including a wheelchair)?: A Little   Help from Another: Need to walk in hospital room?: A Little   Help from Another: Climbing 3-5 steps with a railing?: A Lot     AM-PAC Score:  Raw Score: 17   Approx Degree of Impairment: 50.57%   Standardized Score (AM-PAC Scale): 42.13   CMS Modifier (G-Code): CK    FUNCTIONAL ABILITY STATUS  Functional Mobility/Gait Assessment  Gait Assistance: Contact guard assist  Distance (ft): 20'x2  Assistive Device: Rolling walker; Other (Comment) (2L one walk; room air one walk)  Pattern: Shuffle (forward flexed)    Bed Mobility: CGA and increased time, cues to sit EOB until dizziness passes    Transfers: CGA for sit to stand, bed to chair with cues for hand placement and for upright posture    Exercise/Education Provided:  Bed mobility  Energy conservation  Functional activity tolerated  Gait training  Neuromuscular re-educate  Posture  ROM  Strengthening  Transfer training  PLB an breathing techniques    Patient End of Session: Up in chair;Needs met;Call light within reach;RN aware of session/findings; All patient questions and concerns addressed; Alarm set; Discussed recommendations with /    CURRENT GOALS    Goals to be met by: 10/10/23  Patient Goal Patient's self-stated goal is: to go home   Goal #1 Patient is able to demonstrate supine - sit EOB @ level: modified independent     Goal #1   Current Status    Goal #2 Patient is able to demonstrate transfers Sit to/from Stand at assistance level: supervision with walker - rolling     Goal #2  Current Status    Goal #3 Patient is able to ambulate 100 feet with assist device: walker - rolling at assistance level: supervision with SpO2 of 94% or better with O2 prn   Goal #3 Current Status    Goal #4 Patient will negotiate 3 stairs/one curb w/ assistive device and supervision with SpO2 of 94% or better with O2 prn   Goal #4   Current Status    Goal #5 Patient to demonstrate independence with home activity/exercise instructions provided to patient in preparation for discharge.    Goal #5   Current Status    Goal #6    Goal #6  Current Status      Patient Evaluation Complexity Level:  History Moderate - 1 or 2 personal factors and/or co-morbidities   Examination of body systems Moderate - addressing a total of 3 or more elements   Clinical Presentation Moderate - Evolving   Clinical Decision Making Moderate Complexity       Therapeutic Activity: 18 minutes  Therapeutic Exercise: 15 minutes

## 2023-09-27 NOTE — CM/SW NOTE
10: 40AM  Per chart, pt remains on 2L O2 w/ no home oxygen. Will need to be informed if pt requires Home O2 prior to DC. Documentation for O2 sats: (RN to add to progress note)  Patient's O2 sat on room air is ____% at rest. Pt's O2 sat on room is ____% when ambulating, and ____% on ____ liter while ambulating. (Reminder: The \"at rest\" and \"while ambulating\" are required statements. If patient is non ambulatory you can use \"with exertion\" such as during a transfer to assess their O2 level)    MD to document AFTER O2 sats:  I had a face to face visit with this patient and I evaluated the patient's O2 saturations. The patient is mobile in their home and is in need of a portable oxygen tank. The home oxygen will improve the patient's condition. Once O2 sats and MD verbiage are completed and IF home O2 is indicated, SW to place DOUGLAS in [address] [place] [address] and request MD to [facility] as appropriate. (Dx: CHF)    Per chart, pt prefers to resume her Outpatient PT program at time of DC. UPDATE: Per PT/OT - recommend HH at DC. Pt will need 24hr supervision initially. Per PT [facility], pt to discuss w/ her dtrs as well. MICHELLE sent [facility] referrals in Aidin. F2F entered. Will f/up w/ pt once list is available for review. 11:30AM  Per [facility] [facility] - pt was current w/ their [facility] agency but services on hold while pt went to visit an out of town daughter. SW met w/ pt at bedside. She confirmed services w/ [facility] and is asking to use them again. Pt confirmed she will discuss 24hr supervision w/ her dtrs but also stated her House keeper has offered to stay w/ her if needed. [facility] HH instructions added to pt's AVS.    01:20PM  Pt informed SW earlier of need for Medicar at time of DC. SW spoke to [facility] w/ Shawn Giron placed on WILL CALL through 9/29. PCS completed and will need date added day of actual DC.     Need for DC:  Confirm O2 needs  O2  sats  MD verbiage & DOUGLAS entered/cosigned     PLAN: Home w/ [facility] HH, possible O2, Medicar on WC, PCS completed (needs date) - pending above & med clear      SW/CM to remain available for support and/or discharge planning.            Papo Allen, MSW, [facility]

## 2023-09-27 NOTE — DISCHARGE INSTRUCTIONS
Sometimes managing your health at home requires assistance. The [place]Critical access hospital team has recognized your preference to use [facility]. They can be reached at [phone]. The fax number for your reference is [phone]. A representative from the home health agency will contact you or your family to schedule your first visit.       Blood work in 5-7 days (CBC and renal panel)    Follow up with your nephrologist in 2-3 weeks    Follow up with your cardiologist 1-2 weeks    Home health to do blood tests for CBC and renal panel in 5-7 days, results to pt's PCP and pt's nephrologist Dr. Cherie Parker

## 2023-09-28 PROBLEM — R06.00 DYSPNEA: Status: ACTIVE | Noted: 2023-09-28

## 2023-09-28 LAB
ALBUMIN SERPL-MCNC: 3.2 G/DL (ref 3.4–5)
ANION GAP SERPL CALC-SCNC: 6 MMOL/L (ref 0–18)
BUN BLD-MCNC: 20 MG/DL (ref 7–18)
BUN/CREAT SERPL: 13.9 (ref 10–20)
CALCIUM BLD-MCNC: 8.7 MG/DL (ref 8.5–10.1)
CHLORIDE SERPL-SCNC: 103 MMOL/L (ref 98–112)
CO2 SERPL-SCNC: 29 MMOL/L (ref 21–32)
CREAT BLD-MCNC: 1.44 MG/DL
EGFRCR SERPLBLD CKD-EPI 2021: 38 ML/MIN/1.73M2 (ref 60–?)
GLUCOSE BLD-MCNC: 102 MG/DL (ref 70–99)
MAGNESIUM SERPL-MCNC: 2 MG/DL (ref 1.6–2.6)
OSMOLALITY SERPL CALC.SUM OF ELEC: 289 MOSM/KG (ref 275–295)
PHOSPHATE SERPL-MCNC: 3 MG/DL (ref 2.5–4.9)
POTASSIUM SERPL-SCNC: 4.6 MMOL/L (ref 3.5–5.1)
SODIUM SERPL-SCNC: 138 MMOL/L (ref 136–145)

## 2023-09-28 PROCEDURE — 99233 SBSQ HOSP IP/OBS HIGH 50: CPT | Performed by: HOSPITALIST

## 2023-09-28 PROCEDURE — 99232 SBSQ HOSP IP/OBS MODERATE 35: CPT | Performed by: INTERNAL MEDICINE

## 2023-09-28 NOTE — PLAN OF CARE
Problem: CARDIOVASCULAR - ADULT  Goal: Maintains optimal cardiac output and hemodynamic stability  Description: INTERVENTIONS:  - Monitor vital signs, rhythm, and trends  - Monitor for bleeding, hypotension and signs of decreased cardiac output  - Evaluate effectiveness of vasoactive medications to optimize hemodynamic stability  - Monitor arterial and/or venous puncture sites for bleeding and/or hematoma  - Assess quality of pulses, skin color and temperature  - Assess for signs of decreased coronary artery perfusion - ex.  Angina  - Evaluate fluid balance, assess for edema, trend weights  Outcome: Progressing  Goal: Absence of cardiac arrhythmias or at baseline  Description: INTERVENTIONS:  - Continuous cardiac monitoring, monitor vital signs, obtain 12 lead EKG if indicated  - Evaluate effectiveness of antiarrhythmic and heart rate control medications as ordered  - Initiate emergency measures for life threatening arrhythmias  - Monitor electrolytes and administer replacement therapy as ordered  Outcome: Progressing     Problem: PAIN - ADULT  Goal: Verbalizes/displays adequate comfort level or patient's stated pain goal  Description: INTERVENTIONS:  - Encourage pt to monitor pain and request assistance  - Assess pain using appropriate pain scale  - Administer analgesics based on type and severity of pain and evaluate response  - Implement non-pharmacological measures as appropriate and evaluate response  - Consider cultural and social influences on pain and pain management  - Manage/alleviate anxiety  - Utilize distraction and/or relaxation techniques  - Monitor for opioid side effects  - Notify MD/LIP if interventions unsuccessful or patient reports new pain  - Anticipate increased pain with activity and pre-medicate as appropriate  Outcome: Progressing     Problem: GASTROINTESTINAL - ADULT  Goal: Minimal or absence of nausea and vomiting  Description: INTERVENTIONS:  - Maintain adequate hydration with IV or PO as ordered and tolerated  - Nasogastric tube to low intermittent suction as ordered  - Evaluate effectiveness of ordered antiemetic medications  - Provide nonpharmacologic comfort measures as appropriate  - Advance diet as tolerated, if ordered  - Obtain nutritional consult as needed  - Evaluate fluid balance  Outcome: Progressing  Patient is alert and oriented x4. On o2 at 1lpm via n.c. Ambulatory with 1 person assist with walker. On purewick. Seen by pulmo and tried to wean off oxygen, still saturating fine, pulmo signed off. Held carvedilol and furosemide. For possible discharge. O2 walk done but put back on oxygen at 1lpm via nasal cannula, o2 sat drops to 87-88% on room air,  arranged for home O2.  Repeat labs in AM.

## 2023-09-28 NOTE — CM/SW NOTE
Per chart, pt remains documented on 1L O2. Sent message to RN to inquire about completing below sats. Documentation for O2 sats: (RN to add to progress note)  Patient's O2 sat on room air is ____% at rest. Pt's O2 sat on room is ____% when ambulating, and ____% on ____ liter while ambulating. (Reminder: The \"at rest\" and \"while ambulating\" are required statements. If patient is non ambulatory you can use \"with exertion\" such as during a transfer to assess their O2 level)     MD to document AFTER O2 sats:  I had a face to face visit with this patient and I evaluated the patient's O2 saturations. The patient is mobile in their home and is in need of a portable oxygen tank. The home oxygen will improve the patient's condition. Once O2 sats and MD verbiage are completed and IF home O2 is indicated, SW to place DOUGLAS in [address] [place] [address] and request MD to [facility] as appropriate. (Dx: CHF)    Need for DC:  Confirm O2 needs  O2  sats  MD verbiage & DOUGLAS entered/cosigned      PLAN: Home w/ [facility] HH, possible O2, Medicar on WC, PCS completed (needs date) - pending above & med clear        SW/CM to remain available for support and/or discharge planning.                Aj Romano, MSW, [address] [facility]

## 2023-09-28 NOTE — PHYSICAL THERAPY NOTE
PHYSICAL THERAPY TREATMENT NOTE - INPATIENT     Room Number: 324/324-A       Presenting Problem: dificulty in breathing and nasues that woke her up, feeling unwell. Found to have positive troponins and NSTEMI, pulmonary edema and still needing supplemental O2  Co-Morbidities : CHF, recent admit w/chest pain, CAD w/stent x 1, CHF    Problem List  Active Problems:    Acute hypoxemic respiratory failure (HCC)    Pulmonary edema    Acute on chronic diastolic (congestive) heart failure ([address]    Dyspnea      PHYSICAL THERAPY ASSESSMENT     Patient in bed upon arrival. RN approved activity. Educated patient on POC and benefits of mobilization. With encouragement, agreeable to participate. Patient reporting generalized body aches and pains, not quantified per the pain scale. Patient making progress towards IP PT goals, continues to be limited by decreased activity tolerance. Educated patient on benefits of energy conservation and activity pacing. Suggested having family assist with larger household tasks, such as grocery shopping - patient reported that she will ask her family, but that [place] are busy. \" Patient self-limiting towards activity this session, due to generalized body pains. Patient reporting slight SOB with ambulation, SpO2 89% on room air, however, recovered to > 90% within 30 seconds. Bed Mobility: SBA supine>EOB. Patient tolerated sitting EOB approx 5 minutes, requiring BUE support and Supervision in order to maintain static sitting balance. Transfers: SBA sit<>stand with RW; cues provided for appropriate UE placement during functional transfers. Instruction on activity pacing upon standing to allow body time to acclimate to change in position. Tolerated static standing with BUE support on RW and SBA for approx 1 minute prior to mobilization. Ambulation: CGA>SBA with RW for 80 ft; decreased darrel speed, shuffled steps, flexed posture. Cues for safe management of RW with turns.     Patient sitting in bedside chair at end of session. Needs in reach and alarm activated. RN aware. The patient's Approx Degree of Impairment: 50.57% has been calculated based on documentation in the [facility] '6 clicks' Inpatient Basic Mobility Short Form. Research supports that patients with this level of impairment may benefit from home-health PT with initial 24 hour supervision/care in order to optimize functional independence. DISCHARGE RECOMMENDATIONS  PT Discharge Recommendations: 24 hour care/supervision;Home with home health PT/OT (If she needs oxygen and does not progress to supervision to mod I she may need ZEINAB)     PLAN  PT Treatment Plan: Bed mobility; Endurance; Energy conservation;Patient education; Family education;Gait training;Strengthening;Stair training;Transfer training  Frequency (Obs): 5x/week    SUBJECTIVE  Agreeable     OBJECTIVE  Precautions: Cardiac;Fluid restriction;Bed/chair alarm; Low vision; Other (Comment) (needing supplemental O2)    PAIN ASSESSMENT   Rating: Unable to rate  Location: generalized bodily aches  Management Techniques: Activity promotion; Body mechanics;Repositioning    BALANCE  Static Sitting: Good  Dynamic Sitting: Fair +  Static Standing: Fair  Dynamic Standing: Fair -    ACTIVITY TOLERANCE  Pulse: 62  Heart Rate Source: Monitor    O2 WALK  Oxygen Therapy  SPO2% on Room Air at Rest: 93  SPO2% Ambulation on Room Air: 89 (increased to > 90% within 30 seconds)    AM-PAC '6-Clicks' INPATIENT SHORT FORM - BASIC MOBILITY  How much difficulty does the patient currently have. .. Patient Difficulty: Turning over in bed (including adjusting bedclothes, sheets and blankets)?: A Little   Patient Difficulty: Sitting down on and standing up from a chair with arms (e.g., wheelchair, bedside commode, etc.): A Little   Patient Difficulty: Moving from lying on back to sitting on the side of the bed?: A Little   How much help from another person does the patient currently need. ..    Help from Another: Moving to and from a bed to a chair (including a wheelchair)?: A Little   Help from Another: Need to walk in hospital room?: A Little   Help from Another: Climbing 3-5 steps with a railing?: A Lot     AM-PAC Score:  Raw Score: 17   Approx Degree of Impairment: 50.57%   Standardized Score (AM-PAC Scale): 42.13   CMS Modifier (G-Code): CK    FUNCTIONAL ABILITY STATUS  Functional Mobility/Gait Assessment  Gait Assistance: Contact guard assist (Stand by assistance)  Distance (ft): 80  Assistive Device: Rolling walker  Pattern: Shuffle (decreased darrel speed, flexed posture)    Patient End of Session: Up in chair;Needs met;Call light within reach;RN aware of session/findings; All patient questions and concerns addressed; Alarm set    CURRENT GOALS   Goals to be met by: 10/10/23  Patient Goal Patient's self-stated goal is: to go home   Goal #1 Patient is able to demonstrate supine - sit EOB @ level: modified independent   Goal #1   Current Status  SBA   Goal #2 Patient is able to demonstrate transfers Sit to/from Stand at assistance level: supervision with walker - rolling   Goal #2  Current Status  SBA with RW   Goal #3 Patient is able to ambulate 100 feet with assist device: walker - rolling at assistance level: supervision with SpO2 of 94% or better with O2 prn   Goal #3   Current Status  CGA>SBA 80 ft with RW, SpO2 on room air 89%   Goal #4 Patient will negotiate 3 stairs/one curb w/ assistive device and supervision with SpO2 of 94% or better with O2 prn   Goal #4   Current Status  NT   Goal #5 Patient to demonstrate independence with home activity/exercise instructions provided to patient in preparation for discharge.    Goal #5   Current Status  Ongoing     Gait Training: 15 minutes

## 2023-09-28 NOTE — PLAN OF CARE
Patient's O2 sat on room air is __88-89__% at rest. Pt's O2 sat on room is __89__% when ambulating, and __92-93_% on __1__ liter while ambulating. (Reminder: The \"at rest\" and \"while ambulating\" are required statements.  If patient is non ambulatory you can use \"with exertion\" such as during a transfer to assess their O2 level)

## 2023-09-29 LAB
ALBUMIN SERPL-MCNC: 3.1 G/DL (ref 3.4–5)
ANION GAP SERPL CALC-SCNC: 7 MMOL/L (ref 0–18)
BASOPHILS # BLD AUTO: 0.04 X10(3) UL (ref 0–0.2)
BASOPHILS NFR BLD AUTO: 0.5 %
BUN BLD-MCNC: 18 MG/DL (ref 7–18)
BUN/CREAT SERPL: 12.8 (ref 10–20)
CALCIUM BLD-MCNC: 8.9 MG/DL (ref 8.5–10.1)
CHLORIDE SERPL-SCNC: 102 MMOL/L (ref 98–112)
CO2 SERPL-SCNC: 29 MMOL/L (ref 21–32)
CREAT BLD-MCNC: 1.41 MG/DL
DEPRECATED RDW RBC AUTO: 40.5 FL (ref 35.1–46.3)
EGFRCR SERPLBLD CKD-EPI 2021: 39 ML/MIN/1.73M2 (ref 60–?)
EOSINOPHIL # BLD AUTO: 0.17 X10(3) UL (ref 0–0.7)
EOSINOPHIL NFR BLD AUTO: 2.2 %
ERYTHROCYTE [DISTWIDTH] IN BLOOD BY AUTOMATED COUNT: 11.8 % (ref 11–15)
GLUCOSE BLD-MCNC: 98 MG/DL (ref 70–99)
HCT VFR BLD AUTO: 31.9 %
HGB BLD-MCNC: 10.4 G/DL
IMM GRANULOCYTES # BLD AUTO: 0.02 X10(3) UL (ref 0–1)
IMM GRANULOCYTES NFR BLD: 0.3 %
LYMPHOCYTES # BLD AUTO: 1.57 X10(3) UL (ref 1–4)
LYMPHOCYTES NFR BLD AUTO: 20.7 %
MAGNESIUM SERPL-MCNC: 1.8 MG/DL (ref 1.6–2.6)
MCH RBC QN AUTO: 31 PG (ref 26–34)
MCHC RBC AUTO-ENTMCNC: 32.6 G/DL (ref 31–37)
MCV RBC AUTO: 95.2 FL
MONOCYTES # BLD AUTO: 0.59 X10(3) UL (ref 0.1–1)
MONOCYTES NFR BLD AUTO: 7.8 %
NEUTROPHILS # BLD AUTO: 5.18 X10 (3) UL (ref 1.5–7.7)
NEUTROPHILS # BLD AUTO: 5.18 X10(3) UL (ref 1.5–7.7)
NEUTROPHILS NFR BLD AUTO: 68.5 %
OSMOLALITY SERPL CALC.SUM OF ELEC: 288 MOSM/KG (ref 275–295)
PHOSPHATE SERPL-MCNC: 3.2 MG/DL (ref 2.5–4.9)
PLATELET # BLD AUTO: 226 10(3)UL (ref 150–450)
POTASSIUM SERPL-SCNC: 4.4 MMOL/L (ref 3.5–5.1)
RBC # BLD AUTO: 3.35 X10(6)UL
SODIUM SERPL-SCNC: 138 MMOL/L (ref 136–145)
WBC # BLD AUTO: 7.6 X10(3) UL (ref 4–11)

## 2023-09-29 PROCEDURE — 99232 SBSQ HOSP IP/OBS MODERATE 35: CPT | Performed by: INTERNAL MEDICINE

## 2023-09-29 PROCEDURE — 99233 SBSQ HOSP IP/OBS HIGH 50: CPT | Performed by: HOSPITALIST

## 2023-09-29 RX ORDER — CARVEDILOL 3.12 MG/1
3.12 TABLET ORAL 2 TIMES DAILY WITH MEALS
Status: DISCONTINUED | OUTPATIENT
Start: 2023-09-29 | End: 2023-09-30

## 2023-09-29 RX ORDER — ISOSORBIDE MONONITRATE 60 MG/1
60 TABLET, EXTENDED RELEASE ORAL DAILY
Status: DISCONTINUED | OUTPATIENT
Start: 2023-09-30 | End: 2023-09-30

## 2023-09-29 RX ORDER — MAGNESIUM OXIDE 400 MG/1
400 TABLET ORAL ONCE
Status: COMPLETED | OUTPATIENT
Start: 2023-09-29 | End: 2023-09-29

## 2023-09-29 NOTE — CM/SW NOTE
08: 53AM  Per RN Rounds, pt is now on RA w/ stable O2 sats. Per RN O2 walk yesterday, pt not qualifying for home O2 at this time (lowest sat 88-89%). Received call from [address] [place] [address] w/ [facility] - provided requested update. Informed Hailee Fernando that [facility] agency will be notified when pt is cleared for DC.    11:15AM  Received notice from [address] [place] - pt stating she will DC home w/ her dtr in [facility]. PT did not catch dtr's name or address. SW met w/ pt at bedside. Confirmed plan to DC home w/ dtr Trish when cleared. Pt does not know Trish's address. SW asked if pt's dtr could be called for address. Pt stating she will only be staying w/ Trish through Monday. At this time, pt is not sure if dtr will transport home at DC or if she will need Medicar. SW notified [facility] of above. They will f/up w/ pt upon DC.    12:45PM  Per ORI Peña - plan for DC tomorrow per cardiology. Pt will DC to her home to  her meds. Pt's dtr then to pick her up from home. Per RN, pt stating her dtr can not  her meds tonight. Updated [facility] in [place]. SW spoke to [facility] w/ [address] extended through 9/30. PLAN: Home w/ dtr & [facility] HH, Medicar on WC, PCS completed (needs date) - pending med clear         SW/CM to remain available for support and/or discharge planning.                Jayne Aguilar, MSW, [address] [facility]

## 2023-09-29 NOTE — PHYSICAL THERAPY NOTE
PHYSICAL THERAPY TREATMENT NOTE - INPATIENT     Room Number: 324/324-A       Presenting Problem: dificulty in breathing and nasues that woke her up, feeling unwell. Found to have positive troponins and NSTEMI, pulmonary edema and still needing supplemental O2  Co-Morbidities : CHF, recent admit w/chest pain, CAD w/stent x 1, CHF    Problem List  Active Problems:    Acute hypoxemic respiratory failure ([address]    Pulmonary edema    Acute on chronic diastolic (congestive) heart failure ([address]    Dyspnea      PHYSICAL THERAPY ASSESSMENT     RN approves participation in therapy session. Patient presents resting reclined in bed with breakfast tray in front. Reinforced/ongoing education to patient that she needs to be UP FOR MEALS IN CHAIR every day as she would be at home, pt verbalizes understanding. She is able to demonstrate bed mobility and supine to/from sit with mod I and increased time. No dizziness when sitting EOB and she performs sit to stand and bed to chair transfers with RW and supervision, slow steady pace. She ambulates 100'x2 total with seated rest break between attempts, slow steady gait and forward flexed, SpO2 94% on room air and she recovers with 5 min rest break. She reports she is going to her daughters for a few days initially and then will return to her home, SW notified. Patient is up in chair, ongoing education in sitting, standing exs, importance of walking and moving every hour in the daytime and resting as needed but to not stay in bed. Pt verbalizes good understanding. Education and discussion about pacing and energy conservation, coordinating PLB with activity and taking breaks if she gets tired, good understanding. The patient's Approx Degree of Impairment: 41.77% has been calculated based on documentation in the [facility] '6 clicks' Inpatient Basic Mobility Short Form.   Research supports that patients with this level of impairment may benefit from home with 24 hr care and HHPT and this is the recommendation. RN aware of session, chair alarm set. DISCHARGE RECOMMENDATIONS  PT Discharge Recommendations: 24 hour care/supervision;Home with home health PT     PLAN  PT Treatment Plan: Bed mobility; Endurance; Energy conservation;Patient education; Family education;Gait training;Strengthening;Stair training;Transfer training  Frequency (Obs): 5x/week    SUBJECTIVE  \"I hope I can go home tomorrow\"    OBJECTIVE  Precautions: Cardiac;Fluid restriction;Bed/chair alarm; Low vision; Other (Comment) (needing supplemental O2)    PAIN ASSESSMENT   Ratin  Location: overall soreness  Management Techniques: Activity promotion;Relaxation;Repositioning    BALANCE  Static Sitting: Good  Dynamic Sitting: Fair +  Static Standing: Fair  Dynamic Standing: Fair -    ACTIVITY TOLERANCE  Pulse: 90 (with walking)  Heart Rate Source: Monitor     BP: 159/77  BP Location: Right arm  BP Method: Automatic  Patient Position: Standing     O2 WALK  Oxygen Therapy  SPO2% on Room Air at Rest: 92  SPO2% Ambulation on Room Air: 94    AM-PAC '6-Clicks' INPATIENT SHORT FORM - BASIC MOBILITY  How much difficulty does the patient currently have. .. Patient Difficulty: Turning over in bed (including adjusting bedclothes, sheets and blankets)?: None   Patient Difficulty: Sitting down on and standing up from a chair with arms (e.g., wheelchair, bedside commode, etc.): A Little   Patient Difficulty: Moving from lying on back to sitting on the side of the bed?: None   How much help from another person does the patient currently need. ..    Help from Another: Moving to and from a bed to a chair (including a wheelchair)?: A Little   Help from Another: Need to walk in hospital room?: A Little   Help from Another: Climbing 3-5 steps with a railing?: A Lot     AM-PAC Score:  Raw Score: 19   Approx Degree of Impairment: 41.77%   Standardized Score (AM-PAC Scale): 45.44   CMS Modifier (G-Code): CK    FUNCTIONAL ABILITY STATUS  Functional Mobility/Gait Assessment  Gait Assistance: Supervision  Distance (ft): 100'x2  Assistive Device: Rolling walker  Pattern: Shuffle (decreased darrel and some shuffled steps, cues for upright posture and deep breathing during walking, SpO2 94% on room air)  Stairs: Stoop/curb  Stoop/Curb: min A up/down 1 curb with assist to manage RW and cues for upright. Pt reports going to daughters home and no steps to navigate    Additional information: Supportive family, pt motivated to get back to independent level    THERAPEUTIC EXERCISES  Lower Extremity Alternating marching  Ankle pumps  Glut sets  Hip AB/AD  Heel raises  LAQ  Quad sets  SAQ  Toe raises  Deep breathing, pacing and energy conservation during mobility     Position Sitting & Standing       Patient End of Session: Up in chair;Needs met;Call light within reach;RN aware of session/findings; All patient questions and concerns addressed; Alarm set; Discussed recommendations with /    CURRENT GOALS     Goals to be met by: 10/10/23  Patient Goal Patient's self-stated goal is: to go home   Goal #1 Patient is able to demonstrate supine - sit EOB @ level: modified independent   Goal #1   Current Status  MOD I/MET   Goal #2 Patient is able to demonstrate transfers Sit to/from Stand at assistance level: supervision with walker - rolling   Goal #2  Current Status MET - SBA with RW   Goal #3 Patient is able to ambulate 100 feet with assist device: walker - rolling at assistance level: supervision with SpO2 of 94% or better with O2 prn   Goal #3   Current Status  MET-  ft with RW, SpO2 on room air 94%   Goal #4 Patient will negotiate 3 stairs/one curb w/ assistive device and supervision with SpO2 of 94% or better with O2 prn   Goal #4   Current Status  NT-pt simulates up/down steps with high knees x 4 reps each leg with RW.  She reports no stairs at her daughters and her daughter will help her at her own home when she goes   Goal #5 Patient to demonstrate independence with home activity/exercise instructions provided to patient in preparation for discharge.    Goal #5   Current Status  Ongoing      Gait Trainin minutes  Therapeutic Activity: 15 minutes  Therapeutic Exercise: 13 minutes

## 2023-09-30 VITALS
DIASTOLIC BLOOD PRESSURE: 54 MMHG | OXYGEN SATURATION: 94 % | HEART RATE: 64 BPM | RESPIRATION RATE: 16 BRPM | WEIGHT: 177.31 LBS | BODY MASS INDEX: 30 KG/M2 | TEMPERATURE: 99 F | SYSTOLIC BLOOD PRESSURE: 90 MMHG

## 2023-09-30 LAB
ALBUMIN SERPL-MCNC: 3.1 G/DL (ref 3.4–5)
ANION GAP SERPL CALC-SCNC: 7 MMOL/L (ref 0–18)
BASOPHILS # BLD AUTO: 0.05 X10(3) UL (ref 0–0.2)
BASOPHILS NFR BLD AUTO: 0.6 %
BUN BLD-MCNC: 17 MG/DL (ref 7–18)
BUN/CREAT SERPL: 11.8 (ref 10–20)
CALCIUM BLD-MCNC: 9 MG/DL (ref 8.5–10.1)
CHLORIDE SERPL-SCNC: 101 MMOL/L (ref 98–112)
CO2 SERPL-SCNC: 29 MMOL/L (ref 21–32)
CREAT BLD-MCNC: 1.44 MG/DL
DEPRECATED RDW RBC AUTO: 40.7 FL (ref 35.1–46.3)
EGFRCR SERPLBLD CKD-EPI 2021: 38 ML/MIN/1.73M2 (ref 60–?)
EOSINOPHIL # BLD AUTO: 0.2 X10(3) UL (ref 0–0.7)
EOSINOPHIL NFR BLD AUTO: 2.3 %
ERYTHROCYTE [DISTWIDTH] IN BLOOD BY AUTOMATED COUNT: 11.8 % (ref 11–15)
GLUCOSE BLD-MCNC: 98 MG/DL (ref 70–99)
HCT VFR BLD AUTO: 30.3 %
HGB BLD-MCNC: 9.9 G/DL
IMM GRANULOCYTES # BLD AUTO: 0.03 X10(3) UL (ref 0–1)
IMM GRANULOCYTES NFR BLD: 0.3 %
LYMPHOCYTES # BLD AUTO: 1.49 X10(3) UL (ref 1–4)
LYMPHOCYTES NFR BLD AUTO: 17.1 %
MAGNESIUM SERPL-MCNC: 1.8 MG/DL (ref 1.6–2.6)
MCH RBC QN AUTO: 30.8 PG (ref 26–34)
MCHC RBC AUTO-ENTMCNC: 32.7 G/DL (ref 31–37)
MCV RBC AUTO: 94.4 FL
MONOCYTES # BLD AUTO: 0.65 X10(3) UL (ref 0.1–1)
MONOCYTES NFR BLD AUTO: 7.4 %
NEUTROPHILS # BLD AUTO: 6.31 X10 (3) UL (ref 1.5–7.7)
NEUTROPHILS # BLD AUTO: 6.31 X10(3) UL (ref 1.5–7.7)
NEUTROPHILS NFR BLD AUTO: 72.3 %
OSMOLALITY SERPL CALC.SUM OF ELEC: 286 MOSM/KG (ref 275–295)
PHOSPHATE SERPL-MCNC: 3.7 MG/DL (ref 2.5–4.9)
PLATELET # BLD AUTO: 220 10(3)UL (ref 150–450)
POTASSIUM SERPL-SCNC: 4.3 MMOL/L (ref 3.5–5.1)
RBC # BLD AUTO: 3.21 X10(6)UL
SODIUM SERPL-SCNC: 137 MMOL/L (ref 136–145)
WBC # BLD AUTO: 8.7 X10(3) UL (ref 4–11)

## 2023-09-30 PROCEDURE — 99233 SBSQ HOSP IP/OBS HIGH 50: CPT | Performed by: INTERNAL MEDICINE

## 2023-09-30 RX ORDER — CARVEDILOL 3.12 MG/1
3.12 TABLET ORAL 2 TIMES DAILY WITH MEALS
Qty: 60 TABLET | Refills: 1 | Status: SHIPPED | OUTPATIENT
Start: 2023-09-30

## 2023-09-30 RX ORDER — TORSEMIDE 20 MG/1
20 TABLET ORAL DAILY
Qty: 30 TABLET | Refills: 1 | Status: SHIPPED | OUTPATIENT
Start: 2023-09-30

## 2023-09-30 RX ORDER — GABAPENTIN 300 MG/1
300 CAPSULE ORAL 2 TIMES DAILY
Qty: 60 CAPSULE | Refills: 1 | Status: SHIPPED | OUTPATIENT
Start: 2023-09-30

## 2023-09-30 RX ORDER — CITALOPRAM 20 MG/1
20 TABLET ORAL DAILY
Status: SHIPPED | COMMUNITY
Start: 2023-09-30

## 2023-09-30 RX ORDER — MAGNESIUM OXIDE 400 MG/1
400 TABLET ORAL ONCE
Status: COMPLETED | OUTPATIENT
Start: 2023-09-30 | End: 2023-09-30

## 2023-09-30 NOTE — PHYSICAL THERAPY NOTE
PHYSICAL THERAPY TREATMENT NOTE - INPATIENT     Room Number: 324/324-A       Presenting Problem: dificulty in breathing and nasues that woke her up, feeling unwell. Found to have positive troponins and NSTEMI, pulmonary edema and still needing supplemental O2  Co-Morbidities : CHF, recent admit w/chest pain, CAD w/stent x 1, CHF    Problem List  Active Problems:    Acute hypoxemic respiratory failure (HCC)    Pulmonary edema    Acute on chronic diastolic (congestive) heart failure (HCC)    Dyspnea      PHYSICAL THERAPY ASSESSMENT     Chart reviewed. RN aware. Patient up in bed. Sit to stand with sup. Walked 200 ft with RW and sup. Returned to chair with siting LE ex's. All needs left within reach. RN aware. The patient's Approx Degree of Impairment: 11.2% has been calculated based on documentation in the [facility] '6 clicks' Inpatient Basic Mobility Short Form. Research supports that patients with this level of [facility]    DISCHARGE RECOMMENDATIONS  PT Discharge Recommendations: Home with home health PT;24 hour care/supervision     PLAN  PT Treatment Plan: Bed mobility; Endurance; Energy conservation;Patient education; Family education;Gait training;Strengthening;Stair training;Transfer training  Frequency (Obs): 5x/week    SUBJECTIVE  I'm going home today. OBJECTIVE  Precautions: Cardiac;Fluid restriction;Bed/chair alarm; Low vision; Other (Comment) (needing supplemental O2)    WEIGHT BEARING RESTRICTION                PAIN ASSESSMENT   Ratin  Location: overall soreness  Management Techniques: Activity promotion;Relaxation;Repositioning    BALANCE  Static Sitting: Good  Dynamic Sitting: Good  Static Standing: Good  Dynamic Standing: Fair +    ACTIVITY TOLERANCE                          O2 WALK       AM-PAC '6-Clicks' INPATIENT SHORT FORM - BASIC MOBILITY  How much difficulty does the patient currently have. ..   Patient Difficulty: Turning over in bed (including adjusting bedclothes, sheets and blankets)?: None   Patient Difficulty: Sitting down on and standing up from a chair with arms (e.g., wheelchair, bedside commode, etc.): None   Patient Difficulty: Moving from lying on back to sitting on the side of the bed?: None   How much help from another person does the patient currently need. .. Help from Another: Moving to and from a bed to a chair (including a wheelchair)?: None   Help from Another: Need to walk in hospital room?: None   Help from Another: Climbing 3-5 steps with a railing?: A Little     AM-PAC Score:  Raw Score: 23   Approx Degree of Impairment: 11.2%   Standardized Score (AM-PAC Scale): 56.93   CMS Modifier (G-Code): CI    FUNCTIONAL ABILITY STATUS  Functional Mobility/Gait Assessment  Gait Assistance: Supervision  Distance (ft): 200  Assistive Device: Rolling walker  Pattern: Shuffle  Stairs: Stoop/curb  Stoop/Curb: min A up/down 1 curb with assist to manage RW and cues for upright. Pt reports going to daughters home and no steps to navigate    THERAPEUTIC EXERCISES  Lower Extremity Ankle pumps  Glut sets  LAQ     Position Sitting       Patient End of Session: Up in chair;Needs met;Call light within reach;RN aware of session/findings; All patient questions and concerns addressed      CURRENT GOALS     Goals to be met by: 10/10/23  Patient Goal Patient's self-stated goal is: to go home   Goal #1 Patient is able to demonstrate supine - sit EOB @ level: modified independent   Goal #1   Current Status sup   Goal #2 Patient is able to demonstrate transfers Sit to/from Stand at assistance level: supervision with walker - rolling   Goal #2  Current Status Sup RW   Goal #3 Patient is able to ambulate 100 feet with assist device: walker - rolling at assistance level: supervision with SpO2 of 94% or better with O2 prn   Goal #3   Current Status 200 ft RW sup   Goal #4 Patient will negotiate 3 stairs/one curb w/ assistive device and supervision with SpO2 of 94% or better with O2 prn Goal #4   Current Status  NT-pt simulates up/down steps with high knees x 4 reps each leg with RW. She reports no stairs at her daughters and her daughter will help her at her own home when she goes   Goal #5 Patient to demonstrate independence with home activity/exercise instructions provided to patient in preparation for discharge.    Goal #5   Current Status  Ongoing

## 2023-09-30 NOTE — CM/SW NOTE
09/30/23 1400   Discharge disposition   Expected discharge disposition Home-Health   Post Acute Care Provider   ([facility])   Discharge transportation Private car     Herminia notified of savanna LE sent for home lab draws. Per Damaris Mena, her daughter will be able to pick her up.     Tashia MALDONADOA BSN RN CRRN   RN Case Manager  [phone]

## 2024-05-08 ENCOUNTER — APPOINTMENT (OUTPATIENT)
Dept: GENERAL RADIOLOGY | Facility: HOSPITAL | Age: 79
DRG: 280 | End: 2024-05-08
Attending: EMERGENCY MEDICINE

## 2024-05-08 ENCOUNTER — HOSPITAL ENCOUNTER (INPATIENT)
Facility: HOSPITAL | Age: 79
LOS: 2 days | Discharge: HOME | DRG: 280 | End: 2024-05-10
Attending: EMERGENCY MEDICINE | Admitting: INTERNAL MEDICINE

## 2024-05-08 DIAGNOSIS — J96.01 ACUTE HYPOXIC RESPIRATORY FAILURE (HCC): Primary | ICD-10-CM

## 2024-05-08 DIAGNOSIS — I50.9 ACUTE ON CHRONIC CONGESTIVE HEART FAILURE, UNSPECIFIED HEART FAILURE TYPE (HCC): ICD-10-CM

## 2024-05-08 LAB
ANION GAP SERPL CALC-SCNC: 12 MMOL/L (ref 0–18)
APTT PPP: 140.3 SECONDS (ref 23–36)
APTT PPP: 30.6 SECONDS (ref 23–36)
ATRIAL RATE: 91 BPM
BASOPHILS # BLD AUTO: 0.01 X10(3) UL (ref 0–0.2)
BASOPHILS NFR BLD AUTO: 0.1 %
BNP SERPL-MCNC: 531 PG/ML
BUN BLD-MCNC: 27 MG/DL (ref 9–23)
BUN/CREAT SERPL: 12.9 (ref 10–20)
CALCIUM BLD-MCNC: 10.2 MG/DL (ref 8.7–10.4)
CHLORIDE SERPL-SCNC: 101 MMOL/L (ref 98–112)
CHOLEST SERPL-MCNC: 168 MG/DL (ref ?–200)
CO2 SERPL-SCNC: 26 MMOL/L (ref 21–32)
CREAT BLD-MCNC: 2.09 MG/DL
DEPRECATED RDW RBC AUTO: 38.9 FL (ref 35.1–46.3)
EGFRCR SERPLBLD CKD-EPI 2021: 24 ML/MIN/1.73M2 (ref 60–?)
EOSINOPHIL # BLD AUTO: 0 X10(3) UL (ref 0–0.7)
EOSINOPHIL NFR BLD AUTO: 0 %
ERYTHROCYTE [DISTWIDTH] IN BLOOD BY AUTOMATED COUNT: 12.5 % (ref 11–15)
FLUAV + FLUBV RNA SPEC NAA+PROBE: NEGATIVE
FLUAV + FLUBV RNA SPEC NAA+PROBE: NEGATIVE
GLUCOSE BLD-MCNC: 147 MG/DL (ref 70–99)
GLUCOSE BLDC GLUCOMTR-MCNC: 155 MG/DL (ref 70–99)
HCT VFR BLD AUTO: 35 %
HDLC SERPL-MCNC: 56 MG/DL (ref 40–59)
HGB BLD-MCNC: 12.4 G/DL
IMM GRANULOCYTES # BLD AUTO: 0.07 X10(3) UL (ref 0–1)
IMM GRANULOCYTES NFR BLD: 0.5 %
LDLC SERPL CALC-MCNC: 86 MG/DL (ref ?–100)
LYMPHOCYTES # BLD AUTO: 1.07 X10(3) UL (ref 1–4)
LYMPHOCYTES NFR BLD AUTO: 7.8 %
MCH RBC QN AUTO: 30.3 PG (ref 26–34)
MCHC RBC AUTO-ENTMCNC: 35.4 G/DL (ref 31–37)
MCV RBC AUTO: 85.6 FL
MONOCYTES # BLD AUTO: 0.68 X10(3) UL (ref 0.1–1)
MONOCYTES NFR BLD AUTO: 5 %
NEUTROPHILS # BLD AUTO: 11.86 X10 (3) UL (ref 1.5–7.7)
NEUTROPHILS # BLD AUTO: 11.86 X10(3) UL (ref 1.5–7.7)
NEUTROPHILS NFR BLD AUTO: 86.6 %
NONHDLC SERPL-MCNC: 112 MG/DL (ref ?–130)
OSMOLALITY SERPL CALC.SUM OF ELEC: 296 MOSM/KG (ref 275–295)
P AXIS: 54 DEGREES
P-R INTERVAL: 158 MS
PLATELET # BLD AUTO: 359 10(3)UL (ref 150–450)
POTASSIUM SERPL-SCNC: 3.3 MMOL/L (ref 3.5–5.1)
POTASSIUM SERPL-SCNC: 3.4 MMOL/L (ref 3.5–5.1)
Q-T INTERVAL: 424 MS
QRS DURATION: 140 MS
QTC CALCULATION (BEZET): 521 MS
R AXIS: -7 DEGREES
RBC # BLD AUTO: 4.09 X10(6)UL
RSV RNA SPEC NAA+PROBE: NEGATIVE
SARS-COV-2 RNA RESP QL NAA+PROBE: NOT DETECTED
SODIUM SERPL-SCNC: 139 MMOL/L (ref 136–145)
T AXIS: 98 DEGREES
TRIGL SERPL-MCNC: 152 MG/DL (ref 30–149)
TROPONIN I SERPL HS-MCNC: 1407 NG/L
TROPONIN I SERPL HS-MCNC: 461 NG/L
VENTRICULAR RATE: 91 BPM
VLDLC SERPL CALC-MCNC: 24 MG/DL (ref 0–30)
WBC # BLD AUTO: 13.7 X10(3) UL (ref 4–11)

## 2024-05-08 PROCEDURE — 84132 ASSAY OF SERUM POTASSIUM: CPT | Performed by: INTERNAL MEDICINE

## 2024-05-08 PROCEDURE — 96375 TX/PRO/DX INJ NEW DRUG ADDON: CPT

## 2024-05-08 PROCEDURE — 85025 COMPLETE CBC W/AUTO DIFF WBC: CPT | Performed by: EMERGENCY MEDICINE

## 2024-05-08 PROCEDURE — 85730 THROMBOPLASTIN TIME PARTIAL: CPT | Performed by: INTERNAL MEDICINE

## 2024-05-08 PROCEDURE — 99285 EMERGENCY DEPT VISIT HI MDM: CPT

## 2024-05-08 PROCEDURE — 83880 ASSAY OF NATRIURETIC PEPTIDE: CPT | Performed by: EMERGENCY MEDICINE

## 2024-05-08 PROCEDURE — 80048 BASIC METABOLIC PNL TOTAL CA: CPT | Performed by: EMERGENCY MEDICINE

## 2024-05-08 PROCEDURE — 0241U SARS-COV-2/FLU A AND B/RSV BY PCR (GENEXPERT): CPT | Performed by: EMERGENCY MEDICINE

## 2024-05-08 PROCEDURE — 80061 LIPID PANEL: CPT | Performed by: EMERGENCY MEDICINE

## 2024-05-08 PROCEDURE — 93005 ELECTROCARDIOGRAM TRACING: CPT

## 2024-05-08 PROCEDURE — 96365 THER/PROPH/DIAG IV INF INIT: CPT

## 2024-05-08 PROCEDURE — 93010 ELECTROCARDIOGRAM REPORT: CPT

## 2024-05-08 PROCEDURE — 82962 GLUCOSE BLOOD TEST: CPT

## 2024-05-08 PROCEDURE — 84484 ASSAY OF TROPONIN QUANT: CPT | Performed by: EMERGENCY MEDICINE

## 2024-05-08 PROCEDURE — 71045 X-RAY EXAM CHEST 1 VIEW: CPT | Performed by: EMERGENCY MEDICINE

## 2024-05-08 RX ORDER — GABAPENTIN 300 MG/1
300 CAPSULE ORAL 2 TIMES DAILY
Status: DISCONTINUED | OUTPATIENT
Start: 2024-05-08 | End: 2024-05-08

## 2024-05-08 RX ORDER — HEPARIN SODIUM AND DEXTROSE 10000; 5 [USP'U]/100ML; G/100ML
12 INJECTION INTRAVENOUS ONCE
Status: COMPLETED | OUTPATIENT
Start: 2024-05-08 | End: 2024-05-09

## 2024-05-08 RX ORDER — ONDANSETRON 2 MG/ML
4 INJECTION INTRAMUSCULAR; INTRAVENOUS EVERY 6 HOURS PRN
Status: DISCONTINUED | OUTPATIENT
Start: 2024-05-08 | End: 2024-05-10

## 2024-05-08 RX ORDER — CARVEDILOL 3.12 MG/1
3.12 TABLET ORAL 2 TIMES DAILY WITH MEALS
Status: DISCONTINUED | OUTPATIENT
Start: 2024-05-08 | End: 2024-05-10

## 2024-05-08 RX ORDER — PROCHLORPERAZINE MALEATE 10 MG
10 TABLET ORAL EVERY 6 HOURS PRN
Status: DISCONTINUED | OUTPATIENT
Start: 2024-05-08 | End: 2024-05-10

## 2024-05-08 RX ORDER — POTASSIUM CHLORIDE 20 MEQ/1
40 TABLET, EXTENDED RELEASE ORAL ONCE
Status: COMPLETED | OUTPATIENT
Start: 2024-05-08 | End: 2024-05-08

## 2024-05-08 RX ORDER — ASPIRIN 81 MG/1
81 TABLET ORAL DAILY
Status: DISCONTINUED | OUTPATIENT
Start: 2024-05-09 | End: 2024-05-10

## 2024-05-08 RX ORDER — HEPARIN SODIUM 1000 [USP'U]/ML
60 INJECTION, SOLUTION INTRAVENOUS; SUBCUTANEOUS ONCE
Status: COMPLETED | OUTPATIENT
Start: 2024-05-08 | End: 2024-05-08

## 2024-05-08 RX ORDER — ROPINIROLE 1 MG/1
3 TABLET, FILM COATED ORAL
Status: DISCONTINUED | OUTPATIENT
Start: 2024-05-08 | End: 2024-05-10

## 2024-05-08 RX ORDER — HEPARIN SODIUM AND DEXTROSE 10000; 5 [USP'U]/100ML; G/100ML
INJECTION INTRAVENOUS CONTINUOUS
Status: DISCONTINUED | OUTPATIENT
Start: 2024-05-08 | End: 2024-05-10

## 2024-05-08 RX ORDER — GABAPENTIN 300 MG/1
600 CAPSULE ORAL 2 TIMES DAILY
Status: DISCONTINUED | OUTPATIENT
Start: 2024-05-09 | End: 2024-05-10

## 2024-05-08 RX ORDER — ACETAMINOPHEN 500 MG
500 TABLET ORAL EVERY 4 HOURS PRN
Status: DISCONTINUED | OUTPATIENT
Start: 2024-05-08 | End: 2024-05-10

## 2024-05-08 RX ORDER — SPIRONOLACTONE 25 MG/1
25 TABLET ORAL DAILY
COMMUNITY

## 2024-05-08 RX ORDER — FUROSEMIDE 10 MG/ML
60 INJECTION INTRAMUSCULAR; INTRAVENOUS ONCE
Status: COMPLETED | OUTPATIENT
Start: 2024-05-08 | End: 2024-05-08

## 2024-05-08 RX ORDER — HEPARIN SODIUM 5000 [USP'U]/ML
5000 INJECTION, SOLUTION INTRAVENOUS; SUBCUTANEOUS EVERY 8 HOURS SCHEDULED
Status: DISCONTINUED | OUTPATIENT
Start: 2024-05-08 | End: 2024-05-08

## 2024-05-08 RX ORDER — ACETAMINOPHEN 500 MG
500 TABLET ORAL EVERY 6 HOURS PRN
Status: DISCONTINUED | OUTPATIENT
Start: 2024-05-08 | End: 2024-05-10

## 2024-05-08 RX ORDER — SPIRONOLACTONE 25 MG/1
12.5 TABLET ORAL DAILY
Status: DISCONTINUED | OUTPATIENT
Start: 2024-05-09 | End: 2024-05-10

## 2024-05-08 RX ORDER — FUROSEMIDE 10 MG/ML
40 INJECTION INTRAMUSCULAR; INTRAVENOUS
Status: DISCONTINUED | OUTPATIENT
Start: 2024-05-09 | End: 2024-05-10

## 2024-05-08 RX ORDER — LUBIPROSTONE 24 UG/1
24 CAPSULE ORAL 2 TIMES DAILY WITH MEALS
Status: DISCONTINUED | OUTPATIENT
Start: 2024-05-08 | End: 2024-05-10

## 2024-05-08 RX ORDER — ASPIRIN 81 MG/1
324 TABLET, CHEWABLE ORAL DAILY
Status: DISCONTINUED | OUTPATIENT
Start: 2024-05-08 | End: 2024-05-09

## 2024-05-08 RX ORDER — ISOSORBIDE DINITRATE 10 MG/1
10 TABLET ORAL 2 TIMES DAILY
COMMUNITY

## 2024-05-08 RX ORDER — ATORVASTATIN CALCIUM 40 MG/1
40 TABLET, FILM COATED ORAL NIGHTLY
Status: DISCONTINUED | OUTPATIENT
Start: 2024-05-08 | End: 2024-05-10

## 2024-05-08 RX ORDER — METOCLOPRAMIDE HYDROCHLORIDE 5 MG/ML
5 INJECTION INTRAMUSCULAR; INTRAVENOUS EVERY 8 HOURS PRN
Status: DISCONTINUED | OUTPATIENT
Start: 2024-05-08 | End: 2024-05-10

## 2024-05-08 RX ORDER — PANTOPRAZOLE SODIUM 40 MG/1
40 TABLET, DELAYED RELEASE ORAL
Status: DISCONTINUED | OUTPATIENT
Start: 2024-05-09 | End: 2024-05-10

## 2024-05-08 RX ORDER — TIZANIDINE 2 MG/1
2 TABLET ORAL
Status: DISCONTINUED | OUTPATIENT
Start: 2024-05-08 | End: 2024-05-10

## 2024-05-08 RX ORDER — ISOSORBIDE DINITRATE 5 MG/1
10 TABLET ORAL 2 TIMES DAILY
Status: DISCONTINUED | OUTPATIENT
Start: 2024-05-08 | End: 2024-05-10

## 2024-05-08 RX ORDER — BUPROPION HYDROCHLORIDE 150 MG/1
150 TABLET ORAL DAILY
Status: DISCONTINUED | OUTPATIENT
Start: 2024-05-09 | End: 2024-05-10

## 2024-05-08 NOTE — ED PROVIDER NOTES
Patient Seen in: [facility] Emergency Department    History     Chief Complaint   Patient presents with    Difficulty Breathing    Chest Pain Angina       HPI    79-year-old female with a history of CHF who presents to the emergency department with PND and orthopnea in addition to bilateral lower extremity edema since yesterday.  She denies any chest pain.  She has been compliant with her medications.    History reviewed.   Past Medical History:    Congestive heart disease (HCC)    Diverticulitis    Esophageal reflux    Fibromyalgia    High blood pressure    Osteoarthrosis, unspecified whether generalized or localized, unspecified site    RLS (restless legs syndrome)    Unspecified essential hypertension       History reviewed.   Past Surgical History:   Procedure Laterality Date    Cath drug eluting stent      Excis lumbar disk,one level      Removal gallbladder           Medications :  (Not in a hospital admission)       No family history on file.    Smoking Status:   Social History     Socioeconomic History    Marital status:    Tobacco Use    Smoking status: Former     Types: Cigarettes     Passive exposure: Past    Smokeless tobacco: Never   Substance and Sexual Activity    Alcohol use: No       Constitutional and vital signs reviewed.      Social History and Family History elements reviewed from today, pertinent positives to the presenting problem noted.    Physical Exam     ED Triage Vitals [05/08/24 1327]   BP (!) 158/97   Pulse 98   Resp 20   Temp 98.5 °F (36.9 °C)   Temp src Oral   SpO2 95 %   O2 Device None (Room air)       All measures to prevent infection transmission during my interaction with the patient were taken. The patient was already wearing a droplet mask on my arrival to the room. Personal protective equipment was worn throughout the duration of the exam.  Handwashing was performed prior to and after the exam.  Stethoscope and any equipment used during my examination was  cleaned with super sani-cloth germicidal wipes following the exam.     Physical Exam    General: NAD  Head: Normocephalic and atraumatic.  Mouth/Throat/Ears/Nose: Oropharynx is clear and moist.   Eyes: Conjunctivae and EOM are normal.   Neck: Normal range of motion. Supple.   Cardiovascular: Normal rate, regular rhythm   Respiratory/Chest: mild bibasilar crackles. Exhibits no tenderness.  Gastrointestinal: Soft, non-tender, non-distended. Bowel sounds are normal.   Musculoskeletal: b/l LE edema symmetric   Neurological: Alert and appropriate. No focal deficits.   Skin: Skin is warm and dry. No pallor.  Psychiatric: Has a normal mood and affect.      ED Course        Labs Reviewed   TROPONIN I HIGH SENSITIVITY - Abnormal; Notable for the following components:       Result Value    Troponin I (High Sensitivity) 461 (*)     All other components within normal limits   BNP (B TYPE NATRIURETIC PEPTIDE) - Abnormal; Notable for the following components:    Beta Natriuretic Peptide 531 (*)     All other components within normal limits   BASIC METABOLIC PANEL (8) - Abnormal; Notable for the following components:    Glucose 147 (*)     Potassium 3.3 (*)     BUN 27 (*)     Creatinine 2.09 (*)     Calculated Osmolality 296 (*)     eGFR-Cr 24 (*)     All other components within normal limits   LIPID PANEL - Abnormal; Notable for the following components:    Triglycerides 152 (*)     All other components within normal limits   CBC W/ DIFFERENTIAL - Abnormal; Notable for the following components:    WBC 13.7 (*)     Neutrophil Absolute Prelim 11.86 (*)     Neutrophil Absolute 11.86 (*)     All other components within normal limits   SARS-COV-2/FLU A AND B/RSV BY PCR (GENEXPERT) - Normal    Narrative:     This test is intended for the qualitative detection and differentiation of SARS-CoV-2, influenza A, influenza B, and respiratory syncytial virus (RSV) viral RNA in nasopharyngeal or nares swabs from individuals suspected of  respiratory viral infection consistent with COVID-19 by their healthcare provider. Signs and symptoms of respiratory viral infection due to SARS-CoV-2, influenza, and RSV can be similar.                                    Test performed using the Xpert Xpress SARS-CoV-2/FLU/RSV (real time RT-PCR)  assay on the GeneXpert instrument, [employer], [place] [address].                   This test is being used under the Food and Drug Administration's Emergency Use Authorization.                                    The authorized Fact Sheet for Healthcare Providers for this assay is available upon request from the laboratory.   CBC WITH DIFFERENTIAL WITH PLATELET    Narrative:     The following orders were created for panel order CBC With Differential With Platelet.                  Procedure                               Abnormality         Status                                     ---------                               -----------         ------                                     CBC W/ DIFFERENTIAL[[id number]]          Abnormal            Final result                                                 Please view results for these tests on the individual orders.   TROPONIN I HIGH SENSITIVITY     EKG    Rate, intervals and axes as noted on EKG Report.  Rate: 91  Rhythm: Sinus Rhythm  Reading: Normal sinus rhythm, left bundle branch block unchanged from September 24, 2023 EKG.  QTc 521.  No STEMI.  This is my interpretation.           As Interpreted by me    Imaging Results Available and Reviewed while in ED: XR CHEST AP PORTABLE  (CPT=71045)    Result Date: 5/8/2024  CONCLUSION:   Mild bilateral interstitial opacity which could reflect mild interstitial edema.    Dictated by (CST): Nestor Whitehead MD on 5/08/2024 at 2:38 PM     Finalized by (CST): Nestor Whitehead MD on 5/08/2024 at 2:40 PM         ED Medications Administered:   Medications   furosemide (Lasix) 10 mg/mL injection 60 mg (has no administration in time  range)         [facility]     Vitals:    05/08/24 1327   BP: (!) 158/97   Pulse: 98   Resp: 20   Temp: 98.5 °F (36.9 °C)   TempSrc: Oral   SpO2: 95%   Weight: 83.5 kg   Height: 157.5 cm (5' 2\")     *I personally reviewed and interpreted all ED vitals.    Pulse Ox: 95%, Room air, Normal     Monitor Interpretation:   normal sinus rhythm as interpreted by me.  The cardiac monitor was ordered given dyspnea      Medical Decision Making      Differential Diagnosis/ Diagnostic Considerations: CHF, PNA    Complicating Factors: The patient already has CHF to contribute to the complexity of this ED evaluation.    I reviewed prior chart records including visit note from May 3, 2024.  Patient here with acute decompensated heart failure exacerbation complicated by acute hypoxic respiratory failure.  Provided with dose of Lasix.  Labs reviewed and notable for elevated troponin and CKD.  Continues to have no chest pain.  Repeat troponin pending at time of admission.  Consulted Dr. Singh with cardiology.  Chest x-ray with mild pulmonary edema bilaterally on my interpretation.    Admitted in stable condition.  Patient is comfortable with the plan.      Disposition and Plan     Clinical Impression:  1. Acute hypoxic respiratory failure (HCC)    2. Acute on chronic congestive heart failure, unspecified heart failure type (HCC)        Disposition:  Admit    Follow-up:  No follow-up provider specified.    Medications Prescribed:  Current Discharge Medication List          Hospital Problems       Present on Admission             ICD-10-CM Noted POA    Acute hypoxic respiratory failure (HCC) J96.01 5/8/2024 Unknown

## 2024-05-08 NOTE — ED INITIAL ASSESSMENT (HPI)
Patient arrived via EMS with c/o LISA, mild chest pain, ad nausea since this morning. Patient denies fever, cough, diarrhea.

## 2024-05-08 NOTE — CONSULTS
Patient is a 79 year old female who was admitted to the hospital for <principal problem not specified>:  This is a patient well-known to me from the office recently started on Farxiga for heart failure as well as renal failure.  Patient comes in with sudden onset shortness of breath where she could not catch her breath.  She states that her blood pressure was markedly elevated just like previous times he had her non-ST elevation myocardial infarction she does not remember exactly how high the first blood pressure here was modestly elevated but she does not remember what the blood pressure was with the EMS.  She had some mild chest tightness as well at the time resolved and has not had any further chest tightness.  Her breathing has improved.  She received milligrams of IV Lasix with a chest x-ray demonstrating congestive heart failure.  She has not had any lower extremity edema has had some nausea but no vomiting no abdominal pain and otherwise a 12 point review system is negative.    Past Medical History:   Past Medical History:    Congestive heart disease (HCC)    Diverticulitis    Esophageal reflux    Fibromyalgia    High blood pressure    Osteoarthrosis, unspecified whether generalized or localized, unspecified site    RLS (restless legs syndrome)    Unspecified essential hypertension       Past Surgical History:  Past Surgical History:   Procedure Laterality Date    Cath drug eluting stent      Excis lumbar disk,one level      Removal gallbladder         Family History:  No family history on file.    Social History:  Pediatric History   Patient Parents    Not on file     Other Topics Concern    Not on file   Social History Narrative    Not on file           Current Medications:  Current Facility-Administered Medications   Medication Dose Route Frequency    heparin (Porcine) 1000 UNIT/ML injection - BOLUS IV 5,000 Units  60 Units/kg Intravenous Once    heparin (Porcine) 66754 units/250mL infusion ED INITIAL DOSE   12 Units/kg/hr Intravenous Once    heparin (Porcine) 41363 units/250mL infusion ACS/AFIB CONTINUOUS  200-3,000 Units/hr Intravenous Continuous    aspirin chewable tab 324 mg  324 mg Oral Daily     (Not in a hospital admission)    Allergies:  No Known Allergies    Review of Systems:   A comprehensive 12 point review of system was performed.  All other review of systems are negative.    Physical Exam:   Blood pressure 110/77, pulse 80, temperature 98.5 °F (36.9 °C), temperature source Oral, resp. rate 24, height 5' 2\" (1.575 m), weight 184 lb (83.5 kg), SpO2 94%.  Intake/Output:   Last 3 shifts: [id number]   This shift: No intake/output data recorded.     Vent Settings:      Hemodynamic parameters (last 24 hours):      Scheduled Meds:    heparin  60 Units/kg Intravenous Once    ED initial dose heparin  12 Units/kg/hr Intravenous Once    aspirin  324 mg Oral Daily       Continuous Infusions:    continuous dose heparin           Physical Exam:    General: No acute distress.  HEENT: No scleral icterus.  External ears are normal.  Lids are normal.  Oral mucosa is moist.  Neck: No JVD, no bruits.  Cardiac: Normal rate, No murmur.  Lungs: Clear without rhales, rhochi or wheezing.  Abdomen: Soft, non-tender. No abdominal bruit. No hepatojugular reflux.  Extremities: No edema.    Neurologic: Alert and moving all 4 extremities.  Psychiatry: Patient has calm affect.  Lymphatic: No cervical or lower extremity lymphadenopathy.  Skin/nails: No clubbing.  Musculoskeletal: No joint effusions.    Results:     Laboratory Data:  Lab Results   Component Value Date    WBC 13.7 (H) 05/08/2024    HGB 12.4 05/08/2024    HCT 35.0 05/08/2024    .0 05/08/2024    CREATSERUM 2.09 (H) 05/08/2024    BUN 27 (H) 05/08/2024     05/08/2024    K 3.3 (L) 05/08/2024     05/08/2024    CO2 26.0 05/08/2024     (H) 05/08/2024    CA 10.2 05/08/2024    ALB 3.1 (L) 09/30/2023    ALKPHO 84 09/26/2023    TP 5.8 (L) 09/26/2023    AST  13 (L) 2023    ALT 13 2023    PTT 36.5 (H) 2023    TSH 3.320 2023    LIP 33 2023    DDIMER 1.02 (H) 2023    MG 1.8 2023    PHOS 3.7 2023    TROP <0.045 10/01/2020         Imaging:  [unfilled]        IMPRESSION:  Expand All Collapse All         [facility]     Cardiology Hospital Progress Note               Susan P Ruzicka Patient Status:  Inpatient    1945 MRN [id number]   Location [facility] 2W/SW Attending Rick Chan MD   Hosp Day # 6 PCP ROLY KAUR         Patient is a 78 year old female who was admitted to the hospital for CHF-pulmonary edema, ruled in For NSTEMI     Subjective:  Patient was examined at the bedside.  No cp/sob  HR/BP ok     Past Medical History:   Past Medical History        Past Medical History:   Diagnosis Date    Congestive heart disease (HCC)      Diverticulitis      Esophageal reflux      Fibromyalgia      High blood pressure      Osteoarthrosis, unspecified whether generalized or localized, unspecified site      RLS (restless legs syndrome)      Unspecified essential hypertension              Past Surgical History:  Past Surgical History         Past Surgical History:   Procedure Laterality Date    CATH DRUG ELUTING STENT        EXCIS LUMBAR DISK,ONE LEVEL        REMOVAL GALLBLADDER                Family History:  Family History   No family history on file.        Social History:  Patient Guardians:  Not on file     Other Topics            Concern    None on file     Social History Narrative    None on file              Current Medications:  Current Hospital Medications              Prescriptions Prior to Admission   carvedilol 12.5 MG Oral Tab, Take 1 tablet (12.5 mg total) by mouth 2 (two) times daily with meals.  isosorbide mononitrate 10 MG Oral Tab, Take 1 tablet (10 mg total) by mouth 2 (two) times daily.  spironolactone 25 MG Oral Tab, Take 1 tablet (25 mg total) by mouth daily.  Torsemide  (SOAANZ) 40 MG Oral Tab, Take 40 mg by mouth daily.  NON FORMULARY, Place 1 drop into the right eye daily. Ivizia eye drops uses daily to right eye  rOPINIRole 3 MG Oral Tab, Take 1 tablet (3 mg total) by mouth After dinner.  lubiprostone 24 MCG Oral Cap, Take 1 capsule (24 mcg total) by mouth 2 (two) times daily with meals.  pantoprazole 40 MG Oral Tab EC, Take 1 tablet (40 mg total) by mouth 2 (two) times daily before meals.  prochlorperazine (COMPAZINE) 10 mg tablet, Take 1 tablet (10 mg total) by mouth every 6 (six) hours as needed for Nausea.  atorvastatin 40 MG Oral Tab, Take 1 tablet (40 mg total) by mouth daily with dinner.  ergocalciferol 1.25 MG (00974 UT) Oral Cap, Take 1 capsule (50,000 Units total) by mouth once a week. On Sundays  ferrous sulfate 325 (65 FE) MG Oral Tab EC, Take 1 tablet (325 mg total) by mouth daily with breakfast.  Magnesium 200 MG Oral Tab, Take 2.5 tablets (500 mg total) by mouth daily.  docusate sodium 100 MG Oral Cap, Take 1 capsule (100 mg total) by mouth 2 (two) times daily as needed.  gabapentin 600 MG Oral Tab, Take 1 tablet (600 mg total) by mouth 3 (three) times daily.  aspirin 81 MG Oral Tab EC, Take 1 tablet (81 mg total) by mouth daily.  buPROPion HCl ER, SR, 150 MG Oral Tablet 12 Hr, Take 1 tablet (150 mg total) by mouth daily.  tiZANidine HCl 2 MG Oral Tab, Take 1 tablet (2 mg total) by mouth daily with dinner.  citalopram 20 MG Oral Tab, Take 2 tablets (40 mg total) by mouth daily.  traMADol 50 MG Oral Tab, Take 2 tablets (100 mg total) by mouth 3 (three) times daily as needed.  lidocaine-menthol 4-1 % External Patch, Place 1 patch onto the skin daily.  acetaminophen 500 MG Oral Tab, Take 1 tablet (500 mg total) by mouth every 6 (six) hours as needed for Pain.            Allergies:  Allergies   No Known Allergies        Review of Systems:   A comprehensive 12 point review of system was performed.  All pertinent positive and negative findings per HPI.     Physical  Exam:   Blood pressure 109/62, pulse 67, temperature 98.2 °F (36.8 °C), temperature source Oral, resp. rate 17, weight 177 lb 4.8 oz (80.4 kg), SpO2 92%.  Intake/Output:                Last 3 shifts: [id number]               This shift: I/O this shift:  In: 10 [I.V.:10]  Out: 0         V  Scheduled Meds:   Scheduled Medications    carvedilol  3.125 mg Oral BID with meals    isosorbide mononitrate ER  60 mg Oral Daily    aspirin  81 mg Oral Daily    [Held by provider] furosemide  40 mg Oral Daily    heparin  5,000 Units Subcutaneous 2 times per day    pantoprazole  40 mg Oral QAM AC    atorvastatin  40 mg Oral Nightly    buPROPion ER  150 mg Oral Daily    escitalopram  20 mg Oral Daily    docusate sodium  100 mg Oral BID    lubiprostone  24 mcg Oral BID with meals    rOPINIRole HCl  3 mg Oral After dinner    tiZANidine  2 mg Oral Daily with dinner    gabapentin  300 mg Oral BID               Physical Exam:     General: No acute distress.  HEENT: NAD  Neck: No JVD, no bruits.  Cardiac: RRR, +s1 and s2, I/VI MAGI  Lungs: Clear without rhales, rhochi or wheezing.  Abdomen: Soft, nt/nd, +bs  Extremities: No edema.    Neurologic: Alert and moving all 4 extremities.  Psychiatry: Patient has calm affect.        Results:      Laboratory Data:        Lab Results   Component Value Date     WBC 8.7 09/30/2023     HGB 9.9 (L) 09/30/2023     HCT 30.3 (L) 09/30/2023     .0 09/30/2023     CREATSERUM 1.44 (H) 09/30/2023     BUN 17 09/30/2023      09/30/2023     K 4.3 09/30/2023      09/30/2023     CO2 29.0 09/30/2023     GLU 98 09/30/2023     CA 9.0 09/30/2023     ALB 3.1 (L) 09/30/2023     ALKPHO 84 09/26/2023     TP 5.8 (L) 09/26/2023     AST 13 (L) 09/26/2023     ALT 13 09/26/2023     PTT 36.5 (H) 09/24/2023     TSH 3.320 05/28/2023     LIP 33 09/24/2023     DDIMER 1.02 (H) 01/30/2023     MG 1.8 09/30/2023     PHOS 3.7 09/30/2023     TROP <0.045 10/01/2020          Imaging:  CXR:  PROCEDURE: XR CHEST AP/PA  (1 VIEW) (CPT=71045)     COMPARISON: [facility], XR CHEST AP PORTABLE (CPT=71045), 9/03/2023, 10:39 PM.     INDICATIONS: Shortness of breath today.     TECHNIQUE:   Single PA view.       FINDINGS:  CARDIAC/VASC: No cardiac silhouette abnormality or cardiomegaly.  Unremarkable pulmonary vasculature.    MEDIAST/NANDO:   No visible mass or adenopathy.  LUNGS/PLEURA: There is a round double-density superimposed of the cardiac silhouette in the region of the gastroesophageal junction, compatible with hiatal hernia.  Interstitial prominence.  BONES: Scattered mild degenerative endplate changes in the visualized thoracolumbar spine.  Spine stimulator electrodes are present within the lower thoracic canal.  Severe osteoarthritis within the right shoulder at the glenohumeral joint.  OTHER: Negative.                 Impression   CONCLUSION: Interstitial prominence suggesting increased fluid volume status of the patient/edema.  Large hiatal hernia.  Severe osteoarthritis in the right shoulder.      ECHO(previous):  LVH with LV EF ~55-60%     EKG:  Sinus tachycardia   Left bundle branch block   Abnormal ECG   When compared with ECG of 24-SEP-2023 06:17,   Vent. rate has increased BY  47 BPM      C CATH:9/24/23    IMPRESSION:  1.       Non-obstructive coronary artery disease with a negative iFR.  2.       Moderately elevated left ventricular filling pressures.         IMPRESSION:    #1.  Acute on chronic diastolic heart failure with increased BNP and congestive heart failure changes on chest x-ray.  #2.  Known history of coronary disease with history of stent placed in 2019.  Non-obstructive repeat cath 2020 and 2023  #3.  Chronic stage III -IV kidney disease   #4.  Type II NSETMI with chronic renal insufficiency and congestive heart failure. Negative cardiac cath for significant coronary artery disease on 9/24/2023. Appears to be flash pulmonary edema from hypertensive emergency.  #5.  Chronic LBBB.     PLAN: IV  lasix. Resume home meds. Echo. Heparin x 24 hours. If echo negative for WMA, stop.           Thank you very much for the consultation. Please do not hesitate to call with questions.    Juan José Singh DO [facility]  [facility] [address]  [place] [address]  [phone]

## 2024-05-08 NOTE — ED QUICK NOTES
Orders for admission, patient is aware of plan and ready to go upstairs. Any questions, please call ED RN saeid at extension [phone].     Patient Covid vaccination status: Fully vaccinated     COVID Test Ordered in ED: SARS-CoV-2/Flu A and B/RSV by PCR (GeneXpert)    COVID Suspicion at Admission: N/A    Running Infusions:  None    Mental Status/LOC at time of transport: axox4    Other pertinent information:   CIWA score: N/A   NIH score:  N/A

## 2024-05-08 NOTE — H&P
[facility] Hospitalist H&P       CC: shortness of breath     PCP: ROLY KAUR    History of Present Illness:   79 year old female with history of chronic diastolic CHF, coronary artery disease s/p PCI 2019, CKD stage 3, hyperlipidemia, restless leg syndrome, HTN, chronic pain on morphine pain pump, who presents to the hospital for evaluation of shortness of breath.  The patient states that she started having shortness of breath this morning.  She was feeling okay the last few days.  Her last doctor's visit was on 5/2/2024.  During that visit she was noted to have some edema in her legs.  She states she is taking torsemide 20 mg daily.  She denies any major chest pain and does not complain of chest pain currently in the ER.  She states that at home possibly she may have had some chest pain but states the breathing was really what brought her into the hospital.    Review of Systems  Comprehensive ROS reviewed and negative except for what's stated above.     PMH  chronic diastolic CHF, coronary artery disease s/p PCI 2019, CKD stage 3, hyperlipidemia, restless leg syndrome, HTN, chronic pain on morphine pain pump    PSH  Past Surgical History:   Procedure Laterality Date    Cath drug eluting stent      Excis lumbar disk,one level      Removal gallbladder          ALL:  No Known Allergies     Home Medications:  Aspirin 81 mg daily  Atorvastatin 40 mg nightly  Wellbutrin  mg daily  Carvedilol 3.125 mg twice daily  Celexa 20 mg daily  Gabapentin 600 mg twice daily  Protonix 40 mg twice daily  Ropinirole 3 mg every evening  Spironolactone 25 mg daily  Tizanidine 2mg HS   Torsemide 20mg daily   Isosorbide dinitrate 10mg BID  Farxiga 10mg daily     Soc Hx  Social History     Tobacco Use    Smoking status: Former     Types: Cigarettes     Passive exposure: Past    Smokeless tobacco: Never   Substance Use Topics    Alcohol use: No      Fam Hx  No family history on file.    OBJECTIVE:  BP (!) 158/97    Pulse 98   Temp 98.5 °F (36.9 °C) (Oral)   Resp 20   Ht 5' 2\" (1.575 m)   Wt 184 lb (83.5 kg)   SpO2 95%   BMI 33.65 kg/m²   General: Alert, no acute distress  Lungs: clear to ausculation bilaterally  Heart: Regular rate and rhythm  Abdomen: soft, non tender  Extremities: No edema  Skin: no new rash, normal color    Diagnostic Data:    CBC/Chem  Recent Labs   Lab 05/08/24  1346   WBC 13.7*   HGB 12.4   MCV 85.6   .0       Recent Labs   Lab 05/08/24  1346      K 3.3*      CO2 26.0   BUN 27*   CREATSERUM 2.09*   *   CA 10.2     Radiology:     XR CHEST AP PORTABLE  (CPT=71045)  Result Date: 5/8/2024  CONCLUSION:   Mild bilateral interstitial opacity which could reflect mild interstitial edema.    Dictated by (CST): Nestor Whitehead MD on 5/08/2024 at 2:38 PM     Finalized by (CST): Nestor Whitehead MD on 5/08/2024 at 2:40 PM           ASSESSMENT / PLAN:   79 year old female with history of chronic diastolic CHF, coronary artery disease s/p PCI 2019, CKD stage 3, hyperlipidemia, restless leg syndrome, HTN, chronic pain on morphine pain pump, who presents to the hospital for evaluation of shortness of breath.      Acute on chronic diastolic CHF  Elevated troponin, suspect demand ischemia, but trend  -symptoms of dyspnea, possible orthopnea, mild LE swelling, CXR findings noted, BNP elevated, positive troponin noted   -given lasix 60mg IV once in ER, will see what she puts out with this. Schedule 40mg IV Bid starting tomorrow for now  -monitor renal function  -obtain TTE  -resume beta blocker (coreg 3.125mg BID)  -hold Farxiga 10mg for now given FARAZ  -hold aldactone until repeat renal function in AM    Coronary artery disease  -resume aspirin, statin  -beta blocker  -trend troponin  -if rapid rise in trop would start heparin drip     FARAZ on CKD stage 3  -monitor with diuresis    Restless leg syndrome   -resume home meds    Chronic pain, on morphine pain pump     DVT prophylaxis: heparin  sub q  Code status: full     Asrar Cece AGOSTO  [facility]

## 2024-05-09 ENCOUNTER — APPOINTMENT (OUTPATIENT)
Dept: CV DIAGNOSTICS | Facility: HOSPITAL | Age: 79
DRG: 280 | End: 2024-05-09
Attending: INTERNAL MEDICINE

## 2024-05-09 LAB
ANION GAP SERPL CALC-SCNC: 10 MMOL/L (ref 0–18)
APTT PPP: 46.1 SECONDS (ref 23–36)
APTT PPP: 46.4 SECONDS (ref 23–36)
APTT PPP: 61.1 SECONDS (ref 23–36)
ATRIAL RATE: 77 BPM
BASOPHILS # BLD AUTO: 0.01 X10(3) UL (ref 0–0.2)
BASOPHILS NFR BLD AUTO: 0.1 %
BUN BLD-MCNC: 34 MG/DL (ref 9–23)
BUN/CREAT SERPL: 14.3 (ref 10–20)
CALCIUM BLD-MCNC: 9.4 MG/DL (ref 8.7–10.4)
CHLORIDE SERPL-SCNC: 103 MMOL/L (ref 98–112)
CO2 SERPL-SCNC: 26 MMOL/L (ref 21–32)
CREAT BLD-MCNC: 2.37 MG/DL
DEPRECATED RDW RBC AUTO: 42.4 FL (ref 35.1–46.3)
EGFRCR SERPLBLD CKD-EPI 2021: 21 ML/MIN/1.73M2 (ref 60–?)
EOSINOPHIL # BLD AUTO: 0 X10(3) UL (ref 0–0.7)
EOSINOPHIL NFR BLD AUTO: 0 %
ERYTHROCYTE [DISTWIDTH] IN BLOOD BY AUTOMATED COUNT: 13.1 % (ref 11–15)
GLUCOSE BLD-MCNC: 178 MG/DL (ref 70–99)
HCT VFR BLD AUTO: 34.1 %
HGB BLD-MCNC: 11.1 G/DL
IMM GRANULOCYTES # BLD AUTO: 0.03 X10(3) UL (ref 0–1)
IMM GRANULOCYTES NFR BLD: 0.3 %
LYMPHOCYTES # BLD AUTO: 0.85 X10(3) UL (ref 1–4)
LYMPHOCYTES NFR BLD AUTO: 8.5 %
MAGNESIUM SERPL-MCNC: 2.1 MG/DL (ref 1.6–2.6)
MCH RBC QN AUTO: 29.4 PG (ref 26–34)
MCHC RBC AUTO-ENTMCNC: 32.6 G/DL (ref 31–37)
MCV RBC AUTO: 90.2 FL
MONOCYTES # BLD AUTO: 0.38 X10(3) UL (ref 0.1–1)
MONOCYTES NFR BLD AUTO: 3.8 %
NEUTROPHILS # BLD AUTO: 8.77 X10 (3) UL (ref 1.5–7.7)
NEUTROPHILS # BLD AUTO: 8.77 X10(3) UL (ref 1.5–7.7)
NEUTROPHILS NFR BLD AUTO: 87.3 %
OSMOLALITY SERPL CALC.SUM OF ELEC: 300 MOSM/KG (ref 275–295)
P AXIS: 68 DEGREES
P-R INTERVAL: 158 MS
PLATELET # BLD AUTO: 300 10(3)UL (ref 150–450)
POTASSIUM SERPL-SCNC: 3.9 MMOL/L (ref 3.5–5.1)
POTASSIUM SERPL-SCNC: 3.9 MMOL/L (ref 3.5–5.1)
Q-T INTERVAL: 406 MS
QRS DURATION: 144 MS
QTC CALCULATION (BEZET): 459 MS
R AXIS: -3 DEGREES
RBC # BLD AUTO: 3.78 X10(6)UL
SODIUM SERPL-SCNC: 139 MMOL/L (ref 136–145)
T AXIS: 117 DEGREES
VENTRICULAR RATE: 77 BPM
WBC # BLD AUTO: 10 X10(3) UL (ref 4–11)

## 2024-05-09 PROCEDURE — 84132 ASSAY OF SERUM POTASSIUM: CPT | Performed by: INTERNAL MEDICINE

## 2024-05-09 PROCEDURE — 85730 THROMBOPLASTIN TIME PARTIAL: CPT | Performed by: INTERNAL MEDICINE

## 2024-05-09 PROCEDURE — 80048 BASIC METABOLIC PNL TOTAL CA: CPT | Performed by: INTERNAL MEDICINE

## 2024-05-09 PROCEDURE — 93306 TTE W/DOPPLER COMPLETE: CPT | Performed by: INTERNAL MEDICINE

## 2024-05-09 PROCEDURE — 83735 ASSAY OF MAGNESIUM: CPT | Performed by: INTERNAL MEDICINE

## 2024-05-09 PROCEDURE — 85025 COMPLETE CBC W/AUTO DIFF WBC: CPT | Performed by: INTERNAL MEDICINE

## 2024-05-09 NOTE — PLAN OF CARE
Patient denies pain or shortness of breath. IV lasix held due to elevated creatine. Patient hypotensive this morning but asymptomatic. Plan is to recheck kidney function in the morning and continue heparin gtt. Patient and daughter updated on plan of care.     Problem: Patient Centered Care  Goal: Patient preferences are identified and integrated in the patient's plan of care  Description: Interventions:  - What would you like us to know as we care for you? FROM HOME ALONE  - Provide timely, complete, and accurate information to patient/family  - Incorporate patient and family knowledge, values, beliefs, and cultural backgrounds into the planning and delivery of care  - Encourage patient/family to participate in care and decision-making at the level they choose  - Honor patient and family perspectives and choices  Outcome: Progressing     Problem: Patient/Family Goals  Goal: Patient/Family Long Term Goal  Description: Patient's Long Term Goal: go home    Interventions:  - cardio consult  - See additional Care Plan goals for specific interventions  Outcome: Progressing  Goal: Patient/Family Short Term Goal  Description: Patient's Short Term Goal: safe in hospital    Interventions:   - monitor on tele  - monitor labs  - See additional Care Plan goals for specific interventions  Outcome: Progressing     Problem: CARDIOVASCULAR - ADULT  Goal: Maintains optimal cardiac output and hemodynamic stability  Description: INTERVENTIONS:  - Monitor vital signs, rhythm, and trends  - Monitor for bleeding, hypotension and signs of decreased cardiac output  - Evaluate effectiveness of vasoactive medications to optimize hemodynamic stability  - Monitor arterial and/or venous puncture sites for bleeding and/or hematoma  - Assess quality of pulses, skin color and temperature  - Assess for signs of decreased coronary artery perfusion - ex. Angina  - Evaluate fluid balance, assess for edema, trend weights  Outcome: Progressing  Goal:  Absence of cardiac arrhythmias or at baseline  Description: INTERVENTIONS:  - Continuous cardiac monitoring, monitor vital signs, obtain 12 lead EKG if indicated  - Evaluate effectiveness of antiarrhythmic and heart rate control medications as ordered  - Initiate emergency measures for life threatening arrhythmias  - Monitor electrolytes and administer replacement therapy as ordered  Outcome: Progressing     Problem: RESPIRATORY - ADULT  Goal: Achieves optimal ventilation and oxygenation  Description: INTERVENTIONS:  - Assess for changes in respiratory status  - Assess for changes in mentation and behavior  - Position to facilitate oxygenation and minimize respiratory effort  - Oxygen supplementation based on oxygen saturation or ABGs  - Provide Smoking Cessation handout, if applicable  - Encourage broncho-pulmonary hygiene including cough, deep breathe, Incentive Spirometry  - Assess the need for suctioning and perform as needed  - Assess and instruct to report SOB or any respiratory difficulty  - Respiratory Therapy support as indicated  - Manage/alleviate anxiety  - Monitor for signs/symptoms of CO2 retention  Outcome: Progressing     Problem: SKIN/TISSUE INTEGRITY - ADULT  Goal: Skin integrity remains intact  Description: INTERVENTIONS  - Assess and document risk factors for pressure ulcer development  - Assess and document skin integrity  - Monitor for areas of redness and/or skin breakdown  - Initiate interventions, skin care algorithm/standards of care as needed  Outcome: Progressing  Goal: Incision(s), wounds(s) or drain site(s) healing without S/S of infection  Description: INTERVENTIONS:  - Assess and document risk factors for pressure ulcer development  - Assess and document skin integrity  - Assess and document dressing/incision, wound bed, drain sites and surrounding tissue  - Implement wound care per orders  - Initiate isolation precautions as appropriate  - Initiate Pressure Ulcer prevention bundle as  indicated  Outcome: Progressing     Problem: MUSCULOSKELETAL - ADULT  Goal: Return mobility to safest level of function  Description: INTERVENTIONS:  - Assess patient stability and activity tolerance for standing, transferring and ambulating w/ or w/o assistive devices  - Assist with transfers and ambulation using safe patient handling equipment as needed  - Ensure adequate protection for wounds/incisions during mobilization  - Obtain PT/OT consults as needed  - Advance activity as appropriate  - Communicate ordered activity level and limitations with patient/family  Outcome: Progressing     Problem: PAIN - ADULT  Goal: Verbalizes/displays adequate comfort level or patient's stated pain goal  Description: INTERVENTIONS:  - Encourage pt to monitor pain and request assistance  - Assess pain using appropriate pain scale  - Administer analgesics based on type and severity of pain and evaluate response  - Implement non-pharmacological measures as appropriate and evaluate response  - Consider cultural and social influences on pain and pain management  - Manage/alleviate anxiety  - Utilize distraction and/or relaxation techniques  - Monitor for opioid side effects  - Notify MD/LIP if interventions unsuccessful or patient reports new pain  - Anticipate increased pain with activity and pre-medicate as appropriate  Outcome: Progressing

## 2024-05-09 NOTE — PROGRESS NOTES
Patient seen in follow up. No reported chest pain or sob. Complaining of calf pain now. On heparin gtt. Chart reviewed.  Review of systems: Constitutional: Negative for fever.  Respiratory: Negative for shortness of breath.   Cardiac: Negative for chest pain.  Gastrointestinal: Negative for abdominal pain.    Vitals:    05/09/24 0814   BP: 90/49   Pulse: (!) 47   Resp: 16   Temp: 98.3 °F (36.8 °C)       Intake/Output Summary (Last 24 hours) at 5/9/2024 0925  Last data filed at 5/9/2024 0613  Gross per 24 hour   Intake 390.35 ml   Output 400 ml   Net -9.65 ml     Wt Readings from Last 1 Encounters:   05/09/24 181 lb 12.8 oz (82.5 kg)        General: No acute distress.  Neck: Jugular venous pulsations not seen.  Lungs: Clear to auscultation.  Heart: Normal rate. No murmurs.  Abdomen: Soft. Non tender  Extremities: No edema.  Neurological: Alert. No focal deficits.  Psychiatric: Appropriate mood and affect.  Current Facility-Administered Medications   Medication Dose Route Frequency    acetaminophen (Tylenol Extra Strength) tab 500 mg  500 mg Oral Q6H PRN    aspirin DR tab 81 mg  81 mg Oral Daily    atorvastatin (Lipitor) tab 40 mg  40 mg Oral Nightly    buPROPion ER (Wellbutrin XL) 24 hr tab 150 mg  150 mg Oral Daily    carvedilol (Coreg) tab 3.125 mg  3.125 mg Oral BID with meals    isosorbide dinitrate (Isordil Titradose) tab 10 mg  10 mg Oral BID    lubiprostone (Amitiza) cap 24 mcg  24 mcg Oral BID with meals    pantoprazole (Protonix) DR tab 40 mg  40 mg Oral BID AC    prochlorperazine (Compazine) tab 10 mg  10 mg Oral Q6H PRN    rOPINIRole (Requip) tab 3 mg  3 mg Oral After dinner    tiZANidine (Zanaflex) tab 2 mg  2 mg Oral Daily with dinner    furosemide (Lasix) 10 mg/mL injection 40 mg  40 mg Intravenous BID (Diuretic)    acetaminophen (Tylenol Extra Strength) tab 500 mg  500 mg Oral Q4H PRN    melatonin cap/tab 5 mg  5 mg Oral Nightly PRN    ondansetron (Zofran) 4 MG/2ML injection 4 mg  4 mg  Intravenous Q6H PRN    metoclopramide (Reglan) 5 mg/mL injection 5 mg  5 mg Intravenous Q8H PRN    heparin (Porcine) 96100 units/250mL infusion ACS/AFIB CONTINUOUS  200-3,000 Units/hr Intravenous Continuous    spironolactone (Aldactone) partial tab 12.5 mg  12.5 mg Oral Daily    gabapentin (Neurontin) cap 600 mg  600 mg Oral BID     Medications Prior to Admission   Medication Sig    isosorbide dinitrate 10 MG Oral Tab Take 1 tablet (10 mg total) by mouth in the morning and 1 tablet (10 mg total) before bedtime.    spironolactone 25 MG Oral Tab Take 1 tablet (25 mg total) by mouth daily.    carvedilol 3.125 MG Oral Tab Take 1 tablet (3.125 mg total) by mouth 2 (two) times daily with meals.    citalopram 20 MG Oral Tab Take 1 tablet (20 mg total) by mouth daily.    gabapentin 300 MG Oral Cap Take 1 capsule (300 mg total) by mouth 2 (two) times daily. (Patient taking differently: Take 2 capsules (600 mg total) by mouth 2 (two) times daily.)    torsemide 20 MG Oral Tab Take 1 tablet (20 mg total) by mouth daily. (Patient taking differently: Take 2 tablets (40 mg total) by mouth daily.)    rOPINIRole 3 MG Oral Tab Take 1 tablet (3 mg total) by mouth After dinner.    lubiprostone 24 MCG Oral Cap Take 1 capsule (24 mcg total) by mouth 2 (two) times daily with meals.    pantoprazole 40 MG Oral Tab EC Take 1 tablet (40 mg total) by mouth 2 (two) times daily before meals.    atorvastatin 40 MG Oral Tab Take 1 tablet (40 mg total) by mouth daily with dinner.    ergocalciferol 1.25 MG (15196 UT) Oral Cap Take 1 capsule (50,000 Units total) by mouth once a week. On Sundays    ferrous sulfate 325 (65 FE) MG Oral Tab EC Take 1 tablet (325 mg total) by mouth daily with breakfast.    Magnesium 200 MG Oral Tab Take 2.5 tablets (500 mg total) by mouth daily.    aspirin 81 MG Oral Tab EC Take 1 tablet (81 mg total) by mouth daily.    buPROPion HCl ER, SR, 150 MG Oral Tablet 12 Hr Take 1 tablet (150 mg total) by mouth daily.     tiZANidine HCl 2 MG Oral Tab Take 1 tablet (2 mg total) by mouth daily with dinner.    DAPAGLIFLOZIN PROPANEDIOL OR Take 10 mg by mouth. (Patient not taking: Reported on 5/8/2024)    NON FORMULARY Place 1 drop into the right eye daily. Ivizia eye drops uses daily to right eye    lidocaine-menthol 4-1 % External Patch Place 1 patch onto the skin daily. (Patient not taking: Reported on 5/8/2024)    prochlorperazine (COMPAZINE) 10 mg tablet Take 1 tablet (10 mg total) by mouth every 6 (six) hours as needed for Nausea.    docusate sodium 100 MG Oral Cap Take 1 capsule (100 mg total) by mouth 2 (two) times daily as needed. (Patient not taking: Reported on 5/8/2024)    acetaminophen 500 MG Oral Tab Take 1 tablet (500 mg total) by mouth every 6 (six) hours as needed for Pain.     XR CHEST AP PORTABLE  (CPT=71045)    Result Date: 5/8/2024  CONCLUSION:   Mild bilateral interstitial opacity which could reflect mild interstitial edema.    Dictated by (CST): Nestor Whitehead MD on 5/08/2024 at 2:38 PM     Finalized by (CST): Nestor Whitehead MD on 5/08/2024 at 2:40 PM           Lab Results   Component Value Date    WBC 10.0 05/09/2024    HGB 11.1 05/09/2024    HCT 34.1 05/09/2024    .0 05/09/2024    CREATSERUM 2.37 05/09/2024    BUN 34 05/09/2024     05/09/2024    K 3.9 05/09/2024    K 3.9 05/09/2024     05/09/2024    CO2 26.0 05/09/2024     05/09/2024    CA 9.4 05/09/2024    PTT 46.1 05/09/2024    MG 2.1 05/09/2024       ECHO(previous):  LVH with LV EF ~55-60%    Echo today  LV EF of 45-50% (closer to 50%)     EKG:  Sinus tachycardia   Left bundle branch block   Abnormal ECG   When compared with ECG of 24-SEP-2023 06:17,   Vent. rate has increased BY  47 BPM      C CATH:9/24/23    IMPRESSION:  1.       Non-obstructive coronary artery disease with a negative iFR.  2.       Moderately elevated left ventricular filling pressures.         IMPRESSION:    #1.  Acute on chronic combined heart failure with  increased BNP and congestive heart failure changes on chest x-ray.  #2.  Known history of coronary disease with history of stent placed in 2019.  Non-obstructive repeat cath 2020 and 2023  #3.  acute on Chronic stage III -IV kidney disease   #4.  Type II NSETMI with chronic renal insufficiency and congestive heart failure. Negative cardiac cath for significant coronary artery disease on 9/24/2023. Appears to be flash pulmonary edema from hypertensive emergency.  #5.  Chronic LBBB.     PLAN:   Echo with mild drop in EF.  There is no clear segmental wall motion abnormality.  Will arrange for MPI. She had a cath 8 months ago with no significant obstructive CAD.   There is acute rise in creatinine. Will hold off on cath as patient is currently asymptomatic.  Hold IV lasix/spironolactone  She is complaining of b/l calf pain today but no chest pain or shortness of breath  I will stop IV heparin after total of 48 hours.  Continue asa/statin/isordil  Continue low dose BB       [address]  [address]  [place] [address]  Phone: [phone]  [email or web address]

## 2024-05-09 NOTE — PROGRESS NOTES
[facility] Hospitalist Progress Note     CC: Hospital Follow up    PCP: ROLY KAUR       Assessment/Plan:   79 year old female with history of chronic diastolic CHF, coronary artery disease s/p PCI 2019, CKD stage 3, hyperlipidemia, restless leg syndrome, HTN, chronic pain on morphine pain pump, who presented to the hospital for evaluation of shortness of breath.       Acute on chronic diastolic CHF  Elevated troponin, suspect demand ischemia, but trend  -symptoms of dyspnea, possible orthopnea, mild lower extremity swelling, CXR findings noted, BNP elevated, positive troponin noted   -given lasix 60mg IV once in ER  -lasix IV held this AM given hypotension, elevation of Cr from admission   -repeat renal function in AM  -TTE  -resume beta blocker (coreg 3.125mg BID)  -hold Farxiga 10mg for now given FARAZ  -aldactone held this AM given low BP and FARAZ     Coronary artery disease  -resume aspirin, statin  -beta blocker  -trend troponin  -started on heparin drip on admission per cardiology     FARAZ on CKD stage 3  -monitor with diuresis     Restless leg syndrome   -resume home meds     Chronic pain, on morphine pain pump      DVT prophylaxis: heparin drip IV  Code status: full      Asrar Cece AGOSTO  [facility] Hospitalist        Subjective:     Feels a little better then yesterday. No leg swelling. She is not short of breath at rest.     OBJECTIVE:    Blood pressure 90/49, pulse (!) 47, temperature 98.3 °F (36.8 °C), temperature source Oral, resp. rate 16, height 5' 2\" (1.575 m), weight 181 lb 12.8 oz (82.5 kg), SpO2 91%.    Temp:  [97.7 °F (36.5 °C)-98.5 °F (36.9 °C)] 98.3 °F (36.8 °C)  Pulse:  [47-98] 47  Resp:  [16-24] 16  BP: ()/(49-97) 90/49  SpO2:  [91 %-95 %] 91 %      Intake/Output:    Intake/Output Summary (Last 24 hours) at 5/9/2024 1117  Last data filed at 5/9/2024 1029  Gross per 24 hour   Intake 630.35 ml   Output 400 ml   Net 230.35 ml       Last 3 Weights   05/09/24 0614 181 lb  12.8 oz (82.5 kg)   05/08/24 2106 181 lb 11.2 oz (82.4 kg)   05/08/24 1802 175 lb 4.3 oz (79.5 kg)   05/08/24 1327 184 lb (83.5 kg)   09/30/23 0504 177 lb 4.8 oz (80.4 kg)   09/29/23 0500 179 lb 14.4 oz (81.6 kg)   09/28/23 0500 180 lb 9.6 oz (81.9 kg)   09/27/23 0600 182 lb (82.6 kg)   09/26/23 0330 187 lb 9.8 oz (85.1 kg)   09/25/23 0400 184 lb 15.5 oz (83.9 kg)   09/24/23 0928 186 lb 4.6 oz (84.5 kg)   09/05/23 0510 179 lb 8 oz (81.4 kg)   09/04/23 0128 180 lb 9.6 oz (81.9 kg)   09/03/23 2200 177 lb (80.3 kg)       BP 90/49 (BP Location: Right arm)   Pulse (!) 47   Temp 98.3 °F (36.8 °C) (Oral)   Resp 16   Ht 5' 2\" (1.575 m)   Wt 181 lb 12.8 oz (82.5 kg)   SpO2 91%   BMI 33.25 kg/m²   General: Alert, no acute distress  Lungs: clear to ausculation bilaterally  Heart: Regular rate and rhythm  Abdomen: soft, non tender  Extremities: No edema  Skin: no new rash, normal color    Data Review:       Labs:     Recent Labs   Lab 05/08/24  1346 05/09/24  0320   RBC 4.09 3.78*   HGB 12.4 11.1*   HCT 35.0 34.1*   MCV 85.6 90.2   MCH 30.3 29.4   MCHC 35.4 32.6   RDW 12.5 13.1   NEPRELIM 11.86* 8.77*   WBC 13.7* 10.0   .0 300.0         Recent Labs   Lab 05/08/24  1346 05/08/24 2206 05/09/24  0320   *  --  178*   BUN 27*  --  34*   CREATSERUM 2.09*  --  2.37*   EGFRCR 24*  --  21*   CA 10.2  --  9.4     --  139   K 3.3* 3.4* 3.9  3.9     --  103   CO2 26.0  --  26.0       No results for input(s): \"ALT\", \"AST\", \"ALB\", \"AMYLASE\", \"LIPASE\", \"LDH\" in the last 168 hours.    Invalid input(s): \"ALPHOS\", \"TBIL\", \"DBIL\", \"TPROT\"      Imaging:  XR CHEST AP PORTABLE  (CPT=71045)    Result Date: 5/8/2024  CONCLUSION:   Mild bilateral interstitial opacity which could reflect mild interstitial edema.    Dictated by (CST): Nestor Whitehead MD on 5/08/2024 at 2:38 PM     Finalized by (CST): Nestor Whitehead MD on 5/08/2024 at 2:40 PM             Meds:      aspirin  81 mg Oral Daily    atorvastatin  40 mg  Oral Nightly    buPROPion ER  150 mg Oral Daily    carvedilol  3.125 mg Oral BID with meals    isosorbide dinitrate  10 mg Oral BID    lubiprostone  24 mcg Oral BID with meals    pantoprazole  40 mg Oral BID AC    rOPINIRole HCl  3 mg Oral After dinner    tiZANidine  2 mg Oral Daily with dinner    [Held by provider] furosemide  40 mg Intravenous BID (Diuretic)    [Held by provider] spironolactone  12.5 mg Oral Daily    gabapentin  600 mg Oral BID      continuous dose heparin 900 Units/hr (05/09/24 5067)       acetaminophen    prochlorperazine    acetaminophen    melatonin    ondansetron    metoclopramide

## 2024-05-09 NOTE — OCCUPATIONAL THERAPY NOTE
OT orders generated through a functional mobility screen. Pt presented to ED late afternoon on 5/8 with c/o CP and SOB. Pt with critically high troponins and is getting a cardiac work up. Will defer OT intervention at this time- plan to follow up tomorrow as appropriate.

## 2024-05-09 NOTE — PLAN OF CARE
No complaints overnight. 250cc bolus given for hypotention. Heparin running at 9. 2L o2.  Problem: Patient Centered Care  Goal: Patient preferences are identified and integrated in the patient's plan of care  Description: Interventions:  - What would you like us to know as we care for you? FROM HOME ALONE  - Provide timely, complete, and accurate information to patient/family  - Incorporate patient and family knowledge, values, beliefs, and cultural backgrounds into the planning and delivery of care  - Encourage patient/family to participate in care and decision-making at the level they choose  - Honor patient and family perspectives and choices  Outcome: Progressing     Problem: Patient/Family Goals  Goal: Patient/Family Long Term Goal  Description: Patient's Long Term Goal: go home    Interventions:  - cardio consult  - See additional Care Plan goals for specific interventions  Outcome: Progressing  Goal: Patient/Family Short Term Goal  Description: Patient's Short Term Goal: safe in hospital    Interventions:   - monitor on tele  - monitor labs  - See additional Care Plan goals for specific interventions  Outcome: Progressing     Problem: CARDIOVASCULAR - ADULT  Goal: Maintains optimal cardiac output and hemodynamic stability  Description: INTERVENTIONS:  - Monitor vital signs, rhythm, and trends  - Monitor for bleeding, hypotension and signs of decreased cardiac output  - Evaluate effectiveness of vasoactive medications to optimize hemodynamic stability  - Monitor arterial and/or venous puncture sites for bleeding and/or hematoma  - Assess quality of pulses, skin color and temperature  - Assess for signs of decreased coronary artery perfusion - ex. Angina  - Evaluate fluid balance, assess for edema, trend weights  Outcome: Progressing  Goal: Absence of cardiac arrhythmias or at baseline  Description: INTERVENTIONS:  - Continuous cardiac monitoring, monitor vital signs, obtain 12 lead EKG if indicated  - Evaluate  effectiveness of antiarrhythmic and heart rate control medications as ordered  - Initiate emergency measures for life threatening arrhythmias  - Monitor electrolytes and administer replacement therapy as ordered  Outcome: Progressing     Problem: RESPIRATORY - ADULT  Goal: Achieves optimal ventilation and oxygenation  Description: INTERVENTIONS:  - Assess for changes in respiratory status  - Assess for changes in mentation and behavior  - Position to facilitate oxygenation and minimize respiratory effort  - Oxygen supplementation based on oxygen saturation or ABGs  - Provide Smoking Cessation handout, if applicable  - Encourage broncho-pulmonary hygiene including cough, deep breathe, Incentive Spirometry  - Assess the need for suctioning and perform as needed  - Assess and instruct to report SOB or any respiratory difficulty  - Respiratory Therapy support as indicated  - Manage/alleviate anxiety  - Monitor for signs/symptoms of CO2 retention  Outcome: Progressing     Problem: SKIN/TISSUE INTEGRITY - ADULT  Goal: Skin integrity remains intact  Description: INTERVENTIONS  - Assess and document risk factors for pressure ulcer development  - Assess and document skin integrity  - Monitor for areas of redness and/or skin breakdown  - Initiate interventions, skin care algorithm/standards of care as needed  Outcome: Progressing  Goal: Incision(s), wounds(s) or drain site(s) healing without S/S of infection  Description: INTERVENTIONS:  - Assess and document risk factors for pressure ulcer development  - Assess and document skin integrity  - Assess and document dressing/incision, wound bed, drain sites and surrounding tissue  - Implement wound care per orders  - Initiate isolation precautions as appropriate  - Initiate Pressure Ulcer prevention bundle as indicated  Outcome: Progressing     Problem: MUSCULOSKELETAL - ADULT  Goal: Return mobility to safest level of function  Description: INTERVENTIONS:  - Assess patient  stability and activity tolerance for standing, transferring and ambulating w/ or w/o assistive devices  - Assist with transfers and ambulation using safe patient handling equipment as needed  - Ensure adequate protection for wounds/incisions during mobilization  - Obtain PT/OT consults as needed  - Advance activity as appropriate  - Communicate ordered activity level and limitations with patient/family  Outcome: Progressing     Problem: PAIN - ADULT  Goal: Verbalizes/displays adequate comfort level or patient's stated pain goal  Description: INTERVENTIONS:  - Encourage pt to monitor pain and request assistance  - Assess pain using appropriate pain scale  - Administer analgesics based on type and severity of pain and evaluate response  - Implement non-pharmacological measures as appropriate and evaluate response  - Consider cultural and social influences on pain and pain management  - Manage/alleviate anxiety  - Utilize distraction and/or relaxation techniques  - Monitor for opioid side effects  - Notify MD/LIP if interventions unsuccessful or patient reports new pain  - Anticipate increased pain with activity and pre-medicate as appropriate  Outcome: Progressing

## 2024-05-10 VITALS
BODY MASS INDEX: 32.61 KG/M2 | RESPIRATION RATE: 16 BRPM | TEMPERATURE: 98 F | SYSTOLIC BLOOD PRESSURE: 120 MMHG | OXYGEN SATURATION: 94 % | WEIGHT: 177.19 LBS | HEIGHT: 62 IN | HEART RATE: 66 BPM | DIASTOLIC BLOOD PRESSURE: 61 MMHG

## 2024-05-10 LAB
ANION GAP SERPL CALC-SCNC: 7 MMOL/L (ref 0–18)
APTT PPP: 70.1 SECONDS (ref 23–36)
BUN BLD-MCNC: 37 MG/DL (ref 9–23)
BUN/CREAT SERPL: 20.2 (ref 10–20)
CALCIUM BLD-MCNC: 8.8 MG/DL (ref 8.7–10.4)
CHLORIDE SERPL-SCNC: 101 MMOL/L (ref 98–112)
CO2 SERPL-SCNC: 28 MMOL/L (ref 21–32)
CREAT BLD-MCNC: 1.83 MG/DL
EGFRCR SERPLBLD CKD-EPI 2021: 28 ML/MIN/1.73M2 (ref 60–?)
GLUCOSE BLD-MCNC: 112 MG/DL (ref 70–99)
OSMOLALITY SERPL CALC.SUM OF ELEC: 291 MOSM/KG (ref 275–295)
POTASSIUM SERPL-SCNC: 3.8 MMOL/L (ref 3.5–5.1)
SODIUM SERPL-SCNC: 136 MMOL/L (ref 136–145)

## 2024-05-10 PROCEDURE — 85730 THROMBOPLASTIN TIME PARTIAL: CPT | Performed by: INTERNAL MEDICINE

## 2024-05-10 PROCEDURE — 80048 BASIC METABOLIC PNL TOTAL CA: CPT | Performed by: INTERNAL MEDICINE

## 2024-05-10 RX ORDER — TORSEMIDE 20 MG/1
40 TABLET ORAL DAILY
Status: DISCONTINUED | OUTPATIENT
Start: 2024-05-10 | End: 2024-05-10

## 2024-05-10 RX ORDER — TORSEMIDE 20 MG/1
40 TABLET ORAL DAILY
Status: SHIPPED | COMMUNITY
Start: 2024-05-10

## 2024-05-10 RX ORDER — SPIRONOLACTONE 25 MG/1
12.5 TABLET ORAL DAILY
Status: DISCONTINUED | OUTPATIENT
Start: 2024-05-11 | End: 2024-05-10

## 2024-05-10 RX ORDER — GABAPENTIN 300 MG/1
600 CAPSULE ORAL 2 TIMES DAILY
Status: SHIPPED | COMMUNITY
Start: 2024-05-10

## 2024-05-10 NOTE — DISCHARGE PLANNING
Patient was provided with discharge instructions, education, and follow up information. Patient verbalizes understanding of follow up information, specifically following up with PCP and cardiology, CHF discharge instructions, daily weights, diet and fluid limitation. Patient has no questions after reviewing all instructions and will be going home via medicar.     Pennie REHMAN, Discharge Leader [id number]

## 2024-05-10 NOTE — CM/SW NOTE
Per chart, pt has DC order and PT/OT consults pending.    SW confirmed w/ MD - PT/OT do not need to see pt prior to DC.      Pt is cleared from SW/CM stand point.        Marnie Chin, DAVEY, LSW [id number]

## 2024-05-10 NOTE — DISCHARGE SUMMARY
General Medicine Discharge Summary     Patient ID:  Susan P Ruzicka  79 year old  2/16/1945    Admit date: 5/8/2024    Discharge date and time: 5/10/24    Attending Physician: Dyana Zhao DO     Primary Care Physician: ROLY KAUR     Discharge Diagnoses:   Acute on chronic congestive heart failure, unspecified heart failure type (HCC) [I50.9]  Acute hypoxic respiratory failure (HCC) [J96.01]    Discharge Condition: stable    Disposition: home     Important Follow up:    [facility] cardiology office 1-2 weeks if possible  PCP in 1 week    Hospital Course:    79 year old female with history of chronic diastolic CHF, coronary artery disease s/p PCI 2019, CKD stage 3, hyperlipidemia, restless leg syndrome, HTN, chronic pain on morphine pain pump, who presented to the hospital for evaluation of shortness of breath.       Acute on chronic diastolic CHF  Elevated troponin, suspect demand ischemia, but trend  -symptoms of dyspnea, possible orthopnea, mild lower extremity swelling, CXR findings noted, BNP elevated, positive troponin noted   -given lasix 60mg IV once in ER  -renal function with Cr jump initially but now resolved  -ok to resume her oral torsemide 40mg today  -asked to resume her aldactone tomorrow   -TTE done  -resume beta blocker (coreg 3.125mg BID)  -can resume Farxiga 10mg at discharge     Coronary artery disease  -resume aspirin, statin  -beta blocker  -started on heparin drip on admission per cardiology     FARAZ on CKD stage 3  -monitored with diuresis, now improved, CR 1.8 on discharge day     Restless leg syndrome   -resume home meds     Chronic pain, on morphine pain pump       Consults: IP CONSULT TO CARDIOLOGY      Patient instructions:        Current Discharge Medication List        CONTINUE these medications which have CHANGED    Details   torsemide 20 MG Oral Tab Take 2 tablets (40 mg total) by mouth daily.      gabapentin 300 MG Oral Cap Take  2 capsules (600 mg total) by mouth 2 (two) times daily.           CONTINUE these medications which have NOT CHANGED    Details   isosorbide dinitrate 10 MG Oral Tab Take 1 tablet (10 mg total) by mouth in the morning and 1 tablet (10 mg total) before bedtime.      spironolactone 25 MG Oral Tab Take 1 tablet (25 mg total) by mouth daily.      carvedilol 3.125 MG Oral Tab Take 1 tablet (3.125 mg total) by mouth 2 (two) times daily with meals.      citalopram 20 MG Oral Tab Take 1 tablet (20 mg total) by mouth daily.      rOPINIRole 3 MG Oral Tab Take 1 tablet (3 mg total) by mouth After dinner.      lubiprostone 24 MCG Oral Cap Take 1 capsule (24 mcg total) by mouth 2 (two) times daily with meals.      pantoprazole 40 MG Oral Tab EC Take 1 tablet (40 mg total) by mouth 2 (two) times daily before meals.      atorvastatin 40 MG Oral Tab Take 1 tablet (40 mg total) by mouth daily with dinner.      ergocalciferol 1.25 MG (90679 UT) Oral Cap Take 1 capsule (50,000 Units total) by mouth once a week. On Sundays      ferrous sulfate 325 (65 FE) MG Oral Tab EC Take 1 tablet (325 mg total) by mouth daily with breakfast.      Magnesium 200 MG Oral Tab Take 2.5 tablets (500 mg total) by mouth daily.      aspirin 81 MG Oral Tab EC Take 1 tablet (81 mg total) by mouth daily.      buPROPion HCl ER, SR, 150 MG Oral Tablet 12 Hr Take 1 tablet (150 mg total) by mouth daily.      tiZANidine HCl 2 MG Oral Tab Take 1 tablet (2 mg total) by mouth daily with dinner.      DAPAGLIFLOZIN PROPANEDIOL OR Take 10 mg by mouth.      NON FORMULARY Place 1 drop into the right eye daily. Ivizia eye drops uses daily to right eye      prochlorperazine (COMPAZINE) 10 mg tablet Take 1 tablet (10 mg total) by mouth every 6 (six) hours as needed for Nausea.      acetaminophen 500 MG Oral Tab Take 1 tablet (500 mg total) by mouth every 6 (six) hours as needed for Pain.           Code Status: Full Code      Exam on day of discharge:     Vitals:    05/10/24  0759   BP: 120/61   Pulse: 66   Resp: 16   Temp: 98.4 °F (36.9 °C)   General: no acute distress  Heart: RRR  Lungs: clear bilaterally  Abdomen: nontender  Extremities: no pedal edema     Total time coordinating care for discharge: Greater than 30 minutes    Dyana Zhao DO

## 2024-05-10 NOTE — PROGRESS NOTES
Patient seen in follow up. No reported chest pain or sob. Complaining of calf pain now. On heparin gtt. Chart reviewed.  Review of systems: Constitutional: Negative for fever.  Respiratory: Negative for shortness of breath.   Cardiac: Negative for chest pain.  Gastrointestinal: Negative for abdominal pain.  Patient sitting up in bed, wants to go home  HR and BP stable  C monitor; sinus Rhythm    Vitals:    05/10/24 0759   BP: 120/61   Pulse: 66   Resp: 16   Temp: 98.4 °F (36.9 °C)       Intake/Output Summary (Last 24 hours) at 5/10/2024 1124  Last data filed at 5/10/2024 1107  Gross per 24 hour   Intake 480 ml   Output 925 ml   Net -445 ml     Wt Readings from Last 1 Encounters:   05/10/24 177 lb 3.2 oz (80.4 kg)        General: No acute distress.  Neck: Jugular venous pulsations not seen.  Lungs: Clear to auscultation.  Heart: Normal rate. No murmurs.  Abdomen: Soft. Non tender  Extremities: No edema.  Neurological: Alert. No focal deficits.  Psychiatric: Appropriate mood and affect.  Current Facility-Administered Medications   Medication Dose Route Frequency    torsemide (Demadex) tab 40 mg  40 mg Oral Daily    [START ON 5/11/2024] spironolactone (Aldactone) partial tab 12.5 mg  12.5 mg Oral Daily    acetaminophen (Tylenol Extra Strength) tab 500 mg  500 mg Oral Q6H PRN    aspirin DR tab 81 mg  81 mg Oral Daily    atorvastatin (Lipitor) tab 40 mg  40 mg Oral Nightly    buPROPion ER (Wellbutrin XL) 24 hr tab 150 mg  150 mg Oral Daily    carvedilol (Coreg) tab 3.125 mg  3.125 mg Oral BID with meals    isosorbide dinitrate (Isordil Titradose) tab 10 mg  10 mg Oral BID    lubiprostone (Amitiza) cap 24 mcg  24 mcg Oral BID with meals    pantoprazole (Protonix) DR tab 40 mg  40 mg Oral BID AC    prochlorperazine (Compazine) tab 10 mg  10 mg Oral Q6H PRN    rOPINIRole (Requip) tab 3 mg  3 mg Oral After dinner    tiZANidine (Zanaflex) tab 2 mg  2 mg Oral Daily with dinner    acetaminophen (Tylenol Extra Strength)  tab 500 mg  500 mg Oral Q4H PRN    melatonin cap/tab 5 mg  5 mg Oral Nightly PRN    ondansetron (Zofran) 4 MG/2ML injection 4 mg  4 mg Intravenous Q6H PRN    metoclopramide (Reglan) 5 mg/mL injection 5 mg  5 mg Intravenous Q8H PRN    heparin (Porcine) 90611 units/250mL infusion ACS/AFIB CONTINUOUS  200-3,000 Units/hr Intravenous Continuous    gabapentin (Neurontin) cap 600 mg  600 mg Oral BID     Medications Prior to Admission   Medication Sig    isosorbide dinitrate 10 MG Oral Tab Take 1 tablet (10 mg total) by mouth in the morning and 1 tablet (10 mg total) before bedtime.    spironolactone 25 MG Oral Tab Take 1 tablet (25 mg total) by mouth daily.    carvedilol 3.125 MG Oral Tab Take 1 tablet (3.125 mg total) by mouth 2 (two) times daily with meals.    citalopram 20 MG Oral Tab Take 1 tablet (20 mg total) by mouth daily.    rOPINIRole 3 MG Oral Tab Take 1 tablet (3 mg total) by mouth After dinner.    lubiprostone 24 MCG Oral Cap Take 1 capsule (24 mcg total) by mouth 2 (two) times daily with meals.    pantoprazole 40 MG Oral Tab EC Take 1 tablet (40 mg total) by mouth 2 (two) times daily before meals.    atorvastatin 40 MG Oral Tab Take 1 tablet (40 mg total) by mouth daily with dinner.    ergocalciferol 1.25 MG (00803 UT) Oral Cap Take 1 capsule (50,000 Units total) by mouth once a week. On Sundays    ferrous sulfate 325 (65 FE) MG Oral Tab EC Take 1 tablet (325 mg total) by mouth daily with breakfast.    Magnesium 200 MG Oral Tab Take 2.5 tablets (500 mg total) by mouth daily.    aspirin 81 MG Oral Tab EC Take 1 tablet (81 mg total) by mouth daily.    buPROPion HCl ER, SR, 150 MG Oral Tablet 12 Hr Take 1 tablet (150 mg total) by mouth daily.    tiZANidine HCl 2 MG Oral Tab Take 1 tablet (2 mg total) by mouth daily with dinner.    DAPAGLIFLOZIN PROPANEDIOL OR Take 10 mg by mouth. (Patient not taking: Reported on 5/8/2024)    [DISCONTINUED] gabapentin 300 MG Oral Cap Take 1 capsule (300 mg total) by mouth 2 (two)  times daily. (Patient taking differently: Take 2 capsules (600 mg total) by mouth 2 (two) times daily.)    [DISCONTINUED] torsemide 20 MG Oral Tab Take 1 tablet (20 mg total) by mouth daily. (Patient taking differently: Take 2 tablets (40 mg total) by mouth daily.)    NON FORMULARY Place 1 drop into the right eye daily. Ivizia eye drops uses daily to right eye    prochlorperazine (COMPAZINE) 10 mg tablet Take 1 tablet (10 mg total) by mouth every 6 (six) hours as needed for Nausea.    acetaminophen 500 MG Oral Tab Take 1 tablet (500 mg total) by mouth every 6 (six) hours as needed for Pain.     XR CHEST AP PORTABLE  (CPT=71045)    Result Date: 5/8/2024  CONCLUSION:   Mild bilateral interstitial opacity which could reflect mild interstitial edema.    Dictated by (CST): Nestor Whitehead MD on 5/08/2024 at 2:38 PM     Finalized by (CST): Nestor Whitehead MD on 5/08/2024 at 2:40 PM           Lab Results   Component Value Date    CREATSERUM 1.83 05/10/2024    BUN 37 05/10/2024     05/10/2024    K 3.8 05/10/2024     05/10/2024    CO2 28.0 05/10/2024     05/10/2024    CA 8.8 05/10/2024    PTT 70.1 05/10/2024       ECHO(previous):  LVH with LV EF ~55-60%    Echo today  LV EF of 45-50% (closer to 50%)     EKG:  Sinus tachycardia   Left bundle branch block   Abnormal ECG   When compared with ECG of 24-SEP-2023 06:17,   Vent. rate has increased BY  47 BPM      C CATH:9/24/23    IMPRESSION:  1.       Non-obstructive coronary artery disease with a negative iFR.  2.       Moderately elevated left ventricular filling pressures.         IMPRESSION:    #1.  Acute on chronic combined heart failure with increased BNP and congestive heart failure changes on chest x-ray.  #2.  Known history of coronary disease with history of stent placed in 2019.  Non-obstructive repeat cath 2020 and 2023  #3.  acute on Chronic stage III -IV kidney disease   #4.  Type II NSETMI with chronic renal insufficiency and congestive heart  failure. Negative cardiac cath for significant coronary artery disease on 9/24/2023. Appears to be flash pulmonary edema from hypertensive emergency.  #5.  Chronic LBBB.     PLAN:   Echo with mild drop in EF.  There is no clear segmental wall motion abnormality.  Will arrange for MPI. She had a cath 8 months ago with no significant obstructive CAD.   There is acute rise in creatinine. Will hold off on cath as patient is currently asymptomatic.  Hold IV lasix/spironolactone  BUN 37/Creat 1.83/K 3.8/Mg2.1  She states b/l calf pain better today but no chest pain or shortness of breath  stop IV heparin after total of 48 hours.  Continue asa/statin/isordil  Continue low dose BB      Cardiac meds:  ASA 81 mg  Atorvastatin 40 mg  Coreg 3.125 mg bid  Isordil 10 mg bid  Torsemide 40 mg    Stable from cardiac standpoint for discharge  F/u in office in 1-2 weeks    D/W patient and nursing staff    Jorgito Pollack MD  [address]  [address]  [place] [address]  Phone: [phone]  [email or web address]

## 2024-05-10 NOTE — PLAN OF CARE
Patient is from home alone. A&Ox4. Room air. PO torsemide restarted and given in am. Patient is a standby assist. Bed in lowest position and locked. Call light in hand. Personal belongings w/in reach. Ready for discharge. IV removed. Tele box removed and returned. Nurse  cleared.    Problem: Patient Centered Care  Goal: Patient preferences are identified and integrated in the patient's plan of care  Description: Interventions:  - What would you like us to know as we care for you? FROM HOME ALONE  - Provide timely, complete, and accurate information to patient/family  - Incorporate patient and family knowledge, values, beliefs, and cultural backgrounds into the planning and delivery of care  - Encourage patient/family to participate in care and decision-making at the level they choose  - Honor patient and family perspectives and choices  Outcome: Adequate for Discharge     Problem: Patient/Family Goals  Goal: Patient/Family Long Term Goal  Description: Patient's Long Term Goal: go home    Interventions:  - cardio consult  - See additional Care Plan goals for specific interventions  Outcome: Adequate for Discharge  Goal: Patient/Family Short Term Goal  Description: Patient's Short Term Goal: safe in hospital    Interventions:   - monitor on tele  - monitor labs  - See additional Care Plan goals for specific interventions  Outcome: Adequate for Discharge     Problem: CARDIOVASCULAR - ADULT  Goal: Maintains optimal cardiac output and hemodynamic stability  Description: INTERVENTIONS:  - Monitor vital signs, rhythm, and trends  - Monitor for bleeding, hypotension and signs of decreased cardiac output  - Evaluate effectiveness of vasoactive medications to optimize hemodynamic stability  - Monitor arterial and/or venous puncture sites for bleeding and/or hematoma  - Assess quality of pulses, skin color and temperature  - Assess for signs of decreased coronary artery perfusion - ex. Angina  - Evaluate fluid balance,  assess for edema, trend weights  Outcome: Adequate for Discharge  Goal: Absence of cardiac arrhythmias or at baseline  Description: INTERVENTIONS:  - Continuous cardiac monitoring, monitor vital signs, obtain 12 lead EKG if indicated  - Evaluate effectiveness of antiarrhythmic and heart rate control medications as ordered  - Initiate emergency measures for life threatening arrhythmias  - Monitor electrolytes and administer replacement therapy as ordered  Outcome: Adequate for Discharge     Problem: RESPIRATORY - ADULT  Goal: Achieves optimal ventilation and oxygenation  Description: INTERVENTIONS:  - Assess for changes in respiratory status  - Assess for changes in mentation and behavior  - Position to facilitate oxygenation and minimize respiratory effort  - Oxygen supplementation based on oxygen saturation or ABGs  - Provide Smoking Cessation handout, if applicable  - Encourage broncho-pulmonary hygiene including cough, deep breathe, Incentive Spirometry  - Assess the need for suctioning and perform as needed  - Assess and instruct to report SOB or any respiratory difficulty  - Respiratory Therapy support as indicated  - Manage/alleviate anxiety  - Monitor for signs/symptoms of CO2 retention  Outcome: Adequate for Discharge     Problem: SKIN/TISSUE INTEGRITY - ADULT  Goal: Skin integrity remains intact  Description: INTERVENTIONS  - Assess and document risk factors for pressure ulcer development  - Assess and document skin integrity  - Monitor for areas of redness and/or skin breakdown  - Initiate interventions, skin care algorithm/standards of care as needed  Outcome: Adequate for Discharge  Goal: Incision(s), wounds(s) or drain site(s) healing without S/S of infection  Description: INTERVENTIONS:  - Assess and document risk factors for pressure ulcer development  - Assess and document skin integrity  - Assess and document dressing/incision, wound bed, drain sites and surrounding tissue  - Implement wound care  per orders  - Initiate isolation precautions as appropriate  - Initiate Pressure Ulcer prevention bundle as indicated  Outcome: Adequate for Discharge     Problem: MUSCULOSKELETAL - ADULT  Goal: Return mobility to safest level of function  Description: INTERVENTIONS:  - Assess patient stability and activity tolerance for standing, transferring and ambulating w/ or w/o assistive devices  - Assist with transfers and ambulation using safe patient handling equipment as needed  - Ensure adequate protection for wounds/incisions during mobilization  - Obtain PT/OT consults as needed  - Advance activity as appropriate  - Communicate ordered activity level and limitations with patient/family  Outcome: Adequate for Discharge     Problem: PAIN - ADULT  Goal: Verbalizes/displays adequate comfort level or patient's stated pain goal  Description: INTERVENTIONS:  - Encourage pt to monitor pain and request assistance  - Assess pain using appropriate pain scale  - Administer analgesics based on type and severity of pain and evaluate response  - Implement non-pharmacological measures as appropriate and evaluate response  - Consider cultural and social influences on pain and pain management  - Manage/alleviate anxiety  - Utilize distraction and/or relaxation techniques  - Monitor for opioid side effects  - Notify MD/LIP if interventions unsuccessful or patient reports new pain  - Anticipate increased pain with activity and pre-medicate as appropriate  Outcome: Adequate for Discharge

## 2024-05-10 NOTE — CDS QUERY
CLINICAL DOCUMENTATION CLARIFICATION FORM  Dear Doctor: Cece    Please clarify the significance of potassium level of  3.3 from 5/8/2024    PLEASE (X) ALL DIAGNOSES THAT APPLY.  SELECTION BY PROVIDER ONLY    ( X)  Hypokalemia    ( )  Clinically Insignificant below normal potassium laboratory result    ( )  Other (please specify):     Admitted for treatment of Acute on Chronic Diastolic CHF  Potassium Result 05/08/2024--3.3  Given Potassium Chloride (Klon-Con) 40 mEq on 05/08/2024  Potassium Result on 5/09/2024--3.9  Risk Factors- FARAZ, CKD STAGE 3     If you have any questions, please contact Clinical :  Ritchie MIKE RN at [phone]     Thank You!    THIS FORM IS A PERMANENT PART OF THE MEDICAL RECORD

## 2024-05-10 NOTE — DISCHARGE PLANNING
[facility] called to schedule the next available [facility]. Sasha reports the ETA 2:45-3pm with a $45 quote.     Pennie REHMAN, Discharge Leader [id number]

## 2024-05-10 NOTE — PLAN OF CARE
Pt up ambulating to bathroom standby assist w/ walker. Complaints of pain, PRN tylenol given. Heparin gtt infusing. Safety precautions maintained and in place. Plan for PT/OT to see.    Problem: Patient Centered Care  Goal: Patient preferences are identified and integrated in the patient's plan of care  Description: Interventions:  - What would you like us to know as we care for you? FROM HOME ALONE  - Provide timely, complete, and accurate information to patient/family  - Incorporate patient and family knowledge, values, beliefs, and cultural backgrounds into the planning and delivery of care  - Encourage patient/family to participate in care and decision-making at the level they choose  - Honor patient and family perspectives and choices  Outcome: Progressing     Problem: Patient/Family Goals  Goal: Patient/Family Long Term Goal  Description: Patient's Long Term Goal: go home    Interventions:  - cardio consult  - See additional Care Plan goals for specific interventions  Outcome: Progressing  Goal: Patient/Family Short Term Goal  Description: Patient's Short Term Goal: safe in hospital    Interventions:   - monitor on tele  - monitor labs  - See additional Care Plan goals for specific interventions  Outcome: Progressing     Problem: CARDIOVASCULAR - ADULT  Goal: Maintains optimal cardiac output and hemodynamic stability  Description: INTERVENTIONS:  - Monitor vital signs, rhythm, and trends  - Monitor for bleeding, hypotension and signs of decreased cardiac output  - Evaluate effectiveness of vasoactive medications to optimize hemodynamic stability  - Monitor arterial and/or venous puncture sites for bleeding and/or hematoma  - Assess quality of pulses, skin color and temperature  - Assess for signs of decreased coronary artery perfusion - ex. Angina  - Evaluate fluid balance, assess for edema, trend weights  Outcome: Progressing  Goal: Absence of cardiac arrhythmias or at baseline  Description: INTERVENTIONS:  -  Continuous cardiac monitoring, monitor vital signs, obtain 12 lead EKG if indicated  - Evaluate effectiveness of antiarrhythmic and heart rate control medications as ordered  - Initiate emergency measures for life threatening arrhythmias  - Monitor electrolytes and administer replacement therapy as ordered  Outcome: Progressing     Problem: RESPIRATORY - ADULT  Goal: Achieves optimal ventilation and oxygenation  Description: INTERVENTIONS:  - Assess for changes in respiratory status  - Assess for changes in mentation and behavior  - Position to facilitate oxygenation and minimize respiratory effort  - Oxygen supplementation based on oxygen saturation or ABGs  - Provide Smoking Cessation handout, if applicable  - Encourage broncho-pulmonary hygiene including cough, deep breathe, Incentive Spirometry  - Assess the need for suctioning and perform as needed  - Assess and instruct to report SOB or any respiratory difficulty  - Respiratory Therapy support as indicated  - Manage/alleviate anxiety  - Monitor for signs/symptoms of CO2 retention  Outcome: Progressing     Problem: SKIN/TISSUE INTEGRITY - ADULT  Goal: Skin integrity remains intact  Description: INTERVENTIONS  - Assess and document risk factors for pressure ulcer development  - Assess and document skin integrity  - Monitor for areas of redness and/or skin breakdown  - Initiate interventions, skin care algorithm/standards of care as needed  Outcome: Progressing  Goal: Incision(s), wounds(s) or drain site(s) healing without S/S of infection  Description: INTERVENTIONS:  - Assess and document risk factors for pressure ulcer development  - Assess and document skin integrity  - Assess and document dressing/incision, wound bed, drain sites and surrounding tissue  - Implement wound care per orders  - Initiate isolation precautions as appropriate  - Initiate Pressure Ulcer prevention bundle as indicated  Outcome: Progressing     Problem: MUSCULOSKELETAL - ADULT  Goal:  Return mobility to safest level of function  Description: INTERVENTIONS:  - Assess patient stability and activity tolerance for standing, transferring and ambulating w/ or w/o assistive devices  - Assist with transfers and ambulation using safe patient handling equipment as needed  - Ensure adequate protection for wounds/incisions during mobilization  - Obtain PT/OT consults as needed  - Advance activity as appropriate  - Communicate ordered activity level and limitations with patient/family  Outcome: Progressing     Problem: PAIN - ADULT  Goal: Verbalizes/displays adequate comfort level or patient's stated pain goal  Description: INTERVENTIONS:  - Encourage pt to monitor pain and request assistance  - Assess pain using appropriate pain scale  - Administer analgesics based on type and severity of pain and evaluate response  - Implement non-pharmacological measures as appropriate and evaluate response  - Consider cultural and social influences on pain and pain management  - Manage/alleviate anxiety  - Utilize distraction and/or relaxation techniques  - Monitor for opioid side effects  - Notify MD/LIP if interventions unsuccessful or patient reports new pain  - Anticipate increased pain with activity and pre-medicate as appropriate  Outcome: Progressing

## 2024-09-28 ENCOUNTER — HOSPITAL ENCOUNTER (INPATIENT)
Facility: HOSPITAL | Age: 79
LOS: 2 days | Discharge: HOME HEALTH | End: 2024-10-02
Attending: EMERGENCY MEDICINE | Admitting: HOSPITALIST
Payer: MEDICARE

## 2024-09-28 DIAGNOSIS — R41.82 ALTERED MENTAL STATUS, UNSPECIFIED ALTERED MENTAL STATUS TYPE: ICD-10-CM

## 2024-09-28 DIAGNOSIS — R19.7 DIARRHEA OF PRESUMED INFECTIOUS ORIGIN: Primary | ICD-10-CM

## 2024-09-28 LAB
ANION GAP SERPL CALC-SCNC: 16 MMOL/L (ref 0–18)
BASOPHILS # BLD AUTO: 0.05 X10(3) UL (ref 0–0.2)
BASOPHILS NFR BLD AUTO: 0.4 %
BILIRUB UR QL: NEGATIVE
BUN BLD-MCNC: 21 MG/DL (ref 9–23)
BUN/CREAT SERPL: 13.8 (ref 10–20)
CALCIUM BLD-MCNC: 10.1 MG/DL (ref 8.7–10.4)
CHLORIDE SERPL-SCNC: 102 MMOL/L (ref 98–112)
CLARITY UR: CLEAR
CO2 SERPL-SCNC: 20 MMOL/L (ref 21–32)
CREAT BLD-MCNC: 1.52 MG/DL
DEPRECATED RDW RBC AUTO: 40 FL (ref 35.1–46.3)
EGFRCR SERPLBLD CKD-EPI 2021: 35 ML/MIN/1.73M2 (ref 60–?)
EOSINOPHIL # BLD AUTO: 0.03 X10(3) UL (ref 0–0.7)
EOSINOPHIL NFR BLD AUTO: 0.2 %
ERYTHROCYTE [DISTWIDTH] IN BLOOD BY AUTOMATED COUNT: 12.8 % (ref 11–15)
GLUCOSE BLD-MCNC: 108 MG/DL (ref 70–99)
GLUCOSE BLDC GLUCOMTR-MCNC: 118 MG/DL (ref 70–99)
GLUCOSE UR-MCNC: 100 MG/DL
HCT VFR BLD AUTO: 38.6 %
HGB BLD-MCNC: 13.1 G/DL
IMM GRANULOCYTES # BLD AUTO: 0.04 X10(3) UL (ref 0–1)
IMM GRANULOCYTES NFR BLD: 0.3 %
KETONES UR-MCNC: 80 MG/DL
LEUKOCYTE ESTERASE UR QL STRIP.AUTO: NEGATIVE
LYMPHOCYTES # BLD AUTO: 1.87 X10(3) UL (ref 1–4)
LYMPHOCYTES NFR BLD AUTO: 13.4 %
MCH RBC QN AUTO: 29.2 PG (ref 26–34)
MCHC RBC AUTO-ENTMCNC: 33.9 G/DL (ref 31–37)
MCV RBC AUTO: 86.2 FL
MONOCYTES # BLD AUTO: 1.01 X10(3) UL (ref 0.1–1)
MONOCYTES NFR BLD AUTO: 7.2 %
NEUTROPHILS # BLD AUTO: 10.95 X10 (3) UL (ref 1.5–7.7)
NEUTROPHILS # BLD AUTO: 10.95 X10(3) UL (ref 1.5–7.7)
NEUTROPHILS NFR BLD AUTO: 78.5 %
NITRITE UR QL STRIP.AUTO: NEGATIVE
OSMOLALITY SERPL CALC.SUM OF ELEC: 290 MOSM/KG (ref 275–295)
PH UR: 6 [PH] (ref 5–8)
PLATELET # BLD AUTO: 309 10(3)UL (ref 150–450)
POTASSIUM SERPL-SCNC: 3.2 MMOL/L (ref 3.5–5.1)
PROT UR-MCNC: 100 MG/DL
RBC # BLD AUTO: 4.48 X10(6)UL
SODIUM SERPL-SCNC: 138 MMOL/L (ref 136–145)
SP GR UR STRIP: 1.02 (ref 1–1.03)
UROBILINOGEN UR STRIP-ACNC: NORMAL
WBC # BLD AUTO: 14 X10(3) UL (ref 4–11)

## 2024-09-28 PROCEDURE — 99285 EMERGENCY DEPT VISIT HI MDM: CPT

## 2024-09-28 PROCEDURE — 85025 COMPLETE CBC W/AUTO DIFF WBC: CPT | Performed by: EMERGENCY MEDICINE

## 2024-09-28 PROCEDURE — 80048 BASIC METABOLIC PNL TOTAL CA: CPT | Performed by: EMERGENCY MEDICINE

## 2024-09-28 PROCEDURE — 82962 GLUCOSE BLOOD TEST: CPT

## 2024-09-28 PROCEDURE — 81001 URINALYSIS AUTO W/SCOPE: CPT | Performed by: EMERGENCY MEDICINE

## 2024-09-28 PROCEDURE — 96360 HYDRATION IV INFUSION INIT: CPT

## 2024-09-28 RX ORDER — LOPERAMIDE HCL 2 MG
2 CAPSULE ORAL 4 TIMES DAILY PRN
Status: DISCONTINUED | OUTPATIENT
Start: 2024-09-28 | End: 2024-10-02

## 2024-09-28 NOTE — ED QUICK NOTES
Orders for admission, patient is aware of plan and ready to go upstairs. Any questions, please call ED RN Rayo at extension [phone].     Patient Covid vaccination status: Fully vaccinated     COVID Test Ordered in ED: None    COVID Suspicion at Admission: N/A    Running Infusions:  None    Mental Status/LOC at time of transport: a/o x 2-3    Other pertinent information:   CIWA score: N/A   NIH score:  N/A

## 2024-09-28 NOTE — ED INITIAL ASSESSMENT (HPI)
Arrives via ems from home for diarrhea and htn x 1 hr pta, states she did not take her BP meds today

## 2024-09-29 LAB — POTASSIUM SERPL-SCNC: 4.7 MMOL/L (ref 3.5–5.1)

## 2024-09-29 PROCEDURE — 84132 ASSAY OF SERUM POTASSIUM: CPT | Performed by: HOSPITALIST

## 2024-09-29 PROCEDURE — 97161 PT EVAL LOW COMPLEX 20 MIN: CPT

## 2024-09-29 PROCEDURE — 97530 THERAPEUTIC ACTIVITIES: CPT

## 2024-09-29 RX ORDER — SPIRONOLACTONE 25 MG/1
25 TABLET ORAL DAILY
Status: DISCONTINUED | OUTPATIENT
Start: 2024-09-29 | End: 2024-09-30

## 2024-09-29 RX ORDER — BUPROPION HYDROCHLORIDE 150 MG/1
150 TABLET, EXTENDED RELEASE ORAL DAILY
Status: DISCONTINUED | OUTPATIENT
Start: 2024-09-29 | End: 2024-10-02

## 2024-09-29 RX ORDER — ISOSORBIDE DINITRATE 10 MG/1
10 TABLET ORAL 2 TIMES DAILY
Status: DISCONTINUED | OUTPATIENT
Start: 2024-09-29 | End: 2024-10-02

## 2024-09-29 RX ORDER — ASPIRIN 81 MG/1
81 TABLET ORAL DAILY
Status: DISCONTINUED | OUTPATIENT
Start: 2024-09-29 | End: 2024-10-02

## 2024-09-29 RX ORDER — ESCITALOPRAM OXALATE 10 MG/1
20 TABLET ORAL DAILY
Status: DISCONTINUED | OUTPATIENT
Start: 2024-09-29 | End: 2024-10-02

## 2024-09-29 RX ORDER — ACETAMINOPHEN 500 MG
500 TABLET ORAL EVERY 6 HOURS PRN
Status: DISCONTINUED | OUTPATIENT
Start: 2024-09-29 | End: 2024-10-02

## 2024-09-29 RX ORDER — CARVEDILOL 3.12 MG/1
3.12 TABLET ORAL 2 TIMES DAILY WITH MEALS
Status: DISCONTINUED | OUTPATIENT
Start: 2024-09-29 | End: 2024-10-02

## 2024-09-29 RX ORDER — FERROUS SULFATE 325(65) MG
325 TABLET, DELAYED RELEASE (ENTERIC COATED) ORAL
Status: DISCONTINUED | OUTPATIENT
Start: 2024-09-29 | End: 2024-10-02

## 2024-09-29 RX ORDER — BISACODYL 10 MG
10 SUPPOSITORY, RECTAL RECTAL
Status: DISCONTINUED | OUTPATIENT
Start: 2024-09-29 | End: 2024-10-02

## 2024-09-29 RX ORDER — POLYETHYLENE GLYCOL 3350 17 G/17G
17 POWDER, FOR SOLUTION ORAL DAILY PRN
Status: DISCONTINUED | OUTPATIENT
Start: 2024-09-29 | End: 2024-10-02

## 2024-09-29 RX ORDER — ONDANSETRON 2 MG/ML
4 INJECTION INTRAMUSCULAR; INTRAVENOUS EVERY 6 HOURS PRN
Status: DISCONTINUED | OUTPATIENT
Start: 2024-09-29 | End: 2024-10-02

## 2024-09-29 RX ORDER — MULTIVITAMIN/IRON/FOLIC ACID 18MG-0.4MG
500 TABLET ORAL DAILY
Status: DISCONTINUED | OUTPATIENT
Start: 2024-09-29 | End: 2024-10-02

## 2024-09-29 RX ORDER — METOCLOPRAMIDE HYDROCHLORIDE 5 MG/ML
5 INJECTION INTRAMUSCULAR; INTRAVENOUS EVERY 8 HOURS PRN
Status: DISCONTINUED | OUTPATIENT
Start: 2024-09-29 | End: 2024-09-29

## 2024-09-29 RX ORDER — ERGOCALCIFEROL 1.25 MG/1
50000 CAPSULE, LIQUID FILLED ORAL WEEKLY
Status: DISCONTINUED | OUTPATIENT
Start: 2024-09-30 | End: 2024-10-02

## 2024-09-29 RX ORDER — ROPINIROLE 1 MG/1
3 TABLET, FILM COATED ORAL
Status: DISCONTINUED | OUTPATIENT
Start: 2024-09-29 | End: 2024-10-02

## 2024-09-29 RX ORDER — PROCHLORPERAZINE MALEATE 10 MG
10 TABLET ORAL EVERY 6 HOURS PRN
Status: DISCONTINUED | OUTPATIENT
Start: 2024-09-29 | End: 2024-10-02

## 2024-09-29 RX ORDER — TIZANIDINE 2 MG/1
2 TABLET ORAL
Status: DISCONTINUED | OUTPATIENT
Start: 2024-09-29 | End: 2024-10-02

## 2024-09-29 RX ORDER — LUBIPROSTONE 24 UG/1
24 CAPSULE ORAL 2 TIMES DAILY WITH MEALS
Status: DISCONTINUED | OUTPATIENT
Start: 2024-09-29 | End: 2024-10-02

## 2024-09-29 RX ORDER — POTASSIUM CHLORIDE 1.5 G/1.58G
40 POWDER, FOR SOLUTION ORAL EVERY 4 HOURS
Status: COMPLETED | OUTPATIENT
Start: 2024-09-29 | End: 2024-09-29

## 2024-09-29 RX ORDER — SENNOSIDES 8.6 MG
17.2 TABLET ORAL NIGHTLY PRN
Status: DISCONTINUED | OUTPATIENT
Start: 2024-09-29 | End: 2024-10-02

## 2024-09-29 RX ORDER — GABAPENTIN 300 MG/1
600 CAPSULE ORAL 2 TIMES DAILY
Status: DISCONTINUED | OUTPATIENT
Start: 2024-09-29 | End: 2024-10-02

## 2024-09-29 RX ORDER — ENOXAPARIN SODIUM 100 MG/ML
40 INJECTION SUBCUTANEOUS DAILY
Status: DISCONTINUED | OUTPATIENT
Start: 2024-09-29 | End: 2024-09-30

## 2024-09-29 RX ORDER — ATORVASTATIN CALCIUM 40 MG/1
40 TABLET, FILM COATED ORAL
Status: DISCONTINUED | OUTPATIENT
Start: 2024-09-29 | End: 2024-10-02

## 2024-09-29 RX ORDER — TORSEMIDE 20 MG/1
40 TABLET ORAL DAILY
Status: DISCONTINUED | OUTPATIENT
Start: 2024-09-29 | End: 2024-10-02

## 2024-09-29 RX ORDER — PANTOPRAZOLE SODIUM 40 MG/1
40 TABLET, DELAYED RELEASE ORAL
Status: DISCONTINUED | OUTPATIENT
Start: 2024-09-29 | End: 2024-10-02

## 2024-09-29 RX ORDER — SODIUM CHLORIDE 9 MG/ML
INJECTION, SOLUTION INTRAVENOUS CONTINUOUS
Status: DISCONTINUED | OUTPATIENT
Start: 2024-09-29 | End: 2024-10-02

## 2024-09-29 NOTE — PLAN OF CARE
Problem: Patient Centered Care  Goal: Patient preferences are identified and integrated in the patient's plan of care  Description: Interventions:  - What would you like us to know as we care for you?   - Provide timely, complete, and accurate information to patient/family  - Incorporate patient and family knowledge, values, beliefs, and cultural backgrounds into the planning and delivery of care  - Encourage patient/family to participate in care and decision-making at the level they choose  - Honor patient and family perspectives and choices  Outcome: Progressing     Problem: Patient/Family Goals  Goal: Patient/Family Long Term Goal  Description: Patient's Long Term Goal:    Interventions:  - See additional Care Plan goals for specific interventions  Outcome: Progressing  Goal: Patient/Family Short Term Goal  Description: Patient's Short Term Goal:    Interventions:   - See additional Care Plan goals for specific interventions  Outcome: Progressing     Problem: GASTROINTESTINAL - ADULT  Goal: Maintains or returns to baseline bowel function  Description: INTERVENTIONS:  - Assess bowel function  - Maintain adequate hydration with IV or PO as ordered and tolerated  - Evaluate effectiveness of GI medications  - Encourage mobilization and activity  - Obtain nutritional consult as needed  - Establish a toileting routine/schedule  - Consider collaborating with pharmacy to review patient's medication profile  Outcome: Progressing     Problem: SKIN/TISSUE INTEGRITY - ADULT  Goal: Skin integrity remains intact  Description: INTERVENTIONS  - Assess and document risk factors for pressure ulcer development  - Assess and document skin integrity  - Monitor for areas of redness and/or skin breakdown  - Initiate interventions, skin care algorithm/standards of care as needed  Outcome: Progressing     Problem: MUSCULOSKELETAL - ADULT  Goal: Return mobility to safest level of function  Description: INTERVENTIONS:  - Assess patient  stability and activity tolerance for standing, transferring and ambulating w/ or w/o assistive devices  - Assist with transfers and ambulation using safe patient handling equipment as needed  - Ensure adequate protection for wounds/incisions during mobilization  - Obtain PT/OT consults as needed  - Advance activity as appropriate  - Communicate ordered activity level and limitations with patient/family  Outcome: Progressing  Goal: Maintain proper alignment of affected body part  Description: INTERVENTIONS:  - Support and protect limb and body alignment per provider's orders  - Instruct and reinforce with patient and family use of appropriate assistive device and precautions (e.g. spinal or hip dislocation precautions)  Outcome: Progressing

## 2024-09-29 NOTE — H&P
[facility]   History and Physical  name: Susan P Ruzicka  Room: 546/546-A  Date of admission: 9/28/2024    Primary care provider:   [unfilled]      SUBJECTIVE:  Reason for Admission: acute kidney injury and diarrhea    History ofPresent Illness: 79-year-old female with a history of hyperlipidemia, hypertension, depression, peripheral neuropathy, and congestive heart failure, presented with severe diarrhea and was brought to the emergency department. She was found to have an elevated blood pressure of 180/100 mmHg, acute kidney injury likely secondary to diarrhea, and electrolyte imbalances. She was admitted for further evaluation and management, including IV fluids, continuation of home medications, and supportive care.      All Other ROS Negative except as mentioned in HPI    [unfilled]    Not on File     Past Medical History:    Congestive heart disease (HCC)    Diverticulitis    Esophageal reflux    Fibromyalgia    High blood pressure    Osteoarthrosis, unspecified whether generalized or localized, unspecified site    RLS (restless legs syndrome)    Unspecified essential hypertension       Past Surgical History:   Procedure Laterality Date    Cath drug eluting stent      Excis lumbar disk,one level      Removal gallbladder         No family history on file.    Social History     Socioeconomic History    Marital status:      Spouse name: Not on file    Number of children: Not on file    Years of education: Not on file    Highest education level: Not on file   Occupational History    Not on file   Tobacco Use    Smoking status: Former     Types: Cigarettes     Passive exposure: Past    Smokeless tobacco: Never   Substance and Sexual Activity    Alcohol use: No    Drug use: Not on file    Sexual activity: Not on file   Other Topics Concern    Not on file   Social History Narrative    Not on file     Social Determinants of Health     Financial Resource Strain: Low Risk  (4/26/2024)    Received  from [facility]    Overall Financial Resource Strain (CARDIA)     Difficulty of Paying Living Expenses: Not hard at all   Food Insecurity: No Food Insecurity (5/8/2024)    Food Insecurity     Food Insecurity: Never true   Transportation Needs: No Transportation Needs (9/28/2024)    Transportation Needs     Lack of Transportation: No     Car Seat: Not on file   Physical Activity: Inactive (4/26/2024)    Received from [facility]    Physical Activity     On average, how many days per week do you engage in moderate to strenuous exercise (like a brisk walk)?: 0 days     On average, how many minutes do you engage in exercise at this level?: 0 min   Stress: No Stress Concern Present (4/26/2024)    Received from [facility]    [place] [employer] - Occupational Stress Questionnaire     Feeling of Stress : Not at all   Social Connections: Feeling Socially Integrated (8/26/2024)    Received from [facility]    OASIS : Social Isolation     Frequency of experiencing loneliness or isolation: Never   Housing Stability: Low Risk  (9/28/2024)    Housing Stability     Housing Instability: No     Housing Instability Emergency: Not on file     Crib or Bassinette: Not on file         OBJECTIVE:  Patient Vitals for the past 24 hrs:   BP Temp Temp src Pulse Resp SpO2 Weight   09/29/24 1000 160/84 97.7 °F (36.5 °C) Axillary 68 18 97 % --   09/29/24 0438 137/57 98.1 °F (36.7 °C) Oral 60 18 98 % --   09/28/24 2226 155/80 -- -- -- -- -- --   09/28/24 2104 -- -- -- -- -- -- 167 lb 12.8 oz (76.1 kg)   09/28/24 2101 (!) 171/75 100.1 °F (37.8 °C) Oral 69 18 97 % --   09/28/24 2030 (!) 162/79 -- -- 67 16 96 % --   09/28/24 1930 (!) 142/92 -- -- 67 15 99 % --   09/28/24 1900 -- -- -- 62 16 98 % --   09/28/24 1815 155/83 -- -- 67 14 -- --   09/28/24 1715 145/76 -- -- 71 16 -- --   09/28/24 1645 149/87 -- -- 76 14 98 % --   09/28/24 1607 (!) 180/100 98.5 °F (36.9 °C) Temporal 95 18 97 % 160 lb (72.6 kg)       General:  NAD  HEENT: NC/AT, MMM, OP clear with no exudates, PERRL.  Neck: Supple, No LAD  Heart: Normal s1/s2, no MRG  Chest: CTAB: NT/ND, soft, + Bs, no HSM  Ext: no peripheral edema.    Neuro: CN II through XII grossly intact.  No focal deficits     Diagnostic data:    CBC  Recent Labs   Lab 09/28/24  1612   WBC 14.0*   HGB 13.1   .0       CMP  Recent Labs   Lab 09/28/24  1612      K 3.2*      CO2 20.0*   BUN 21       Coags:   Recent Labs   Lab 09/28/24  1612   .0         Urinalysis  Invalid input(s): \"COLURN\", \"CLARURNE\", \"PHURN\", \"PROTURN\", \"GLCSURN\", \"KTNSURN\", \"BILIRUURN\", \"BLOODURN\", \"NITRTURN\", \"UROBILIURN\", \"SPECGRVURN\", \"LEUKESTURN\", \"RBLCLURN\", \"WHTBLCLURN\", \"BACTUR\", \"MUCURN\", \"SQMIPICLURN\"    CardiacEnzymes  Invalid input(s): \"TROPONINT\"    Diabetes Studies  No results found for: \"GLUF\", \"MICROALBUR\", \"CREATININE\"    .      Imaging:  No results found.    ASSESSMENT:    Ms. Susan Ruzicka, a 79-year-old female with a history of hyperlipidemia, hypertension, depression, peripheral neuropathy, and congestive heart failure, presented with severe diarrhea and was brought to the emergency department. She was found to have an elevated blood pressure of 180/100 mmHg, acute kidney injury likely secondary to diarrhea, and electrolyte imbalances. She was admitted for further evaluation and management, including IV fluids, continuation of home medications, and supportive care.    PLAN:  Assessment and Plan:    1. Acute kidney injury (FARAZ):  - Likely secondary to severe diarrhea  - Plan: Start on IV fluids, monitor vital signs, and repeat BMP as needed    2. Hypertension:  - Noted elevated blood pressure of 180/100 in the emergency department  - Plan: Continue home blood pressure medication, Carvedilol    3. Hyperlipidemia:  - Plan: Continue home medication, Traversagin    4. Gastroesophageal reflux disease (GERD):  - Plan: Continue home medication, Protonix    5. Depression/Anxiety:  - Plan:  Continue home medications as prescribed    6. Congestive heart failure (CHF):  - Plan: Continue home medications, Trosemide and Spironolactone    7. Severe diarrhea:  - Plan: Monitor and manage symptoms, provide supportive care, and evaluate for possible causes    8. Peripheral neuropathy:  - No acute issues noted during this visit    9. Deep vein thrombosis (DVT) prophylaxis:  - Plan: Assess risk and provide appropriate prophylaxis as needed    Disposition:  - Admit for further evaluation and management until medically stable    Follow-up:  - Schedule follow-up appointment with primary care provider or specialist as needed    [facility]

## 2024-09-29 NOTE — PLAN OF CARE
Patient A&Ox4. VSS. Remains on IV fluids, no diarrhea noted. Labs monitored. Up with assistance to bathroom. Plan for repeat labs in AM and possible discharge after PT/OT eval. See assessments. Will monitor.    Problem: Patient Centered Care  Goal: Patient preferences are identified and integrated in the patient's plan of care  Description: Interventions:  - What would you like us to know as we care for you?   - Provide timely, complete, and accurate information to patient/family  - Incorporate patient and family knowledge, values, beliefs, and cultural backgrounds into the planning and delivery of care  - Encourage patient/family to participate in care and decision-making at the level they choose  - Honor patient and family perspectives and choices  Outcome: Progressing       Problem: GASTROINTESTINAL - ADULT  Goal: Maintains or returns to baseline bowel function  Description: INTERVENTIONS:  - Assess bowel function  - Maintain adequate hydration with IV or PO as ordered and tolerated  - Evaluate effectiveness of GI medications  - Encourage mobilization and activity  - Obtain nutritional consult as needed  - Establish a toileting routine/schedule  - Consider collaborating with pharmacy to review patient's medication profile  Outcome: Progressing     Problem: SKIN/TISSUE INTEGRITY - ADULT  Goal: Skin integrity remains intact  Description: INTERVENTIONS  - Assess and document risk factors for pressure ulcer development  - Assess and document skin integrity  - Monitor for areas of redness and/or skin breakdown  - Initiate interventions, skin care algorithm/standards of care as needed  Outcome: Progressing     Problem: MUSCULOSKELETAL - ADULT  Goal: Return mobility to safest level of function  Description: INTERVENTIONS:  - Assess patient stability and activity tolerance for standing, transferring and ambulating w/ or w/o assistive devices  - Assist with transfers and ambulation using safe patient handling equipment as  needed  - Ensure adequate protection for wounds/incisions during mobilization  - Obtain PT/OT consults as needed  - Advance activity as appropriate  - Communicate ordered activity level and limitations with patient/family  Outcome: Progressing  Goal: Maintain proper alignment of affected body part  Description: INTERVENTIONS:  - Support and protect limb and body alignment per provider's orders  - Instruct and reinforce with patient and family use of appropriate assistive device and precautions (e.g. spinal or hip dislocation precautions)  Outcome: Progressing

## 2024-09-29 NOTE — ED PROVIDER NOTES
Patient Seen in: [facility] Emergency Department    History     Chief Complaint   Patient presents with    Diarrhea       HPI    The patient presents to the ED from home after pressing her life alert button.  She states that she came in after developing severe diarrhea starting an hour ago.  She states no longer having diarrhea and no abdominal pain fevers or other complaints.  Admits to not taking her medicines today.    History reviewed.   Past Medical History:    Congestive heart disease (HCC)    Diverticulitis    Esophageal reflux    Fibromyalgia    High blood pressure    Osteoarthrosis, unspecified whether generalized or localized, unspecified site    RLS (restless legs syndrome)    Unspecified essential hypertension       History reviewed.   Past Surgical History:   Procedure Laterality Date    Cath drug eluting stent      Excis lumbar disk,one level      Removal gallbladder           Medications :  Medications Prior to Admission   Medication Sig Dispense Refill Last Dose    torsemide 20 MG Oral Tab Take 2 tablets (40 mg total) by mouth daily.       gabapentin 300 MG Oral Cap Take 2 capsules (600 mg total) by mouth 2 (two) times daily.       isosorbide dinitrate 10 MG Oral Tab Take 1 tablet (10 mg total) by mouth in the morning and 1 tablet (10 mg total) before bedtime.       spironolactone 25 MG Oral Tab Take 1 tablet (25 mg total) by mouth daily.       carvedilol 3.125 MG Oral Tab Take 1 tablet (3.125 mg total) by mouth 2 (two) times daily with meals. 60 tablet 1     citalopram 20 MG Oral Tab Take 1 tablet (20 mg total) by mouth daily.       NON FORMULARY Place 1 drop into the right eye daily. Ivizia eye drops uses daily to right eye       rOPINIRole 3 MG Oral Tab Take 1 tablet (3 mg total) by mouth After dinner.  0     lubiprostone 24 MCG Oral Cap Take 1 capsule (24 mcg total) by mouth 2 (two) times daily with meals.       pantoprazole 40 MG Oral Tab EC Take 1 tablet (40 mg total) by mouth 2 (two)  times daily before meals.       prochlorperazine (COMPAZINE) 10 mg tablet Take 1 tablet (10 mg total) by mouth every 6 (six) hours as needed for Nausea.       atorvastatin 40 MG Oral Tab Take 1 tablet (40 mg total) by mouth daily with dinner.       ergocalciferol 1.25 MG (38454 UT) Oral Cap Take 1 capsule (50,000 Units total) by mouth once a week. On Sundays       ferrous sulfate 325 (65 FE) MG Oral Tab EC Take 1 tablet (325 mg total) by mouth daily with breakfast.       Magnesium 200 MG Oral Tab Take 2.5 tablets (500 mg total) by mouth daily.       acetaminophen 500 MG Oral Tab Take 1 tablet (500 mg total) by mouth every 6 (six) hours as needed for Pain.       aspirin 81 MG Oral Tab EC Take 1 tablet (81 mg total) by mouth daily. 30 tablet 0     buPROPion HCl ER, SR, 150 MG Oral Tablet 12 Hr Take 1 tablet (150 mg total) by mouth daily.       tiZANidine HCl 2 MG Oral Tab Take 1 tablet (2 mg total) by mouth daily with dinner.           No family history on file.    Smoking Status:   Social History     Socioeconomic History    Marital status:    Tobacco Use    Smoking status: Former     Types: Cigarettes     Passive exposure: Past    Smokeless tobacco: Never   Substance and Sexual Activity    Alcohol use: No       Constitutional and vital signs reviewed.      Social History and Family History elements reviewed from today, pertinent positives to the presenting problem noted.    Physical Exam     ED Triage Vitals [09/28/24 1607]   BP (!) 180/100   Pulse 95   Resp 18   Temp 98.5 °F (36.9 °C)   Temp src Temporal   SpO2 97 %   O2 Device None (Room air)       All measures to prevent infection transmission during my interaction with the patient were taken. Handwashing was performed prior to and after the exam.  Stethoscope and any equipment used during my examination was cleaned with super sani-cloth germicidal wipes following the exam.     Physical Exam  Vitals and nursing note reviewed.   Constitutional:        General: She is not in acute distress.     Appearance: She is well-developed. She is not ill-appearing or toxic-appearing.   HENT:      Head: Normocephalic and atraumatic.   Eyes:      General:         Right eye: No discharge.         Left eye: No discharge.      Conjunctiva/sclera: Conjunctivae normal.   Neck:      Trachea: No tracheal deviation.   Cardiovascular:      Rate and Rhythm: Normal rate.   Pulmonary:      Effort: Pulmonary effort is normal. No respiratory distress.      Breath sounds: No stridor.   Abdominal:      General: There is no distension.      Palpations: Abdomen is soft.      Tenderness: There is no abdominal tenderness. There is no guarding or rebound.   Musculoskeletal:         General: No deformity.   Skin:     General: Skin is warm and dry.   Neurological:      Mental Status: She is alert.      Comments: Oriented x 2   Psychiatric:         Mood and Affect: Mood normal.         Behavior: Behavior normal.         ED Course        Labs Reviewed   CBC WITH DIFFERENTIAL WITH PLATELET - Abnormal; Notable for the following components:       Result Value    WBC 14.0 (*)     Neutrophil Absolute Prelim 10.95 (*)     Neutrophil Absolute 10.95 (*)     Monocyte Absolute 1.01 (*)     All other components within normal limits   BASIC METABOLIC PANEL (8) - Abnormal; Notable for the following components:    Glucose 108 (*)     Potassium 3.2 (*)     CO2 20.0 (*)     Creatinine 1.52 (*)     eGFR-Cr 35 (*)     All other components within normal limits   URINALYSIS WITH CULTURE REFLEX - Abnormal; Notable for the following components:    Glucose Urine 100 (*)     Ketones Urine 80 (*)     Blood Urine 2+ (*)     Protein Urine 100 (*)     WBC Urine 6-10 (*)     RBC Urine 3-5 (*)     All other components within normal limits   POCT GLUCOSE - Abnormal; Notable for the following components:    POC Glucose  118 (*)     All other components within normal limits       As Interpreted by me    Imaging Results Available and  Reviewed while in ED: No results found.  ED Medications Administered:   Medications   loperamide (Imodium) cap 2 mg (2 mg Oral Given 9/28/24 1706)   sodium chloride 0.9 % IV bolus 1,000 mL (0 mL Intravenous Stopped 9/28/24 1820)         MDM     Vitals:    09/28/24 1930 09/28/24 2030 09/28/24 2101 09/28/24 2104   BP: (!) 142/92 (!) 162/79 (!) 171/75    BP Location:   Left arm    Pulse: 67 67 69    Resp: 15 16 18    Temp:   100.1 °F (37.8 °C)    TempSrc:   Oral    SpO2: 99% 96% 97%    Weight:    76.1 kg     *I personally reviewed and interpreted all ED vitals.    Pulse Ox: 97%, Room air, Normal     Monitor Interpretation:   normal sinus rhythm, normal sinus rhythm with sinus arrhythmia as interpreted by me.  The cardiac monitor was ordered to monitor heart rate.    Differential Diagnosis/ Diagnostic Considerations: Dehydration, infectious diarrhea, viral syndrome, UTI, other    Complicating Factors: The patient already has does not have any pertinent problems on file. to contribute to the complexity of this ED evaluation.    Medical Decision Making  The patient presents to the ED for diarrhea starting with an hour ago.  Admits to not taking her meds today.  Blood pressure elevated on arrival but improved spontaneously.  Patient nondistressed on exam.  Laboratory testing and urinalysis without concerning findings.  Possible mild hydration.  Patient was given IV fluids.  Daughter arrives and states the patient is somewhat confused per her normal.  No headache or history of head trauma.  Will admit for management and hydration at daughter's request.  I discussed with Dr. Zhao for admission.    Problems Addressed:  Altered mental status, unspecified altered mental status type: acute illness or injury  Diarrhea of presumed infectious origin: acute illness or injury    Amount and/or Complexity of Data Reviewed  Independent Historian:      Details: The patient's daughter provides history details  Labs: ordered.  Decision-making details documented in ED Course.  Discussion of management or test interpretation with external provider(s):  I discussed with Dr. Zhao for admission.        Condition upon leaving the department: Stable    Disposition and Plan     Clinical Impression:  1. Diarrhea of presumed infectious origin    2. Altered mental status, unspecified altered mental status type        Disposition:  Admit    Follow-up:  No follow-up provider specified.    Medications Prescribed:  Current Discharge Medication List          Hospital Problems       Present on Admission             ICD-10-CM Noted POA    * (Principal) Diarrhea of presumed infectious origin R19.7 9/28/2024 Unknown    Altered mental status, unspecified altered mental status type R41.82 9/28/2024 Unknown

## 2024-09-29 NOTE — PHYSICAL THERAPY NOTE
Attempted eval, chart reviewed. Patient admitted w/ diarrhea, no H&P note available at this time. Will re-attempt asap.   Curt Cortez, PT

## 2024-09-30 ENCOUNTER — APPOINTMENT (OUTPATIENT)
Dept: CV DIAGNOSTICS | Facility: HOSPITAL | Age: 79
End: 2024-09-30
Attending: INTERNAL MEDICINE
Payer: MEDICARE

## 2024-09-30 LAB
ALBUMIN SERPL-MCNC: 3.5 G/DL (ref 3.2–4.8)
ALBUMIN/GLOB SERPL: 1.5 {RATIO} (ref 1–2)
ALP LIVER SERPL-CCNC: 109 U/L
ALT SERPL-CCNC: 11 U/L
ANION GAP SERPL CALC-SCNC: 7 MMOL/L (ref 0–18)
AST SERPL-CCNC: 20 U/L (ref ?–34)
BASOPHILS # BLD AUTO: 0.05 X10(3) UL (ref 0–0.2)
BASOPHILS NFR BLD AUTO: 0.7 %
BILIRUB SERPL-MCNC: 0.6 MG/DL (ref 0.2–1.1)
BUN BLD-MCNC: 18 MG/DL (ref 9–23)
BUN/CREAT SERPL: 14.1 (ref 10–20)
CALCIUM BLD-MCNC: 8.4 MG/DL (ref 8.7–10.4)
CHLORIDE SERPL-SCNC: 109 MMOL/L (ref 98–112)
CHOLEST SERPL-MCNC: 121 MG/DL (ref ?–200)
CO2 SERPL-SCNC: 23 MMOL/L (ref 21–32)
CREAT BLD-MCNC: 1.28 MG/DL
DEPRECATED RDW RBC AUTO: 44.2 FL (ref 35.1–46.3)
EGFRCR SERPLBLD CKD-EPI 2021: 43 ML/MIN/1.73M2 (ref 60–?)
EOSINOPHIL # BLD AUTO: 0.25 X10(3) UL (ref 0–0.7)
EOSINOPHIL NFR BLD AUTO: 3.3 %
ERYTHROCYTE [DISTWIDTH] IN BLOOD BY AUTOMATED COUNT: 13.2 % (ref 11–15)
GLOBULIN PLAS-MCNC: 2.3 G/DL (ref 2–3.5)
GLUCOSE BLD-MCNC: 109 MG/DL (ref 70–99)
HCT VFR BLD AUTO: 32.2 %
HDLC SERPL-MCNC: 32 MG/DL (ref 40–59)
HGB BLD-MCNC: 10.3 G/DL
IMM GRANULOCYTES # BLD AUTO: 0.01 X10(3) UL (ref 0–1)
IMM GRANULOCYTES NFR BLD: 0.1 %
LDLC SERPL CALC-MCNC: 62 MG/DL (ref ?–100)
LYMPHOCYTES # BLD AUTO: 1.47 X10(3) UL (ref 1–4)
LYMPHOCYTES NFR BLD AUTO: 19.4 %
MAGNESIUM SERPL-MCNC: 1.8 MG/DL (ref 1.6–2.6)
MCH RBC QN AUTO: 29.3 PG (ref 26–34)
MCHC RBC AUTO-ENTMCNC: 32 G/DL (ref 31–37)
MCV RBC AUTO: 91.7 FL
MONOCYTES # BLD AUTO: 0.49 X10(3) UL (ref 0.1–1)
MONOCYTES NFR BLD AUTO: 6.5 %
NEUTROPHILS # BLD AUTO: 5.3 X10 (3) UL (ref 1.5–7.7)
NEUTROPHILS # BLD AUTO: 5.3 X10(3) UL (ref 1.5–7.7)
NEUTROPHILS NFR BLD AUTO: 70 %
NONHDLC SERPL-MCNC: 89 MG/DL (ref ?–130)
OSMOLALITY SERPL CALC.SUM OF ELEC: 290 MOSM/KG (ref 275–295)
PHOSPHATE SERPL-MCNC: 3.1 MG/DL (ref 2.4–5.1)
PLATELET # BLD AUTO: 214 10(3)UL (ref 150–450)
POTASSIUM SERPL-SCNC: 4.2 MMOL/L (ref 3.5–5.1)
PROT SERPL-MCNC: 5.8 G/DL (ref 5.7–8.2)
RBC # BLD AUTO: 3.51 X10(6)UL
SODIUM SERPL-SCNC: 139 MMOL/L (ref 136–145)
TRIGL SERPL-MCNC: 154 MG/DL (ref 30–149)
VLDLC SERPL CALC-MCNC: 23 MG/DL (ref 0–30)
WBC # BLD AUTO: 7.6 X10(3) UL (ref 4–11)

## 2024-09-30 PROCEDURE — 93306 TTE W/DOPPLER COMPLETE: CPT | Performed by: INTERNAL MEDICINE

## 2024-09-30 PROCEDURE — 97165 OT EVAL LOW COMPLEX 30 MIN: CPT

## 2024-09-30 PROCEDURE — 83735 ASSAY OF MAGNESIUM: CPT | Performed by: HOSPITALIST

## 2024-09-30 PROCEDURE — 80061 LIPID PANEL: CPT | Performed by: HOSPITALIST

## 2024-09-30 PROCEDURE — 85027 COMPLETE CBC AUTOMATED: CPT | Performed by: HOSPITALIST

## 2024-09-30 PROCEDURE — 85025 COMPLETE CBC W/AUTO DIFF WBC: CPT | Performed by: HOSPITALIST

## 2024-09-30 PROCEDURE — 97161 PT EVAL LOW COMPLEX 20 MIN: CPT

## 2024-09-30 PROCEDURE — 97530 THERAPEUTIC ACTIVITIES: CPT

## 2024-09-30 PROCEDURE — 84100 ASSAY OF PHOSPHORUS: CPT | Performed by: HOSPITALIST

## 2024-09-30 PROCEDURE — 97535 SELF CARE MNGMENT TRAINING: CPT

## 2024-09-30 PROCEDURE — 80053 COMPREHEN METABOLIC PANEL: CPT | Performed by: HOSPITALIST

## 2024-09-30 RX ORDER — SPIRONOLACTONE 25 MG/1
12.5 TABLET ORAL DAILY
Status: DISCONTINUED | OUTPATIENT
Start: 2024-10-01 | End: 2024-10-02

## 2024-09-30 RX ORDER — MAGNESIUM OXIDE 400 MG/1
400 TABLET ORAL ONCE
Status: COMPLETED | OUTPATIENT
Start: 2024-09-30 | End: 2024-09-30

## 2024-09-30 RX ORDER — ENOXAPARIN SODIUM 100 MG/ML
30 INJECTION SUBCUTANEOUS DAILY
Status: DISCONTINUED | OUTPATIENT
Start: 2024-09-30 | End: 2024-10-02

## 2024-09-30 NOTE — PROGRESS NOTES
progress note  name: Susan P Ruzicka  Room: 546/546-A  Date of admission: 9/28/2024    Primary care provider:   [unfilled]      SUBJECTIVE: seen and examined.   Positive for orthostats, continue ivf, pt/ot to see. Hold diuretics per cardio.     Reason for Admission: acute kidney injury and diarrhea    History ofPresent Illness: 79-year-old female with a history of hyperlipidemia, hypertension, depression, peripheral neuropathy, and congestive heart failure, presented with severe diarrhea and was brought to the emergency department. She was found to have an elevated blood pressure of 180/100 mmHg, acute kidney injury likely secondary to diarrhea, and electrolyte imbalances. She was admitted for further evaluation and management, including IV fluids, continuation of home medications, and supportive care.      All Other ROS Negative except as mentioned in HPI    [unfilled]    Not on File     Past Medical History:    Congestive heart disease (HCC)    Diverticulitis    Esophageal reflux    Fibromyalgia    High blood pressure    Osteoarthrosis, unspecified whether generalized or localized, unspecified site    RLS (restless legs syndrome)    Unspecified essential hypertension       Past Surgical History:   Procedure Laterality Date    Cath drug eluting stent      Excis lumbar disk,one level      Removal gallbladder         No family history on file.    Social History     Socioeconomic History    Marital status:      Spouse name: Not on file    Number of children: Not on file    Years of education: Not on file    Highest education level: Not on file   Occupational History    Not on file   Tobacco Use    Smoking status: Former     Types: Cigarettes     Passive exposure: Past    Smokeless tobacco: Never   Substance and Sexual Activity    Alcohol use: No    Drug use: Not on file    Sexual activity: Not on file   Other Topics Concern    Not on file   Social History Narrative    Not on file     Social Determinants  of Health     Financial Resource Strain: Low Risk  (4/26/2024)    Received from [facility]    Overall Financial Resource Strain (CARDIA)     Difficulty of Paying Living Expenses: Not hard at all   Food Insecurity: No Food Insecurity (5/8/2024)    Food Insecurity     Food Insecurity: Never true   Transportation Needs: No Transportation Needs (9/28/2024)    Transportation Needs     Lack of Transportation: No     Car Seat: Not on file   Physical Activity: Inactive (4/26/2024)    Received from [facility]    Physical Activity     On average, how many days per week do you engage in moderate to strenuous exercise (like a brisk walk)?: 0 days     On average, how many minutes do you engage in exercise at this level?: 0 min   Stress: No Stress Concern Present (4/26/2024)    Received from [facility]    [employer] - Occupational Stress Questionnaire     Feeling of Stress : Not at all   Social Connections: Feeling Socially Integrated (8/26/2024)    Received from [facility]    OASIS : Social Isolation     Frequency of experiencing loneliness or isolation: Never   Housing Stability: Low Risk  (9/28/2024)    Housing Stability     Housing Instability: No     Housing Instability Emergency: Not on file     Crib or [place]in[place]: Not on file         OBJECTIVE:  Patient Vitals for the past 24 hrs:   BP Temp Temp src Pulse Resp SpO2 Weight   09/29/24 1000 160/84 97.7 °F (36.5 °C) Axillary 68 18 97 % --   09/29/24 0438 137/57 98.1 °F (36.7 °C) Oral 60 18 98 % --   09/28/24 2226 155/80 -- -- -- -- -- --   09/28/24 2104 -- -- -- -- -- -- 167 lb 12.8 oz (76.1 kg)   09/28/24 2101 (!) 171/75 100.1 °F (37.8 °C) Oral 69 18 97 % --   09/28/24 2030 (!) 162/79 -- -- 67 16 96 % --   09/28/24 1930 (!) 142/92 -- -- 67 15 99 % --   09/28/24 1900 -- -- -- 62 16 98 % --   09/28/24 1815 155/83 -- -- 67 14 -- --   09/28/24 1715 145/76 -- -- 71 16 -- --   09/28/24 1645 149/87 -- -- 76 14 98 % --   09/28/24 1607 (!)  180/100 98.5 °F (36.9 °C) Temporal 95 18 97 % 160 lb (72.6 kg)       General: NAD  HEENT: NC/AT, MMM, OP clear with no exudates, PERRL.  Neck: Supple, No LAD  Heart: Normal s1/s2, no MRG  Chest: CTAB: NT/ND, soft, + Bs, no HSM  Ext: no peripheral edema.    Neuro: CN II through XII grossly intact.  No focal deficits     Diagnostic data:    CBC  Recent Labs   Lab 09/28/24  1612 09/30/24  0715 10/01/24  0528   WBC 14.0* 7.6 7.4   HGB 13.1 10.3* 10.1*   .0 214.0 194.0       CMP  Recent Labs   Lab 09/28/24 1612 09/29/24  1233 09/30/24  0715 10/01/24  0528     --  139 142   K 3.2* 4.7 4.2 4.7     --  109 112   CO2 20.0*  --  23.0 26.0   BUN 21  --  18 17   ALT  --   --  11 8*   AST  --   --  20 15   ALB  --   --  3.5 3.6       Coags:   Recent Labs   Lab 09/28/24  1612 09/30/24  0715 10/01/24  0528   .0 214.0 194.0         Urinalysis  Invalid input(s): \"COLURN\", \"CLARURNE\", \"PHURN\", \"PROTURN\", \"GLCSURN\", \"KTNSURN\", \"BILIRUURN\", \"BLOODURN\", \"NITRTURN\", \"UROBILIURN\", \"SPECGRVURN\", \"LEUKESTURN\", \"RBLCLURN\", \"WHTBLCLURN\", \"BACTUR\", \"MUCURN\", \"SQMIPICLURN\"    CardiacEnzymes  Invalid input(s): \"TROPONINT\"    Diabetes Studies  No results found for: \"GLUF\", \"MICROALBUR\", \"CREATININE\"    .      Imaging:  No results found.    ASSESSMENT:    Ms. Susan Ruzicka, a 79-year-old female with a history of hyperlipidemia, hypertension, depression, peripheral neuropathy, and congestive heart failure, presented with severe diarrhea and was brought to the emergency department. She was found to have an elevated blood pressure of 180/100 mmHg, acute kidney injury likely secondary to diarrhea, and electrolyte imbalances. She was admitted for further evaluation and management, including IV fluids, continuation of home medications, and supportive care.    PLAN:  Assessment and Plan:    1. Acute kidney injury (FARAZ):  - Likely secondary to severe diarrhea  - Plan: Start on IV fluids, monitor vital signs, and repeat BMP as  needed    2. Hypertension:  - Noted elevated blood pressure of 180/100 in the emergency department  - Plan: Continue home blood pressure medication, Carvedilol    3. Hyperlipidemia:  - Plan: Continue home medication, Traversagin    4. Gastroesophageal reflux disease (GERD):  - Plan: Continue home medication, Protonix    5. Depression/Anxiety:  - Plan: Continue home medications as prescribed    6. Congestive heart failure (CHF):  - Plan: Continue home medications, Trosemide and Spironolactone    7. Severe diarrhea:  - Plan: Monitor and manage symptoms, provide supportive care, and evaluate for possible causes    8. Peripheral neuropathy:  - No acute issues noted during this visit    9. Deep vein thrombosis (DVT) prophylaxis:  - Plan: Assess risk and provide appropriate prophylaxis as needed    Dizziness: orthostatic vitals ordered.     Disposition:  - Admit for further evaluation and management until medically stable    Follow-up:  - Schedule follow-up appointment with primary care provider or specialist as needed    [facility]

## 2024-09-30 NOTE — PHYSICAL THERAPY NOTE
PHYSICAL THERAPY EVALUATION - INPATIENT     Room Number: 546/546-A  Evaluation Date: 9/30/2024  Type of Evaluation: Initial   Physician Order: PT Eval and Treat    Presenting Problem: diarrhea     Reason for Therapy: Mobility Dysfunction and Discharge Planning    PHYSICAL THERAPY ASSESSMENT   Patient is a 79 year old female admitted 9/28/2024 for diarrhea.  Prior to admission, patient's baseline is independent with ADLs and functional mobility with a RW.  Patient is currently functioning near baseline with bed mobility, transfers, gait, stair negotiation, standing prolonged periods, and performing household tasks.  Patient is requiring stand-by assist as a result of the following impairments: decreased functional strength, impaired dynamic standing balance, decreased muscular endurance, and medical status.  Physical Therapy will continue to follow for duration of hospitalization.    Patient will benefit from continued skilled PT Services at discharge to promote prior level of function and safety with additional support and return home with home health PT.    PLAN  PT Treatment Plan: Bed mobility;Body mechanics;Endurance;Energy conservation;Family education;Patient education;Gait training;Strengthening;Stair training;Transfer training;Balance training  Rehab Potential : Good  Frequency (Obs): 5x/week    PHYSICAL THERAPY MEDICAL/SOCIAL HISTORY     Problem List  Principal Problem:    Diarrhea of presumed infectious origin  Active Problems:    Altered mental status, unspecified altered mental status type      HOME SITUATION  Home Situation  Type of Home: House  Home Layout: One level  Stairs to Enter : 3  Railing: Yes  Lives With: Alone (supportive neighbor and dtr nearby)  Drives: Yes  Patient Owned Equipment: Rolling walker;Rollator  Patient Regularly Uses: Glasses     Prior Level of [place]: independent, ambulates with RW     SUBJECTIVE  \"You came at the perfect time\"    PHYSICAL THERAPY EXAMINATION    OBJECTIVE  Precautions: Bed/chair alarm  Fall Risk: High fall risk    WEIGHT BEARING RESTRICTION  Weight Bearing Restriction: None    PAIN ASSESSMENT  Ratin    COGNITION  Overall Cognitive Status:  WFL - within functional limits    RANGE OF MOTION AND STRENGTH ASSESSMENT  Upper extremity ROM and strength are within functional limits.  Lower extremity ROM is within functional limits.  Lower extremity strength is within functional limits.    BALANCE  Static Sitting: Good  Dynamic Sitting: Fair +  Static Standing: Fair  Dynamic Standing: Fair -    AM-PAC '6-Clicks' INPATIENT SHORT FORM - BASIC MOBILITY  How much difficulty does the patient currently have...  Patient Difficulty: Turning over in bed (including adjusting bedclothes, sheets and blankets)?: A Little   Patient Difficulty: Sitting down on and standing up from a chair with arms (e.g., wheelchair, bedside commode, etc.): A Little   Patient Difficulty: Moving from lying on back to sitting on the side of the bed?: A Little   How much help from another person does the patient currently need...   Help from Another: Moving to and from a bed to a chair (including a wheelchair)?: A Little   Help from Another: Need to walk in hospital room?: A Little   Help from Another: Climbing 3-5 steps with a railing?: A Little     AM-PAC Score:  Raw Score: 18   Approx Degree of Impairment: 46.58%   Standardized Score (AM-PAC Scale): 43.63   CMS Modifier (G-Code): CK    FUNCTIONAL ABILITY STATUS  Functional Mobility/Gait Assessment  Gait Assistance: Other (Comment) (SBA)  Distance (ft): 15 x 2  Assistive Device: Rolling walker  Pattern: Shuffle (slow pace, flexed posture, no LOB)  Supine to Sit: stand-by assist  Sit to Supine: stand-by assist  Sit to Stand: stand-by assist    Exercise/Education Provided:  Bed mobility  Body mechanics  Energy conservation  Functional activity tolerated  Gait training  Posture  Transfer training    Skilled Therapy Provided: Pt received resting  in bed; introduced self and role; agreeable to activity. Pt with no c/o pain. Demos bed mobility and STS transfer with SBA. Ambulated to the bathroom and back to bed with RW and SBA, slow but steady gait overall. Demos toilet transfer with SBA and able to don/doff brief and complete pericare with SBA. Pt was left resting in bed with needs within reach, alarm on, handoff to RN complete. Encouraged to get out of bed to chair with nursing staff later today and ambulate as tolerated while hospitalized.     The patient's Approx Degree of Impairment: 46.58% has been calculated based on documentation in the [facility] '6 clicks' Inpatient Basic Mobility Short Form.  Research supports that patients with this level of impairment may benefit from home with  PT.  Final disposition will be made by interdisciplinary medical team.    Patient End of Session: In bed;Call light within reach;Needs met;RN aware of session/findings;All patient questions and concerns addressed;Alarm set    CURRENT GOALS  Goals to be met by: 10/7/24  Patient Goal Patient's self-stated goal is: go home   Goal #1 Patient is able to demonstrate supine - sit EOB @ level: modified independent     Goal #1   Current Status    Goal #2 Patient is able to demonstrate transfers Sit to/from Stand at assistance level: modified independent with walker - rolling     Goal #2  Current Status    Goal #3 Patient is able to ambulate 100 feet with assist device: walker - rolling at assistance level: supervision   Goal #3   Current Status    Goal #4 Patient will negotiate 3 stairs/one curb w/ assistive device and supervision   Goal #4   Current Status    Goal #5 Patient to demonstrate independence with home activity/exercise instructions provided to patient in preparation for discharge.   Goal #5   Current Status    Goal #6    Goal #6  Current Status      Patient Evaluation Complexity Level:  History Low - no personal factors and/or co-morbidities   Examination of body systems  Low -  addressing 1-2 elements   Clinical Presentation Low- Stable   Clinical Decision Making  Low Complexity   Therapeutic Activity:  17 minutes

## 2024-09-30 NOTE — OCCUPATIONAL THERAPY NOTE
OCCUPATIONAL THERAPY EVALUATION - INPATIENT     Room Number: 546/546-A  Evaluation Date: 9/30/2024  Type of Evaluation: Initial   Presenting Problem: Diarrhea   Co-Morbidities: hyperlipidemia, hypertension, peripheral neuropathy, CHF    Physician Order: IP Consult to Occupational Therapy  Reason for Therapy: ADL/IADL Dysfunction and Discharge Planning    OCCUPATIONAL THERAPY ASSESSMENT   Patient is a 79 year old female admitted 9/28/2024 for diarrhea.  Prior to admission, patient's baseline is independent with all ADLs and mod I with functional mobility using RW.  Patient is currently functioning below baseline with ADLs and functional mobility.  Patient is requiring stand-by assist and contact guard assist as a result of the following impairments: decreased functional strength, decreased endurance, decreased muscular endurance, medical status, and dizziness . Occupational Therapy will continue to follow for duration of hospitalization.    Patient will benefit from continued skilled OT Services at discharge to promote prior level of function.  Anticipate patient will return home with home health OT pending medical and IP OT progress.     PLAN  OT Treatment Plan: ADL training;Functional transfer training;Patient/Family education;Patient/Family training;Compensatory technique education  OT Device Recommendations: None    OCCUPATIONAL THERAPY MEDICAL/SOCIAL HISTORY   Problem List  Principal Problem:    Diarrhea of presumed infectious origin  Active Problems:    Altered mental status, unspecified altered mental status type    HOME SITUATION  Type of Home: House  Home Layout: One level  Lives With: Alone (supportive neighbor and dtr nearby)  Toilet and Equipment: Comfort height toilet  Shower/Tub and Equipment: Tub-shower combo; Shower chair; Grab bar  Other Equipment: Reacher  Drives: Yes  Patient Regularly Uses: Glasses    Stairs in Home: none   Use of Assistive Device(s): Prior to admission pt used rw for functional  mobility    Prior Level of [place]: Prior to admission pt was mod I for function mobility and independent with all ADLs. Pt still drives.     SUBJECTIVE  \"I am still dizzy\"     OCCUPATIONAL THERAPY EXAMINATION    OBJECTIVE  Precautions: Bed/chair alarm  Fall Risk: High fall risk    PAIN ASSESSMENT  Rating: Unable to rate  Location: lower back    ACTIVITY TOLERANCE  Pulse: 63  Heart Rate Source: Monitor     BP: 93/52 (90/61/EOB; 99/70 after stand)  BP Location: Right arm  BP Method: Automatic  Patient Position: Semi-Bassett    O2 SATURATIONS  Oxygen Therapy  SPO2% on Room Air at Rest: 96    RANGE OF MOTION   Upper extremity ROM is within functional limits     STRENGTH ASSESSMENT  Upper extremity strength is within functional limits     COORDINATION  Gross Motor: WFL   Fine Motor: WFL     ACTIVITIES OF DAILY LIVING ASSESSMENT  AM-PAC ‘6-Clicks’ Inpatient Daily Activity Short Form  How much help from another person does the patient currently need…  -   Putting on and taking off regular lower body clothing?: A Little  -   Bathing (including washing, rinsing, drying)?: A Little  -   Toileting, which includes using toilet, bedpan or urinal? : A Little  -   Putting on and taking off regular upper body clothing?: None  -   Taking care of personal grooming such as brushing teeth?: None  -   Eating meals?: None    AM-PAC Score:  Score: 21  Approx Degree of Impairment: 32.79%  Standardized Score (AM-PAC Scale): 44.27  CMS Modifier (G-Code): CJ    FUNCTIONAL TRANSFER ASSESSMENT  Sit to Stand: Edge of Bed  Edge of Bed: Stand-by Assist    BED MOBILITY  Supine to Sit : Stand-by Assist  Sit to Supine (OT): Stand-by Assist    BALANCE ASSESSMENT  Static Sitting: Stand-by Assist  Static Standing: Stand-by Assist    FUNCTIONAL ADL ASSESSMENT  Grooming Seated: -- (set-up assist)       Skilled Therapy Provided:   Pt receiving laying in bed and agreeable to therpy. Pt ed on role of OT, plan for session, bed mobility, functional  transfers, and energy conservation. Pt responded well to education but limited in participation this session due to persisting dizziness. Vitals monitored throughout session and BP remained WNL. RN notified.     Pt completed supine<>sit EOB and STS from EOB with SBA. Pt maintained static standing ~2 minutes with CGA due to persisting dizziness that has been present since beginning of session. Pt requesting to return back to bed and not move to recliner at this time due to dizziness. Pt completed grooming (wash face, brush hair) sitting EOB with set-up assist. Pt would continue to benefit from item retrieval tasks to mirror household distances and challenge functional balance and standing grooming tasks to increase endurance for ADLs at discharge.       EDUCATION PROVIDED  Patient: Role of Occupational Therapy; Plan of Care; Functional Transfer Techniques; Posture/Positioning; Fall Prevention; Energy Conservation  Patient's Response to Education: Verbalized Understanding; Returned Demonstration  Family/Caregiver: Role of Occupational Therapy; Plan of Care  Family/Caregiver's Response to Education: Verbalized Understanding    The patient's Approx Degree of Impairment: 32.79% has been calculated based on documentation in the [facility] '6 clicks' Inpatient Daily Activity Short Form.  Research supports that patients with this level of impairment may benefit from  OT.  Final disposition will be made by interdisciplinary medical team.     Patient End of Session: In bed;Needs met;RN aware of session/findings;Call light within reach;Alarm set;Family present    OT Goals  Patients self stated goal is: none      Patient will complete functional transfer SUP  Comment:     Patient will complete toileting with SUP  Comment:     Patient will tolerate standing for 5 minutes in prep for adls with SUP   Comment:    Patient will complete item retrieval with SUP  Comment:          Goals  on: 10/7/24  Frequency: 3-5x/week     Patient  Evaluation Complexity Level:   Occupational Profile/Medical History LOW - Brief history including review of medical or therapy records    Specific performance deficits impacting engagement in ADL/IADL MODERATE  3 - 5 performance deficits   Client Assessment/Performance Deficits MODERATE - Comorbidities and min to mod modifications of tasks    Clinical Decision Making LOW - Analysis of occupational profile, problem-focused assessments, limited treatment options    Overall Complexity LOW     OT Session Time: 30 minutes  Self-Care Home Management: 30 minutes

## 2024-09-30 NOTE — PLAN OF CARE
Problem: Patient Centered Care  Goal: Patient preferences are identified and integrated in the patient's plan of care  Description: Interventions:  - What would you like us to know as we care for you?  - Provide timely, complete, and accurate information to patient/family  - Incorporate patient and family knowledge, values, beliefs, and cultural backgrounds into the planning and delivery of care  - Encourage patient/family to participate in care and decision-making at the level they choose  - Honor patient and family perspectives and choices  Outcome: Progressing     Problem: Patient/Family Goals  Goal: Patient/Family Long Term Goal  Description: Patient's Long Term Goal    Interventions:  - See additional Care Plan goals for specific interventions  Outcome: Progressing  Goal: Patient/Family Short Term Goal  Description: Patient's Short Term Goal:    Interventions:   - See additional Care Plan goals for specific interventions  Outcome: Progressing     Problem: GASTROINTESTINAL - ADULT  Goal: Maintains or returns to baseline bowel function  Description: INTERVENTIONS:  - Assess bowel function  - Maintain adequate hydration with IV or PO as ordered and tolerated  - Evaluate effectiveness of GI medications  - Encourage mobilization and activity  - Obtain nutritional consult as needed  - Establish a toileting routine/schedule  - Consider collaborating with pharmacy to review patient's medication profile  Outcome: Progressing     Problem: SKIN/TISSUE INTEGRITY - ADULT  Goal: Skin integrity remains intact  Description: INTERVENTIONS  - Assess and document risk factors for pressure ulcer development  - Assess and document skin integrity  - Monitor for areas of redness and/or skin breakdown  - Initiate interventions, skin care algorithm/standards of care as needed  Outcome: Progressing     Problem: MUSCULOSKELETAL - ADULT  Goal: Return mobility to safest level of function  Description: INTERVENTIONS:  - Assess patient  stability and activity tolerance for standing, transferring and ambulating w/ or w/o assistive devices  - Assist with transfers and ambulation using safe patient handling equipment as needed  - Ensure adequate protection for wounds/incisions during mobilization  - Obtain PT/OT consults as needed  - Advance activity as appropriate  - Communicate ordered activity level and limitations with patient/family  Outcome: Progressing  Goal: Maintain proper alignment of affected body part  Description: INTERVENTIONS:  - Support and protect limb and body alignment per provider's orders  - Instruct and reinforce with patient and family use of appropriate assistive device and precautions (e.g. spinal or hip dislocation precautions)  Outcome: Progressing

## 2024-09-30 NOTE — PROGRESS NOTES
progress note  name: Susan P Ruzicka  Room: 546/546-A  Date of admission: 9/28/2024    Primary care provider:   [unfilled]      SUBJECTIVE: seen and examined.   Diarrhea improving Cr improving. WBC 14. Monitor and possible d.c tomorrow if continues to be better. Pt/ot pending.     Reason for Admission: acute kidney injury and diarrhea    History ofPresent Illness: 79-year-old female with a history of hyperlipidemia, hypertension, depression, peripheral neuropathy, and congestive heart failure, presented with severe diarrhea and was brought to the emergency department. She was found to have an elevated blood pressure of 180/100 mmHg, acute kidney injury likely secondary to diarrhea, and electrolyte imbalances. She was admitted for further evaluation and management, including IV fluids, continuation of home medications, and supportive care.      All Other ROS Negative except as mentioned in HPI    [unfilled]    Not on File     Past Medical History:    Congestive heart disease (HCC)    Diverticulitis    Esophageal reflux    Fibromyalgia    High blood pressure    Osteoarthrosis, unspecified whether generalized or localized, unspecified site    RLS (restless legs syndrome)    Unspecified essential hypertension       Past Surgical History:   Procedure Laterality Date    Cath drug eluting stent      Excis lumbar disk,one level      Removal gallbladder         No family history on file.    Social History     Socioeconomic History    Marital status:      Spouse name: Not on file    Number of children: Not on file    Years of education: Not on file    Highest education level: Not on file   Occupational History    Not on file   Tobacco Use    Smoking status: Former     Types: Cigarettes     Passive exposure: Past    Smokeless tobacco: Never   Substance and Sexual Activity    Alcohol use: No    Drug use: Not on file    Sexual activity: Not on file   Other Topics Concern    Not on file   Social History Narrative     Not on file     Social Determinants of Health     Financial Resource Strain: Low Risk  (4/26/2024)    Received from [employer]    Overall Financial Resource Strain (CARDIA)     Difficulty of Paying Living Expenses: Not hard at all   Food Insecurity: No Food Insecurity (5/8/2024)    Food Insecurity     Food Insecurity: Never true   Transportation Needs: No Transportation Needs (9/28/2024)    Transportation Needs     Lack of Transportation: No     Car Seat: Not on file   Physical Activity: Inactive (4/26/2024)    Received from [employer]    Physical Activity     On average, how many days per week do you engage in moderate to strenuous exercise (like a brisk walk)?: 0 days     On average, how many minutes do you engage in exercise at this level?: 0 min   Stress: No Stress Concern Present (4/26/2024)    Received from [employer]    [employer] - Occupational Stress Questionnaire     Feeling of Stress : Not at all   Social Connections: Feeling Socially Integrated (8/26/2024)    Received from [employer]    OASIS : Social Isolation     Frequency of experiencing loneliness or isolation: Never   Housing Stability: Low Risk  (9/28/2024)    Housing Stability     Housing Instability: No     Housing Instability Emergency: Not on file     Crib or Bassinette: Not on file         OBJECTIVE:  Patient Vitals for the past 24 hrs:   BP Temp Temp src Pulse Resp SpO2 Weight   09/29/24 1000 160/84 97.7 °F (36.5 °C) Axillary 68 18 97 % --   09/29/24 0438 137/57 98.1 °F (36.7 °C) Oral 60 18 98 % --   09/28/24 2226 155/80 -- -- -- -- -- --   09/28/24 2104 -- -- -- -- -- -- 167 lb 12.8 oz (76.1 kg)   09/28/24 2101 (!) 171/75 100.1 °F (37.8 °C) Oral 69 18 97 % --   09/28/24 2030 (!) 162/79 -- -- 67 16 96 % --   09/28/24 1930 (!) 142/92 -- -- 67 15 99 % --   09/28/24 1900 -- -- -- 62 16 98 % --   09/28/24 1815 155/83 -- -- 67 14 -- --   09/28/24 1715 145/76 -- -- 71 16 -- --   09/28/24 1645 149/87 -- --  76 14 98 % --   09/28/24 1607 (!) 180/100 98.5 °F (36.9 °C) Temporal 95 18 97 % 160 lb (72.6 kg)       General: NAD  HEENT: NC/AT, MMM, OP clear with no exudates, PERRL.  Neck: Supple, No LAD  Heart: Normal s1/s2, no MRG  Chest: CTAB: NT/ND, soft, + Bs, no HSM  Ext: no peripheral edema.    Neuro: CN II through XII grossly intact.  No focal deficits     Diagnostic data:    CBC  Recent Labs   Lab 09/28/24  1612   WBC 14.0*   HGB 13.1   .0       CMP  Recent Labs   Lab 09/28/24  1612 09/29/24  1233     --    K 3.2* 4.7     --    CO2 20.0*  --    BUN 21  --        Coags:   Recent Labs   Lab 09/28/24  1612   .0         Urinalysis  Invalid input(s): \"COLURN\", \"CLARURNE\", \"PHURN\", \"PROTURN\", \"GLCSURN\", \"KTNSURN\", \"BILIRUURN\", \"BLOODURN\", \"NITRTURN\", \"UROBILIURN\", \"SPECGRVURN\", \"LEUKESTURN\", \"RBLCLURN\", \"WHTBLCLURN\", \"BACTUR\", \"MUCURN\", \"SQMIPICLURN\"    CardiacEnzymes  Invalid input(s): \"TROPONINT\"    Diabetes Studies  No results found for: \"GLUF\", \"MICROALBUR\", \"CREATININE\"    .      Imaging:  No results found.    ASSESSMENT:    Ms. Susan Ruzicka, a 79-year-old female with a history of hyperlipidemia, hypertension, depression, peripheral neuropathy, and congestive heart failure, presented with severe diarrhea and was brought to the emergency department. She was found to have an elevated blood pressure of 180/100 mmHg, acute kidney injury likely secondary to diarrhea, and electrolyte imbalances. She was admitted for further evaluation and management, including IV fluids, continuation of home medications, and supportive care.    PLAN:  Assessment and Plan:    1. Acute kidney injury (FARAZ):  - Likely secondary to severe diarrhea  - Plan: Start on IV fluids, monitor vital signs, and repeat BMP as needed    2. Hypertension:  - Noted elevated blood pressure of 180/100 in the emergency department  - Plan: Continue home blood pressure medication, Carvedilol    3. Hyperlipidemia:  - Plan: Continue home  medication, Traversagin    4. Gastroesophageal reflux disease (GERD):  - Plan: Continue home medication, Protonix    5. Depression/Anxiety:  - Plan: Continue home medications as prescribed    6. Congestive heart failure (CHF):  - Plan: Continue home medications, Trosemide and Spironolactone    7. Severe diarrhea:  - Plan: Monitor and manage symptoms, provide supportive care, and evaluate for possible causes    8. Peripheral neuropathy:  - No acute issues noted during this visit    9. Deep vein thrombosis (DVT) prophylaxis:  - Plan: Assess risk and provide appropriate prophylaxis as needed    Disposition:  - Admit for further evaluation and management until medically stable    Follow-up:  - Schedule follow-up appointment with primary care provider or specialist as needed    [facility]

## 2024-09-30 NOTE — PROGRESS NOTES
progress note  name: Susan P Ruzicka  Room: 546/546-A  Date of admission: 9/28/2024    Primary care provider:   [unfilled]      SUBJECTIVE: seen and examined.   Diarrhea improving. During occupational therpay patient was dizzy and light headed. Cardiology consulted to optimize medications for chf. Held discharge today. Orthostatic vitals ordered.     Reason for Admission: acute kidney injury and diarrhea    History ofPresent Illness: 79-year-old female with a history of hyperlipidemia, hypertension, depression, peripheral neuropathy, and congestive heart failure, presented with severe diarrhea and was brought to the emergency department. She was found to have an elevated blood pressure of 180/100 mmHg, acute kidney injury likely secondary to diarrhea, and electrolyte imbalances. She was admitted for further evaluation and management, including IV fluids, continuation of home medications, and supportive care.      All Other ROS Negative except as mentioned in HPI    [unfilled]    Not on File     Past Medical History:    Congestive heart disease (HCC)    Diverticulitis    Esophageal reflux    Fibromyalgia    High blood pressure    Osteoarthrosis, unspecified whether generalized or localized, unspecified site    RLS (restless legs syndrome)    Unspecified essential hypertension       Past Surgical History:   Procedure Laterality Date    Cath drug eluting stent      Excis lumbar disk,one level      Removal gallbladder         No family history on file.    Social History     Socioeconomic History    Marital status:      Spouse name: Not on file    Number of children: Not on file    Years of education: Not on file    Highest education level: Not on file   Occupational History    Not on file   Tobacco Use    Smoking status: Former     Types: Cigarettes     Passive exposure: Past    Smokeless tobacco: Never   Substance and Sexual Activity    Alcohol use: No    Drug use: Not on file    Sexual activity: Not on  file   Other Topics Concern    Not on file   Social History Narrative    Not on file     Social Determinants of Health     Financial Resource Strain: Low Risk  (4/26/2024)    Received from [employer] [facility]    Overall Financial Resource Strain (CARDIA)     Difficulty of Paying Living Expenses: Not hard at all   Food Insecurity: No Food Insecurity (5/8/2024)    Food Insecurity     Food Insecurity: Never true   Transportation Needs: No Transportation Needs (9/28/2024)    Transportation Needs     Lack of Transportation: No     Car Seat: Not on file   Physical Activity: Inactive (4/26/2024)    Received from [employer] [facility]    Physical Activity     On average, how many days per week do you engage in moderate to strenuous exercise (like a brisk walk)?: 0 days     On average, how many minutes do you engage in exercise at this level?: 0 min   Stress: No Stress Concern Present (4/26/2024)    Received from [facility] [employer] [facility]    [employer] [place] [employer] - Occupational Stress Questionnaire     Feeling of Stress : Not at all   Social Connections: Feeling Socially Integrated (8/26/2024)    Received from [employer]    OASIS : Social Isolation     Frequency of experiencing loneliness or isolation: Never   Housing Stability: Low Risk  (9/28/2024)    Housing Stability     Housing Instability: No     Housing Instability Emergency: Not on file     Crib or Bassinette: Not on file         OBJECTIVE:  Patient Vitals for the past 24 hrs:   BP Temp Temp src Pulse Resp SpO2 Weight   09/29/24 1000 160/84 97.7 °F (36.5 °C) Axillary 68 18 97 % --   09/29/24 0438 137/57 98.1 °F (36.7 °C) Oral 60 18 98 % --   09/28/24 2226 155/80 -- -- -- -- -- --   09/28/24 2104 -- -- -- -- -- -- 167 lb 12.8 oz (76.1 kg)   09/28/24 2101 (!) 171/75 100.1 °F (37.8 °C) Oral 69 18 97 % --   09/28/24 2030 (!) 162/79 -- -- 67 16 96 % --   09/28/24 1930 (!) 142/92 -- -- 67 15 99 % --   09/28/24 1900 -- -- -- 62 16 98 % --   09/28/24 1815 155/83 -- -- 67 14 --  --   09/28/24 1715 145/76 -- -- 71 16 -- --   09/28/24 1645 149/87 -- -- 76 14 98 % --   09/28/24 1607 (!) 180/100 98.5 °F (36.9 °C) Temporal 95 18 97 % 160 lb (72.6 kg)       General: NAD  HEENT: NC/AT, MMM, OP clear with no exudates, PERRL.  Neck: Supple, No LAD  Heart: Normal s1/s2, no MRG  Chest: CTAB: NT/ND, soft, + Bs, no HSM  Ext: no peripheral edema.    Neuro: CN II through XII grossly intact.  No focal deficits     Diagnostic data:    CBC  Recent Labs   Lab 09/28/24 1612 09/30/24  0715   WBC 14.0* 7.6   HGB 13.1 10.3*   .0 214.0       CMP  Recent Labs   Lab 09/28/24 1612 09/29/24  1233 09/30/24  0715     --  139   K 3.2* 4.7 4.2     --  109   CO2 20.0*  --  23.0   BUN 21  --  18   ALT  --   --  11   AST  --   --  20   ALB  --   --  3.5       Coags:   Recent Labs   Lab 09/28/24 1612 09/30/24  0715   .0 214.0         Urinalysis  Invalid input(s): \"COLURN\", \"CLARURNE\", \"PHURN\", \"PROTURN\", \"GLCSURN\", \"KTNSURN\", \"BILIRUURN\", \"BLOODURN\", \"NITRTURN\", \"UROBILIURN\", \"SPECGRVURN\", \"LEUKESTURN\", \"RBLCLURN\", \"WHTBLCLURN\", \"BACTUR\", \"MUCURN\", \"SQMIPICLURN\"    CardiacEnzymes  Invalid input(s): \"TROPONINT\"    Diabetes Studies  No results found for: \"GLUF\", \"MICROALBUR\", \"CREATININE\"    .      Imaging:  No results found.    ASSESSMENT:    Ms. Linda Ruzicka, a 79-year-old female with a history of hyperlipidemia, hypertension, depression, peripheral neuropathy, and congestive heart failure, presented with severe diarrhea and was brought to the emergency department. She was found to have an elevated blood pressure of 180/100 mmHg, acute kidney injury likely secondary to diarrhea, and electrolyte imbalances. She was admitted for further evaluation and management, including IV fluids, continuation of home medications, and supportive care.    PLAN:  Assessment and Plan:    1. Acute kidney injury (FARAZ):  - Likely secondary to severe diarrhea  - Plan: Start on IV fluids, monitor vital signs, and repeat  BMP as needed    2. Hypertension:  - Noted elevated blood pressure of 180/100 in the emergency department  - Plan: Continue home blood pressure medication, Carvedilol    3. Hyperlipidemia:  - Plan: Continue home medication, Traversagin    4. Gastroesophageal reflux disease (GERD):  - Plan: Continue home medication, Protonix    5. Depression/Anxiety:  - Plan: Continue home medications as prescribed    6. Congestive heart failure (CHF):  - Plan: Continue home medications, Trosemide and Spironolactone    7. Severe diarrhea:  - Plan: Monitor and manage symptoms, provide supportive care, and evaluate for possible causes    8. Peripheral neuropathy:  - No acute issues noted during this visit    9. Deep vein thrombosis (DVT) prophylaxis:  - Plan: Assess risk and provide appropriate prophylaxis as needed    Dizziness: orthostatic vitals ordered.     Disposition:  - Admit for further evaluation and management until medically stable    Follow-up:  - Schedule follow-up appointment with primary care provider or specialist as needed    [facility]

## 2024-09-30 NOTE — PLAN OF CARE
Problem: Patient Centered Care  Goal: Patient preferences are identified and integrated in the patient's plan of care  Description: Interventions:  - What would you like us to know as we care for you?   - Provide timely, complete, and accurate information to patient/family  - Incorporate patient and family knowledge, values, beliefs, and cultural backgrounds into the planning and delivery of care  - Encourage patient/family to participate in care and decision-making at the level they choose  - Honor patient and family perspectives and choices  Outcome: Progressing     Problem: Patient/Family Goals  Goal: Patient/Family Long Term Goal  Description: Patient's Long Term Goal:     Interventions:  -   - See additional Care Plan goals for specific interventions  Outcome: Progressing  Goal: Patient/Family Short Term Goal  Description: Patient's Short Term Goal:     Interventions:   -   - See additional Care Plan goals for specific interventions  Outcome: Progressing     Problem: GASTROINTESTINAL - ADULT  Goal: Maintains or returns to baseline bowel function  Description: INTERVENTIONS:  - Assess bowel function  - Maintain adequate hydration with IV or PO as ordered and tolerated  - Evaluate effectiveness of GI medications  - Encourage mobilization and activity  - Obtain nutritional consult as needed  - Establish a toileting routine/schedule  - Consider collaborating with pharmacy to review patient's medication profile  Outcome: Progressing     Problem: SKIN/TISSUE INTEGRITY - ADULT  Goal: Skin integrity remains intact  Description: INTERVENTIONS  - Assess and document risk factors for pressure ulcer development  - Assess and document skin integrity  - Monitor for areas of redness and/or skin breakdown  - Initiate interventions, skin care algorithm/standards of care as needed  Outcome: Progressing     Problem: MUSCULOSKELETAL - ADULT  Goal: Return mobility to safest level of function  Description: INTERVENTIONS:  - Assess  patient stability and activity tolerance for standing, transferring and ambulating w/ or w/o assistive devices  - Assist with transfers and ambulation using safe patient handling equipment as needed  - Ensure adequate protection for wounds/incisions during mobilization  - Obtain PT/OT consults as needed  - Advance activity as appropriate  - Communicate ordered activity level and limitations with patient/family  Outcome: Progressing  Goal: Maintain proper alignment of affected body part  Description: INTERVENTIONS:  - Support and protect limb and body alignment per provider's orders  - Instruct and reinforce with patient and family use of appropriate assistive device and precautions (e.g. spinal or hip dislocation precautions)  Outcome: Progressing

## 2024-09-30 NOTE — CONSULTS
[facility]  part of [facility]    Report of Consultation    Susan P Ruzicka Patient Status:  Observation    1945 MRN [id number]   Location [facility] 5SW/SE Attending Ottoniel Zhao MD   Hosp Day # 0 PCP ROLY KAUR     Date of Admission:  2024  Date of Consult:  2024  Reason for Consultation:   Dizzy and hypotensive  No CP/SOB at rest/palpitations    History of Present Illness:   Patient is a 79 year old female who was admitted to the hospital for Diarrhea of presumed infectious origin:  Patient had Diarrhea x 3 days and had poor PO intake, felt very weak and dizzy and with AMS, called AMS and was admitted with above  Patient was hypertensive in /100, also with dehydration and FARAZ with electrolyte imbalance in ER  Patient's BP was controlled and received IV fluids  PMH of CAD s/p stent, also last ECHO s/o LV systolic dysfunction and LV EF ~ 45%  Patient has H/o HTN and CHF per patient        Past Medical History  Past Medical History:    Congestive heart disease (HCC)    Diverticulitis    Esophageal reflux    Fibromyalgia    High blood pressure    Osteoarthrosis, unspecified whether generalized or localized, unspecified site    RLS (restless legs syndrome)    Unspecified essential hypertension       Past Surgical History  Past Surgical History:   Procedure Laterality Date    Cath drug eluting stent      Excis lumbar disk,one level      Removal gallbladder         Family History  No family history on file.    Social History  Social History     Socioeconomic History    Marital status:    Tobacco Use    Smoking status: Former     Types: Cigarettes     Passive exposure: Past    Smokeless tobacco: Never   Substance and Sexual Activity    Alcohol use: No     Social Determinants of Health     Financial Resource Strain: Low Risk  (2024)    Received from [employer]    Overall Financial Resource Strain (CARDIA)     Difficulty of Paying Living Expenses:  Not hard at all   Food Insecurity: No Food Insecurity (5/8/2024)    Food Insecurity     Food Insecurity: Never true   Transportation Needs: No Transportation Needs (9/28/2024)    Transportation Needs     Lack of Transportation: No   Physical Activity: Inactive (4/26/2024)    Received from [employer]    Physical Activity     On average, how many days per week do you engage in moderate to strenuous exercise (like a brisk walk)?: 0 days     On average, how many minutes do you engage in exercise at this level?: 0 min   Stress: No Stress Concern Present (4/26/2024)    Received from [employer]    [employer] - Occupational Stress Questionnaire     Feeling of Stress : Not at all   Social Connections: Feeling Socially Integrated (8/26/2024)    Received from [employer]    OASIS : Social Isolation     Frequency of experiencing loneliness or isolation: Never   Housing Stability: Low Risk  (9/28/2024)    Housing Stability     Housing Instability: No           Current Medications:  Current Facility-Administered Medications   Medication Dose Route Frequency    enoxaparin (Lovenox) 30 MG/0.3ML SUBQ injection 30 mg  30 mg Subcutaneous Daily    buPROPion SR (WELLBUTRIN SR) 12 hr tab 150 mg  150 mg Oral Daily    tiZANidine (Zanaflex) tab 2 mg  2 mg Oral Daily with dinner    aspirin DR tab 81 mg  81 mg Oral Daily    acetaminophen (Tylenol Extra Strength) tab 500 mg  500 mg Oral Q6H PRN    ergocalciferol (Vitamin D2) cap 50,000 Units  50,000 Units Oral Weekly    ferrous sulfate DR tab 325 mg  325 mg Oral Daily with breakfast    magnesium oxide (Mag-Ox) tab 500 mg  500 mg Oral Daily    atorvastatin (Lipitor) tab 40 mg  40 mg Oral Daily with dinner    pantoprazole (Protonix) DR tab 40 mg  40 mg Oral BID AC    prochlorperazine (Compazine) tab 10 mg  10 mg Oral Q6H PRN    lubiprostone (Amitiza) cap 24 mcg  24 mcg Oral BID with meals    rOPINIRole (Requip) tab 3 mg  3 mg Oral After dinner     carvedilol (Coreg) tab 3.125 mg  3.125 mg Oral BID with meals    isosorbide dinitrate (Isordil) tab 10 mg  10 mg Oral BID    spironolactone (Aldactone) tab 25 mg  25 mg Oral Daily    torsemide (Demadex) tab 40 mg  40 mg Oral Daily    gabapentin (Neurontin) cap 600 mg  600 mg Oral BID    escitalopram (Lexapro) tab 20 mg  20 mg Oral Daily    sodium chloride 0.9% infusion   Intravenous Continuous    polyethylene glycol (PEG 3350) (Miralax) 17 g oral packet 17 g  17 g Oral Daily PRN    sennosides (Senokot) tab 17.2 mg  17.2 mg Oral Nightly PRN    bisacodyl (Dulcolax) 10 MG rectal suppository 10 mg  10 mg Rectal Daily PRN    ondansetron (Zofran) 4 MG/2ML injection 4 mg  4 mg Intravenous Q6H PRN    loperamide (Imodium) cap 2 mg  2 mg Oral QID PRN     Medications Prior to Admission   Medication Sig    torsemide 20 MG Oral Tab Take 2 tablets (40 mg total) by mouth daily.    gabapentin 300 MG Oral Cap Take 2 capsules (600 mg total) by mouth 2 (two) times daily.    isosorbide dinitrate 10 MG Oral Tab Take 1 tablet (10 mg total) by mouth in the morning and 1 tablet (10 mg total) before bedtime.    spironolactone 25 MG Oral Tab Take 1 tablet (25 mg total) by mouth daily.    carvedilol 3.125 MG Oral Tab Take 1 tablet (3.125 mg total) by mouth 2 (two) times daily with meals.    citalopram 20 MG Oral Tab Take 1 tablet (20 mg total) by mouth daily.    NON FORMULARY Place 1 drop into the right eye daily. Ivizia eye drops uses daily to right eye    rOPINIRole 3 MG Oral Tab Take 1 tablet (3 mg total) by mouth After dinner.    lubiprostone 24 MCG Oral Cap Take 1 capsule (24 mcg total) by mouth 2 (two) times daily with meals.    pantoprazole 40 MG Oral Tab EC Take 1 tablet (40 mg total) by mouth 2 (two) times daily before meals.    prochlorperazine (COMPAZINE) 10 mg tablet Take 1 tablet (10 mg total) by mouth every 6 (six) hours as needed for Nausea.    atorvastatin 40 MG Oral Tab Take 1 tablet (40 mg total) by mouth daily with dinner.     ergocalciferol 1.25 MG (07212 UT) Oral Cap Take 1 capsule (50,000 Units total) by mouth once a week. On Sundays    ferrous sulfate 325 (65 FE) MG Oral Tab EC Take 1 tablet (325 mg total) by mouth daily with breakfast.    Magnesium 200 MG Oral Tab Take 2.5 tablets (500 mg total) by mouth daily.    acetaminophen 500 MG Oral Tab Take 1 tablet (500 mg total) by mouth every 6 (six) hours as needed for Pain.    aspirin 81 MG Oral Tab EC Take 1 tablet (81 mg total) by mouth daily.    buPROPion HCl ER, SR, 150 MG Oral Tablet 12 Hr Take 1 tablet (150 mg total) by mouth daily.    tiZANidine HCl 2 MG Oral Tab Take 1 tablet (2 mg total) by mouth daily with dinner.       Allergies  Not on File    Review of Systems:   Pertinent items are noted in HPI.    Physical Exam:   Vital Signs:  Blood pressure 93/52, pulse 63, temperature 98.2 °F (36.8 °C), temperature source Oral, resp. rate 18, weight 167 lb 12.8 oz (76.1 kg), SpO2 95%.     Physical Exam  Vitals and nursing note reviewed.   Constitutional:       Appearance: Normal appearance.   HENT:      Head: Normocephalic and atraumatic.      Nose: Nose normal.   Cardiovascular:      Rate and Rhythm: Normal rate and regular rhythm.      Pulses: Normal pulses.      Heart sounds: Normal heart sounds.   Pulmonary:      Effort: Pulmonary effort is normal.      Breath sounds: Normal breath sounds.   Abdominal:      General: Bowel sounds are normal.      Palpations: Abdomen is soft.   Musculoskeletal:         General: Normal range of motion.      Cervical back: Neck supple.   Skin:     General: Skin is warm and dry.   Neurological:      General: No focal deficit present.      Mental Status: She is alert and oriented to person, place, and time.   Psychiatric:         Mood and Affect: Mood normal.         Behavior: Behavior normal.         Thought Content: Thought content normal.         Judgment: Judgment normal.        Results:     Laboratory Data:  Lab Results   Component Value Date     WBC 7.6 09/30/2024    HGB 10.3 (L) 09/30/2024    HCT 32.2 (L) 09/30/2024    .0 09/30/2024    CREATSERUM 1.28 (H) 09/30/2024    BUN 18 09/30/2024     09/30/2024    K 4.2 09/30/2024     09/30/2024    CO2 23.0 09/30/2024     (H) 09/30/2024    CA 8.4 (L) 09/30/2024    ALB 3.5 09/30/2024    ALKPHO 109 09/30/2024    TP 5.8 09/30/2024    AST 20 09/30/2024    ALT 11 09/30/2024    PTT 70.1 (H) 05/10/2024    TSH 3.320 05/28/2023    LIP 33 09/24/2023    DDIMER 1.02 (H) 01/30/2023    MG 1.8 09/30/2024    PHOS 3.1 09/30/2024    TROP <0.045 10/01/2020         Imaging:  ECHO 5/9/24  Conclusions:   Left ventricle: The cavity size was normal. Wall thickness was normal.   Systolic function was at the lower limits of normal. The estimated ejection   fraction was 45-50%, by biplane method of disks. Left ventricular diastolic   function parameters were normal.     C Cath 9/24/23  Patent Mid RCA stent  Otherwise non obstructive CAD    Impression:   Admitted with Diarrhea and hypertension , now hypotensive with orthostatic hypotension    AMS upon admission : resolved    H/O HTN/HTHD/chronic combined systolic and diastolic heart failure    CAD s/p ZIYAD in Mid RCA with cath in 9/2023 s/o patent ZIYAD in RCA and non obstructive CAD    H/O LBBB    Anemia of chronic disease and blood loss    FARAZ on CKD III        Recommendations:  IV hydration   Hold diuretics  Check ECHO  Follow and correct K and Mg levels with goal to keep K~4 and Mg~2  Strict fall precautions  PT/OT as tolerated  Will hold Coreg and Torsemide as well as spironolactone and Imdur now and restart after BP improves with Holding parameters      D/W patient and nursing staff    Thank you for allowing me to participate in the care of your patient.    Jorgito Pollack MD [facility]  [address]  [place] [address]  [phone]    This note was prepared using Dragon Medical voice recognition dictation software. As a result  errors may occur. When identified these errors have been corrected. While every attempt is made to correct errors during dictation discrepancies may still exist.

## 2024-10-01 ENCOUNTER — APPOINTMENT (OUTPATIENT)
Dept: PICC SERVICES | Facility: HOSPITAL | Age: 79
End: 2024-10-01
Attending: HOSPITALIST
Payer: MEDICARE

## 2024-10-01 LAB
ALBUMIN SERPL-MCNC: 3.6 G/DL (ref 3.2–4.8)
ALBUMIN/GLOB SERPL: 1.6 {RATIO} (ref 1–2)
ALP LIVER SERPL-CCNC: 113 U/L
ALT SERPL-CCNC: 8 U/L
ANION GAP SERPL CALC-SCNC: 4 MMOL/L (ref 0–18)
AST SERPL-CCNC: 15 U/L (ref ?–34)
BILIRUB SERPL-MCNC: 0.3 MG/DL (ref 0.2–1.1)
BUN BLD-MCNC: 17 MG/DL (ref 9–23)
BUN/CREAT SERPL: 11.8 (ref 10–20)
CALCIUM BLD-MCNC: 8.3 MG/DL (ref 8.7–10.4)
CHLORIDE SERPL-SCNC: 112 MMOL/L (ref 98–112)
CO2 SERPL-SCNC: 26 MMOL/L (ref 21–32)
CREAT BLD-MCNC: 1.44 MG/DL
DEPRECATED RDW RBC AUTO: 45.7 FL (ref 35.1–46.3)
EGFRCR SERPLBLD CKD-EPI 2021: 37 ML/MIN/1.73M2 (ref 60–?)
ERYTHROCYTE [DISTWIDTH] IN BLOOD BY AUTOMATED COUNT: 13.2 % (ref 11–15)
GLOBULIN PLAS-MCNC: 2.2 G/DL (ref 2–3.5)
GLUCOSE BLD-MCNC: 109 MG/DL (ref 70–99)
HCT VFR BLD AUTO: 32.2 %
HGB BLD-MCNC: 10.1 G/DL
MAGNESIUM SERPL-MCNC: 1.8 MG/DL (ref 1.6–2.6)
MCH RBC QN AUTO: 29.6 PG (ref 26–34)
MCHC RBC AUTO-ENTMCNC: 31.4 G/DL (ref 31–37)
MCV RBC AUTO: 94.4 FL
OSMOLALITY SERPL CALC.SUM OF ELEC: 296 MOSM/KG (ref 275–295)
PLATELET # BLD AUTO: 194 10(3)UL (ref 150–450)
POTASSIUM SERPL-SCNC: 4.7 MMOL/L (ref 3.5–5.1)
PROT SERPL-MCNC: 5.8 G/DL (ref 5.7–8.2)
RBC # BLD AUTO: 3.41 X10(6)UL
SODIUM SERPL-SCNC: 142 MMOL/L (ref 136–145)
WBC # BLD AUTO: 7.4 X10(3) UL (ref 4–11)

## 2024-10-01 PROCEDURE — 80053 COMPREHEN METABOLIC PANEL: CPT | Performed by: HOSPITALIST

## 2024-10-01 PROCEDURE — 97530 THERAPEUTIC ACTIVITIES: CPT

## 2024-10-01 PROCEDURE — 83735 ASSAY OF MAGNESIUM: CPT | Performed by: HOSPITALIST

## 2024-10-01 PROCEDURE — 97110 THERAPEUTIC EXERCISES: CPT

## 2024-10-01 PROCEDURE — 85027 COMPLETE CBC AUTOMATED: CPT | Performed by: HOSPITALIST

## 2024-10-01 RX ORDER — MAGNESIUM OXIDE 400 MG/1
400 TABLET ORAL ONCE
Status: COMPLETED | OUTPATIENT
Start: 2024-10-01 | End: 2024-10-01

## 2024-10-01 NOTE — CDS QUERY
.How to answer this Query:     1.) DON'T CLICK COSIGN BUTTON FIRST  2.) Click \"3 dots...\" to the right of cosign button and click EDIT on the toolbar.  2.) Type an \"X\" in the bracket for the diagnosis that applies. (You may also add additional clinical details as you feel necessary to substantiate your response).   3.) Finally click \"Sign\" to complete response.  Thank You    CLINICAL DOCUMENTATION CLARIFICATION    Dear Doctor Zhao,   The attending physician is required to clarify conflicting documentation in the medical record.  The following documentation is noted in the medical record:    Diagnosis 1: chronic combined systolic and diastolic heart failure      Documented by: Dr. Pollack Location: Cardio consult 9/30  Diagnosis 2: chronic diastolic heart failure  Documented by: Dr. Singh Location: PN 10/1           PLEASE (X) DIAGNOSIS THAT APPLIES.     (    ) Chronic Diastolic Heart Failure   (    ) Other - please specify:_____________________________        Clinical Indicators: Echo 9/30/24  - ejection fraction was 50-55%. CXR 9/25 - Interval improvement in the chest with decrease in interstitial edema from previous study suggesting improvement in cardiac failure/fluid overload.        Risk Factor: 79 year old who was admitted to the hospital for Diarrhea with H/o HTN and CHF      Treatment: Cardiology consult, Home meds Torsemide and Spironolactone       If you have any questions, please contact Clinical :  Daryl HALL at [phone]     Thank You!     THIS FORM IS A PERMANENT PART OF THE MEDICAL RECORD

## 2024-10-01 NOTE — PLAN OF CARE
Patient VSS, no acute changes during shift. Updated patient on plan of care. Frequent rounding, no needs at this time. Call light always in reach and safety precautions in place.     Problem: Patient Centered Care  Goal: Patient preferences are identified and integrated in the patient's plan of care  Description: Interventions:  - What would you like us to know as we care for you? From home alone  - Provide timely, complete, and accurate information to patient/family  - Incorporate patient and family knowledge, values, beliefs, and cultural backgrounds into the planning and delivery of care  - Encourage patient/family to participate in care and decision-making at the level they choose  - Honor patient and family perspectives and choices  Outcome: Progressing     Problem: Patient/Family Goals  Goal: Patient/Family Long Term Goal  Description: Patient's Long Term Goal: Go home    Interventions:  - monitor BP  -IVF  -Cards consult  - See additional Care Plan goals for specific interventions  Outcome: Progressing  Goal: Patient/Family Short Term Goal  Description: Patient's Short Term Goal: No pain    Interventions:   - PRN tylenol  - See additional Care Plan goals for specific interventions  Outcome: Progressing     Problem: GASTROINTESTINAL - ADULT  Goal: Maintains or returns to baseline bowel function  Description: INTERVENTIONS:  - Assess bowel function  - Maintain adequate hydration with IV or PO as ordered and tolerated  - Evaluate effectiveness of GI medications  - Encourage mobilization and activity  - Obtain nutritional consult as needed  - Establish a toileting routine/schedule  - Consider collaborating with pharmacy to review patient's medication profile  Outcome: Progressing     Problem: SKIN/TISSUE INTEGRITY - ADULT  Goal: Skin integrity remains intact  Description: INTERVENTIONS  - Assess and document risk factors for pressure ulcer development  - Assess and document skin integrity  - Monitor for areas of  redness and/or skin breakdown  - Initiate interventions, skin care algorithm/standards of care as needed  Outcome: Progressing     Problem: MUSCULOSKELETAL - ADULT  Goal: Return mobility to safest level of function  Description: INTERVENTIONS:  - Assess patient stability and activity tolerance for standing, transferring and ambulating w/ or w/o assistive devices  - Assist with transfers and ambulation using safe patient handling equipment as needed  - Ensure adequate protection for wounds/incisions during mobilization  - Obtain PT/OT consults as needed  - Advance activity as appropriate  - Communicate ordered activity level and limitations with patient/family  Outcome: Progressing  Goal: Maintain proper alignment of affected body part  Description: INTERVENTIONS:  - Support and protect limb and body alignment per provider's orders  - Instruct and reinforce with patient and family use of appropriate assistive device and precautions (e.g. spinal or hip dislocation precautions)  Outcome: Progressing

## 2024-10-01 NOTE — PROGRESS NOTES
Admitted with diarrhea and htn then with orthostatic hypotension. Diuretics held.  Patient denies chest pain or shortness of breath at rest. Denies cough or congestion. Denies dizziness or lightheadedness. Denies abdominal pain or nausea.      Vitals:    10/01/24 0551   BP: 114/64   Pulse: 63   Resp: 16   Temp: 97.8 °F (36.6 °C)       Intake/Output Summary (Last 24 hours) at 10/1/2024 0650  Last data filed at 10/1/2024 0600  Gross per 24 hour   Intake 1470 ml   Output 900 ml   Net 570 ml     Wt Readings from Last 1 Encounters:   09/28/24 167 lb 12.8 oz (76.1 kg)        General: No acute distress.  Neck: Jugular venous pulsations not seen.  Lungs: Clear to auscultation.  Heart: Normal rate. MAGI  Abdomen: Soft. Non tender  Extremities: No edema.  Neurological: Alert. No focal deficits.  Psychiatric: Appropriate mood and affect.  Current Facility-Administered Medications   Medication Dose Route Frequency    enoxaparin (Lovenox) 30 MG/0.3ML SUBQ injection 30 mg  30 mg Subcutaneous Daily    spironolactone (Aldactone) partial tab 12.5 mg  12.5 mg Oral Daily    buPROPion SR (WELLBUTRIN SR) 12 hr tab 150 mg  150 mg Oral Daily    tiZANidine (Zanaflex) tab 2 mg  2 mg Oral Daily with dinner    aspirin DR tab 81 mg  81 mg Oral Daily    acetaminophen (Tylenol Extra Strength) tab 500 mg  500 mg Oral Q6H PRN    ergocalciferol (Vitamin D2) cap 50,000 Units  50,000 Units Oral Weekly    ferrous sulfate DR tab 325 mg  325 mg Oral Daily with breakfast    magnesium oxide (Mag-Ox) tab 500 mg  500 mg Oral Daily    atorvastatin (Lipitor) tab 40 mg  40 mg Oral Daily with dinner    pantoprazole (Protonix) DR tab 40 mg  40 mg Oral BID AC    prochlorperazine (Compazine) tab 10 mg  10 mg Oral Q6H PRN    lubiprostone (Amitiza) cap 24 mcg  24 mcg Oral BID with meals    rOPINIRole (Requip) tab 3 mg  3 mg Oral After dinner    carvedilol (Coreg) tab 3.125 mg  3.125 mg Oral BID with meals    isosorbide dinitrate (Isordil) tab 10 mg  10 mg Oral  BID    torsemide (Demadex) tab 40 mg  40 mg Oral Daily    gabapentin (Neurontin) cap 600 mg  600 mg Oral BID    escitalopram (Lexapro) tab 20 mg  20 mg Oral Daily    sodium chloride 0.9% infusion   Intravenous Continuous    polyethylene glycol (PEG 3350) (Miralax) 17 g oral packet 17 g  17 g Oral Daily PRN    sennosides (Senokot) tab 17.2 mg  17.2 mg Oral Nightly PRN    bisacodyl (Dulcolax) 10 MG rectal suppository 10 mg  10 mg Rectal Daily PRN    ondansetron (Zofran) 4 MG/2ML injection 4 mg  4 mg Intravenous Q6H PRN    loperamide (Imodium) cap 2 mg  2 mg Oral QID PRN     Medications Prior to Admission   Medication Sig    torsemide 20 MG Oral Tab Take 2 tablets (40 mg total) by mouth daily.    gabapentin 300 MG Oral Cap Take 2 capsules (600 mg total) by mouth 2 (two) times daily.    isosorbide dinitrate 10 MG Oral Tab Take 1 tablet (10 mg total) by mouth in the morning and 1 tablet (10 mg total) before bedtime.    spironolactone 25 MG Oral Tab Take 1 tablet (25 mg total) by mouth daily.    carvedilol 3.125 MG Oral Tab Take 1 tablet (3.125 mg total) by mouth 2 (two) times daily with meals.    citalopram 20 MG Oral Tab Take 1 tablet (20 mg total) by mouth daily.    NON FORMULARY Place 1 drop into the right eye daily. Ivizia eye drops uses daily to right eye    rOPINIRole 3 MG Oral Tab Take 1 tablet (3 mg total) by mouth After dinner.    lubiprostone 24 MCG Oral Cap Take 1 capsule (24 mcg total) by mouth 2 (two) times daily with meals.    pantoprazole 40 MG Oral Tab EC Take 1 tablet (40 mg total) by mouth 2 (two) times daily before meals.    prochlorperazine (COMPAZINE) 10 mg tablet Take 1 tablet (10 mg total) by mouth every 6 (six) hours as needed for Nausea.    atorvastatin 40 MG Oral Tab Take 1 tablet (40 mg total) by mouth daily with dinner.    ergocalciferol 1.25 MG (96305 UT) Oral Cap Take 1 capsule (50,000 Units total) by mouth once a week. On Sundays    ferrous sulfate 325 (65 FE) MG Oral Tab EC Take 1 tablet  (325 mg total) by mouth daily with breakfast.    Magnesium 200 MG Oral Tab Take 2.5 tablets (500 mg total) by mouth daily.    acetaminophen 500 MG Oral Tab Take 1 tablet (500 mg total) by mouth every 6 (six) hours as needed for Pain.    aspirin 81 MG Oral Tab EC Take 1 tablet (81 mg total) by mouth daily.    buPROPion HCl ER, SR, 150 MG Oral Tablet 12 Hr Take 1 tablet (150 mg total) by mouth daily.    tiZANidine HCl 2 MG Oral Tab Take 1 tablet (2 mg total) by mouth daily with dinner.     No results found.    Lab Results   Component Value Date    WBC 7.4 10/01/2024    HGB 10.1 10/01/2024    HCT 32.2 10/01/2024    .0 10/01/2024    CREATSERUM 1.44 10/01/2024    BUN 17 10/01/2024     10/01/2024    K 4.7 10/01/2024     10/01/2024    CO2 26.0 10/01/2024     10/01/2024    CA 8.3 10/01/2024    ALB 3.6 10/01/2024    ALKPHO 113 10/01/2024    BILT 0.3 10/01/2024    TP 5.8 10/01/2024    AST 15 10/01/2024    ALT 8 10/01/2024    MG 1.8 10/01/2024    PHOS 3.1 09/30/2024       Admitted with Diarrhea and hypertension , now hypotensive with orthostatic hypotension     AMS upon admission : resolved     H/O HTN/HTHD/chronic diastolic heart failure     CAD s/p ZIYAD in Mid RCA with cath in 9/2023 s/o patent ZIYAD in RCA and non obstructive CAD     H/O LBBB     Anemia of chronic disease and blood loss     CKD3B. Cr at baseline to slightly lower.      PLAN: Holding diuretic. Repeat orthostatic vitals. If negative, stop ivf.  Then can discharge from cardiology. Hold torsemide and spironolactone but recommend resuming in 1 week as she does have significant CHF history. Diarrhea has stopped now.    Juan José Singh DO [facility]  [address]  [place] [address] [phone]

## 2024-10-01 NOTE — PHYSICAL THERAPY NOTE
PHYSICAL THERAPY TREATMENT NOTE - INPATIENT     Room Number: 546/546-A       Presenting Problem: diarrhea       Problem List  Principal Problem:    Diarrhea of presumed infectious origin  Active Problems:    Altered mental status, unspecified altered mental status type      PHYSICAL THERAPY ASSESSMENT   Patient demonstrates fair progress this session, goals  remain in progress. Limited by hypotension this date, RN aware.      Patient is requiring contact guard assist as a result of the following impairments: decreased functional strength, decreased endurance/aerobic capacity, and medical status.     Patient continues to function below baseline with bed mobility, transfers, and gait.  Next session anticipate patient to progress bed mobility, transfers, and gait.  Physical Therapy will continue to follow patient for duration of hospitalization.    Patient continues to benefit from continued skilled PT services: at discharge to promote prior level of function and safety with additional support and return home with home health PT.    PLAN DURING HOSPITALIZATION  PT Device Recommendation: Rolling walker;Rollator  PT Treatment Plan: Bed mobility;Body mechanics;Endurance;Energy conservation;Family education;Patient education;Gait training;Strengthening;Stair training;Transfer training;Balance training  Frequency (Obs): 5x/week     SUBJECTIVE  \"My legs need a rest.\"     OBJECTIVE  Precautions: Bed/chair alarm    WEIGHT BEARING RESTRICTION       PAIN ASSESSMENT   Ratin          BALANCE  Static Sitting: Good  Dynamic Sitting: Fair +  Static Standing: Fair  Dynamic Standing: Fair -    ACTIVITY TOLERANCE           BP: (!) 89/56 (92/57 in sitting and 99/59 in standing)              O2 WALK       AM-PAC '6-Clicks' INPATIENT SHORT FORM - BASIC MOBILITY  How much difficulty does the patient currently have...  Patient Difficulty: Turning over in bed (including adjusting bedclothes, sheets and blankets)?: A Little   Patient  Difficulty: Sitting down on and standing up from a chair with arms (e.g., wheelchair, bedside commode, etc.): A Little   Patient Difficulty: Moving from lying on back to sitting on the side of the bed?: A Little   How much help from another person does the patient currently need...   Help from Another: Moving to and from a bed to a chair (including a wheelchair)?: A Little   Help from Another: Need to walk in hospital room?: A Little   Help from Another: Climbing 3-5 steps with a railing?: A Little     AM-PAC Score:  Raw Score: 18   Approx Degree of Impairment: 46.58%   Standardized Score (AM-PAC Scale): 43.63   CMS Modifier (G-Code): CK    FUNCTIONAL ABILITY STATUS  Functional Mobility/Gait Assessment  Gait Assistance: Contact guard assist  Distance (ft): 5'  Assistive Device: Rolling walker  Pattern: Shuffle  Rolling: stand-by assist  Supine to Sit: stand-by assist  Sit to Supine: stand-by assist  Sit to Stand: stand-by assist    The patient's Approx Degree of Impairment: 46.58% has been calculated based on documentation in the [facility] '6 clicks' Inpatient Daily Activity Short Form.  Research supports that patients with this level of impairment may benefit from home with home care.  Final disposition will be made by interdisciplinary medical team.    THERAPEUTIC EXERCISES  Lower Extremity Alternating marching  Hip AB/AD  LAQ  Bicep curls      Position Sitting       Patient End of Session: Up in chair;Needs met;Call light within reach;RN aware of session/findings;All patient questions and concerns addressed;Alarm set    CURRENT GOALS   Goals to be met by: 10/7/24  Patient Goal Patient's self-stated goal is: go home   Goal #1 Patient is able to demonstrate supine - sit EOB @ level: modified independent      Goal #1   Current Status     Goal #2 Patient is able to demonstrate transfers Sit to/from Stand at assistance level: modified independent with walker - rolling      Goal #2  Current Status     Goal #3 Patient is  able to ambulate 100 feet with assist device: walker - rolling at assistance level: supervision   Goal #3   Current Status     Goal #4 Patient will negotiate 3 stairs/one curb w/ assistive device and supervision   Goal #4   Current Status     Goal #5 Patient to demonstrate independence with home activity/exercise instructions provided to patient in preparation for discharge.   Goal #5   Current Status     Goal #6     Goal #6  Current Status       Therapeutic Activity: 12 minutes  Therapeutic Exercise: 13 minutes

## 2024-10-01 NOTE — CM/SW NOTE
10/01/24 1300   CM/SW Referral Data   Referral Source Social Work (self-referral)   Reason for Referral Discharge planning   Informant Patient;EMR;Clinical Staff Member   Medical Hx   Does patient have an established PCP? Yes  (Dr. Baez)   Significant Past Medical/Mental Health Hx CHF   Patient Info   Advanced directives? No   Patient's Current Mental Status at Time of Assessment Alert;Oriented   Patient's Home Environment House   Number of Levels in Home 1   Number of Stair in Home 3 to enter   Patient lives with Alone   Patient Status Prior to Admission   Independent with ADLs and Mobility No   Pt. requires assistance with Ambulating   Services in place prior to admission Home Health Care;DME/Supplies at home   Home Health Provider Info [facility]   Type of DME/Supplies Wheeled Walker;Rollator Walker;Raised Toilet Seat;Grab Bars;Shower Chair   Discharge Needs   Anticipated D/C needs Home health care;Transportation services     SW self-referred to this case for discharge planning.    Patient admitted for diarrhea- found to be hypertensive.  Cardiology following.    SW met with patient bedside to complete above assessment.    Patient confirmed address and PCP on file.  Patient stated she follows [employer] physicians.    Patient lives alone- she stated she has a supportive neighbor and her daughter is also able to help her as needed.    Patient uses a walker and/or rollator at home.  She drives and is independent with her ADLs (has modified bathroom).    Anticipated therapy need: Home with Home Healthcare.     MICHELLE confirmed w/Jaida [facility] that patient is active with them for RN.  Face to face entered as patient will benefit from home PT (patient in agreement).    Patient stated she would like [facility] at discharge- has taken them before and aware this is an OOP cost.    PLAN: MT home [facility] via [facility] pending med  clear    / to remain available for support and/or discharge planning.     Sue Vargas MSW, LSW [id number]

## 2024-10-02 VITALS
TEMPERATURE: 99 F | OXYGEN SATURATION: 95 % | HEART RATE: 90 BPM | RESPIRATION RATE: 18 BRPM | DIASTOLIC BLOOD PRESSURE: 73 MMHG | WEIGHT: 167.81 LBS | SYSTOLIC BLOOD PRESSURE: 157 MMHG | BODY MASS INDEX: 31 KG/M2

## 2024-10-02 LAB
ALBUMIN SERPL-MCNC: 3.2 G/DL (ref 3.2–4.8)
ALBUMIN/GLOB SERPL: 1.2 {RATIO} (ref 1–2)
ALP LIVER SERPL-CCNC: 117 U/L
ALT SERPL-CCNC: 8 U/L
ANION GAP SERPL CALC-SCNC: 9 MMOL/L (ref 0–18)
AST SERPL-CCNC: 12 U/L (ref ?–34)
BILIRUB SERPL-MCNC: 0.3 MG/DL (ref 0.2–1.1)
BUN BLD-MCNC: 10 MG/DL (ref 9–23)
BUN/CREAT SERPL: 7.9 (ref 10–20)
CALCIUM BLD-MCNC: 8.5 MG/DL (ref 8.7–10.4)
CHLORIDE SERPL-SCNC: 111 MMOL/L (ref 98–112)
CO2 SERPL-SCNC: 17 MMOL/L (ref 21–32)
CREAT BLD-MCNC: 1.27 MG/DL
DEPRECATED RDW RBC AUTO: 44.8 FL (ref 35.1–46.3)
EGFRCR SERPLBLD CKD-EPI 2021: 44 ML/MIN/1.73M2 (ref 60–?)
ERYTHROCYTE [DISTWIDTH] IN BLOOD BY AUTOMATED COUNT: 13.3 % (ref 11–15)
GLOBULIN PLAS-MCNC: 2.6 G/DL (ref 2–3.5)
GLUCOSE BLD-MCNC: 100 MG/DL (ref 70–99)
HCT VFR BLD AUTO: 32.5 %
HGB BLD-MCNC: 10.6 G/DL
MAGNESIUM SERPL-MCNC: 1.7 MG/DL (ref 1.6–2.6)
MCH RBC QN AUTO: 29.8 PG (ref 26–34)
MCHC RBC AUTO-ENTMCNC: 32.6 G/DL (ref 31–37)
MCV RBC AUTO: 91.3 FL
OSMOLALITY SERPL CALC.SUM OF ELEC: 283 MOSM/KG (ref 275–295)
PLATELET # BLD AUTO: 211 10(3)UL (ref 150–450)
POTASSIUM SERPL-SCNC: 4.4 MMOL/L (ref 3.5–5.1)
PROT SERPL-MCNC: 5.8 G/DL (ref 5.7–8.2)
RBC # BLD AUTO: 3.56 X10(6)UL
SODIUM SERPL-SCNC: 137 MMOL/L (ref 136–145)
WBC # BLD AUTO: 8.1 X10(3) UL (ref 4–11)

## 2024-10-02 PROCEDURE — 85027 COMPLETE CBC AUTOMATED: CPT | Performed by: HOSPITALIST

## 2024-10-02 PROCEDURE — 83735 ASSAY OF MAGNESIUM: CPT | Performed by: HOSPITALIST

## 2024-10-02 PROCEDURE — 80053 COMPREHEN METABOLIC PANEL: CPT | Performed by: HOSPITALIST

## 2024-10-02 RX ORDER — SPIRONOLACTONE 25 MG/1
12.5 TABLET ORAL DAILY
Qty: 15 TABLET | Refills: 0 | Status: SHIPPED | OUTPATIENT
Start: 2024-10-07 | End: 2024-11-06

## 2024-10-02 RX ORDER — MAGNESIUM OXIDE 400 MG/1
400 TABLET ORAL ONCE
Status: COMPLETED | OUTPATIENT
Start: 2024-10-02 | End: 2024-10-02

## 2024-10-02 NOTE — DISCHARGE INSTRUCTIONS
Sometimes managing your health at home requires assistance.  The [facility] team has recognized your preference to use [facility].  They can be reached at [phone] . A representative from the home health agency will contact you or your family to schedule your first visit.

## 2024-10-02 NOTE — PLAN OF CARE
Fall precautions in place. Call light within reach. Patient care needs addressed.    Problem: Patient Centered Care  Goal: Patient preferences are identified and integrated in the patient's plan of care  Description: Interventions:  - Provide timely, complete, and accurate information to patient/family  - Incorporate patient and family knowledge, values, beliefs, and cultural backgrounds into the planning and delivery of care  - Encourage patient/family to participate in care and decision-making at the level they choose  - Honor patient and family perspectives and choices  Outcome: Progressing      Problem: GASTROINTESTINAL - ADULT  Goal: Maintains or returns to baseline bowel function  Description: INTERVENTIONS:  - Assess bowel function  - Maintain adequate hydration with IV or PO as ordered and tolerated  - Evaluate effectiveness of GI medications  - Encourage mobilization and activity  - Obtain nutritional consult as needed  - Establish a toileting routine/schedule  - Consider collaborating with pharmacy to review patient's medication profile  Outcome: Progressing     Problem: SKIN/TISSUE INTEGRITY - ADULT  Goal: Skin integrity remains intact  Description: INTERVENTIONS  - Assess and document risk factors for pressure ulcer development  - Assess and document skin integrity  - Monitor for areas of redness and/or skin breakdown  - Initiate interventions, skin care algorithm/standards of care as needed  Outcome: Progressing     Problem: MUSCULOSKELETAL - ADULT  Goal: Return mobility to safest level of function  Description: INTERVENTIONS:  - Assess patient stability and activity tolerance for standing, transferring and ambulating w/ or w/o assistive devices  - Assist with transfers and ambulation using safe patient handling equipment as needed  - Ensure adequate protection for wounds/incisions during mobilization  - Obtain PT/OT consults as needed  - Advance activity as appropriate  - Communicate ordered activity  level and limitations with patient/family  Outcome: Progressing  Goal: Maintain proper alignment of affected body part  Description: INTERVENTIONS:  - Support and protect limb and body alignment per provider's orders  - Instruct and reinforce with patient and family use of appropriate assistive device and precautions (e.g. spinal or hip dislocation precautions)  Outcome: Progressing

## 2024-10-02 NOTE — PLAN OF CARE
Problem: GASTROINTESTINAL - ADULT  Goal: Maintains or returns to baseline bowel function  Description: INTERVENTIONS:  - Assess bowel function  - Maintain adequate hydration with IV or PO as ordered and tolerated  - Evaluate effectiveness of GI medications  - Encourage mobilization and activity  - Obtain nutritional consult as needed  - Establish a toileting routine/schedule  - Consider collaborating with pharmacy to review patient's medication profile  Outcome: Progressing     Problem: SKIN/TISSUE INTEGRITY - ADULT  Goal: Skin integrity remains intact  Description: INTERVENTIONS  - Assess and document risk factors for pressure ulcer development  - Assess and document skin integrity  - Monitor for areas of redness and/or skin breakdown  - Initiate interventions, skin care algorithm/standards of care as needed  Outcome: Progressing     Problem: MUSCULOSKELETAL - ADULT  Goal: Return mobility to safest level of function  Description: INTERVENTIONS:  - Assess patient stability and activity tolerance for standing, transferring and ambulating w/ or w/o assistive devices  - Assist with transfers and ambulation using safe patient handling equipment as needed  - Ensure adequate protection for wounds/incisions during mobilization  - Obtain PT/OT consults as needed  - Advance activity as appropriate  - Communicate ordered activity level and limitations with patient/family  Outcome: Progressing  Goal: Maintain proper alignment of affected body part  Description: INTERVENTIONS:  - Support and protect limb and body alignment per provider's orders  - Instruct and reinforce with patient and family use of appropriate assistive device and precautions (e.g. spinal or hip dislocation precautions)  Outcome: Progressing

## 2024-10-02 NOTE — PLAN OF CARE
Patient is AxOx4,on RA, no complain of pain, IV infusing, vitals done, all needs completed in the room, call light within reach. Got an discharge order, removed IV line, gathered all belongings, gave discharge papers to patient with all questions answered in the room. Went home via [facility].    Problem: Patient Centered Care  Goal: Patient preferences are identified and integrated in the patient's plan of care  Description: Interventions:  - What would you like us to know as we care for you? Home  - Provide timely, complete, and accurate information to patient/family  - Incorporate patient and family knowledge, values, beliefs, and cultural backgrounds into the planning and delivery of care  - Encourage patient/family to participate in care and decision-making at the level they choose  - Honor patient and family perspectives and choices  Outcome: Adequate for Discharge     Problem: Patient/Family Goals  Goal: Patient/Family Long Term Goal  Description: Patient's Long Term Goal: Back to home    Interventions:  - Follow healthcare team, treatment plan  -Monitor labs, Vitals & dianostics test  -Pain management.  - Diet recommendation.   -Partipate in therapy.  -Discharge planning    - See additional Care Plan goals for specific interventions  Outcome: Adequate for Discharge  Goal: Patient/Family Short Term Goal  Description: Patient's Short Term Goal: Get better    Interventions:   - Monitor labs, vitals and diagnostic tests.  -Pain management, pharmacological interventions  -Diet recommendations  -Adequate oral intake     - See additional Care Plan goals for specific interventions  Outcome: Adequate for Discharge     Problem: GASTROINTESTINAL - ADULT  Goal: Maintains or returns to baseline bowel function  Description: INTERVENTIONS:  - Assess bowel function  - Maintain adequate hydration with IV or PO as ordered and tolerated  - Evaluate effectiveness of GI medications  - Encourage mobilization and activity  - Obtain  nutritional consult as needed  - Establish a toileting routine/schedule  - Consider collaborating with pharmacy to review patient's medication profile  Outcome: Adequate for Discharge     Problem: SKIN/TISSUE INTEGRITY - ADULT  Goal: Skin integrity remains intact  Description: INTERVENTIONS  - Assess and document risk factors for pressure ulcer development  - Assess and document skin integrity  - Monitor for areas of redness and/or skin breakdown  - Initiate interventions, skin care algorithm/standards of care as needed  Outcome: Adequate for Discharge     Problem: MUSCULOSKELETAL - ADULT  Goal: Return mobility to safest level of function  Description: INTERVENTIONS:  - Assess patient stability and activity tolerance for standing, transferring and ambulating w/ or w/o assistive devices  - Assist with transfers and ambulation using safe patient handling equipment as needed  - Ensure adequate protection for wounds/incisions during mobilization  - Obtain PT/OT consults as needed  - Advance activity as appropriate  - Communicate ordered activity level and limitations with patient/family  Outcome: Adequate for Discharge  Goal: Maintain proper alignment of affected body part  Description: INTERVENTIONS:  - Support and protect limb and body alignment per provider's orders  - Instruct and reinforce with patient and family use of appropriate assistive device and precautions (e.g. spinal or hip dislocation precautions)  Outcome: Adequate for Discharge

## 2024-10-02 NOTE — CM/SW NOTE
10/02/24 1000   Discharge disposition   Expected discharge disposition Home-Health   Post Acute Care Provider   ([facility])   Discharge transportation [facility]     MICHELLE confirmed with RN Fátima who stated pt is medically ready for discharge today.  Discharge order placed.    HH updates/signed orders attached via [facility] to [facility].  [facility]/ [facility] Messages.    MICHELLE confirmed that [facility] contact information added to AVS.    MICHELLE scheduled [facility] for 1:00 PM transport.  $50 fees- patient agreeable.    PLAN: DC home with [facility] via [facility] at 1:00 PM.  PCS completed.    Sue Vargas MSW, LSW [id number]

## 2024-10-02 NOTE — PROGRESS NOTES
progress note  name: Susan P Ruzicka  Room: 546/546-A  Date of admission: 9/28/2024    Primary care provider:   [unfilled]      SUBJECTIVE: seen and examined.   Diarrhea improving. Positive for orthostats, continue ivf, pt/ot to see. Hold diuretics per cardio.     Reason for Admission: acute kidney injury and diarrhea    History ofPresent Illness: 79-year-old female with a history of hyperlipidemia, hypertension, depression, peripheral neuropathy, and congestive heart failure, presented with severe diarrhea and was brought to the emergency department. She was found to have an elevated blood pressure of 180/100 mmHg, acute kidney injury likely secondary to diarrhea, and electrolyte imbalances. She was admitted for further evaluation and management, including IV fluids, continuation of home medications, and supportive care.      All Other ROS Negative except as mentioned in HPI    [unfilled]    Not on File     Past Medical History:    Congestive heart disease (HCC)    Diverticulitis    Esophageal reflux    Fibromyalgia    High blood pressure    Osteoarthrosis, unspecified whether generalized or localized, unspecified site    RLS (restless legs syndrome)    Unspecified essential hypertension       Past Surgical History:   Procedure Laterality Date    Cath drug eluting stent      Excis lumbar disk,one level      Removal gallbladder         No family history on file.    Social History     Socioeconomic History    Marital status:      Spouse name: Not on file    Number of children: Not on file    Years of education: Not on file    Highest education level: Not on file   Occupational History    Not on file   Tobacco Use    Smoking status: Former     Types: Cigarettes     Passive exposure: Past    Smokeless tobacco: Never   Substance and Sexual Activity    Alcohol use: No    Drug use: Not on file    Sexual activity: Not on file   Other Topics Concern    Not on file   Social History Narrative    Not on file      Social Determinants of Health     Financial Resource Strain: Low Risk  (4/26/2024)    Received from [facility]    Overall Financial Resource Strain (CARDIA)     Difficulty of Paying Living Expenses: Not hard at all   Food Insecurity: No Food Insecurity (5/8/2024)    Food Insecurity     Food Insecurity: Never true   Transportation Needs: No Transportation Needs (9/28/2024)    Transportation Needs     Lack of Transportation: No     Car Seat: Not on file   Physical Activity: Inactive (4/26/2024)    Received from [facility]    Physical Activity     On average, how many days per week do you engage in moderate to strenuous exercise (like a brisk walk)?: 0 days     On average, how many minutes do you engage in exercise at this level?: 0 min   Stress: No Stress Concern Present (4/26/2024)    Received from [facility]    [employer] [place] [employer] - Occupational Stress Questionnaire     Feeling of Stress : Not at all   Social Connections: Feeling Socially Integrated (8/26/2024)    Received from [facility]    OASIS : Social Isolation     Frequency of experiencing loneliness or isolation: Never   Housing Stability: Low Risk  (9/28/2024)    Housing Stability     Housing Instability: No     Housing Instability Emergency: Not on file     Crib or [place]in[place]: Not on file         OBJECTIVE:  Patient Vitals for the past 24 hrs:   BP Temp Temp src Pulse Resp SpO2 Weight   09/29/24 1000 160/84 97.7 °F (36.5 °C) Axillary 68 18 97 % --   09/29/24 0438 137/57 98.1 °F (36.7 °C) Oral 60 18 98 % --   09/28/24 2226 155/80 -- -- -- -- -- --   09/28/24 2104 -- -- -- -- -- -- 167 lb 12.8 oz (76.1 kg)   09/28/24 2101 (!) 171/75 100.1 °F (37.8 °C) Oral 69 18 97 % --   09/28/24 2030 (!) 162/79 -- -- 67 16 96 % --   09/28/24 1930 (!) 142/92 -- -- 67 15 99 % --   09/28/24 1900 -- -- -- 62 16 98 % --   09/28/24 1815 155/83 -- -- 67 14 -- --   09/28/24 1715 145/76 -- -- 71 16 -- --   09/28/24 1645 149/87 -- -- 76 14 98 %  --   09/28/24 1607 (!) 180/100 98.5 °F (36.9 °C) Temporal 95 18 97 % 160 lb (72.6 kg)       General: NAD  HEENT: NC/AT, MMM, OP clear with no exudates, PERRL.  Neck: Supple, No LAD  Heart: Normal s1/s2, no MRG  Chest: CTAB: NT/ND, soft, + Bs, no HSM  Ext: no peripheral edema.    Neuro: CN II through XII grossly intact.  No focal deficits     Diagnostic data:    CBC  Recent Labs   Lab 09/28/24 1612 09/30/24  0715 10/01/24  0528   WBC 14.0* 7.6 7.4   HGB 13.1 10.3* 10.1*   .0 214.0 194.0       CMP  Recent Labs   Lab 09/28/24 1612 09/29/24  1233 09/30/24  0715 10/01/24  0528     --  139 142   K 3.2* 4.7 4.2 4.7     --  109 112   CO2 20.0*  --  23.0 26.0   BUN 21  --  18 17   ALT  --   --  11 8*   AST  --   --  20 15   ALB  --   --  3.5 3.6       Coags:   Recent Labs   Lab 09/28/24 1612 09/30/24  0715 10/01/24  0528   .0 214.0 194.0         Urinalysis  Invalid input(s): \"COLURN\", \"CLARURNE\", \"PHURN\", \"PROTURN\", \"GLCSURN\", \"KTNSURN\", \"BILIRUURN\", \"BLOODURN\", \"NITRTURN\", \"UROBILIURN\", \"SPECGRVURN\", \"LEUKESTURN\", \"RBLCLURN\", \"WHTBLCLURN\", \"BACTUR\", \"MUCURN\", \"SQMIPICLURN\"    CardiacEnzymes  Invalid input(s): \"TROPONINT\"    Diabetes Studies  No results found for: \"GLUF\", \"MICROALBUR\", \"CREATININE\"    .      Imaging:  No results found.    ASSESSMENT:    Ms. Susan Ruzicka, a 79-year-old female with a history of hyperlipidemia, hypertension, depression, peripheral neuropathy, and congestive heart failure, presented with severe diarrhea and was brought to the emergency department. She was found to have an elevated blood pressure of 180/100 mmHg, acute kidney injury likely secondary to diarrhea, and electrolyte imbalances. She was admitted for further evaluation and management, including IV fluids, continuation of home medications, and supportive care.    PLAN:  Assessment and Plan:    1. Acute kidney injury (FARAZ):  - Likely secondary to severe diarrhea  - Plan: Start on IV fluids, monitor vital  signs, and repeat BMP as needed    2. Hypertension:  - Noted elevated blood pressure of 180/100 in the emergency department  - Plan: Continue home blood pressure medication, Carvedilol    3. Hyperlipidemia:  - Plan: Continue home medication, Traversagin    4. Gastroesophageal reflux disease (GERD):  - Plan: Continue home medication, Protonix    5. Depression/Anxiety:  - Plan: Continue home medications as prescribed    6. Congestive heart failure (CHF):  - Plan: Continue home medications, Trosemide and Spironolactone    7. Severe diarrhea:  - Plan: Monitor and manage symptoms, provide supportive care, and evaluate for possible causes    8. Peripheral neuropathy:  - No acute issues noted during this visit    9. Deep vein thrombosis (DVT) prophylaxis:  - Plan: Assess risk and provide appropriate prophylaxis as needed    Dizziness: orthostatic vitals ordered.     Disposition:  - Admit for further evaluation and management until medically stable    Follow-up:  - Schedule follow-up appointment with primary care provider or specialist as needed    [facility]

## 2024-10-03 NOTE — DISCHARGE SUMMARY
Discharge Summary     name: Susan P Ruzicka  Room: 546/546-A  Date of admission: 9/28/2024  Date of Discharge: 10/02/2024    Primary care provider:   [unfilled]      Reason for Admission: acute kidney injury and diarrhea    History ofPresent Illness: 79-year-old female with a history of hyperlipidemia, hypertension, depression, peripheral neuropathy, and congestive heart failure, presented with severe diarrhea and was brought to the emergency department. She was found to have an elevated blood pressure of 180/100 mmHg, acute kidney injury likely secondary to diarrhea, and electrolyte imbalances. She was admitted for further evaluation and management, including IV fluids, continuation of home medications, and supportive care.      All Other ROS Negative except as mentioned in HPI    [unfilled]    Not on File     Past Medical History:    Congestive heart disease (HCC)    Diverticulitis    Esophageal reflux    Fibromyalgia    High blood pressure    Osteoarthrosis, unspecified whether generalized or localized, unspecified site    RLS (restless legs syndrome)    Unspecified essential hypertension       Past Surgical History:   Procedure Laterality Date    Cath drug eluting stent      Excis lumbar disk,one level      Removal gallbladder         No family history on file.    Social History     Socioeconomic History    Marital status:      Spouse name: Not on file    Number of children: Not on file    Years of education: Not on file    Highest education level: Not on file   Occupational History    Not on file   Tobacco Use    Smoking status: Former     Types: Cigarettes     Passive exposure: Past    Smokeless tobacco: Never   Substance and Sexual Activity    Alcohol use: No    Drug use: Not on file    Sexual activity: Not on file   Other Topics Concern    Not on file   Social History Narrative    Not on file     Social Determinants of Health     Financial Resource Strain: Low Risk  (4/26/2024)    [facility]    Overall Financial Resource Strain (CARDIA)     Difficulty of Paying Living Expenses: Not hard at all   Food Insecurity: No Food Insecurity (5/8/2024)    Food Insecurity     Food Insecurity: Never true   Transportation Needs: No Transportation Needs (9/28/2024)    Transportation Needs     Lack of Transportation: No     Car Seat: Not on file   Physical Activity: Inactive (4/26/2024)    Received from [facility]    Physical Activity     On average, how many days per week do you engage in moderate to strenuous exercise (like a brisk walk)?: 0 days     On average, how many minutes do you engage in exercise at this level?: 0 min   Stress: No Stress Concern Present (4/26/2024)    Received from [facility]    [employer] - Occupational Stress Questionnaire     Feeling of Stress : Not at all   Social Connections: Feeling Socially Integrated (8/26/2024)    Received from [facility]    OASIS : Social Isolation     Frequency of experiencing loneliness or isolation: Never   Housing Stability: Low Risk  (9/28/2024)    Housing Stability     Housing Instability: No     Housing Instability Emergency: Not on file     Crib or Bassinette: Not on file         OBJECTIVE:  Patient Vitals for the past 24 hrs:   BP Temp Temp src Pulse Resp SpO2 Weight   09/29/24 1000 160/84 97.7 °F (36.5 °C) Axillary 68 18 97 % --   09/29/24 0438 137/57 98.1 °F (36.7 °C) Oral 60 18 98 % --   09/28/24 2226 155/80 -- -- -- -- -- --   09/28/24 2104 -- -- -- -- -- -- 167 lb 12.8 oz (76.1 kg)   09/28/24 2101 (!) 171/75 100.1 °F (37.8 °C) Oral 69 18 97 % --   09/28/24 2030 (!) 162/79 -- -- 67 16 96 % --   09/28/24 1930 (!) 142/92 -- -- 67 15 99 % --   09/28/24 1900 -- -- -- 62 16 98 % --   09/28/24 1815 155/83 -- -- 67 14 -- --   09/28/24 1715 145/76 -- -- 71 16 -- --   09/28/24 1645 149/87 -- -- 76 14 98 % --   09/28/24 1607 (!) 180/100 98.5 °F (36.9 °C) Temporal 95 18 97 % 160 lb (72.6 kg)       General:  NAD  HEENT: NC/AT, MMM, OP clear with no exudates, PERRL.  Neck: Supple, No LAD  Heart: Normal s1/s2, no MRG  Chest: CTAB: NT/ND, soft, + Bs, no HSM  Ext: no peripheral edema.    Neuro: CN II through XII grossly intact.  No focal deficits     Diagnostic data:    CBC  Recent Labs   Lab 09/28/24  1612 09/30/24  0715 10/01/24  0528 10/02/24  0818   WBC 14.0* 7.6 7.4 8.1   HGB 13.1 10.3* 10.1* 10.6*   .0 214.0 194.0 211.0       CMP  Recent Labs   Lab 09/28/24  1612 09/29/24  1233 09/30/24  0715 10/01/24  0528 10/02/24  0555     --  139 142 137   K 3.2* 4.7 4.2 4.7 4.4     --  109 112 111   CO2 20.0*  --  23.0 26.0 17.0*   BUN 21  --  18 17 10   ALT  --   --  11 8* 8*   AST  --   --  20 15 12   ALB  --   --  3.5 3.6 3.2       Coags:   Recent Labs   Lab 09/30/24  0715 10/01/24  0528 10/02/24  0818   .0 194.0 211.0         Urinalysis  Invalid input(s): \"COLURN\", \"CLARURNE\", \"PHURN\", \"PROTURN\", \"GLCSURN\", \"KTNSURN\", \"BILIRUURN\", \"BLOODURN\", \"NITRTURN\", \"UROBILIURN\", \"SPECGRVURN\", \"LEUKESTURN\", \"RBLCLURN\", \"WHTBLCLURN\", \"BACTUR\", \"MUCURN\", \"SQMIPICLURN\"    CardiacEnzymes  Invalid input(s): \"TROPONINT\"    Diabetes Studies  No results found for: \"GLUF\", \"MICROALBUR\", \"CREATININE\"    .      Imaging:  No results found.    Hospital Course:    Ms. Susan Ruzicka, a 79-year-old female with a history of hyperlipidemia, hypertension, depression, peripheral neuropathy, and congestive heart failure, presented with severe diarrhea and was brought to the emergency department. She was found to have an elevated blood pressure of 180/100 mmHg, acute kidney injury likely secondary to diarrhea, and electrolyte imbalances. She was admitted for further evaluation and management, including IV fluids, continuation of home medications, and supportive care.    1. Acute kidney injury (FARAZ):  - Likely secondary to severe diarrhea  - Plan: Start on IV fluids, monitor vital signs, and repeat BMP as needed    2. Hypertension:  -  Noted elevated blood pressure of 180/100 in the emergency department  - Plan: Continue home blood pressure medication, Carvedilol    3. Hyperlipidemia:  - Plan: Continue home medication, Traversagin    4. Gastroesophageal reflux disease (GERD):  - Plan: Continue home medication, Protonix    5. Depression/Anxiety:  - Plan: Continue home medications as prescribed    6. Congestive heart failure (CHF):  - Plan: Continue home medications, Trosemide and Spironolactone    7. Severe diarrhea:  - Plan: Monitor and manage symptoms, provide supportive care, and evaluate for possible causes    8. Peripheral neuropathy:  - No acute issues noted during this visit    9. Deep vein thrombosis (DVT) prophylaxis:  - Plan: Assess risk and provide appropriate prophylaxis as needed    Dizziness: orthostatic vitals ordered.     Disposition:  - Admit for further evaluation and management until medically stable    Follow-up:  - Schedule follow-up appointment with primary care provider or specialist as needed    Resume home healthcare    Stable for discharge    [facility]

## 2025-01-17 ENCOUNTER — LAB ENCOUNTER (OUTPATIENT)
Dept: LAB | Facility: HOSPITAL | Age: 80
End: 2025-01-17
Attending: INTERNAL MEDICINE
Payer: MEDICARE

## 2025-01-17 DIAGNOSIS — I10 ESSENTIAL HYPERTENSION, MALIGNANT: Primary | ICD-10-CM

## 2025-01-17 LAB
ALBUMIN SERPL-MCNC: 4.5 G/DL (ref 3.2–4.8)
ALBUMIN/GLOB SERPL: 1.6 {RATIO} (ref 1–2)
ALP LIVER SERPL-CCNC: 115 U/L
ALT SERPL-CCNC: <7 U/L
ANION GAP SERPL CALC-SCNC: 8 MMOL/L (ref 0–18)
AST SERPL-CCNC: 13 U/L (ref ?–34)
BASOPHILS # BLD AUTO: 0.08 X10(3) UL (ref 0–0.2)
BASOPHILS NFR BLD AUTO: 0.9 %
BILIRUB SERPL-MCNC: 0.4 MG/DL (ref 0.2–1.1)
BUN BLD-MCNC: 20 MG/DL (ref 9–23)
BUN/CREAT SERPL: 11.8 (ref 10–20)
CALCIUM BLD-MCNC: 9.5 MG/DL (ref 8.7–10.4)
CHLORIDE SERPL-SCNC: 102 MMOL/L (ref 98–112)
CHOLEST SERPL-MCNC: 178 MG/DL (ref ?–200)
CO2 SERPL-SCNC: 29 MMOL/L (ref 21–32)
CREAT BLD-MCNC: 1.69 MG/DL
DEPRECATED RDW RBC AUTO: 46.3 FL (ref 35.1–46.3)
EGFRCR SERPLBLD CKD-EPI 2021: 31 ML/MIN/1.73M2 (ref 60–?)
EOSINOPHIL # BLD AUTO: 0.15 X10(3) UL (ref 0–0.7)
EOSINOPHIL NFR BLD AUTO: 1.7 %
ERYTHROCYTE [DISTWIDTH] IN BLOOD BY AUTOMATED COUNT: 13.4 % (ref 11–15)
FASTING PATIENT LIPID ANSWER: NO
FASTING STATUS PATIENT QL REPORTED: NO
GLOBULIN PLAS-MCNC: 2.8 G/DL (ref 2–3.5)
GLUCOSE BLD-MCNC: 95 MG/DL (ref 70–99)
HCT VFR BLD AUTO: 34.1 %
HDLC SERPL-MCNC: 49 MG/DL (ref 40–59)
HGB BLD-MCNC: 10.9 G/DL
IMM GRANULOCYTES # BLD AUTO: 0.03 X10(3) UL (ref 0–1)
IMM GRANULOCYTES NFR BLD: 0.3 %
LDLC SERPL CALC-MCNC: 88 MG/DL (ref ?–100)
LYMPHOCYTES # BLD AUTO: 2.23 X10(3) UL (ref 1–4)
LYMPHOCYTES NFR BLD AUTO: 24.7 %
MCH RBC QN AUTO: 30.2 PG (ref 26–34)
MCHC RBC AUTO-ENTMCNC: 32 G/DL (ref 31–37)
MCV RBC AUTO: 94.5 FL
MONOCYTES # BLD AUTO: 0.66 X10(3) UL (ref 0.1–1)
MONOCYTES NFR BLD AUTO: 7.3 %
NEUTROPHILS # BLD AUTO: 5.89 X10 (3) UL (ref 1.5–7.7)
NEUTROPHILS # BLD AUTO: 5.89 X10(3) UL (ref 1.5–7.7)
NEUTROPHILS NFR BLD AUTO: 65.1 %
NONHDLC SERPL-MCNC: 129 MG/DL (ref ?–130)
OSMOLALITY SERPL CALC.SUM OF ELEC: 290 MOSM/KG (ref 275–295)
PLATELET # BLD AUTO: 379 10(3)UL (ref 150–450)
POTASSIUM SERPL-SCNC: 4.3 MMOL/L (ref 3.5–5.1)
PROT SERPL-MCNC: 7.3 G/DL (ref 5.7–8.2)
RBC # BLD AUTO: 3.61 X10(6)UL
SODIUM SERPL-SCNC: 139 MMOL/L (ref 136–145)
TRIGL SERPL-MCNC: 244 MG/DL (ref 30–149)
VLDLC SERPL CALC-MCNC: 40 MG/DL (ref 0–30)
WBC # BLD AUTO: 9 X10(3) UL (ref 4–11)

## 2025-01-17 PROCEDURE — 80061 LIPID PANEL: CPT

## 2025-01-17 PROCEDURE — 85025 COMPLETE CBC W/AUTO DIFF WBC: CPT

## 2025-01-17 PROCEDURE — 36415 COLL VENOUS BLD VENIPUNCTURE: CPT

## 2025-01-17 PROCEDURE — 80053 COMPREHEN METABOLIC PANEL: CPT

## 2025-02-13 ENCOUNTER — APPOINTMENT (OUTPATIENT)
Dept: CT IMAGING | Facility: HOSPITAL | Age: 80
End: 2025-02-13
Attending: EMERGENCY MEDICINE
Payer: MEDICARE

## 2025-02-13 ENCOUNTER — HOSPITAL ENCOUNTER (INPATIENT)
Facility: HOSPITAL | Age: 80
LOS: 3 days | Discharge: HOME | End: 2025-02-17
Attending: EMERGENCY MEDICINE | Admitting: HOSPITALIST
Payer: MEDICARE

## 2025-02-13 DIAGNOSIS — R11.2 NAUSEA VOMITING AND DIARRHEA: Primary | ICD-10-CM

## 2025-02-13 DIAGNOSIS — R19.7 NAUSEA VOMITING AND DIARRHEA: Primary | ICD-10-CM

## 2025-02-13 LAB
ALBUMIN SERPL-MCNC: 4.5 G/DL (ref 3.2–4.8)
ALP LIVER SERPL-CCNC: 102 U/L
ALT SERPL-CCNC: 9 U/L
ANION GAP SERPL CALC-SCNC: 13 MMOL/L (ref 0–18)
AST SERPL-CCNC: 23 U/L (ref ?–34)
BASOPHILS # BLD AUTO: 0.03 X10(3) UL (ref 0–0.2)
BASOPHILS NFR BLD AUTO: 0.3 %
BILIRUB DIRECT SERPL-MCNC: 0.2 MG/DL (ref ?–0.3)
BILIRUB SERPL-MCNC: 0.8 MG/DL (ref 0.2–1.1)
BUN BLD-MCNC: 17 MG/DL (ref 9–23)
BUN/CREAT SERPL: 13.2 (ref 10–20)
CALCIUM BLD-MCNC: 9.4 MG/DL (ref 8.7–10.4)
CHLORIDE SERPL-SCNC: 101 MMOL/L (ref 98–112)
CO2 SERPL-SCNC: 22 MMOL/L (ref 21–32)
CREAT BLD-MCNC: 1.29 MG/DL
DEPRECATED RDW RBC AUTO: 43.5 FL (ref 35.1–46.3)
EGFRCR SERPLBLD CKD-EPI 2021: 43 ML/MIN/1.73M2 (ref 60–?)
EOSINOPHIL # BLD AUTO: 0.03 X10(3) UL (ref 0–0.7)
EOSINOPHIL NFR BLD AUTO: 0.3 %
ERYTHROCYTE [DISTWIDTH] IN BLOOD BY AUTOMATED COUNT: 13.5 % (ref 11–15)
GLUCOSE BLD-MCNC: 104 MG/DL (ref 70–99)
HCT VFR BLD AUTO: 35.6 %
HGB BLD-MCNC: 12 G/DL
IMM GRANULOCYTES # BLD AUTO: 0.04 X10(3) UL (ref 0–1)
IMM GRANULOCYTES NFR BLD: 0.4 %
LIPASE SERPL-CCNC: 30 U/L (ref 12–53)
LYMPHOCYTES # BLD AUTO: 1.76 X10(3) UL (ref 1–4)
LYMPHOCYTES NFR BLD AUTO: 15.9 %
MCH RBC QN AUTO: 29.6 PG (ref 26–34)
MCHC RBC AUTO-ENTMCNC: 33.7 G/DL (ref 31–37)
MCV RBC AUTO: 87.7 FL
MONOCYTES # BLD AUTO: 0.45 X10(3) UL (ref 0.1–1)
MONOCYTES NFR BLD AUTO: 4.1 %
NEUTROPHILS # BLD AUTO: 8.73 X10 (3) UL (ref 1.5–7.7)
NEUTROPHILS # BLD AUTO: 8.73 X10(3) UL (ref 1.5–7.7)
NEUTROPHILS NFR BLD AUTO: 79 %
OSMOLALITY SERPL CALC.SUM OF ELEC: 284 MOSM/KG (ref 275–295)
PLATELET # BLD AUTO: 361 10(3)UL (ref 150–450)
POTASSIUM SERPL-SCNC: 4.5 MMOL/L (ref 3.5–5.1)
PROT SERPL-MCNC: 7.5 G/DL (ref 5.7–8.2)
RBC # BLD AUTO: 4.06 X10(6)UL
SODIUM SERPL-SCNC: 136 MMOL/L (ref 136–145)
WBC # BLD AUTO: 11 X10(3) UL (ref 4–11)

## 2025-02-13 PROCEDURE — 99222 1ST HOSP IP/OBS MODERATE 55: CPT | Performed by: HOSPITALIST

## 2025-02-13 PROCEDURE — 74177 CT ABD & PELVIS W/CONTRAST: CPT | Performed by: EMERGENCY MEDICINE

## 2025-02-13 RX ORDER — ISOSORBIDE DINITRATE 10 MG/1
10 TABLET ORAL 2 TIMES DAILY
Status: DISCONTINUED | OUTPATIENT
Start: 2025-02-13 | End: 2025-02-14

## 2025-02-13 RX ORDER — TEMAZEPAM 15 MG/1
15 CAPSULE ORAL NIGHTLY PRN
Status: DISCONTINUED | OUTPATIENT
Start: 2025-02-13 | End: 2025-02-17

## 2025-02-13 RX ORDER — METOCLOPRAMIDE HYDROCHLORIDE 5 MG/ML
5 INJECTION INTRAMUSCULAR; INTRAVENOUS EVERY 8 HOURS PRN
Status: DISCONTINUED | OUTPATIENT
Start: 2025-02-13 | End: 2025-02-17

## 2025-02-13 RX ORDER — ONDANSETRON 2 MG/ML
4 INJECTION INTRAMUSCULAR; INTRAVENOUS ONCE
Status: COMPLETED | OUTPATIENT
Start: 2025-02-13 | End: 2025-02-13

## 2025-02-13 RX ORDER — SODIUM CHLORIDE 9 MG/ML
INJECTION, SOLUTION INTRAVENOUS CONTINUOUS
Status: DISCONTINUED | OUTPATIENT
Start: 2025-02-13 | End: 2025-02-17

## 2025-02-13 RX ORDER — GABAPENTIN 300 MG/1
600 CAPSULE ORAL 2 TIMES DAILY
Status: DISCONTINUED | OUTPATIENT
Start: 2025-02-13 | End: 2025-02-17

## 2025-02-13 RX ORDER — ACETAMINOPHEN 500 MG
500 TABLET ORAL EVERY 4 HOURS PRN
Status: DISCONTINUED | OUTPATIENT
Start: 2025-02-13 | End: 2025-02-13

## 2025-02-13 RX ORDER — TORSEMIDE 20 MG/1
40 TABLET ORAL DAILY
Status: DISCONTINUED | OUTPATIENT
Start: 2025-02-14 | End: 2025-02-17

## 2025-02-13 RX ORDER — DICYCLOMINE HCL 20 MG
20 TABLET ORAL 4 TIMES DAILY PRN
Qty: 30 TABLET | Refills: 0 | Status: SHIPPED | OUTPATIENT
Start: 2025-02-13

## 2025-02-13 RX ORDER — TORSEMIDE 20 MG/1
40 TABLET ORAL DAILY
COMMUNITY

## 2025-02-13 RX ORDER — ACETAMINOPHEN 500 MG
500 TABLET ORAL EVERY 4 HOURS PRN
Status: DISCONTINUED | OUTPATIENT
Start: 2025-02-13 | End: 2025-02-17

## 2025-02-13 RX ORDER — TIZANIDINE 2 MG/1
2 TABLET ORAL
Status: DISCONTINUED | OUTPATIENT
Start: 2025-02-14 | End: 2025-02-17

## 2025-02-13 RX ORDER — CARVEDILOL 3.12 MG/1
3.12 TABLET ORAL 2 TIMES DAILY WITH MEALS
Status: DISCONTINUED | OUTPATIENT
Start: 2025-02-14 | End: 2025-02-14

## 2025-02-13 RX ORDER — ONDANSETRON 2 MG/ML
4 INJECTION INTRAMUSCULAR; INTRAVENOUS EVERY 6 HOURS PRN
Status: DISCONTINUED | OUTPATIENT
Start: 2025-02-13 | End: 2025-02-17

## 2025-02-13 RX ORDER — SPIRONOLACTONE 25 MG/1
25 TABLET ORAL DAILY
COMMUNITY

## 2025-02-13 RX ORDER — PANTOPRAZOLE SODIUM 40 MG/1
40 TABLET, DELAYED RELEASE ORAL
Status: DISCONTINUED | OUTPATIENT
Start: 2025-02-14 | End: 2025-02-17

## 2025-02-13 RX ORDER — BUPROPION HYDROCHLORIDE 150 MG/1
150 TABLET, EXTENDED RELEASE ORAL DAILY
Status: DISCONTINUED | OUTPATIENT
Start: 2025-02-14 | End: 2025-02-17

## 2025-02-13 RX ORDER — PROCHLORPERAZINE EDISYLATE 5 MG/ML
5 INJECTION INTRAMUSCULAR; INTRAVENOUS ONCE
Status: COMPLETED | OUTPATIENT
Start: 2025-02-13 | End: 2025-02-13

## 2025-02-13 RX ORDER — SPIRONOLACTONE 25 MG/1
25 TABLET ORAL DAILY
Status: DISCONTINUED | OUTPATIENT
Start: 2025-02-14 | End: 2025-02-14

## 2025-02-13 RX ORDER — MORPHINE SULFATE 2 MG/ML
1 INJECTION, SOLUTION INTRAMUSCULAR; INTRAVENOUS EVERY 4 HOURS PRN
Status: DISCONTINUED | OUTPATIENT
Start: 2025-02-13 | End: 2025-02-17

## 2025-02-13 RX ORDER — ATORVASTATIN CALCIUM 40 MG/1
40 TABLET, FILM COATED ORAL
Status: DISCONTINUED | OUTPATIENT
Start: 2025-02-14 | End: 2025-02-17

## 2025-02-13 RX ORDER — DAPAGLIFLOZIN 10 MG/1
10 TABLET, FILM COATED ORAL DAILY
COMMUNITY

## 2025-02-13 RX ORDER — ONDANSETRON 4 MG/1
4 TABLET, ORALLY DISINTEGRATING ORAL EVERY 4 HOURS PRN
Qty: 15 TABLET | Refills: 0 | Status: SHIPPED | OUTPATIENT
Start: 2025-02-13

## 2025-02-13 RX ORDER — ROPINIROLE 1 MG/1
3 TABLET, FILM COATED ORAL
Status: DISCONTINUED | OUTPATIENT
Start: 2025-02-13 | End: 2025-02-17

## 2025-02-13 NOTE — ED INITIAL ASSESSMENT (HPI)
Patient presents to ED with mid abd pain with NVD starting yesterday. Patient was on abx recently for a sore throat/ear infection

## 2025-02-14 ENCOUNTER — APPOINTMENT (OUTPATIENT)
Dept: CT IMAGING | Facility: HOSPITAL | Age: 80
End: 2025-02-14
Attending: STUDENT IN AN ORGANIZED HEALTH CARE EDUCATION/TRAINING PROGRAM
Payer: MEDICARE

## 2025-02-14 LAB
ADENOVIRUS PCR:: NOT DETECTED
ANION GAP SERPL CALC-SCNC: 8 MMOL/L (ref 0–18)
B PARAPERT DNA SPEC QL NAA+PROBE: NOT DETECTED
B PERT DNA SPEC QL NAA+PROBE: NOT DETECTED
BASOPHILS # BLD AUTO: 0.06 X10(3) UL (ref 0–0.2)
BASOPHILS NFR BLD AUTO: 0.8 %
BUN BLD-MCNC: 15 MG/DL (ref 9–23)
BUN/CREAT SERPL: 10.6 (ref 10–20)
C PNEUM DNA SPEC QL NAA+PROBE: NOT DETECTED
CALCIUM BLD-MCNC: 8.2 MG/DL (ref 8.7–10.4)
CHLORIDE SERPL-SCNC: 103 MMOL/L (ref 98–112)
CO2 SERPL-SCNC: 27 MMOL/L (ref 21–32)
CORONAVIRUS 229E PCR:: NOT DETECTED
CORONAVIRUS HKU1 PCR:: NOT DETECTED
CORONAVIRUS NL63 PCR:: NOT DETECTED
CORONAVIRUS OC43 PCR:: NOT DETECTED
CREAT BLD-MCNC: 1.42 MG/DL
DEPRECATED RDW RBC AUTO: 45 FL (ref 35.1–46.3)
EGFRCR SERPLBLD CKD-EPI 2021: 38 ML/MIN/1.73M2 (ref 60–?)
EOSINOPHIL # BLD AUTO: 0.17 X10(3) UL (ref 0–0.7)
EOSINOPHIL NFR BLD AUTO: 2.3 %
ERYTHROCYTE [DISTWIDTH] IN BLOOD BY AUTOMATED COUNT: 13.4 % (ref 11–15)
FLUAV RNA SPEC QL NAA+PROBE: NOT DETECTED
FLUBV RNA SPEC QL NAA+PROBE: NOT DETECTED
GLUCOSE BLD-MCNC: 110 MG/DL (ref 70–99)
HCT VFR BLD AUTO: 29.2 %
HGB BLD-MCNC: 9.6 G/DL
IMM GRANULOCYTES # BLD AUTO: 0.01 X10(3) UL (ref 0–1)
IMM GRANULOCYTES NFR BLD: 0.1 %
LYMPHOCYTES # BLD AUTO: 2.06 X10(3) UL (ref 1–4)
LYMPHOCYTES NFR BLD AUTO: 27.4 %
MCH RBC QN AUTO: 30.2 PG (ref 26–34)
MCHC RBC AUTO-ENTMCNC: 32.9 G/DL (ref 31–37)
MCV RBC AUTO: 91.8 FL
METAPNEUMOVIRUS PCR:: NOT DETECTED
MONOCYTES # BLD AUTO: 0.53 X10(3) UL (ref 0.1–1)
MONOCYTES NFR BLD AUTO: 7 %
MYCOPLASMA PNEUMONIA PCR:: NOT DETECTED
NEUTROPHILS # BLD AUTO: 4.69 X10 (3) UL (ref 1.5–7.7)
NEUTROPHILS # BLD AUTO: 4.69 X10(3) UL (ref 1.5–7.7)
NEUTROPHILS NFR BLD AUTO: 62.4 %
OSMOLALITY SERPL CALC.SUM OF ELEC: 287 MOSM/KG (ref 275–295)
PARAINFLUENZA 1 PCR:: NOT DETECTED
PARAINFLUENZA 2 PCR:: NOT DETECTED
PARAINFLUENZA 3 PCR:: NOT DETECTED
PARAINFLUENZA 4 PCR:: NOT DETECTED
PLATELET # BLD AUTO: 248 10(3)UL (ref 150–450)
POTASSIUM SERPL-SCNC: 3.7 MMOL/L (ref 3.5–5.1)
RBC # BLD AUTO: 3.18 X10(6)UL
RHINOVIRUS/ENTERO PCR:: NOT DETECTED
RSV RNA SPEC QL NAA+PROBE: NOT DETECTED
SARS-COV-2 RNA NPH QL NAA+NON-PROBE: NOT DETECTED
SODIUM SERPL-SCNC: 138 MMOL/L (ref 136–145)
WBC # BLD AUTO: 7.5 X10(3) UL (ref 4–11)

## 2025-02-14 PROCEDURE — 99233 SBSQ HOSP IP/OBS HIGH 50: CPT | Performed by: HOSPITALIST

## 2025-02-14 PROCEDURE — 71250 CT THORAX DX C-: CPT | Performed by: STUDENT IN AN ORGANIZED HEALTH CARE EDUCATION/TRAINING PROGRAM

## 2025-02-14 PROCEDURE — 99223 1ST HOSP IP/OBS HIGH 75: CPT | Performed by: STUDENT IN AN ORGANIZED HEALTH CARE EDUCATION/TRAINING PROGRAM

## 2025-02-14 RX ORDER — HEPARIN SODIUM 5000 [USP'U]/ML
5000 INJECTION, SOLUTION INTRAVENOUS; SUBCUTANEOUS EVERY 12 HOURS SCHEDULED
Status: DISCONTINUED | OUTPATIENT
Start: 2025-02-14 | End: 2025-02-17

## 2025-02-14 NOTE — H&P
[facility]    PATIENT'S NAME: RUZICKA, SUSAN P   ATTENDING PHYSICIAN: Giovanni Bailon MD   PATIENT ACCOUNT#:   [id number]    LOCATION:  [address] [place] [address]  MEDICAL RECORD #:   [id number]       YOB: 1945  ADMISSION DATE:       02/13/2025    HISTORY AND PHYSICAL EXAMINATION    CHIEF COMPLAINT:  Intractable nausea, vomiting, and diarrhea.    HISTORY OF PRESENT ILLNESS:  The patient is a 79-year-old  female who presented today to the emergency department for evaluation after she had multiple episodes of diarrhea at home with abdominal cramps and nausea and a few episodes of vomiting.  In the emergency room CBC showed white blood cell count of 11.1 with left shift.  Chemistry and liver function tests showed numbers at baseline.  GFR is 43.  Lipase still pending.  CT scan of the abdomen and pelvis showed large hiatal hernia which is a known chronic finding; no other acute findings noted on CT.  She had multiple rounds of medications and could not tolerate oral intake, and she will be admitted to the hospital for observation.    PAST MEDICAL HISTORY:  Heart failure, preserved ejection fraction, coronary artery disease, status post RCA PCI intervention, hypertension, hyperlipidemia, chronic kidney disease stage 3, anemia of chronic kidney disease, generalized osteoarthritis, osteoporosis, peripheral neuropathy, restless leg syndrome, chronic back pain.    PAST SURGICAL HISTORY:  Multiple lumbar laminectomy and fusions, spinal cord stimulator, two C-sections, hemicolectomy for diverticulitis.    MEDICATIONS:  Please see medication reconciliation list.     ALLERGIES:  No known drug allergies.    FAMILY HISTORY:  Positive for hypertension.    SOCIAL HISTORY:  No tobacco, alcohol, or drug use.  Lives at home by herself.  Usually independent for basic activities of daily living.     REVIEW OF SYSTEMS:  The patient reports she had a sinus infection a week ago and received a course of antibiotics,  finished on Monday, 3 days ago.  Today started having loose and watery bowel movements, a total of 4, and then she had intractable nausea and vomited once.  She called an ambulance and was brought into the emergency department for evaluation.  No fever or chills.  No sick contacts.  Other 12-point review of systems is negative.        PHYSICAL EXAMINATION:    GENERAL:  Alert and oriented to time, place, and person.  Mild distress.    VITAL SIGNS:  Temperature 98.1, pulse 82, respiratory rate 18, blood pressure 150/88, pulse ox 99% on room air.  HEENT:  Atraumatic.  Oropharynx clear.  Eyes:  Right eye cataract noted.  NECK:  Supple.  No lymphadenopathy.  Trachea midline.    LUNGS:  Clear to auscultation bilaterally.  Normal respiratory effort.    HEART:  Regular rate and rhythm.  S1 and S2 auscultated.  No murmur.  ABDOMEN:  Soft, nondistended.  No tenderness.  Positive bowel sounds.  EXTREMITIES:  No peripheral edema, clubbing, or cyanosis.  NEUROLOGIC:  Motor and sensory intact.       ASSESSMENT:    1.   Nausea, vomiting, diarrhea; possible gastroenteritis, rule out C difficile infection considering recent antibiotic use.  2.   Anxiety disorder.  3.   Hypertension.    PLAN:  The patient will be admitted to general medical floor.  Gentle hydration.  Hold diuretics.  Nausea control.  Gastroenterology consult.  Stool for C. difficile.  Further recommendations to follow.      Dictated By Tarsha Yanes MD  d: 02/13/2025 19:48:40  t: 02/14/2025 08:39:24  Job [id number]  FB/

## 2025-02-14 NOTE — PLAN OF CARE
Problem: Patient Centered Care  Goal: Patient preferences are identified and integrated in the patient's plan of care  Description: Interventions:  - What would you like us to know as we care for you? From home alone  - Provide timely, complete, and accurate information to patient/family  - Incorporate patient and family knowledge, values, beliefs, and cultural backgrounds into the planning and delivery of care  - Encourage patient/family to participate in care and decision-making at the level they choose  - Honor patient and family perspectives and choices  Outcome: Progressing     Problem: Patient/Family Goals  Goal: Patient/Family Long Term Goal  Description: Patient's Long Term Goal: Discharge home    Interventions:  - - Monitor vital signs  - Monitor appropriate labs  - Pain management  - Administer medications per order  - Follow MD orders  - Diagnostics per order  - Update / inform patient and family on plan of care  - Discharge planning     - See additional Care Plan goals for specific interventions  Outcome: Progressing  Goal: Patient/Family Short Term Goal  Description: Patient's Short Term Goal: Pain management    Interventions:   - Frequent pain assessments  -Pain medication as needed  - See additional Care Plan goals for specific interventions  Outcome: Progressing     Problem: GASTROINTESTINAL - ADULT  Goal: Minimal or absence of nausea and vomiting  Description: INTERVENTIONS:  - Maintain adequate hydration with IV or PO as ordered and tolerated  - Nasogastric tube to low intermittent suction as ordered  - Evaluate effectiveness of ordered antiemetic medications  - Provide nonpharmacologic comfort measures as appropriate  - Advance diet as tolerated, if ordered  - Obtain nutritional consult as needed  - Evaluate fluid balance  Outcome: Progressing  Goal: Maintains or returns to baseline bowel function  Description: INTERVENTIONS:  - Assess bowel function  - Maintain adequate hydration with IV or PO  as ordered and tolerated  - Evaluate effectiveness of GI medications  - Encourage mobilization and activity  - Obtain nutritional consult as needed  - Establish a toileting routine/schedule  - Consider collaborating with pharmacy to review patient's medication profile  Outcome: Progressing

## 2025-02-14 NOTE — CONSULTS
Is this a shared or split note between Advanced Practice Provider and Physician? Yes      [facility]   Gastroenterology Consultation Note    Susan P Ruzicka  Patient Status:    Observation  Date of Admission:         2/13/2025, Hospital day #0  Attending:   Giovanni Bailon MD  PCP:     ROLY KAUR    Reason for Consultation:  Nausea, Vomiting, Diarrhea    History of Present Illness:  Susan P Ruzicka is a 79 year old female w/ PMHx of BMI 29.10, HFpEF, CAD s/p RCA PCI stent, HTN, HLD, CKD 3, Anemia of Chronic disease, Generalized OA, Osteoporosis, Peripheral neuropathy, Restless leg syndrome, Chronic back pain who presented to the ED with nausea, vomiting and diarrhea.      Pt reports she had a sinus infection treated with ABX 1 week ago, completed course on Monday.  She notes that she was exposed to Covid on Sunday at a baby shower.  She started to feel unwell Wednesday with cough, sore throat and chills.  She started to have watery diarrhea yesterday along with nausea and vomiting x1 with nonbloody emesis.  She denies ABD pain, dyspepsia, difficult/painful swallowing, black/bloody stools, constipation, fever, chills, chest pain and unintentional weight loss.  She denies recent travel outside the [place] and suspicious foods.  No NSAIDs.  Complaining of mild SOB.    Pertinent Family Hx:  - No known history of esophageal, gastric or colon cancers  - No known IBD  - No known liver pathologies    Endoscopy Hx:  - Last colonoscopy 2022 with Dr. Kwong for CRC screening with 5 year recall  - Last EGD 2022 with Dr. Kwong,  but no treatment given no symptoms    Social Hx:  - No tobacco use  - No ETOH  - Denies cannabis/illicit drug use  - Denies NSAIDs  - Lives alone     History:  Past Medical History:    Congestive heart disease (HCC)    Diverticulitis    Esophageal reflux    Fibromyalgia    High blood pressure    Osteoarthrosis, unspecified whether generalized or localized, unspecified site    RLS  (restless legs syndrome)    Unspecified essential hypertension     Past Surgical History:   Procedure Laterality Date    Cath drug eluting stent      Excis lumbar disk,one level      Removal gallbladder       No family history on file.   reports that she has quit smoking. Her smoking use included cigarettes. She has been exposed to tobacco smoke. She has never used smokeless tobacco. She reports that she does not drink alcohol.    Allergies:  Allergies[1]    Medications:    Current Facility-Administered Medications:     guaiFENesin (Robitussin) 100 MG/5 ML oral liquid 200 mg, 200 mg, Oral, Q4H PRN    sodium chloride 0.9% infusion, , Intravenous, Continuous    ondansetron (Zofran) 4 MG/2ML injection 4 mg, 4 mg, Intravenous, Q6H PRN    metoclopramide (Reglan) 5 mg/mL injection 5 mg, 5 mg, Intravenous, Q8H PRN    temazepam (Restoril) cap 15 mg, 15 mg, Oral, Nightly PRN    atorvastatin (Lipitor) tab 40 mg, 40 mg, Oral, Daily with dinner    buPROPion SR (WELLBUTRIN SR) 12 hr tab 150 mg, 150 mg, Oral, Daily    carvedilol (Coreg) tab 3.125 mg, 3.125 mg, Oral, BID with meals    gabapentin (Neurontin) cap 600 mg, 600 mg, Oral, BID    isosorbide dinitrate (Isordil) tab 10 mg, 10 mg, Oral, BID    pantoprazole (Protonix) DR tab 40 mg, 40 mg, Oral, BID AC    rOPINIRole (Requip) tab 3 mg, 3 mg, Oral, After dinner    spironolactone (Aldactone) tab 25 mg, 25 mg, Oral, Daily    tiZANidine (Zanaflex) tab 2 mg, 2 mg, Oral, Daily with dinner    torsemide (Demadex) tab 40 mg, 40 mg, Oral, Daily    morphINE PF 2 MG/ML injection 1 mg, 1 mg, Intravenous, Q4H PRN    acetaminophen (Tylenol Extra Strength) tab 500 mg, 500 mg, Oral, Q4H PRN    Review of Systems:   CONSTITUTIONAL:  negative for +fevers, +chills, unintentional weight loss   EYES:  negative for diplopia or change in vision   RESPIRATORY:  negative for severe shortness of breath  CARDIOVASCULAR:  negative for crushing sub-sternal chest pain  GASTROINTESTINAL:  see  HPI  GENITOURINARY:  negative for dysuria or gross hematuria  INTEGUMENT/BREAST:  SKIN:  negative for jaundice or new rash   ALLERGIC/IMMUNOLOGIC:  negative for hay fever   ENDOCRINE:  negative for cold intolerance and heat intolerance  MUSCULOSKELETAL:  negative for joint effusion/severe erythema  NEURO: negative for new loss of consciousness or dizziness   BEHAVIOR/PSYCH:  negative for psychotic behavior    Physical Exam:    Blood pressure 119/74, pulse 67, temperature 98.9 °F (37.2 °C), temperature source Oral, resp. rate 18, weight 159 lb 1.6 oz (72.2 kg), SpO2 94%. Body mass index is 29.1 kg/m².    Gen: awake, alert patient, NAD  HEENT: EOMI, the sclera appears anicteric, oropharynx clear, mucus membranes appear moist  CV: RRR  Lung: no conversational dyspnea   Abdomen: soft NTND abdomen with NABS appreciated   Back: No CVA tenderness   Skin: dry, warm, no jaundice  Ext: no LE edema is evident  Neuro: Alert, oriented x3 and interactive  Psych: calm, cooperative    Laboratory Data:  Lab Results   Component Value Date    WBC 7.5 02/14/2025    HGB 9.6 02/14/2025    HCT 29.2 02/14/2025    .0 02/14/2025    CREATSERUM 1.42 02/14/2025    BUN 15 02/14/2025     02/14/2025    K 3.7 02/14/2025     02/14/2025    CO2 27.0 02/14/2025     02/14/2025    CA 8.2 02/14/2025    ALB 4.5 02/13/2025    ALKPHO 102 02/13/2025    BILT 0.8 02/13/2025    TP 7.5 02/13/2025    AST 23 02/13/2025    ALT 9 02/13/2025    LIP 30 02/13/2025       Imaging:  No results found.    Assessment & Plan   Susan P Ruzicka is a 79 year old female w/ PMHx of BMI 29.10, HFpEF, CAD s/p RCA PCI stent, HTN, HLD, CKD 3, Anemia of Chronic disease, Generalized OA, Osteoporosis, Peripheral neuropathy, Restless leg syndrome, Chronic back pain who presented to the ED with nausea, vomiting and diarrhea.      #Nausea  #Vomiting  #Diarrhea  -Pt endorses 1 day of N/V/D with inability to tolerate PO intake and so came to the ED via EMS.  She  started to feel ill Wednesday with fever, chills, sore throat and cough with known exposure to Covid19 on Sunday at gathering.  -Labs on admission Cr 1.29 in setting of CKD, normal LFTs and lipase, no anemia or leukocytosis  -CT A/P with large hiatal hernia containing entire stomach, uncomplicated colonic diverticulosis, bowel resection in the LLQ, bowel unobstructed,   -Last EGD 2022 with Dr. Kwong with reportedly no significant findings other than hiatal hernia she has known about for >10 years, asymptomatic  -Etiology possible Covid19 vs infectious gastroenteritis vs GOO with known large HH  -No recurrence of N/V/D today, which is reassuring.  She is tolerating clear liquids without issue.  No fever, chills but persistent cough, runny nose and sore throat.  -Plan for conservative management at this time with empiric PPI, IVF, prn antiemetics, stool studies if diarrhea recurs and consider Covid/respiratory panel given symptoms and exposure.  Currently, Pt does not wish to undergo endoscopic evaluation given acute respiratory symptoms and mild SOB.    Recommend:  -Clear liquid diet, Advance as tolerated  -Empiric PPI  -Antiemetics per primary  -Stool studies including C. Diff if diarrhea recurs  -Consider Covid/RR PCR per primary, appreciate recs  -Endoscopic evaluation pending clinical course    Thank you for the opportunity to participate in the care of this patient.    Case discussed with Td Corral MD and Charis REHMAN.    [facility]nd[facility], [facility] Gastroenterology  2/14/2025          [1] Not on File

## 2025-02-14 NOTE — PROGRESS NOTES
[facility]  part of [facility]    Progress Note    Susan P Ruzicka Patient Status:  Observation    1945 MRN [id number]   Location [facility] 5SW/SE Attending Giovanni Bailon MD   Hosp Day # 0 PCP ROLY KAUR     Chief Complaint:     Nausea Vomiting Diarrhea    Subjective:   Subjective:    Patient seen and examined  Hypotensive  Afebrile  No new complaints at this time        Objective:   Blood pressure (!) 87/53, pulse 60, temperature 97.9 °F (36.6 °C), temperature source Axillary, resp. rate 18, weight 159 lb 1.6 oz (72.2 kg), SpO2 95%.  Physical Exam    General: Patient is alert and oriented x3 does not appear to be in acute distress at this time  HEENT: EOMI PERRLA, atraumatic normocephalic  Cardiac: S1-S2 appreciated  Lungs: Good air entry bilaterally clear to auscultation  Abdomen: Soft nontender nondistended positive bowel sounds  Ext: Peripheral pulses are positive  Neuro: No focal deficits noted  Psych: Normal mood  Skin: No rashes noted  MSK: Full range of motion intact      Results:   Lab Results   Component Value Date    WBC 7.5 2025    HGB 9.6 (L) 2025    HCT 29.2 (L) 2025    .0 2025    CREATSERUM 1.42 (H) 2025    BUN 15 2025     2025    K 3.7 2025     2025    CO2 27.0 2025     (H) 2025    CA 8.2 (L) 2025    ALB 4.5 2025    ALKPHO 102 2025    BILT 0.8 2025    TP 7.5 2025    AST 23 2025    ALT 9 (L) 2025    PTT 70.1 (H) 05/10/2024    TSH 3.320 2023    LIP 30 2025    DDIMER 1.02 (H) 2023    MG 1.7 10/02/2024    PHOS 3.1 2024    TROP <0.045 10/01/2020    TROPHS 1,407 () 2024       No results found.        Assessment & Plan:       Nausea, vomiting, diarrhea; possible gastroenteritis, rule out C difficile infection considering recent antibiotic use.  -appreciate GI recs  -continue IV antiemetics  -She  was also complaining of sore throat had a recent contact with a person with COVID-19  -Continue empiric PPI IVF  -After for diarrhea and if it recurs patient will need to have stool studies  -Continue monitor closely    Anxiety disorder.  -Patient currently stable  -To monitor    Hypertension.  -Currently she is hypotensive  -Hold antihypertensive medications  -Continue IV fluids    Acute renal failure on CKD stage III  -hold aldactone.     VTE ppx : SCD boots    Global A/P  -holding antihypertensives  -start VTE ppx.  -Respiratory panel.   -Reviewed previous consultant notes  -Reviewed CBC, BMP, Mag, and Phos  -Reviewed tests ordered  -Repeat labs in am  -MDM: High, severe exacerbation of chronic illness posing a threat to life. IV medications requiring close inpatient monitoring.             Giovanni Bailon MD

## 2025-02-14 NOTE — PLAN OF CARE
Problem: Patient Centered Care  Goal: Patient preferences are identified and integrated in the patient's plan of care  Description: Interventions:  - What would you like us to know as we care for you? Home alone.  - Provide timely, complete, and accurate information to patient/family  - Incorporate patient and family knowledge, values, beliefs, and cultural backgrounds into the planning and delivery of care  - Encourage patient/family to participate in care and decision-making at the level they choose  - Honor patient and family perspectives and choices  Outcome: Progressing     Problem: GASTROINTESTINAL - ADULT  Goal: Minimal or absence of nausea and vomiting  Description: INTERVENTIONS:  - Maintain adequate hydration with IV or PO as ordered and tolerated  - Nasogastric tube to low intermittent suction as ordered  - Evaluate effectiveness of ordered antiemetic medications  - Provide nonpharmacologic comfort measures as appropriate  - Advance diet as tolerated, if ordered  - Obtain nutritional consult as needed  - Evaluate fluid balance  Outcome: Progressing  Goal: Maintains or returns to baseline bowel function  Description: INTERVENTIONS:  - Assess bowel function  - Maintain adequate hydration with IV or PO as ordered and tolerated  - Evaluate effectiveness of GI medications  - Encourage mobilization and activity  - Obtain nutritional consult as needed  - Establish a toileting routine/schedule  - Consider collaborating with pharmacy to review patient's medication profile  Outcome: Progressing   Patient A/O x4. MD stopped coreg, aldactone, isosorbide, patient B/P was low at midday. No nausea, vomiting or diarrhea. Patient was swabbed for respiratory panel and covid-pending results.

## 2025-02-14 NOTE — ED QUICK NOTES
Orders for admission, patient is aware of plan and ready to go upstairs. Any questions, please call ED RN lachelle at extension [phone].     Patient Covid vaccination status: Fully vaccinated     COVID Test Ordered in ED: None    COVID Suspicion at Admission: N/A    Running Infusions:  None    Mental Status/LOC at time of transport: alert    Other pertinent information:   CIWA score: N/A   NIH score:  N/A

## 2025-02-14 NOTE — ED PROVIDER NOTES
Patient Seen in: [facility] Emergency Department    History     Chief Complaint   Patient presents with    Nausea/Vomiting/Diarrhea       HPI    The patient presents to the ED complaining of intermittent abdominal cramps starting yesterday along with nausea and 2 episodes of vomiting and 4 episodes of diarrhea.  She states no fevers or chills or other complaints.  She was recently on antibiotics for a sore throat/ear infection.    History reviewed.   Past Medical History:    Congestive heart disease (HCC)    Diverticulitis    Esophageal reflux    Fibromyalgia    High blood pressure    Osteoarthrosis, unspecified whether generalized or localized, unspecified site    RLS (restless legs syndrome)    Unspecified essential hypertension       History reviewed.   Past Surgical History:   Procedure Laterality Date    Cath drug eluting stent      Excis lumbar disk,one level      Removal gallbladder           Medications :  Prescriptions Prior to Admission[1]     No family history on file.    Smoking Status:   Social History     Socioeconomic History    Marital status:    Tobacco Use    Smoking status: Former     Types: Cigarettes     Passive exposure: Past    Smokeless tobacco: Never   Substance and Sexual Activity    Alcohol use: No       Constitutional and vital signs reviewed.      Social History and Family History elements reviewed from today, pertinent positives to the presenting problem noted.    Physical Exam     ED Triage Vitals [02/13/25 1400]   /84   Pulse 89   Resp 18   Temp 98.1 °F (36.7 °C)   Temp src Oral   SpO2 99 %   O2 Device None (Room air)       All measures to prevent infection transmission during my interaction with the patient were taken. Handwashing was performed prior to and after the exam.  Stethoscope and any equipment used during my examination was cleaned with super sani-cloth germicidal wipes following the exam.     Physical Exam  Vitals and nursing note reviewed.    Constitutional:       General: She is not in acute distress.     Appearance: She is well-developed. She is not ill-appearing or toxic-appearing.   HENT:      Head: Normocephalic and atraumatic.   Eyes:      General:         Right eye: No discharge.         Left eye: No discharge.      Conjunctiva/sclera: Conjunctivae normal.   Neck:      Trachea: No tracheal deviation.   Cardiovascular:      Rate and Rhythm: Normal rate.   Pulmonary:      Effort: Pulmonary effort is normal. No respiratory distress.      Breath sounds: No stridor.   Abdominal:      General: There is no distension.      Palpations: Abdomen is soft.      Tenderness: There is no abdominal tenderness. There is no guarding or rebound.   Musculoskeletal:         General: No deformity.   Skin:     General: Skin is warm and dry.   Neurological:      Mental Status: She is alert and oriented to person, place, and time.   Psychiatric:         Mood and Affect: Mood normal.         Behavior: Behavior normal.         ED Course        Labs Reviewed   BASIC METABOLIC PANEL (8) - Abnormal; Notable for the following components:       Result Value    Glucose 104 (*)     Creatinine 1.29 (*)     eGFR-Cr 43 (*)     All other components within normal limits   CBC WITH DIFFERENTIAL WITH PLATELET - Abnormal; Notable for the following components:    Neutrophil Absolute Prelim 8.73 (*)     Neutrophil Absolute 8.73 (*)     All other components within normal limits   HEPATIC FUNCTION PANEL (7) - Abnormal; Notable for the following components:    ALT 9 (*)     All other components within normal limits   LIPASE - Normal       As Interpreted by me    Imaging Results Available and Reviewed while in ED: Impression:      Partly imaged large hiatal hernia containing the entire stomach      Uncomplicated colonic diverticulosis      Calcified pancreatitis   Finalized by (CST): Rommel Wells MD on 2/13/2025 at 7:24 PM   ED Medications Administered:   Medications   ondansetron (Zofran) 4  MG/2ML injection 4 mg (4 mg Intravenous Given 2/13/25 1603)   sodium chloride 0.9 % IV bolus 1,000 mL (0 mL Intravenous Stopped 2/13/25 1902)   ondansetron (Zofran) 4 MG/2ML injection 4 mg (4 mg Intravenous Given 2/13/25 1719)   prochlorperazine (Compazine) 10 MG/2ML injection 5 mg (5 mg Intravenous Given 2/13/25 1752)   iopamidol 76% (ISOVUE-370) injection for power injector (80 mL Intravenous Given 2/13/25 1859)         MDM     Vitals:    02/13/25 1400 02/13/25 1724 02/13/25 1956   BP: 144/84 150/88 146/70   Pulse: 89 82 80   Resp: 18 18 18   Temp: 98.1 °F (36.7 °C)     TempSrc: Oral     SpO2: 99% 99%      *I personally reviewed and interpreted all ED vitals.    Pulse Ox: 99%, Room air, Normal     Monitor Interpretation:   normal sinus rhythm as interpreted by me.  The cardiac monitor was ordered to monitor heart rate.    Differential Diagnosis/ Diagnostic Considerations: Viral syndrome, dehydration, other    Complicating Factors: The patient already has does not have any pertinent problems on file. to contribute to the complexity of this ED evaluation.    Medical Decision Making  Patient presents to the ED with nausea vomiting diarrhea starting yesterday.  Nondistressed on examination.  Abdomen benign.  Laboratory testing without concerning findings.  Patient complaining of ongoing nausea despite Zofran and Compazine in the ED.  CT obtained which shows no acute injuries.  Patient complains of ongoing nausea and does not want to go home given her nausea.  Daughter concerned about the patient's ongoing nausea and also requests that she be admitted for nausea control.  I discussed with Dr. Yanes for admission and with gastroenterology for consultation.    Problems Addressed:  Nausea vomiting and diarrhea: acute illness or injury    Amount and/or Complexity of Data Reviewed  Independent Historian: parent     Details: Daughter provides history details  Labs: ordered. Decision-making details documented in ED  Course.  Radiology: ordered and independent interpretation performed. Decision-making details documented in ED Course.     Details: I personally viewed the patient's CT abdomen pelvis and noted no SBO  Discussion of management or test interpretation with external provider(s): I discussed with Dr. Yanes for admission        Condition upon leaving the department: Stable    Disposition and Plan     Clinical Impression:  1. Nausea vomiting and diarrhea        Disposition:  Admit    Follow-up:  Whit Waters  [address]  [address]  [facility] [address]  [phone]    Schedule an appointment as soon as possible for a visit in 3 day(s)        Medications Prescribed:  Current Discharge Medication List        START taking these medications    Details   ondansetron 4 MG Oral Tablet Dispersible Take 1 tablet (4 mg total) by mouth every 4 (four) hours as needed for Nausea.  Qty: 15 tablet, Refills: 0      dicyclomine 20 MG Oral Tab Take 1 tablet (20 mg total) by mouth 4 (four) times daily as needed (Abdominal pain).  Qty: 30 tablet, Refills: 0             Hospital Problems       Present on Admission             ICD-10-CM Noted POA    * (Principal) Nausea vomiting and diarrhea R11.2, R19.7 2/13/2025 Unknown                       [1] (Not in a hospital admission)

## 2025-02-15 LAB
ADENOVIRUS F 40/41 PCR: NEGATIVE
ANION GAP SERPL CALC-SCNC: 10 MMOL/L (ref 0–18)
ASTROVIRUS PCR: NEGATIVE
BASOPHILS # BLD AUTO: 0.05 X10(3) UL (ref 0–0.2)
BASOPHILS NFR BLD AUTO: 0.8 %
BUN BLD-MCNC: 11 MG/DL (ref 9–23)
BUN/CREAT SERPL: 8 (ref 10–20)
C CAYETANENSIS DNA SPEC QL NAA+PROBE: NEGATIVE
CALCIUM BLD-MCNC: 7.7 MG/DL (ref 8.7–10.4)
CAMPY SP DNA.DIARRHEA STL QL NAA+PROBE: NEGATIVE
CHLORIDE SERPL-SCNC: 104 MMOL/L (ref 98–112)
CO2 SERPL-SCNC: 24 MMOL/L (ref 21–32)
CREAT BLD-MCNC: 1.37 MG/DL
CRYPTOSP DNA SPEC QL NAA+PROBE: NEGATIVE
DEPRECATED RDW RBC AUTO: 45.1 FL (ref 35.1–46.3)
EAEC PAA PLAS AGGR+AATA ST NAA+NON-PRB: NEGATIVE
EC STX1+STX2 + H7 FLIC SPEC NAA+PROBE: NEGATIVE
EGFRCR SERPLBLD CKD-EPI 2021: 40 ML/MIN/1.73M2 (ref 60–?)
ENTAMOEBA HISTOLYTICA PCR: NEGATIVE
EOSINOPHIL # BLD AUTO: 0.28 X10(3) UL (ref 0–0.7)
EOSINOPHIL NFR BLD AUTO: 4.5 %
EPEC EAE GENE STL QL NAA+NON-PROBE: NEGATIVE
ERYTHROCYTE [DISTWIDTH] IN BLOOD BY AUTOMATED COUNT: 13.7 % (ref 11–15)
ETEC LTA+ST1A+ST1B TOX ST NAA+NON-PROBE: NEGATIVE
GIARDIA LAMBLIA PCR: NEGATIVE
GLUCOSE BLD-MCNC: 102 MG/DL (ref 70–99)
HCT VFR BLD AUTO: 28.2 %
HGB BLD-MCNC: 9.1 G/DL
IMM GRANULOCYTES # BLD AUTO: 0.02 X10(3) UL (ref 0–1)
IMM GRANULOCYTES NFR BLD: 0.3 %
LACTATE SERPL-SCNC: 1.2 MMOL/L (ref 0.5–2)
LYMPHOCYTES # BLD AUTO: 1.74 X10(3) UL (ref 1–4)
LYMPHOCYTES NFR BLD AUTO: 28.2 %
MCH RBC QN AUTO: 29.3 PG (ref 26–34)
MCHC RBC AUTO-ENTMCNC: 32.3 G/DL (ref 31–37)
MCV RBC AUTO: 90.7 FL
MONOCYTES # BLD AUTO: 0.46 X10(3) UL (ref 0.1–1)
MONOCYTES NFR BLD AUTO: 7.4 %
NEUTROPHILS # BLD AUTO: 3.63 X10 (3) UL (ref 1.5–7.7)
NEUTROPHILS # BLD AUTO: 3.63 X10(3) UL (ref 1.5–7.7)
NEUTROPHILS NFR BLD AUTO: 58.8 %
NOROVIRUS GI/GII PCR: NEGATIVE
OSMOLALITY SERPL CALC.SUM OF ELEC: 286 MOSM/KG (ref 275–295)
P SHIGELLOIDES DNA STL QL NAA+PROBE: NEGATIVE
PHOSPHATE SERPL-MCNC: 3.8 MG/DL (ref 2.4–5.1)
PLATELET # BLD AUTO: 224 10(3)UL (ref 150–450)
POTASSIUM SERPL-SCNC: 3.3 MMOL/L (ref 3.5–5.1)
PROCALCITONIN SERPL-MCNC: <0.04 NG/ML (ref ?–0.05)
RBC # BLD AUTO: 3.11 X10(6)UL
ROTAVIRUS A PCR: NEGATIVE
SALMONELLA DNA SPEC QL NAA+PROBE: NEGATIVE
SAPOVIRUS PCR: NEGATIVE
SHIGELLA SP+EIEC IPAH ST NAA+NON-PROBE: NEGATIVE
SODIUM SERPL-SCNC: 138 MMOL/L (ref 136–145)
V CHOLERAE DNA SPEC QL NAA+PROBE: NEGATIVE
VIBRIO DNA SPEC NAA+PROBE: NEGATIVE
WBC # BLD AUTO: 6.2 X10(3) UL (ref 4–11)
YERSINIA DNA SPEC NAA+PROBE: NEGATIVE

## 2025-02-15 PROCEDURE — 99233 SBSQ HOSP IP/OBS HIGH 50: CPT | Performed by: STUDENT IN AN ORGANIZED HEALTH CARE EDUCATION/TRAINING PROGRAM

## 2025-02-15 PROCEDURE — 99233 SBSQ HOSP IP/OBS HIGH 50: CPT | Performed by: HOSPITALIST

## 2025-02-15 RX ORDER — POTASSIUM CHLORIDE 1500 MG/1
40 TABLET, EXTENDED RELEASE ORAL ONCE
Status: COMPLETED | OUTPATIENT
Start: 2025-02-15 | End: 2025-02-15

## 2025-02-15 NOTE — PLAN OF CARE
Problem: Patient Centered Care  Goal: Patient preferences are identified and integrated in the patient's plan of care  Description: Interventions:  - What would you like us to know as we care for you? Home alone.  - Provide timely, complete, and accurate information to patient/family  - Incorporate patient and family knowledge, values, beliefs, and cultural backgrounds into the planning and delivery of care  - Encourage patient/family to participate in care and decision-making at the level they choose  - Honor patient and family perspectives and choices  Outcome: Progressing     Problem: GASTROINTESTINAL - ADULT  Goal: Minimal or absence of nausea and vomiting  Description: INTERVENTIONS:  - Maintain adequate hydration with IV or PO as ordered and tolerated  - Nasogastric tube to low intermittent suction as ordered  - Evaluate effectiveness of ordered antiemetic medications  - Provide nonpharmacologic comfort measures as appropriate  - Advance diet as tolerated, if ordered  - Obtain nutritional consult as needed  - Evaluate fluid balance  Outcome: Progressing  Goal: Maintains or returns to baseline bowel function  Description: INTERVENTIONS:  - Assess bowel function  - Maintain adequate hydration with IV or PO as ordered and tolerated  - Evaluate effectiveness of GI medications  - Encourage mobilization and activity  - Obtain nutritional consult as needed  - Establish a toileting routine/schedule  - Consider collaborating with pharmacy to review patient's medication profile  Outcome: Progressing     Problem: CARDIOVASCULAR - ADULT  Goal: Maintains optimal cardiac output and hemodynamic stability  Description: INTERVENTIONS:  - Monitor vital signs, rhythm, and trends  - Monitor for bleeding, hypotension and signs of decreased cardiac output  - Evaluate effectiveness of vasoactive medications to optimize hemodynamic stability  - Monitor arterial and/or venous puncture sites for bleeding and/or hematoma  - Assess  quality of pulses, skin color and temperature  - Assess for signs of decreased coronary artery perfusion - ex. Angina  - Evaluate fluid balance, assess for edema, trend weights  Outcome: Progressing  Goal: Absence of cardiac arrhythmias or at baseline  Description: INTERVENTIONS:  - Continuous cardiac monitoring, monitor vital signs, obtain 12 lead EKG if indicated  - Evaluate effectiveness of antiarrhythmic and heart rate control medications as ordered  - Initiate emergency measures for life threatening arrhythmias  - Monitor electrolytes and administer replacement therapy as ordered  Outcome: Progressing   Patient is A/O x4. Vital signs stable. No complain of nausea or vomiting. Diet advanced to full liquid and may be advanced as tolerated. Patient continues on zosyn, CXR showed LLL opacity.

## 2025-02-15 NOTE — PROGRESS NOTES
[facility]  part of [facility]    Progress Note    Susan P Ruzicka Patient Status:  Observation    1945 MRN [id number]   Location [facility] 5SW/SE Attending Giovanni Bailon MD   Hosp Day # 1 PCP ROLY KAUR     Chief Complaint:     Nausea Vomiting Diarrhea    Subjective:   Subjective:    Patient seen and examined  Hypotensive overnight  This morning blood pressure has improved  Afebrile  No new complaints at this time        Objective:   Blood pressure 109/62, pulse 58, temperature 98.4 °F (36.9 °C), temperature source Oral, resp. rate 16, weight 159 lb 1.6 oz (72.2 kg), SpO2 91%.  Physical Exam    General: Patient is alert and oriented x3 does not appear to be in acute distress at this time  HEENT: EOMI PERRLA, atraumatic normocephalic  Cardiac: S1-S2 appreciated  Lungs: Good air entry bilaterally clear to auscultation  Abdomen: Soft nontender nondistended positive bowel sounds  Ext: Peripheral pulses are positive  Neuro: No focal deficits noted  Psych: Normal mood  Skin: No rashes noted  MSK: Full range of motion intact      Results:   Lab Results   Component Value Date    WBC 6.2 02/15/2025    HGB 9.1 (L) 02/15/2025    HCT 28.2 (L) 02/15/2025    .0 02/15/2025    CREATSERUM 1.37 (H) 02/15/2025    BUN 11 02/15/2025     02/15/2025    K 3.3 (L) 02/15/2025     02/15/2025    CO2 24.0 02/15/2025     (H) 02/15/2025    CA 7.7 (L) 02/15/2025    ALB 4.5 2025    ALKPHO 102 2025    BILT 0.8 2025    TP 7.5 2025    AST 23 2025    ALT 9 (L) 2025    PTT 70.1 (H) 05/10/2024    TSH 3.320 2023    LIP 30 2025    DDIMER 1.02 (H) 2023    MG 1.7 10/02/2024    PHOS 3.8 02/15/2025    TROP <0.045 10/01/2020    TROPHS 1,407 () 2024       CT CHEST ([id number])    Result Date: 2025  CONCLUSION:  1. Large mixed sliding and paraesophageal type hiatal hernia.  Partial herniation of distal transverse colon  through the hiatal hernia defect on prior chest CT from January, 2023 is no longer seen.  The hernia otherwise appears unchanged since that exam.   There are no associated findings of gastric outlet obstruction. 2. Wedge-shaped opacities in the superior segment left lower lobe are new since prior chest CT.  Findings are favored to represent atelectasis or less likely relate to a low-grade infectious process such as aspiration pneumonia. 3. Scattered subcentimeter bilateral pulmonary nodules are largely unchanged since prior chest CT.  Small size and stability suggest benign etiology. 4. Multi-vessel coronary artery and mitral annular calcifications. 5. Cholecystectomy. 6. Partially imaged right upper abdominal spinal stimulator. 7. Lesser incidental findings as above.   elm-remote  Dictated by (CST): Steve Redmond MD on 2/14/2025 at 9:16 PM     Finalized by (CST): Steve Redmond MD on 2/14/2025 at 9:26 PM               Assessment & Plan:       Nausea, vomiting, diarrhea; possible gastroenteritis, rule out C difficile infection considering recent antibiotic use.  -appreciate GI recs  -continue IV antiemetics  -She was also complaining of sore throat had a recent contact with a person with COVID-19  -Continue empiric PPI IVF  -After for diarrhea and if it recurs patient will need to have stool studies  -Continue monitor closely    Anxiety disorder.  -Patient currently stable  -To monitor    Hypertension.  -Currently she is hypotensive  -Hold antihypertensive medications  -Continue IV fluids    Acute renal failure on CKD stage III  -hold aldactone.     VTE ppx : SCD boots    Global A/P  -holding antihypertensives  -CT chest slightly suggestive of possible aspiration pneumonia -lactic acid within normal limits procalcitonin within normal limits patient started on empiric antibiotic therapy with Zosyn this morning patient's blood pressure has improved  -Renal failure is improving  -Hypokalemia electrolyte replacement  protocol  -Anemia patient's hemoglobin has dropped from 12-9.1 we will continue to monitor there probably as a component of dilution  -Respiratory panel -negative  -Reviewed previous consultant notes  -Reviewed CBC, BMP, Mag, and Phos  -Reviewed tests ordered  -Repeat labs in am  -MDM: High, severe exacerbation of chronic illness posing a threat to life. IV medications requiring close inpatient monitoring.             Giovanni Bailon MD

## 2025-02-15 NOTE — PLAN OF CARE
Problem: Patient Centered Care  Goal: Patient preferences are identified and integrated in the patient's plan of care  Description: Interventions:  - What would you like us to know as we care for you? From home alone  - Provide timely, complete, and accurate information to patient/family  - Incorporate patient and family knowledge, values, beliefs, and cultural backgrounds into the planning and delivery of care  - Encourage patient/family to participate in care and decision-making at the level they choose  - Honor patient and family perspectives and choices  Outcome: Progressing     Problem: Patient/Family Goals  Goal: Patient/Family Long Term Goal  Description: Patient's Long Term Goal: Discharge home    Interventions:  - - Monitor vital signs  - Monitor appropriate labs  - Pain management  - Administer medications per order  - Follow MD orders  - Diagnostics per order  - Update / inform patient and family on plan of care  - Discharge planning     - See additional Care Plan goals for specific interventions  Outcome: Progressing  Goal: Patient/Family Short Term Goal  Description: Patient's Short Term Goal: Stop coughing    Interventions:   - As needed cough suppressants  -  - See additional Care Plan goals for specific interventions  Outcome: Progressing     Problem: GASTROINTESTINAL - ADULT  Goal: Minimal or absence of nausea and vomiting  Description: INTERVENTIONS:  - Maintain adequate hydration with IV or PO as ordered and tolerated  - Nasogastric tube to low intermittent suction as ordered  - Evaluate effectiveness of ordered antiemetic medications  - Provide nonpharmacologic comfort measures as appropriate  - Advance diet as tolerated, if ordered  - Obtain nutritional consult as needed  - Evaluate fluid balance  Outcome: Progressing  Goal: Maintains or returns to baseline bowel function  Description: INTERVENTIONS:  - Assess bowel function  - Maintain adequate hydration with IV or PO as ordered and  tolerated  - Evaluate effectiveness of GI medications  - Encourage mobilization and activity  - Obtain nutritional consult as needed  - Establish a toileting routine/schedule  - Consider collaborating with pharmacy to review patient's medication profile  Outcome: Progressing     Problem: CARDIOVASCULAR - ADULT  Goal: Maintains optimal cardiac output and hemodynamic stability  Description: INTERVENTIONS:  - Monitor vital signs, rhythm, and trends  - Monitor for bleeding, hypotension and signs of decreased cardiac output  - Evaluate effectiveness of vasoactive medications to optimize hemodynamic stability  - Monitor arterial and/or venous puncture sites for bleeding and/or hematoma  - Assess quality of pulses, skin color and temperature  - Assess for signs of decreased coronary artery perfusion - ex. Angina  - Evaluate fluid balance, assess for edema, trend weights  Outcome: Progressing  Goal: Absence of cardiac arrhythmias or at baseline  Description: INTERVENTIONS:  - Continuous cardiac monitoring, monitor vital signs, obtain 12 lead EKG if indicated  - Evaluate effectiveness of antiarrhythmic and heart rate control medications as ordered  - Initiate emergency measures for life threatening arrhythmias  - Monitor electrolytes and administer replacement therapy as ordered  Outcome: Progressing

## 2025-02-15 NOTE — PROGRESS NOTES
[facility]     Gastroenterology Progress Note    Susan P Ruzicka Patient Status:  Inpatient    1945 MRN [id number]   Location [facility] 5SW/SE Attending Giovanni Bailon MD   Hosp Day # 1 PCP ROLY KAUR       Subjective:   Complaining of sore throat and cough today. Denies abdominal pain, nausea, vomiting or diarrhea.    Tolerating diet without issues.    Objective:   Blood pressure 122/60, pulse 58, temperature 99.6 °F (37.6 °C), temperature source Oral, resp. rate 16, weight 159 lb 1.6 oz (72.2 kg), SpO2 94%. Body mass index is 29.1 kg/m².    Gen- Patient appears comfortable and in no acute discomfort  HEENT: the sclera appears anicteric, oropharynx clear, mucus membranes appear moist  CV- regular rate and rhythm, the extremities are warm and well perfused   Lung- Moves air well; No labored breathing  Abdomen- soft, non-tender exam in all quadrants without rigidity or guarding, non-distended, no abnormal bowel sounds noted, no masses are palpated  Skin- No jaundice  Ext: no cyanosis, clubbing or edema is evident.   Neuro- Alert and interactive, and gross movements of extremities normal    Results:     Lab Results   Component Value Date    WBC 6.2 02/15/2025    HGB 9.1 (L) 02/15/2025    HCT 28.2 (L) 02/15/2025    .0 02/15/2025    CREATSERUM 1.37 (H) 02/15/2025    BUN 11 02/15/2025     02/15/2025    K 3.3 (L) 02/15/2025     02/15/2025    CO2 24.0 02/15/2025     (H) 02/15/2025    CA 7.7 (L) 02/15/2025    ALB 4.5 2025    ALKPHO 102 2025    BILT 0.8 2025    TP 7.5 2025    AST 23 2025    ALT 9 (L) 2025    PTT 70.1 (H) 05/10/2024    TSH 3.320 2023    LIP 30 2025    DDIMER 1.02 (H) 2023    MG 1.7 10/02/2024    PHOS 3.8 02/15/2025    TROP <0.045 10/01/2020       CT CHEST ([id number])    Result Date: 2025  CONCLUSION:  1. Large mixed sliding and paraesophageal type hiatal hernia.  Partial  herniation of distal transverse colon through the hiatal hernia defect on prior chest CT from January, 2023 is no longer seen.  The hernia otherwise appears unchanged since that exam.   There are no associated findings of gastric outlet obstruction. 2. Wedge-shaped opacities in the superior segment left lower lobe are new since prior chest CT.  Findings are favored to represent atelectasis or less likely relate to a low-grade infectious process such as aspiration pneumonia. 3. Scattered subcentimeter bilateral pulmonary nodules are largely unchanged since prior chest CT.  Small size and stability suggest benign etiology. 4. Multi-vessel coronary artery and mitral annular calcifications. 5. Cholecystectomy. 6. Partially imaged right upper abdominal spinal stimulator. 7. Lesser incidental findings as above.   elm-remote  Dictated by (CST): Steve Redmond MD on 2/14/2025 at 9:16 PM     Finalized by (CST): Steve Redmond MD on 2/14/2025 at 9:26 PM               Assessment and Plan:   79 year old woman with hx of HFpEF, CAD s/p PCI, CKD, chronic back pain, large hiatal vs paraesophgeal hernia presenting with sore throat, cough, nausea, diarrhea.     CT C/A/P reviewed, there is a large mixed, sliding/paraesophageal type hernia in the chest cavity. There is NO evidence of volvulus/obstruction.    Since admission, the patient's symptoms of abdominal pain, nausea, vomiting, diarrhea have resolved. She is tolerating a diet without issues. May have had viral gastroenteritis.    Biggest complaint is sore throat/cough.    Given resolution of symptoms, can continue on diet without further evaluation indicated at this time.    Suspect slight drop in hemoglobin is related to IVFs/dehydration upon arrival, she is having brown stool/no overt GI bleeding.    Td Corral MD  [facility]

## 2025-02-16 LAB
BASOPHILS # BLD AUTO: 0.05 X10(3) UL (ref 0–0.2)
BASOPHILS NFR BLD AUTO: 0.8 %
C DIFF TOX B STL QL: NEGATIVE
DEPRECATED RDW RBC AUTO: 45.7 FL (ref 35.1–46.3)
EOSINOPHIL # BLD AUTO: 0.36 X10(3) UL (ref 0–0.7)
EOSINOPHIL NFR BLD AUTO: 5.5 %
ERYTHROCYTE [DISTWIDTH] IN BLOOD BY AUTOMATED COUNT: 13.4 % (ref 11–15)
HCT VFR BLD AUTO: 29.8 %
HGB BLD-MCNC: 9.7 G/DL
IMM GRANULOCYTES # BLD AUTO: 0.02 X10(3) UL (ref 0–1)
IMM GRANULOCYTES NFR BLD: 0.3 %
LYMPHOCYTES # BLD AUTO: 1.95 X10(3) UL (ref 1–4)
LYMPHOCYTES NFR BLD AUTO: 29.6 %
MAGNESIUM SERPL-MCNC: 1.1 MG/DL (ref 1.6–2.6)
MCH RBC QN AUTO: 30.2 PG (ref 26–34)
MCHC RBC AUTO-ENTMCNC: 32.6 G/DL (ref 31–37)
MCV RBC AUTO: 92.8 FL
MONOCYTES # BLD AUTO: 0.45 X10(3) UL (ref 0.1–1)
MONOCYTES NFR BLD AUTO: 6.8 %
NEUTROPHILS # BLD AUTO: 3.75 X10 (3) UL (ref 1.5–7.7)
NEUTROPHILS # BLD AUTO: 3.75 X10(3) UL (ref 1.5–7.7)
NEUTROPHILS NFR BLD AUTO: 57 %
PHOSPHATE SERPL-MCNC: 4.1 MG/DL (ref 2.4–5.1)
PLATELET # BLD AUTO: 210 10(3)UL (ref 150–450)
POTASSIUM SERPL-SCNC: 3.7 MMOL/L (ref 3.5–5.1)
RBC # BLD AUTO: 3.21 X10(6)UL
WBC # BLD AUTO: 6.6 X10(3) UL (ref 4–11)

## 2025-02-16 PROCEDURE — 99233 SBSQ HOSP IP/OBS HIGH 50: CPT | Performed by: HOSPITALIST

## 2025-02-16 PROCEDURE — 99232 SBSQ HOSP IP/OBS MODERATE 35: CPT | Performed by: STUDENT IN AN ORGANIZED HEALTH CARE EDUCATION/TRAINING PROGRAM

## 2025-02-16 NOTE — PROGRESS NOTES
[facility]  part of [facility]    Progress Note    Susan P Ruzicka Patient Status:  Observation    1945 MRN [id number]   Location [facility] 5SW/SE Attending Giovanni Bailon MD   Hosp Day # 2 PCP ROLY KAUR     Chief Complaint:     Nausea Vomiting Diarrhea    Subjective:   Subjective:    Patient seen and examined  Hypotensive overnight  This morning blood pressure has improved  Afebrile  No new complaints at this time  Nausea vomiting improved        Objective:   Blood pressure 146/72, pulse 66, temperature 98.6 °F (37 °C), temperature source Oral, resp. rate 16, weight 159 lb 1.6 oz (72.2 kg), SpO2 94%.  Physical Exam    General: Patient is alert and oriented x3 does not appear to be in acute distress at this time  HEENT: EOMI PERRLA, atraumatic normocephalic  Cardiac: S1-S2 appreciated  Lungs: Good air entry bilaterally clear to auscultation  Abdomen: Soft nontender nondistended positive bowel sounds  Ext: Peripheral pulses are positive  Neuro: No focal deficits noted  Psych: Normal mood  Skin: No rashes noted  MSK: Full range of motion intact      Results:   Lab Results   Component Value Date    WBC 6.6 2025    HGB 9.7 (L) 2025    HCT 29.8 (L) 2025    .0 2025    CREATSERUM 1.37 (H) 02/15/2025    BUN 11 02/15/2025     02/15/2025    K 3.7 2025     02/15/2025    CO2 24.0 02/15/2025     (H) 02/15/2025    CA 7.7 (L) 02/15/2025    ALB 4.5 2025    ALKPHO 102 2025    BILT 0.8 2025    TP 7.5 2025    AST 23 2025    ALT 9 (L) 2025    PTT 70.1 (H) 05/10/2024    TSH 3.320 2023    LIP 30 2025    DDIMER 1.02 (H) 2023    MG 1.1 (LL) 2025    PHOS 4.1 2025    TROP <0.045 10/01/2020    TROPHS 1,407 (HH) 2024       CT CHEST ([id number]=[id number])    Result Date: 2025  CONCLUSION:  1. Large mixed sliding and paraesophageal type hiatal hernia.  Partial herniation of  distal transverse colon through the hiatal hernia defect on prior chest CT from January, 2023 is no longer seen.  The hernia otherwise appears unchanged since that exam.   There are no associated findings of gastric outlet obstruction. 2. Wedge-shaped opacities in the superior segment left lower lobe are new since prior chest CT.  Findings are favored to represent atelectasis or less likely relate to a low-grade infectious process such as aspiration pneumonia. 3. Scattered subcentimeter bilateral pulmonary nodules are largely unchanged since prior chest CT.  Small size and stability suggest benign etiology. 4. Multi-vessel coronary artery and mitral annular calcifications. 5. Cholecystectomy. 6. Partially imaged right upper abdominal spinal stimulator. 7. Lesser incidental findings as above.   elm-remote  Dictated by (CST): Steve Redmond MD on 2/14/2025 at 9:16 PM     Finalized by (CST): Steve Redmond MD on 2/14/2025 at 9:26 PM               Assessment & Plan:       Nausea, vomiting, diarrhea; possible gastroenteritis, rule out C difficile infection considering recent antibiotic use.  -appreciate GI recs  -continue IV antiemetics  -She was also complaining of sore throat had a recent contact with a person with COVID-19  -Continue empiric PPI IVF  -After for diarrhea and if it recurs patient will need to have stool studies  -Continue monitor closely    Anxiety disorder.  -Patient currently stable  -To monitor    Hypertension.  -Currently she is hypotensive  -Hold antihypertensive medications  -Continue IV fluids    Acute renal failure on CKD stage III  -hold aldactone.     VTE ppx : SCD boots    Global A/P  -holding antihypertensives  -CT chest slightly suggestive of possible aspiration pneumonia -lactic acid within normal limits procalcitonin within normal limits patient started on empiric antibiotic therapy with Zosyn this morning patient's blood pressure has improved  -Appreciate recommendation of  gastroenterology cleared for discharge.  Patient has severe hypomagnesemia will replete at this time continue gentle hydration with 0.9 normal saline.  Patient was hypotensive overnight.  Monitor for 1 more day possible discharge tomorrow  -Renal failure is improving  -Hypokalemia electrolyte replacement protocol  --Respiratory panel -negative  -Reviewed previous consultant notes  -Reviewed CBC, BMP, Mag, and Phos  -Reviewed tests ordered  -Repeat labs in am  -MDM: High, severe exacerbation of chronic illness posing a threat to life. IV medications requiring close inpatient monitoring.             Giovanni Bailon MD

## 2025-02-16 NOTE — PLAN OF CARE
Problem: Patient Centered Care  Goal: Patient preferences are identified and integrated in the patient's plan of care  Description: Interventions:  - What would you like us to know as we care for you? Home alone.  - Provide timely, complete, and accurate information to patient/family  - Incorporate patient and family knowledge, values, beliefs, and cultural backgrounds into the planning and delivery of care  - Encourage patient/family to participate in care and decision-making at the level they choose  - Honor patient and family perspectives and choices  Outcome: Progressing     Problem: GASTROINTESTINAL - ADULT  Goal: Minimal or absence of nausea and vomiting  Description: INTERVENTIONS:  - Maintain adequate hydration with IV or PO as ordered and tolerated  - Nasogastric tube to low intermittent suction as ordered  - Evaluate effectiveness of ordered antiemetic medications  - Provide nonpharmacologic comfort measures as appropriate  - Advance diet as tolerated, if ordered  - Obtain nutritional consult as needed  - Evaluate fluid balance  Outcome: Progressing  Goal: Maintains or returns to baseline bowel function  Description: INTERVENTIONS:  - Assess bowel function  - Maintain adequate hydration with IV or PO as ordered and tolerated  - Evaluate effectiveness of GI medications  - Encourage mobilization and activity  - Obtain nutritional consult as needed  - Establish a toileting routine/schedule  - Consider collaborating with pharmacy to review patient's medication profile  Outcome: Progressing     Problem: CARDIOVASCULAR - ADULT  Goal: Maintains optimal cardiac output and hemodynamic stability  Description: INTERVENTIONS:  - Monitor vital signs, rhythm, and trends  - Monitor for bleeding, hypotension and signs of decreased cardiac output  - Evaluate effectiveness of vasoactive medications to optimize hemodynamic stability  - Monitor arterial and/or venous puncture sites for bleeding and/or hematoma  - Assess  quality of pulses, skin color and temperature  - Assess for signs of decreased coronary artery perfusion - ex. Angina  - Evaluate fluid balance, assess for edema, trend weights  Outcome: Progressing  Goal: Absence of cardiac arrhythmias or at baseline  Description: INTERVENTIONS:  - Continuous cardiac monitoring, monitor vital signs, obtain 12 lead EKG if indicated  - Evaluate effectiveness of antiarrhythmic and heart rate control medications as ordered  - Initiate emergency measures for life threatening arrhythmias  - Monitor electrolytes and administer replacement therapy as ordered  Outcome: Progressing   Patient A/O x4. Magnesium at 1.1 this AM, replaced with 4 GM mag rider. Labs in AM. Vital signs stable. She continues on zosyn. Labs in AM.

## 2025-02-16 NOTE — PROGRESS NOTES
[facility]     Gastroenterology Progress Note    Susan P Ruzicka Patient Status:  Inpatient    1945 MRN [id number]   Location [facility] 5SW/SE Attending Giovanni Bailon MD   Hosp Day # 2 PCP ROLY KAUR       Subjective:   Complaining of sore throat and cough today. Denies abdominal pain, nausea, vomiting or diarrhea.    Tolerating diet without issues.    Objective:   Blood pressure 146/72, pulse 66, temperature 98.6 °F (37 °C), temperature source Oral, resp. rate 16, weight 159 lb 1.6 oz (72.2 kg), SpO2 94%. Body mass index is 29.1 kg/m².    Gen- Patient appears comfortable and in no acute discomfort  HEENT: the sclera appears anicteric, oropharynx clear, mucus membranes appear moist  CV- regular rate and rhythm, the extremities are warm and well perfused   Lung- Moves air well; No labored breathing  Abdomen- soft, non-tender exam in all quadrants without rigidity or guarding, non-distended, no abnormal bowel sounds noted, no masses are palpated  Skin- No jaundice  Ext: no cyanosis, clubbing or edema is evident.   Neuro- Alert and interactive, and gross movements of extremities normal    Results:     Lab Results   Component Value Date    WBC 6.6 2025    HGB 9.7 (L) 2025    HCT 29.8 (L) 2025    .0 2025    CREATSERUM 1.37 (H) 02/15/2025    BUN 11 02/15/2025     02/15/2025    K 3.7 2025     02/15/2025    CO2 24.0 02/15/2025     (H) 02/15/2025    CA 7.7 (L) 02/15/2025    ALB 4.5 2025    ALKPHO 102 2025    BILT 0.8 2025    TP 7.5 2025    AST 23 2025    ALT 9 (L) 2025    PTT 70.1 (H) 05/10/2024    TSH 3.320 2023    LIP 30 2025    DDIMER 1.02 (H) 2023    MG 1.1 (LL) 2025    PHOS 4.1 2025    TROP <0.045 10/01/2020       CT CHEST (NEX=[id number])    Result Date: 2025  CONCLUSION:  1. Large mixed sliding and paraesophageal type hiatal hernia.  Partial  herniation of distal transverse colon through the hiatal hernia defect on prior chest CT from January, 2023 is no longer seen.  The hernia otherwise appears unchanged since that exam.   There are no associated findings of gastric outlet obstruction. 2. Wedge-shaped opacities in the superior segment left lower lobe are new since prior chest CT.  Findings are favored to represent atelectasis or less likely relate to a low-grade infectious process such as aspiration pneumonia. 3. Scattered subcentimeter bilateral pulmonary nodules are largely unchanged since prior chest CT.  Small size and stability suggest benign etiology. 4. Multi-vessel coronary artery and mitral annular calcifications. 5. Cholecystectomy. 6. Partially imaged right upper abdominal spinal stimulator. 7. Lesser incidental findings as above.   elm-remote  Dictated by (CST): Steve Redmond MD on 2/14/2025 at 9:16 PM     Finalized by (CST): Steve Redmond MD on 2/14/2025 at 9:26 PM               Assessment and Plan:   79 year old woman with hx of HFpEF, CAD s/p PCI, CKD, chronic back pain, large hiatal vs paraesophgeal hernia presenting with sore throat, cough, nausea, diarrhea.     CT C/A/P reviewed, there is a large mixed, sliding/paraesophageal type hernia in the chest cavity. There is NO evidence of volvulus/obstruction.    Since admission, the patient's symptoms of abdominal pain, nausea, vomiting, diarrhea have resolved. She is tolerating a diet without issues. May have had viral gastroenteritis.    Biggest complaint is sore throat/cough.    Given resolution of symptoms, can continue on diet without further evaluation indicated at this time.    Suspect slight drop in hemoglobin is related to IVFs/dehydration upon arrival, she is having brown stool/no overt GI bleeding.    Will sign off as patient is tolerating diet without problems.    Td Corral MD  [facility]

## 2025-02-16 NOTE — DIETARY NOTE
[facility]  part of [facility]   CLINICAL NUTRITION    Linda KEANE Ruzicka     Admitting diagnosis:  Nausea vomiting and diarrhea [R11.2, R19.7]    Ht:  72.2 kg (5' 2\")  Wt: 72.2 kg (159 lb 1.6 oz).   Body mass index is 29.1 kg/m².  IBW: 50 kg    Wt Readings from Last 6 Encounters:   02/13/25 72.2 kg (159 lb 1.6 oz)   09/28/24 76.1 kg (167 lb 12.8 oz)   05/10/24 80.4 kg (177 lb 3.2 oz)   09/30/23 80.4 kg (177 lb 4.8 oz)   09/05/23 81.4 kg (179 lb 8 oz)   05/31/23 81.7 kg (180 lb 1.6 oz)        Labs/Meds reviewed    Diet:       Procedures    Low Fiber/Soft diet Low Fiber/Soft, Renal, Cardiac; Is Patient on Accuchecks? No     Percent Meals Eaten (last 3 days)       Date/Time Percent Meals Eaten (%)    02/13/25 2334 100 %     Percent Meals Eaten (%): jello at 02/13/25 2334    02/14/25 1000 80 %    02/14/25 1356 70 %    02/14/25 1800 70 %    02/15/25 1119 100 %     Percent Meals Eaten (%): full liquid at 02/15/25 1119    02/15/25 1347 80 %    02/15/25 1847 50 %    02/16/25 1059 100 %              Pt chart reviewed d/t screening at risk for weight loss d/t poor appetite. 79 y/o female with PMH HF, CAD, CKD 3 presents with nausea, diarrhea, abdominal cramps. Upon admission, CT scan of abdomen showed hiatal hernia. Pt started on liquid diet, is now advanced to low fiber/soft. Pt visited, reported poor appetite for a few days d/t pain, however appetite has improved and is eating now well. Pt had 100% of her breakfast this AM- omelette, peaches, toast, tea. Pt reported recent weight loss of ~4 lbs d/t poor appetite, however weight loss insignificant per standards. Answered all questions at this time. Will follow per protocol.    Patient reports good appetite at this time.  Nursing notes reports Percent Meals Eaten (%): 100 % intake for last meal.  Tolerating po diet without diarrhea, emesis, or constipation.   No significant weight changes noted.     Patient is at low nutrition risk at this time.    Please  consult if patient status changes or nutrition issues arise.      Zari Sams, MS, RDN, LDN  Clinical Dietitian

## 2025-02-16 NOTE — PLAN OF CARE
Problem: Patient Centered Care  Goal: Patient preferences are identified and integrated in the patient's plan of care  Description: Interventions:  - Provide timely, complete, and accurate information to patient/family  - Incorporate patient and family knowledge, values, beliefs, and cultural backgrounds into the planning and delivery of care  - Encourage patient/family to participate in care and decision-making at the level they choose  - Honor patient and family perspectives and choices  Outcome: Progressing     Problem: GASTROINTESTINAL - ADULT  Goal: Minimal or absence of nausea and vomiting  Description: INTERVENTIONS:  - Maintain adequate hydration with IV or PO as ordered and tolerated  - Nasogastric tube to low intermittent suction as ordered  - Evaluate effectiveness of ordered antiemetic medications  - Provide nonpharmacologic comfort measures as appropriate  - Advance diet as tolerated, if ordered  - Obtain nutritional consult as needed  - Evaluate fluid balance  Outcome: Progressing  Goal: Maintains or returns to baseline bowel function  Description: INTERVENTIONS:  - Assess bowel function  - Maintain adequate hydration with IV or PO as ordered and tolerated  - Evaluate effectiveness of GI medications  - Encourage mobilization and activity  - Obtain nutritional consult as needed  - Establish a toileting routine/schedule  - Consider collaborating with pharmacy to review patient's medication profile  Outcome: Progressing     Problem: CARDIOVASCULAR - ADULT  Goal: Maintains optimal cardiac output and hemodynamic stability  Description: INTERVENTIONS:  - Monitor vital signs, rhythm, and trends  - Monitor for bleeding, hypotension and signs of decreased cardiac output  - Evaluate effectiveness of vasoactive medications to optimize hemodynamic stability  - Monitor arterial and/or venous puncture sites for bleeding and/or hematoma  - Assess quality of pulses, skin color and temperature  - Assess for signs of  decreased coronary artery perfusion - ex. Angina  - Evaluate fluid balance, assess for edema, trend weights  Outcome: Progressing  Goal: Absence of cardiac arrhythmias or at baseline  Description: INTERVENTIONS:  - Continuous cardiac monitoring, monitor vital signs, obtain 12 lead EKG if indicated  - Evaluate effectiveness of antiarrhythmic and heart rate control medications as ordered  - Initiate emergency measures for life threatening arrhythmias  - Monitor electrolytes and administer replacement therapy as ordered  Outcome: Progressing

## 2025-02-17 VITALS
WEIGHT: 159.13 LBS | DIASTOLIC BLOOD PRESSURE: 88 MMHG | HEART RATE: 85 BPM | OXYGEN SATURATION: 94 % | RESPIRATION RATE: 18 BRPM | BODY MASS INDEX: 29 KG/M2 | SYSTOLIC BLOOD PRESSURE: 139 MMHG | TEMPERATURE: 98 F

## 2025-02-17 PROBLEM — F33.1 MODERATE EPISODE OF RECURRENT MAJOR DEPRESSIVE DISORDER (HCC): Status: ACTIVE | Noted: 2025-02-17

## 2025-02-17 LAB
ANION GAP SERPL CALC-SCNC: 8 MMOL/L (ref 0–18)
BASOPHILS # BLD AUTO: 0.03 X10(3) UL (ref 0–0.2)
BASOPHILS NFR BLD AUTO: 0.4 %
BUN BLD-MCNC: 15 MG/DL (ref 9–23)
BUN/CREAT SERPL: 10.9 (ref 10–20)
CALCIUM BLD-MCNC: 8.6 MG/DL (ref 8.7–10.4)
CHLORIDE SERPL-SCNC: 104 MMOL/L (ref 98–112)
CO2 SERPL-SCNC: 27 MMOL/L (ref 21–32)
CREAT BLD-MCNC: 1.37 MG/DL
DEPRECATED RDW RBC AUTO: 46.3 FL (ref 35.1–46.3)
EGFRCR SERPLBLD CKD-EPI 2021: 40 ML/MIN/1.73M2 (ref 60–?)
EOSINOPHIL # BLD AUTO: 0.35 X10(3) UL (ref 0–0.7)
EOSINOPHIL NFR BLD AUTO: 4.9 %
ERYTHROCYTE [DISTWIDTH] IN BLOOD BY AUTOMATED COUNT: 13.6 % (ref 11–15)
GLUCOSE BLD-MCNC: 104 MG/DL (ref 70–99)
HCT VFR BLD AUTO: 31.7 %
HGB BLD-MCNC: 10.1 G/DL
IMM GRANULOCYTES # BLD AUTO: 0.01 X10(3) UL (ref 0–1)
IMM GRANULOCYTES NFR BLD: 0.1 %
LYMPHOCYTES # BLD AUTO: 1.56 X10(3) UL (ref 1–4)
LYMPHOCYTES NFR BLD AUTO: 21.8 %
MAGNESIUM SERPL-MCNC: 1.8 MG/DL (ref 1.6–2.6)
MAGNESIUM SERPL-MCNC: 1.8 MG/DL (ref 1.6–2.6)
MCH RBC QN AUTO: 29.8 PG (ref 26–34)
MCHC RBC AUTO-ENTMCNC: 31.9 G/DL (ref 31–37)
MCV RBC AUTO: 93.5 FL
MONOCYTES # BLD AUTO: 0.42 X10(3) UL (ref 0.1–1)
MONOCYTES NFR BLD AUTO: 5.9 %
NEUTROPHILS # BLD AUTO: 4.77 X10 (3) UL (ref 1.5–7.7)
NEUTROPHILS # BLD AUTO: 4.77 X10(3) UL (ref 1.5–7.7)
NEUTROPHILS NFR BLD AUTO: 66.9 %
OSMOLALITY SERPL CALC.SUM OF ELEC: 289 MOSM/KG (ref 275–295)
PHOSPHATE SERPL-MCNC: 3.9 MG/DL (ref 2.4–5.1)
PLATELET # BLD AUTO: 235 10(3)UL (ref 150–450)
POTASSIUM SERPL-SCNC: 3.7 MMOL/L (ref 3.5–5.1)
RBC # BLD AUTO: 3.39 X10(6)UL
SODIUM SERPL-SCNC: 139 MMOL/L (ref 136–145)
WBC # BLD AUTO: 7.1 X10(3) UL (ref 4–11)

## 2025-02-17 PROCEDURE — 90792 PSYCH DIAG EVAL W/MED SRVCS: CPT | Performed by: NURSE PRACTITIONER

## 2025-02-17 PROCEDURE — 99239 HOSP IP/OBS DSCHRG MGMT >30: CPT | Performed by: HOSPITALIST

## 2025-02-17 RX ORDER — VENLAFAXINE HYDROCHLORIDE 37.5 MG/1
37.5 CAPSULE, EXTENDED RELEASE ORAL
Qty: 30 CAPSULE | Refills: 0 | Status: SHIPPED | OUTPATIENT
Start: 2025-02-18

## 2025-02-17 RX ORDER — VENLAFAXINE HYDROCHLORIDE 37.5 MG/1
37.5 CAPSULE, EXTENDED RELEASE ORAL
Status: DISCONTINUED | OUTPATIENT
Start: 2025-02-18 | End: 2025-02-17

## 2025-02-17 RX ORDER — LOPERAMIDE HYDROCHLORIDE 2 MG/1
2 CAPSULE ORAL 4 TIMES DAILY PRN
Status: DISCONTINUED | OUTPATIENT
Start: 2025-02-17 | End: 2025-02-17

## 2025-02-17 RX ORDER — LOPERAMIDE HYDROCHLORIDE 2 MG/1
2 TABLET ORAL 4 TIMES DAILY PRN
Qty: 20 TABLET | Refills: 0 | Status: SHIPPED | OUTPATIENT
Start: 2025-02-17 | End: 2025-02-22

## 2025-02-17 RX ORDER — MAGNESIUM OXIDE 400 MG/1
400 TABLET ORAL ONCE
Status: COMPLETED | OUTPATIENT
Start: 2025-02-17 | End: 2025-02-17

## 2025-02-17 NOTE — PLAN OF CARE
Linda is cleared for discharge. AVS reviewed at bedside with daughter present. Transported home via private vehicle.    Problem: Patient Centered Care  Goal: Patient preferences are identified and integrated in the patient's plan of care  Description: Interventions:  - What would you like us to know as we care for you? From home alone  - Provide timely, complete, and accurate information to patient/family  - Incorporate patient and family knowledge, values, beliefs, and cultural backgrounds into the planning and delivery of care  - Encourage patient/family to participate in care and decision-making at the level they choose  - Honor patient and family perspectives and choices  Outcome: Adequate for Discharge      Problem: GASTROINTESTINAL - ADULT  Goal: Minimal or absence of nausea and vomiting  Description: INTERVENTIONS:  - Maintain adequate hydration with IV or PO as ordered and tolerated  - Nasogastric tube to low intermittent suction as ordered  - Evaluate effectiveness of ordered antiemetic medications  - Provide nonpharmacologic comfort measures as appropriate  - Advance diet as tolerated, if ordered  - Obtain nutritional consult as needed  - Evaluate fluid balance  Outcome: Adequate for Discharge  Goal: Maintains or returns to baseline bowel function  Description: INTERVENTIONS:  - Assess bowel function  - Maintain adequate hydration with IV or PO as ordered and tolerated  - Evaluate effectiveness of GI medications  - Encourage mobilization and activity  - Obtain nutritional consult as needed  - Establish a toileting routine/schedule  - Consider collaborating with pharmacy to review patient's medication profile  Outcome: Adequate for Discharge     Problem: CARDIOVASCULAR - ADULT  Goal: Maintains optimal cardiac output and hemodynamic stability  Description: INTERVENTIONS:  - Monitor vital signs, rhythm, and trends  - Monitor for bleeding, hypotension and signs of decreased cardiac output  - Evaluate  effectiveness of vasoactive medications to optimize hemodynamic stability  - Monitor arterial and/or venous puncture sites for bleeding and/or hematoma  - Assess quality of pulses, skin color and temperature  - Assess for signs of decreased coronary artery perfusion - ex. Angina  - Evaluate fluid balance, assess for edema, trend weights  Outcome: Adequate for Discharge  Goal: Absence of cardiac arrhythmias or at baseline  Description: INTERVENTIONS:  - Continuous cardiac monitoring, monitor vital signs, obtain 12 lead EKG if indicated  - Evaluate effectiveness of antiarrhythmic and heart rate control medications as ordered  - Initiate emergency measures for life threatening arrhythmias  - Monitor electrolytes and administer replacement therapy as ordered  Outcome: Adequate for Discharge

## 2025-02-17 NOTE — DISCHARGE SUMMARY
[facility]  part of [facility]     Discharge Summary    Susan P Ruzicka Patient Status:  Inpatient    1945 MRN [id number]   Location [facility] 5SW/SE Attending Giovanni Bailon MD   Hosp Day # 3 PCP ROLY KAUR     Date of Admission: 2025    Date of Discharge: 2025.    Admitting Diagnosis: Nausea vomiting and diarrhea [R11.2, R19.7]    Discharge Diagnosis:   Patient Active Problem List   Diagnosis    Hemifacial spasm    Chronic low back pain    Leukocytosis    Hyponatremia    Metabolic acidosis    Accelerated hypertension    NSTEMI (non-ST elevated myocardial infarction) (HCC)    Delirium    Encephalopathy    New onset left bundle branch block (LBBB)    Acute chest pain    Acute heart failure, unspecified heart failure type (HCC)    Acute hypoxemic respiratory failure (HCC)    Large hiatal hernia    Acute on chronic congestive heart failure, unspecified heart failure type (HCC)    FARAZ (acute kidney injury)    Hypokalemia    Orthostatic hypotension    Pulmonary edema (HCC)    Acute on chronic diastolic (congestive) heart failure (HCC)    Dyspnea    Acute hypoxic respiratory failure (HCC)    Diarrhea of presumed infectious origin    Altered mental status, unspecified altered mental status type    Nausea vomiting and diarrhea       Reason for Admission:     Nausea and Vomiting and Diarrhea    Physical Exam:     General: Patient is alert and oriented x3 does not appear to be in acute distress at this time  HEENT: EOMI PERRLA, atraumatic normocephalic  Cardiac: S1-S2 appreciated  Lungs: Good air entry bilaterally clear to auscultation  Abdomen: Soft nontender nondistended positive bowel sounds  Ext: Peripheral pulses are positive  Neuro: No focal deficits noted  Psych: Normal mood  Skin: No rashes noted  MSK: Full range of motion intact      Hospital Course:     Nausea, vomiting, diarrhea; possible gastroenteritis, rule out C difficile infection considering recent  antibiotic use.  -appreciate GI recs  -continue IV antiemetics  -She was also complaining of sore throat had a recent contact with a person with COVID-19  -Continue empiric PPI IVF  -After for diarrhea and if it recurs patient will need to have stool studies  -Continue monitor closely     Anxiety disorder.  -Patient currently stable  -To monitor     Hypertension.  -Currently she is hypotensive  -Hold antihypertensive medications  -Continue IV fluids     Acute renal failure on CKD stage III  -hold aldactone.      VTE ppx : SCD boots     Global A/P  -holding antihypertensives  -CT chest slightly suggestive of possible aspiration pneumonia -lactic acid within normal limits procalcitonin within normal limits patient started on empiric antibiotic therapy with Zosyn this morning patient's blood pressure has improved  -Renal failure is improving  -Hypokalemia electrolyte replacement protocol  --Respiratory panel -negative  -Reviewed previous consultant notes  -Reviewed CBC, BMP, Mag, and Phos  -Reviewed tests ordered  -Repeat labs in am  -MDM: High, severe exacerbation of chronic illness posing a threat to life. IV medications requiring close inpatient monitoring.     History of Present Illness:     N/V Diarrhea    Disposition: Home Health Care Services    Discharge Condition: Good    Discharge Medications:   Current Discharge Medication List        START taking these medications    Details   amoxicillin clavulanate 875-125 MG Oral Tab Take 1 tablet by mouth 2 (two) times daily for 5 days.  Qty: 10 tablet, Refills: 0      ondansetron 4 MG Oral Tablet Dispersible Take 1 tablet (4 mg total) by mouth every 4 (four) hours as needed for Nausea.  Qty: 15 tablet, Refills: 0      dicyclomine 20 MG Oral Tab Take 1 tablet (20 mg total) by mouth 4 (four) times daily as needed (Abdominal pain).  Qty: 30 tablet, Refills: 0           CONTINUE these medications which have NOT CHANGED    Details   dapagliflozin (FARXIGA) 10 MG Oral Tab  Take 1 tablet (10 mg total) by mouth daily.      torsemide 20 MG Oral Tab Take 2 tablets (40 mg total) by mouth daily.      spironolactone 25 MG Oral Tab Take 1 tablet (25 mg total) by mouth daily.      gabapentin 300 MG Oral Cap Take 2 capsules (600 mg total) by mouth 2 (two) times daily.      isosorbide dinitrate 10 MG Oral Tab Take 1 tablet (10 mg total) by mouth in the morning and 1 tablet (10 mg total) before bedtime.      carvedilol 3.125 MG Oral Tab Take 1 tablet (3.125 mg total) by mouth 2 (two) times daily with meals.  Qty: 60 tablet, Refills: 1      citalopram 20 MG Oral Tab Take 1 tablet (20 mg total) by mouth daily.    Associated Diagnoses: Hemifacial spasm; Chronic low back pain      rOPINIRole 3 MG Oral Tab Take 1 tablet (3 mg total) by mouth After dinner.  Refills: 0      pantoprazole 40 MG Oral Tab EC Take 1 tablet (40 mg total) by mouth 2 (two) times daily before meals.      atorvastatin 40 MG Oral Tab Take 1 tablet (40 mg total) by mouth daily with dinner.      ferrous sulfate 325 (65 FE) MG Oral Tab EC Take 1 tablet (325 mg total) by mouth daily with breakfast.      aspirin 81 MG Oral Tab EC Take 1 tablet (81 mg total) by mouth daily.  Qty: 30 tablet, Refills: 0      buPROPion HCl ER, SR, 150 MG Oral Tablet 12 Hr Take 1 tablet (150 mg total) by mouth daily.      tiZANidine HCl 2 MG Oral Tab Take 1 tablet (2 mg total) by mouth daily with dinner.      NON FORMULARY Place 1 drop into the right eye daily. Ivizia eye drops uses daily to right eye      lubiprostone 24 MCG Oral Cap Take 1 capsule (24 mcg total) by mouth 2 (two) times daily with meals.      prochlorperazine (COMPAZINE) 10 mg tablet Take 1 tablet (10 mg total) by mouth every 6 (six) hours as needed for Nausea.      ergocalciferol 1.25 MG (63377 UT) Oral Cap Take 1 capsule (50,000 Units total) by mouth once a week. On Sundays      acetaminophen 500 MG Oral Tab Take 1 tablet (500 mg total) by mouth every 6 (six) hours as needed for Pain.              Total dc time > 30 min    Giovanni Bailon MD  2/17/2025  12:13 PM     Hospital Discharge Diagnoses:  N/V diarrhea    Lace+ Score: 71  59-90 High Risk  29-58 Medium Risk  0-28   Low Risk.    TCM Follow-Up Recommendation:  LACE 29-58: Moderate Risk of readmission after discharge from the hospital.

## 2025-02-17 NOTE — CONSULTS
[facility]  part of [facility]    Report of Consultation    Susan P Ruzicka Patient Status:  Inpatient    1945 MRN [id number]   Location [facility] 5SW/SE Attending Giovanni Bailon MD   Hosp Day # 3 PCP ROLY KAUR     Date of Admission:  2025  Date of Consult:  25   Reason for Consultation:   Patient presented with increased anxiety and depressive symptoms, medication management, Giovanni Moser MD requested psychiatric consult for evaluation and advice.    Consult Duration     The patient seen for initial psychiatric consult evaluation.   Record reviewed, communication with attending, communication with RN and patient seen face to face evaluation.    History of Present Illness:   Patient is a 80 year old ,  female with past medical history of anxiety, depression who was admitted to the hospital for Nausea vomiting and diarrhea: The patient has been demonstrating increased anxiety and depressive symptoms.   Patient indicated for psych consult for evaluation and advise.    Per chart review, the patient presented to the hospital with complaint of nausea, vomiting, abdominal pain. She was admitted to the hospital.     The patient received the following psychotropic medications: Wellbutrin 150 mg,     Labs and imaging reviewed: mag 1.8, glucose 104,     The patient seen today laying in hospital bed. She presents calm, cooperative and pleasant.     The patient is alert and oriented to person, place, date and condition. The patient answers questions and engages in appropriate conversation with no impairment in cognition or distortion in thought process.     She reports coming to the hospital due to diarrhea.    The patient otherwise reporting increased anxiety and depressive symptoms recently. She notes that her family is considering moving her to an independent/assisted living facility. She notes that she has lived in her home for 52 years.  She reports feeling overwhelmed thinking about all of her belongings and moving.     The patient reporting feelings of hopelessness, helplessness, worthlessness, low mood, low energy, decreased motivation. She notes that she enjoys quilting otherwise recently she has not had the energy or motivation.     The patient reporting increased anxiety, worry, ruminations she denies any issues with sleep at night.    She repeatedly denies any suicidal ideations.     The patient reports that her  passed away 8 years ago. She notes at that time she was very depressed and her pcp prescribed her wellbutrin and celexa. She notes that she has been taking these medications for the past 8 years and she does not feel that they are helping her.     The patient is agreeable to medication changes and to follow up with outpatient psychiatry providers.    The patient is not demonstrating any gerald or psychosis  The patient denies auditory or visual hallucinations  The patient denies suicidal or homicidal ideation.    The patient has been demonstrating feelings of hopelessness, helplessness, worthlessness, low mood, low energy, decreased motivation with increased anxiety, worry, ruminations with impairment in sleep. The patient denies any suicidal ideations. The patient is agreeable to medications changes and to follow up with outpatient psychiatry providers.     Past Psychiatric/Medication History:  1. Prior diagnoses: anxiety, depression  2. Past psychiatric inpatient: denies  3. Past outpatient history: denies  4. Past suicide history: denies  5. Medication history: Wellbutrin and Celexa prescribed by PCP eight years ago.    Social History:   The patient lives at home by herself. Her  passed away 8 years ago. They were  for 53 years. They have two daughters. She worked as a  for the [employer].    She denies any alcohol, tobacco, cannabis or illicit substance abuse.    Family History:  None reported  Medical  History:   Past Medical History  Past Medical History:    Congestive heart disease (HCC)    Diverticulitis    Esophageal reflux    Fibromyalgia    High blood pressure    Osteoarthrosis, unspecified whether generalized or localized, unspecified site    RLS (restless legs syndrome)    Unspecified essential hypertension       Past Surgical History  Past Surgical History:   Procedure Laterality Date    Cath drug eluting stent      Excis lumbar disk,one level      Removal gallbladder         Family History  No family history on file.    Social History  Social History     Socioeconomic History    Marital status:    Tobacco Use    Smoking status: Former     Types: Cigarettes     Passive exposure: Past    Smokeless tobacco: Never   Substance and Sexual Activity    Alcohol use: No     Social Drivers of Health     Food Insecurity: Patient Declined (2/13/2025)    NCSS - Food Insecurity     Worried About Running Out of Food in the Last Year: Patient declined     Ran Out of Food in the Last Year: Patient declined   Transportation Needs: Patient Declined (2/13/2025)    NCSS - Transportation     Lack of Transportation: Patient declined   Stress: No Stress Concern Present (4/26/2024)    [facility]    [employer] [place] [employer] - Occupational Stress Questionnaire     Feeling of Stress : Not at all   Housing Stability: Patient Declined (2/13/2025)    NCSS - Housing/Utilities     Has Housing: Patient declined     Worried About Losing Housing: Patient declined     Unable to Get Utilities: Patient declined           Current Medications:  Current Facility-Administered Medications   Medication Dose Route Frequency    loperamide (Imodium) cap 2 mg  2 mg Oral QID PRN    [START ON 2/18/2025] venlafaxine ER (Effexor-XR) 24 hr cap 37.5 mg  37.5 mg Oral Daily with breakfast    guaiFENesin (Robitussin) 100 MG/5 ML oral liquid 200 mg  200 mg Oral Q4H PRN    heparin (Porcine) 5000 UNIT/ML injection 5,000 Units   5,000 Units Subcutaneous 2 times per day    piperacillin-tazobactam (Zosyn) 3.375 g in dextrose 5% 100 mL IVPB-ADDV  3.375 g Intravenous Q8H    sodium chloride 0.9% infusion   Intravenous Continuous    ondansetron (Zofran) 4 MG/2ML injection 4 mg  4 mg Intravenous Q6H PRN    metoclopramide (Reglan) 5 mg/mL injection 5 mg  5 mg Intravenous Q8H PRN    temazepam (Restoril) cap 15 mg  15 mg Oral Nightly PRN    atorvastatin (Lipitor) tab 40 mg  40 mg Oral Daily with dinner    gabapentin (Neurontin) cap 600 mg  600 mg Oral BID    pantoprazole (Protonix) DR tab 40 mg  40 mg Oral BID AC    rOPINIRole (Requip) tab 3 mg  3 mg Oral After dinner    tiZANidine (Zanaflex) tab 2 mg  2 mg Oral Daily with dinner    torsemide (Demadex) tab 40 mg  40 mg Oral Daily    morphINE PF 2 MG/ML injection 1 mg  1 mg Intravenous Q4H PRN    acetaminophen (Tylenol Extra Strength) tab 500 mg  500 mg Oral Q4H PRN     Medications Prior to Admission   Medication Sig    dapagliflozin (FARXIGA) 10 MG Oral Tab Take 1 tablet (10 mg total) by mouth daily.    torsemide 20 MG Oral Tab Take 2 tablets (40 mg total) by mouth daily.    spironolactone 25 MG Oral Tab Take 1 tablet (25 mg total) by mouth daily.    gabapentin 300 MG Oral Cap Take 2 capsules (600 mg total) by mouth 2 (two) times daily.    isosorbide dinitrate 10 MG Oral Tab Take 1 tablet (10 mg total) by mouth in the morning and 1 tablet (10 mg total) before bedtime.    carvedilol 3.125 MG Oral Tab Take 1 tablet (3.125 mg total) by mouth 2 (two) times daily with meals.    citalopram 20 MG Oral Tab Take 1 tablet (20 mg total) by mouth daily.    rOPINIRole 3 MG Oral Tab Take 1 tablet (3 mg total) by mouth After dinner.    pantoprazole 40 MG Oral Tab EC Take 1 tablet (40 mg total) by mouth 2 (two) times daily before meals.    atorvastatin 40 MG Oral Tab Take 1 tablet (40 mg total) by mouth daily with dinner.    ferrous sulfate 325 (65 FE) MG Oral Tab EC Take 1 tablet (325 mg total) by mouth daily with  breakfast.    aspirin 81 MG Oral Tab EC Take 1 tablet (81 mg total) by mouth daily.    buPROPion HCl ER, SR, 150 MG Oral Tablet 12 Hr Take 1 tablet (150 mg total) by mouth daily.    tiZANidine HCl 2 MG Oral Tab Take 1 tablet (2 mg total) by mouth daily with dinner.    NON FORMULARY Place 1 drop into the right eye daily. Ivizia eye drops uses daily to right eye    lubiprostone 24 MCG Oral Cap Take 1 capsule (24 mcg total) by mouth 2 (two) times daily with meals.    prochlorperazine (COMPAZINE) 10 mg tablet Take 1 tablet (10 mg total) by mouth every 6 (six) hours as needed for Nausea.    ergocalciferol 1.25 MG (46142 UT) Oral Cap Take 1 capsule (50,000 Units total) by mouth once a week. On Sundays    acetaminophen 500 MG Oral Tab Take 1 tablet (500 mg total) by mouth every 6 (six) hours as needed for Pain.       Allergies  Allergies[1]    Review of Systems:   As by Admitting/Attending    Results:   Laboratory Data:  Lab Results   Component Value Date    WBC 7.1 02/17/2025    HGB 10.1 (L) 02/17/2025    HCT 31.7 (L) 02/17/2025    .0 02/17/2025    CREATSERUM 1.37 (H) 02/17/2025    BUN 15 02/17/2025     02/17/2025    K 3.7 02/17/2025     02/17/2025    CO2 27.0 02/17/2025     (H) 02/17/2025    CA 8.6 (L) 02/17/2025    ALB 4.5 02/13/2025    ALKPHO 102 02/13/2025    TP 7.5 02/13/2025    AST 23 02/13/2025    ALT 9 (L) 02/13/2025    PTT 70.1 (H) 05/10/2024    TSH 3.320 05/28/2023    LIP 30 02/13/2025    DDIMER 1.02 (H) 01/30/2023    MG 1.8 02/17/2025    MG 1.8 02/17/2025    PHOS 3.9 02/17/2025    TROP <0.045 10/01/2020         Imaging:  CT CHEST (CPT=71250)    Result Date: 2/14/2025  CONCLUSION:  1. Large mixed sliding and paraesophageal type hiatal hernia.  Partial herniation of distal transverse colon through the hiatal hernia defect on prior chest CT from January, 2023 is no longer seen.  The hernia otherwise appears unchanged since that exam.   There are no associated findings of gastric outlet  obstruction. 2. Wedge-shaped opacities in the superior segment left lower lobe are new since prior chest CT.  Findings are favored to represent atelectasis or less likely relate to a low-grade infectious process such as aspiration pneumonia. 3. Scattered subcentimeter bilateral pulmonary nodules are largely unchanged since prior chest CT.  Small size and stability suggest benign etiology. 4. Multi-vessel coronary artery and mitral annular calcifications. 5. Cholecystectomy. 6. Partially imaged right upper abdominal spinal stimulator. 7. Lesser incidental findings as above.   elm-remote  Dictated by (CST): Steve Redmond MD on 2/14/2025 at 9:16 PM     Finalized by (CST): Steve Redmond MD on 2/14/2025 at 9:26 PM           Vital Signs:   Blood pressure 143/75, pulse 72, temperature 99 °F (37.2 °C), temperature source Oral, resp. rate 18, weight 72.2 kg (159 lb 1.6 oz), SpO2 93%.    Mental Status Exam:   Appearance: Stated age female, in hospital gown, laying down in hospital bed.  Psychomotor: No psychomotor agitation, or retardation.   Orientation: Alert and oriented to person, place, time and condition.  Gait: Not evaluated.  Attitude/Coorperation: Cooperative and attentive.  Behavior: Appropriate.  Speech: Regular rate and rhythm speech.  Mood: Patient reporting depressed mood.  Affect: Congruent with the mood.  Thought process: Linear and appropriate.  Thought content: Patient denies any suicidal or homicidal ideation.  Perceptions: Patient denies any auditory or visual hallucinations.  Concentration: Grossly intact.  Memory: Grossly intact.  Intellect: Average.  Judgment and Insight: Questionable.     Impression:   Major depressive disorder, recurrent, moderate    Nausea vomiting and diarrhea    Patient is a 80 year old ,  female with past medical history of anxiety, depression who was admitted to the hospital for Nausea vomiting and diarrhea: The patient has been demonstrating increased anxiety  and depressive symptoms.     The patient has been demonstrating feelings of hopelessness, helplessness, worthlessness, low mood, low energy, decreased motivation with increased anxiety, worry, ruminations with impairment in sleep. The patient denies any suicidal ideations. The patient is agreeable to medications changes and to follow up with outpatient psychiatry providers.     Discussed risk and benefit, acknowledging the current symptom and severity.  At this point, I would recommend the following approach:     Focus on safety  Focus on education and support.  Focus on insight orientation helping the patient understand diagnosis and treatment plan.  Start effexor 37.5 mg daily  Discontinue Wellbutrin  Continue celexa 20 mg.   Processed with patient at length, the initiation of the above psychotropic medications I advised the patient of the risks, benefits, alternatives and potential side effects. The patient consents to administration of the medications and understands the right to refuse medications at any time. The patient verbalized understanding.   Coordinate plan with team. Patient to follow up with outpatient psychiatry providers    Orders This Visit:  Orders Placed This Encounter   Procedures    Basic Metabolic Panel (8)    CBC With Differential With Platelet    Hepatic Function Panel (7)    Lipase    Basic Metabolic Panel (8)    CBC With Differential With Platelet    CBC With Differential With Platelet    Phosphorus    Basic Metabolic Panel (8)    Lactic Acid, Plasma    Procalcitonin    CBC With Differential With Platelet    Phosphorus    Magnesium    Potassium    Magnesium    CBC With Differential With Platelet    Phosphorus    Magnesium    Basic Metabolic Panel (8)    Magnesium    Clostridium difficile(toxigenic)PCR    GI Stool panel by PCR    $$$$Respiratory Flu Expanded Panel + Covid-19$$$$       Meds This Visit:  Requested Prescriptions     Signed Prescriptions Disp Refills    ondansetron 4 MG Oral  Tablet Dispersible 15 tablet 0     Sig: Take 1 tablet (4 mg total) by mouth every 4 (four) hours as needed for Nausea.    dicyclomine 20 MG Oral Tab 30 tablet 0     Sig: Take 1 tablet (20 mg total) by mouth 4 (four) times daily as needed (Abdominal pain).    amoxicillin clavulanate 875-125 MG Oral Tab 10 tablet 0     Sig: Take 1 tablet by mouth 2 (two) times daily for 5 days.    Loperamide HCl 2 MG Oral Tab 20 tablet 0     Sig: Take 1 tablet (2 mg total) by mouth 4 (four) times daily as needed for Diarrhea.    venlafaxine ER 37.5 MG Oral Capsule SR 24 Hr 30 capsule 0     Sig: Take 1 capsule (37.5 mg total) by mouth daily with breakfast.       Zari Marquis, DICK  2/17/2025    Note to Patient: The 21st Century Cures Act makes medical notes like these available to patients in the interest of transparency. However, be advised this is a medical document. It is intended as peer to peer communication. It is written in medical language and may contain abbreviations or verbiage that are unfamiliar. It may appear blunt or direct. Medical documents are intended to carry relevant information, facts as evident, and the clinical opinion of the practitioner. This note may have been transcribed using a voice dictation system. Voice recognition errors may occur. This should not be taken to alter the content or meaning of this note.           [1] Not on File

## 2025-02-17 NOTE — PLAN OF CARE
Problem: Patient Centered Care  Goal: Patient preferences are identified and integrated in the patient's plan of care  Description: Interventions:  - Provide timely, complete, and accurate information to patient/family  - Incorporate patient and family knowledge, values, beliefs, and cultural backgrounds into the planning and delivery of care  - Encourage patient/family to participate in care and decision-making at the level they choose  - Honor patient and family perspectives and choices  Outcome: Progressing     Problem: GASTROINTESTINAL - ADULT  Goal: Minimal or absence of nausea and vomiting  Description: INTERVENTIONS:  - Maintain adequate hydration with IV or PO as ordered and tolerated  - Nasogastric tube to low intermittent suction as ordered  - Evaluate effectiveness of ordered antiemetic medications  - Provide nonpharmacologic comfort measures as appropriate  - Advance diet as tolerated, if ordered  - Obtain nutritional consult as needed  - Evaluate fluid balance  Outcome: Progressing     Problem: CARDIOVASCULAR - ADULT  Goal: Maintains optimal cardiac output and hemodynamic stability  Description: INTERVENTIONS:  - Monitor vital signs, rhythm, and trends  - Monitor for bleeding, hypotension and signs of decreased cardiac output  - Evaluate effectiveness of vasoactive medications to optimize hemodynamic stability  - Monitor arterial and/or venous puncture sites for bleeding and/or hematoma  - Assess quality of pulses, skin color and temperature  - Assess for signs of decreased coronary artery perfusion - ex. Angina  - Evaluate fluid balance, assess for edema, trend weights  Outcome: Progressing  Goal: Absence of cardiac arrhythmias or at baseline  Description: INTERVENTIONS:  - Continuous cardiac monitoring, monitor vital signs, obtain 12 lead EKG if indicated  - Evaluate effectiveness of antiarrhythmic and heart rate control medications as ordered  - Initiate emergency measures for life threatening  arrhythmias  - Monitor electrolytes and administer replacement therapy as ordered  Outcome: Progressing

## 2025-02-17 NOTE — DISCHARGE INSTRUCTIONS
You were seen by Psychiatry while in the hospital. The recommendation is outpatient mental health treatment. Please find information for Dr. Houston's office and recommended therapist, below.     Dr. Houston - medication management  ARJUN PAVON [facility]- therapy  [address] [facility] [place] [address] · [place]  [phone]    If you are experiencing a mental health crisis, please call 911 or go to your nearest ED. If you are not in immediate danger but require emotional support and/or assistance, please contact: [facility] at [phone] or [facility] at [phone].

## 2025-02-22 NOTE — PROGRESS NOTES
Provider Clarification     Additional information on the patient's renal status.    Acute renal failure on CKD stage III    This note is part of the patient's medical record.

## 2025-06-02 ENCOUNTER — APPOINTMENT (OUTPATIENT)
Dept: CT IMAGING | Facility: HOSPITAL | Age: 80
End: 2025-06-02
Attending: EMERGENCY MEDICINE
Payer: MEDICARE

## 2025-06-02 ENCOUNTER — APPOINTMENT (OUTPATIENT)
Dept: GENERAL RADIOLOGY | Facility: HOSPITAL | Age: 80
End: 2025-06-02
Attending: EMERGENCY MEDICINE
Payer: MEDICARE

## 2025-06-02 ENCOUNTER — HOSPITAL ENCOUNTER (INPATIENT)
Facility: HOSPITAL | Age: 80
LOS: 8 days | Discharge: NURSING, REHAB OR LONG-TERM CARE | End: 2025-06-10
Attending: EMERGENCY MEDICINE | Admitting: HOSPITALIST
Payer: MEDICARE

## 2025-06-02 DIAGNOSIS — R53.1 WEAKNESS GENERALIZED: ICD-10-CM

## 2025-06-02 DIAGNOSIS — R29.6 FREQUENT FALLS: ICD-10-CM

## 2025-06-02 DIAGNOSIS — K44.9 LARGE HIATAL HERNIA: ICD-10-CM

## 2025-06-02 DIAGNOSIS — I21.4 NSTEMI (NON-ST ELEVATED MYOCARDIAL INFARCTION) (HCC): Primary | ICD-10-CM

## 2025-06-02 LAB
ALBUMIN SERPL-MCNC: 4.7 G/DL (ref 3.2–4.8)
ALBUMIN/GLOB SERPL: 1.9 {RATIO} (ref 1–2)
ALP LIVER SERPL-CCNC: 123 U/L (ref 55–142)
ALT SERPL-CCNC: 7 U/L (ref 10–49)
ANION GAP SERPL CALC-SCNC: 10 MMOL/L (ref 0–18)
APTT PPP: 29.8 SECONDS (ref 23–36)
AST SERPL-CCNC: 14 U/L (ref ?–34)
ATRIAL RATE: 84 BPM
BASOPHILS # BLD AUTO: 0.03 X10(3) UL (ref 0–0.2)
BASOPHILS NFR BLD AUTO: 0.3 %
BILIRUB SERPL-MCNC: 0.7 MG/DL (ref 0.2–1.1)
BILIRUB UR QL: NEGATIVE
BUN BLD-MCNC: 18 MG/DL (ref 9–23)
BUN/CREAT SERPL: 10.1 (ref 10–20)
CALCIUM BLD-MCNC: 9.9 MG/DL (ref 8.7–10.4)
CHLORIDE SERPL-SCNC: 107 MMOL/L (ref 98–112)
CHOLEST SERPL-MCNC: 164 MG/DL (ref ?–200)
CLARITY UR: CLEAR
CO2 SERPL-SCNC: 25 MMOL/L (ref 21–32)
CREAT BLD-MCNC: 1.78 MG/DL (ref 0.55–1.02)
DEPRECATED RDW RBC AUTO: 40.1 FL (ref 35.1–46.3)
EGFRCR SERPLBLD CKD-EPI 2021: 29 ML/MIN/1.73M2 (ref 60–?)
EOSINOPHIL # BLD AUTO: 0.01 X10(3) UL (ref 0–0.7)
EOSINOPHIL NFR BLD AUTO: 0.1 %
ERYTHROCYTE [DISTWIDTH] IN BLOOD BY AUTOMATED COUNT: 12.3 % (ref 11–15)
GLOBULIN PLAS-MCNC: 2.5 G/DL (ref 2–3.5)
GLUCOSE BLD-MCNC: 151 MG/DL (ref 70–99)
GLUCOSE UR-MCNC: >1000 MG/DL
HCT VFR BLD AUTO: 35.3 % (ref 35–48)
HDLC SERPL-MCNC: 60 MG/DL (ref 40–59)
HGB BLD-MCNC: 11.9 G/DL (ref 12–16)
HGB UR QL STRIP.AUTO: NEGATIVE
IMM GRANULOCYTES # BLD AUTO: 0.02 X10(3) UL (ref 0–1)
IMM GRANULOCYTES NFR BLD: 0.2 %
KETONES UR-MCNC: NEGATIVE MG/DL
LDLC SERPL CALC-MCNC: 77 MG/DL (ref ?–100)
LEUKOCYTE ESTERASE UR QL STRIP.AUTO: NEGATIVE
LYMPHOCYTES # BLD AUTO: 0.87 X10(3) UL (ref 1–4)
LYMPHOCYTES NFR BLD AUTO: 8.6 %
MCH RBC QN AUTO: 30.1 PG (ref 26–34)
MCHC RBC AUTO-ENTMCNC: 33.7 G/DL (ref 31–37)
MCV RBC AUTO: 89.1 FL (ref 80–100)
MONOCYTES # BLD AUTO: 0.43 X10(3) UL (ref 0.1–1)
MONOCYTES NFR BLD AUTO: 4.2 %
MRSA DNA SPEC QL NAA+PROBE: NEGATIVE
NEUTROPHILS # BLD AUTO: 8.78 X10 (3) UL (ref 1.5–7.7)
NEUTROPHILS # BLD AUTO: 8.78 X10(3) UL (ref 1.5–7.7)
NEUTROPHILS NFR BLD AUTO: 86.6 %
NITRITE UR QL STRIP.AUTO: NEGATIVE
NONHDLC SERPL-MCNC: 104 MG/DL (ref ?–130)
OSMOLALITY SERPL CALC.SUM OF ELEC: 299 MOSM/KG (ref 275–295)
P AXIS: 67 DEGREES
P-R INTERVAL: 150 MS
PH UR: 7.5 [PH] (ref 5–8)
PLATELET # BLD AUTO: 352 10(3)UL (ref 150–450)
POTASSIUM SERPL-SCNC: 3.9 MMOL/L (ref 3.5–5.1)
PROT SERPL-MCNC: 7.2 G/DL (ref 5.7–8.2)
Q-T INTERVAL: 450 MS
QRS DURATION: 140 MS
QTC CALCULATION (BEZET): 531 MS
R AXIS: -11 DEGREES
RBC # BLD AUTO: 3.96 X10(6)UL (ref 3.8–5.3)
SODIUM SERPL-SCNC: 142 MMOL/L (ref 136–145)
SP GR UR STRIP: 1.01 (ref 1–1.03)
T AXIS: 91 DEGREES
TRIGL SERPL-MCNC: 158 MG/DL (ref 30–149)
TROPONIN I SERPL HS-MCNC: 172 NG/L (ref ?–34)
TROPONIN I SERPL HS-MCNC: 424 NG/L (ref ?–34)
UROBILINOGEN UR STRIP-ACNC: NORMAL
VENTRICULAR RATE: 84 BPM
VLDLC SERPL CALC-MCNC: 25 MG/DL (ref 0–30)
WBC # BLD AUTO: 10.1 X10(3) UL (ref 4–11)

## 2025-06-02 PROCEDURE — 70450 CT HEAD/BRAIN W/O DYE: CPT | Performed by: EMERGENCY MEDICINE

## 2025-06-02 PROCEDURE — 72125 CT NECK SPINE W/O DYE: CPT | Performed by: EMERGENCY MEDICINE

## 2025-06-02 PROCEDURE — 71045 X-RAY EXAM CHEST 1 VIEW: CPT | Performed by: EMERGENCY MEDICINE

## 2025-06-02 PROCEDURE — 99223 1ST HOSP IP/OBS HIGH 75: CPT | Performed by: HOSPITALIST

## 2025-06-02 PROCEDURE — 74176 CT ABD & PELVIS W/O CONTRAST: CPT | Performed by: EMERGENCY MEDICINE

## 2025-06-02 RX ORDER — ASPIRIN 81 MG/1
81 TABLET ORAL DAILY
COMMUNITY

## 2025-06-02 RX ORDER — MORPHINE SULFATE 2 MG/ML
1 INJECTION, SOLUTION INTRAMUSCULAR; INTRAVENOUS EVERY 2 HOUR PRN
Status: DISCONTINUED | OUTPATIENT
Start: 2025-06-02 | End: 2025-06-03

## 2025-06-02 RX ORDER — HEPARIN SODIUM AND DEXTROSE 10000; 5 [USP'U]/100ML; G/100ML
INJECTION INTRAVENOUS CONTINUOUS
Status: DISCONTINUED | OUTPATIENT
Start: 2025-06-02 | End: 2025-06-04

## 2025-06-02 RX ORDER — ESCITALOPRAM OXALATE 10 MG/1
20 TABLET ORAL DAILY
Status: DISCONTINUED | OUTPATIENT
Start: 2025-06-02 | End: 2025-06-10

## 2025-06-02 RX ORDER — HEPARIN SODIUM AND DEXTROSE 10000; 5 [USP'U]/100ML; G/100ML
12 INJECTION INTRAVENOUS ONCE
Status: COMPLETED | OUTPATIENT
Start: 2025-06-02 | End: 2025-06-02

## 2025-06-02 RX ORDER — PANTOPRAZOLE SODIUM 40 MG/1
40 TABLET, DELAYED RELEASE ORAL
Status: DISCONTINUED | OUTPATIENT
Start: 2025-06-02 | End: 2025-06-03

## 2025-06-02 RX ORDER — TEMAZEPAM 15 MG/1
15 CAPSULE ORAL NIGHTLY PRN
Status: DISCONTINUED | OUTPATIENT
Start: 2025-06-02 | End: 2025-06-10

## 2025-06-02 RX ORDER — ONDANSETRON 2 MG/ML
4 INJECTION INTRAMUSCULAR; INTRAVENOUS EVERY 6 HOURS PRN
Status: DISCONTINUED | OUTPATIENT
Start: 2025-06-02 | End: 2025-06-02

## 2025-06-02 RX ORDER — GABAPENTIN 600 MG/1
600 TABLET ORAL 2 TIMES DAILY
Status: DISCONTINUED | OUTPATIENT
Start: 2025-06-02 | End: 2025-06-10

## 2025-06-02 RX ORDER — CARVEDILOL 3.12 MG/1
3.12 TABLET ORAL 2 TIMES DAILY WITH MEALS
Status: DISCONTINUED | OUTPATIENT
Start: 2025-06-02 | End: 2025-06-10

## 2025-06-02 RX ORDER — CITALOPRAM HYDROBROMIDE 40 MG/1
40 TABLET ORAL DAILY
COMMUNITY

## 2025-06-02 RX ORDER — MORPHINE SULFATE 4 MG/ML
4 INJECTION, SOLUTION INTRAMUSCULAR; INTRAVENOUS EVERY 2 HOUR PRN
Status: DISCONTINUED | OUTPATIENT
Start: 2025-06-02 | End: 2025-06-03

## 2025-06-02 RX ORDER — MV-MIN NO.113/IRON/FOLIC ACID 4.5 MG-2
1 CAPSULE ORAL DAILY
COMMUNITY

## 2025-06-02 RX ORDER — CALCIUM CARBONATE 500 MG/1
500 TABLET, CHEWABLE ORAL EVERY 6 HOURS PRN
Status: DISCONTINUED | OUTPATIENT
Start: 2025-06-02 | End: 2025-06-10

## 2025-06-02 RX ORDER — BUPROPION HYDROCHLORIDE 150 MG/1
150 TABLET ORAL DAILY
Status: DISCONTINUED | OUTPATIENT
Start: 2025-06-02 | End: 2025-06-10

## 2025-06-02 RX ORDER — BUPROPION HYDROCHLORIDE 300 MG/1
300 TABLET ORAL DAILY
COMMUNITY

## 2025-06-02 RX ORDER — MORPHINE SULFATE 2 MG/ML
2 INJECTION, SOLUTION INTRAMUSCULAR; INTRAVENOUS EVERY 2 HOUR PRN
Status: DISCONTINUED | OUTPATIENT
Start: 2025-06-02 | End: 2025-06-03

## 2025-06-02 RX ORDER — TIZANIDINE 2 MG/1
2 TABLET ORAL NIGHTLY PRN
Status: DISCONTINUED | OUTPATIENT
Start: 2025-06-02 | End: 2025-06-10

## 2025-06-02 RX ORDER — ATORVASTATIN CALCIUM 40 MG/1
40 TABLET, FILM COATED ORAL NIGHTLY
Status: DISCONTINUED | OUTPATIENT
Start: 2025-06-02 | End: 2025-06-10

## 2025-06-02 RX ORDER — BUPROPION HYDROCHLORIDE 150 MG/1
300 TABLET ORAL DAILY
Status: DISCONTINUED | OUTPATIENT
Start: 2025-06-02 | End: 2025-06-10

## 2025-06-02 RX ORDER — ISOSORBIDE DINITRATE 10 MG/1
10 TABLET ORAL 2 TIMES DAILY
Status: DISCONTINUED | OUTPATIENT
Start: 2025-06-02 | End: 2025-06-04

## 2025-06-02 RX ORDER — ASPIRIN 81 MG/1
324 TABLET, CHEWABLE ORAL ONCE
Status: COMPLETED | OUTPATIENT
Start: 2025-06-02 | End: 2025-06-02

## 2025-06-02 RX ORDER — TORSEMIDE 20 MG/1
40 TABLET ORAL DAILY
Status: DISCONTINUED | OUTPATIENT
Start: 2025-06-02 | End: 2025-06-03

## 2025-06-02 RX ORDER — ATORVASTATIN CALCIUM 40 MG/1
40 TABLET, FILM COATED ORAL
Status: DISCONTINUED | OUTPATIENT
Start: 2025-06-02 | End: 2025-06-02

## 2025-06-02 RX ORDER — SPIRONOLACTONE 25 MG/1
12.5 TABLET ORAL DAILY
Status: DISCONTINUED | OUTPATIENT
Start: 2025-06-02 | End: 2025-06-04

## 2025-06-02 RX ORDER — ROPINIROLE 0.5 MG/1
1 TABLET, FILM COATED ORAL 3 TIMES DAILY
Status: DISCONTINUED | OUTPATIENT
Start: 2025-06-02 | End: 2025-06-10

## 2025-06-02 RX ORDER — NITROGLYCERIN 0.4 MG/1
0.4 TABLET SUBLINGUAL EVERY 5 MIN PRN
Status: DISCONTINUED | OUTPATIENT
Start: 2025-06-02 | End: 2025-06-10

## 2025-06-02 RX ORDER — VENLAFAXINE HYDROCHLORIDE 37.5 MG/1
37.5 CAPSULE, EXTENDED RELEASE ORAL
Status: DISCONTINUED | OUTPATIENT
Start: 2025-06-03 | End: 2025-06-10

## 2025-06-02 RX ORDER — METOCLOPRAMIDE HYDROCHLORIDE 5 MG/ML
10 INJECTION INTRAMUSCULAR; INTRAVENOUS EVERY 8 HOURS PRN
Status: DISCONTINUED | OUTPATIENT
Start: 2025-06-02 | End: 2025-06-02

## 2025-06-02 RX ORDER — CARVEDILOL 3.12 MG/1
3.12 TABLET ORAL 2 TIMES DAILY WITH MEALS
COMMUNITY

## 2025-06-02 RX ORDER — ACETAMINOPHEN 500 MG
500 TABLET ORAL EVERY 4 HOURS PRN
Status: DISCONTINUED | OUTPATIENT
Start: 2025-06-02 | End: 2025-06-04

## 2025-06-02 RX ORDER — ASPIRIN 325 MG
325 TABLET ORAL DAILY
Status: DISCONTINUED | OUTPATIENT
Start: 2025-06-02 | End: 2025-06-10

## 2025-06-02 RX ORDER — BUPROPION HYDROCHLORIDE 150 MG/1
150 TABLET ORAL DAILY
COMMUNITY

## 2025-06-02 RX ORDER — HEPARIN SODIUM 1000 [USP'U]/ML
60 INJECTION, SOLUTION INTRAVENOUS; SUBCUTANEOUS ONCE
Status: COMPLETED | OUTPATIENT
Start: 2025-06-02 | End: 2025-06-02

## 2025-06-02 NOTE — H&P
[facility]    PATIENT'S NAME: RUZICKA, SUSAN P   ATTENDING PHYSICIAN: Sudeep Wells MD   PATIENT ACCOUNT#:   [id number]    LOCATION:  [address]  MEDICAL RECORD #:   [id number]       YOB: 1945  ADMISSION DATE:       06/02/2025    HISTORY AND PHYSICAL EXAMINATION    CHIEF COMPLAINT:  Possible non-ST elevation myocardial infarction.    HISTORY OF PRESENT ILLNESS:  The patient is a 80-year-old  female who came in to the emergency department for evaluation of epigastric and midsternal chest tightness on and off since this morning.  CBC and chemistry unremarkable.  EKG showed left bundle branch block, which is chronic, nonspecific ST-T changes noted on EKG.  No significant change from prior.  First troponin 172, second troponin 424.  The patient was initiated on IV heparin.  She will be admitted to the hospital for further management.  CT scan of the cervical spine and CT scan of the brain, both were negative.  CT scan of the abdomen showed large hiatal hernia without acute change.    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    PAST SURGICAL HISTORY:  Multiple lumbar laminectomies and fusions, spinal cord stimulator, 2 C-sections, hemicolectomy for diverticulitis.    MEDICATIONS:  Please see medication reconciliation list.     ALLERGIES:  No known drug allergies.    FAMILY HISTORY:  Positive for hypertension.    SOCIAL HISTORY:  No tobacco, alcohol, or drug use.    REVIEW OF SYSTEMS:  The patient describes epigastric discomfort associated with midsternal  chest discomfort associated with nausea since this morning, has been having difficulty with breathing.  The patient denies any vomiting.  Other 12-point review of systems negative.      PHYSICAL EXAMINATION:    GENERAL:  Alert, oriented to time, place, and person, restless, mild to moderate distress.   VITAL SIGNS:  Temperature 98.4.  Pulse 82.  Respiratory rate 24.  Blood pressure 139/80.  Pulse ox 96% on room air.  HEENT:  Atraumatic.  Oropharynx clear.  Dry mucous membranes.  NECK:  Supple.  No lymphadenopathy.  Trachea midline.  LUNGS:  Clear to auscultation bilaterally.  Normal respiratory effort.   HEART:  Regular rate and rhythm.  S1 and S2 auscultated.  No murmur.  ABDOMEN:  Soft, nondistended.  No tenderness.  Positive bowel sounds.  EXTREMITIES:  No peripheral edema, clubbing, or cyanosis.  NEUROLOGIC:  Motor and sensory intact.     ASSESSMENT:    1.   Epigastric and chest pain.  Rule out possible non-ST myocardial infarction.  2.   Large hiatal hernia.  Could be responsible for her symptoms.  3.   Essential hypertension.  4.   Chronic kidney disease stage 3 to 4 with acute on chronic component.    PLAN:  The patient will be admitted to telemetry floor.  Continue to trend troponin level.  Obtain Cardiology consult.  Start IV heparin.  Full dose aspirin.  Pain control.  Clear liquid diet.  Monitor her clinical status closely.  Nitroglycerin p.r.n.  Further recommendations to follow.     Dictated By Tarsha Yanes MD  d: 06/02/2025 15:41:35  t: 06/02/2025 16:05:45  Job [id number]  FB/

## 2025-06-02 NOTE — ED INITIAL ASSESSMENT (HPI)
Patient arrived via [facility] for fall , unsteady gait and difficulty walking. Per medics patient had fallen x2. Per medics, no LOC, denies head, neck, and back pain.Per medics she has restless legs. Per patient she was trying to turn while using her walker to get into bed and fell. Per patient she hit the right side of her head on the night stand. Per patient she fell once yesterday and twice today. Patient is a/o x3. Patient state her head and abdomen hurts. Patient has a bruise to the right side of back. Patient is able to answer questions without difficulty. Patient is restless. Patient is placed on the monitor with call light within reach.

## 2025-06-02 NOTE — ED PROVIDER NOTES
Patient Seen in: [facility] Emergency Department        History  Chief Complaint   Patient presents with    Fall     Stated Complaint: fall    Subjective:   HPI            80-year-old female with history of hypertension, hyperlipidemia, coronary artery disease post angioplasty and stent placement, congestive heart failure, chronic kidney disease, peripheral neuropathy, osteoporosis, and chronic back pain presents from [facility] with complaints of generalized weakness and multiple falls over the past 24 hours.  The patient reports generalized weakness and fatigue.  She reports that she is fallen 3 times beginning last evening.  She denies hitting her head when she fell and denies loss of consciousness.  She is without acute complaints from the falls.  She denies any known fever.  She denies cough or dyspnea.  No vomiting or diarrhea.      Objective:     Past Medical History:    Congestive heart disease (HCC)    Diverticulitis    Esophageal reflux    Fibromyalgia    High blood pressure    Osteoarthrosis, unspecified whether generalized or localized, unspecified site    RLS (restless legs syndrome)    Unspecified essential hypertension              Past Surgical History:   Procedure Laterality Date    Cath drug eluting stent      Excis lumbar disk,one level      Removal gallbladder                  Social History     Socioeconomic History    Marital status:    Tobacco Use    Smoking status: Former     Types: Cigarettes     Passive exposure: Past    Smokeless tobacco: Never   Substance and Sexual Activity    Alcohol use: No     Social Drivers of Health     Food Insecurity: Patient Declined (2/13/2025)    NCSS - Food Insecurity     Worried About Running Out of Food in the Last Year: Patient declined     Ran Out of Food in the Last Year: Patient declined   Transportation Needs: No Transportation Needs (5/13/2025)    Received from [employer]    OASIS : Transportation     Lack of  Transportation (Medical): No     Lack of Transportation (Non-Medical): No     Patient Unable or Declines to Respond: No   Housing Stability: Patient Declined (2/13/2025)    NCSS - Housing/Utilities     Has Housing: Patient declined     Worried About Losing Housing: Patient declined     Unable to Get Utilities: Patient declined                                Physical Exam    ED Triage Vitals [06/02/25 1209]   BP (!) 149/119   Pulse 86   Resp 24   Temp 98.4 °F (36.9 °C)   Temp src Oral   SpO2 95 %   O2 Device None (Room air)       Current Vitals:   Vital Signs  BP: 156/77  Pulse: 98  Resp: (!) 28  Temp: 98.4 °F (36.9 °C)  Temp src: Oral  MAP (mmHg): 99    Oxygen Therapy  SpO2: 92 %  O2 Device: None (Room air)            Physical Exam      General Appearance: alert, no distress  Eyes: pupils equal and round no injection  Respiratory: chest is non tender to palpation, breath sounds are equal  Cardiac: regular rate and rhythm  Gastrointestinal:  soft and non tender, there is no evidence of external or internal trauma by exam.  Neurological: Speech normal.  Motor and sensation is intact and symmetric to bilateral upper and lower extremities.  Skin: No laceration or abrasions.  Musculoskeletal                Head: atraumatic without scalp tenderness                Neck: The cervical spine is tender to palpation to the midline                Back: there is no thoracic or lumbar spine or paraspinal tenderness                Extremities are non tender to palpation and there is full range of motion of the joints.    DIFFERENTIAL DIAGNOSIS: after history and physical exam differential diagnosis was considered for trauma post fall including weakness including electrolyte abnormality, depression, anxiety, CVA, infectious causes, or other            ED Course  Labs Reviewed   CBC WITH DIFFERENTIAL WITH PLATELET - Abnormal; Notable for the following components:       Result Value    HGB 11.9 (*)     Neutrophil Absolute Prelim 8.78  (*)     Neutrophil Absolute 8.78 (*)     Lymphocyte Absolute 0.87 (*)     All other components within normal limits   COMP METABOLIC PANEL (14) - Abnormal; Notable for the following components:    Glucose 151 (*)     Creatinine 1.78 (*)     Calculated Osmolality 299 (*)     eGFR-Cr 29 (*)     ALT 7 (*)     All other components within normal limits   URINALYSIS WITH CULTURE REFLEX - Abnormal; Notable for the following components:    Glucose Urine >1000 (*)     Protein Urine Trace (*)     All other components within normal limits   TROPONIN I HIGH SENSITIVITY - Abnormal; Notable for the following components:    Troponin I (High Sensitivity) 172 (*)     All other components within normal limits   LIPID PANEL - Abnormal; Notable for the following components:    HDL Cholesterol 60 (*)     Triglycerides 158 (*)     All other components within normal limits   TROPONIN I HIGH SENSITIVITY - Abnormal; Notable for the following components:    Troponin I (High Sensitivity) 424 (*)     All other components within normal limits   PTT, ACTIVATED - Normal   RAINBOW DRAW BLUE   ED/MRSA SCREEN BY PCR-CC     EKG    Rate, intervals and axes as noted on EKG Report.  Rate: 84  Rhythm: Sinus Rhythm  Axis: Normal  Reading: Left bundle branch block, no significant change compared to EKG from 5/8/2024                              [facility]     Lab, x-ray, EKG, and CT results noted.  Patient with elevated troponin.  Discussed findings with the patient and she reports having anterior chest pain at present.  She denied chest pain on my initial history and examination.  Will give aspirin and start the patient on a heparin drip.  Will admit to cardiac telemetry for further evaluation and treatment.  Discussed with Dr. Pollack, cardiology.  He requests Lexiscan order be placed.  Also discussed with Dr. Yanes, hospitalist.    I spent a total of 30 minutes of critical care time in obtaining history, performing a physical exam, bedside monitoring of  interventions, collecting and interpreting tests and discussion with consultants but not including time spent performing procedures.      Admission disposition: 6/2/2025  3:34 PM           Medical Decision Making      Disposition and Plan     Clinical Impression:  1. NSTEMI (non-ST elevated myocardial infarction) (HCC)    2. Weakness generalized    3. Frequent falls         Disposition:  Admit  6/2/2025  3:34 pm    Follow-up:  No follow-up provider specified.  We recommend that you schedule follow up care with a primary care provider within the next three months to obtain basic health screening including reassessment of your blood pressure.      Medications Prescribed:  Current Discharge Medication List                Supplementary Documentation:         Hospital Problems       Present on Admission           ICD-10-CM Noted POA    * (Principal) Weakness generalized R53.1 6/2/2025 Unknown

## 2025-06-03 ENCOUNTER — APPOINTMENT (OUTPATIENT)
Dept: PICC SERVICES | Facility: HOSPITAL | Age: 80
End: 2025-06-03
Attending: HOSPITALIST
Payer: MEDICARE

## 2025-06-03 ENCOUNTER — APPOINTMENT (OUTPATIENT)
Dept: CV DIAGNOSTICS | Facility: HOSPITAL | Age: 80
End: 2025-06-03
Attending: INTERNAL MEDICINE
Payer: MEDICARE

## 2025-06-03 ENCOUNTER — APPOINTMENT (OUTPATIENT)
Dept: GENERAL RADIOLOGY | Facility: HOSPITAL | Age: 80
End: 2025-06-03
Attending: EMERGENCY MEDICINE
Payer: MEDICARE

## 2025-06-03 PROBLEM — Z78.9 DIFFICULT INTRAVENOUS ACCESS: Status: ACTIVE | Noted: 2025-06-03

## 2025-06-03 LAB
ALBUMIN SERPL-MCNC: 3.3 G/DL (ref 3.2–4.8)
ALBUMIN/GLOB SERPL: 1.6 {RATIO} (ref 1–2)
ALP LIVER SERPL-CCNC: 79 U/L (ref 55–142)
ALT SERPL-CCNC: 8 U/L (ref 10–49)
ANION GAP SERPL CALC-SCNC: 10 MMOL/L (ref 0–18)
ANION GAP SERPL CALC-SCNC: 15 MMOL/L (ref 0–18)
APTT PPP: 74.3 SECONDS (ref 23–36)
AST SERPL-CCNC: 26 U/L (ref ?–34)
BASOPHILS # BLD AUTO: 0.05 X10(3) UL (ref 0–0.2)
BASOPHILS NFR BLD AUTO: 0.3 %
BILIRUB SERPL-MCNC: 0.6 MG/DL (ref 0.2–1.1)
BUN BLD-MCNC: 19 MG/DL (ref 9–23)
BUN BLD-MCNC: 21 MG/DL (ref 9–23)
BUN/CREAT SERPL: 11.7 (ref 10–20)
BUN/CREAT SERPL: 13.3 (ref 10–20)
CALCIUM BLD-MCNC: 7.2 MG/DL (ref 8.7–10.4)
CALCIUM BLD-MCNC: 9.9 MG/DL (ref 8.7–10.4)
CHLORIDE SERPL-SCNC: 101 MMOL/L (ref 98–112)
CHLORIDE SERPL-SCNC: 107 MMOL/L (ref 98–112)
CO2 SERPL-SCNC: 22 MMOL/L (ref 21–32)
CO2 SERPL-SCNC: 23 MMOL/L (ref 21–32)
CREAT BLD-MCNC: 1.58 MG/DL (ref 0.55–1.02)
CREAT BLD-MCNC: 1.62 MG/DL (ref 0.55–1.02)
DEPRECATED RDW RBC AUTO: 41.5 FL (ref 35.1–46.3)
EGFRCR SERPLBLD CKD-EPI 2021: 32 ML/MIN/1.73M2 (ref 60–?)
EGFRCR SERPLBLD CKD-EPI 2021: 33 ML/MIN/1.73M2 (ref 60–?)
EOSINOPHIL # BLD AUTO: 0 X10(3) UL (ref 0–0.7)
EOSINOPHIL NFR BLD AUTO: 0 %
ERYTHROCYTE [DISTWIDTH] IN BLOOD BY AUTOMATED COUNT: 12.7 % (ref 11–15)
GLOBULIN PLAS-MCNC: 2.1 G/DL (ref 2–3.5)
GLUCOSE BLD-MCNC: 130 MG/DL (ref 70–99)
GLUCOSE BLD-MCNC: 94 MG/DL (ref 70–99)
HCT VFR BLD AUTO: 41.4 % (ref 35–48)
HGB BLD-MCNC: 13.7 G/DL (ref 12–16)
IMM GRANULOCYTES # BLD AUTO: 0.06 X10(3) UL (ref 0–1)
IMM GRANULOCYTES NFR BLD: 0.4 %
LYMPHOCYTES # BLD AUTO: 2.18 X10(3) UL (ref 1–4)
LYMPHOCYTES NFR BLD AUTO: 13.1 %
MCH RBC QN AUTO: 29.6 PG (ref 26–34)
MCHC RBC AUTO-ENTMCNC: 33.1 G/DL (ref 31–37)
MCV RBC AUTO: 89.4 FL (ref 80–100)
MONOCYTES # BLD AUTO: 0.86 X10(3) UL (ref 0.1–1)
MONOCYTES NFR BLD AUTO: 5.2 %
NEUTROPHILS # BLD AUTO: 13.51 X10 (3) UL (ref 1.5–7.7)
NEUTROPHILS # BLD AUTO: 13.51 X10(3) UL (ref 1.5–7.7)
NEUTROPHILS NFR BLD AUTO: 81 %
OSMOLALITY SERPL CALC.SUM OF ELEC: 291 MOSM/KG (ref 275–295)
OSMOLALITY SERPL CALC.SUM OF ELEC: 292 MOSM/KG (ref 275–295)
PLATELET # BLD AUTO: 438 10(3)UL (ref 150–450)
POTASSIUM SERPL-SCNC: 3.2 MMOL/L (ref 3.5–5.1)
POTASSIUM SERPL-SCNC: 3.4 MMOL/L (ref 3.5–5.1)
PROT SERPL-MCNC: 5.4 G/DL (ref 5.7–8.2)
RBC # BLD AUTO: 4.63 X10(6)UL (ref 3.8–5.3)
SODIUM SERPL-SCNC: 139 MMOL/L (ref 136–145)
SODIUM SERPL-SCNC: 139 MMOL/L (ref 136–145)
TROPONIN I SERPL HS-MCNC: 1914 NG/L (ref ?–34)
WBC # BLD AUTO: 16.7 X10(3) UL (ref 4–11)

## 2025-06-03 PROCEDURE — 99233 SBSQ HOSP IP/OBS HIGH 50: CPT | Performed by: HOSPITALIST

## 2025-06-03 PROCEDURE — 93306 TTE W/DOPPLER COMPLETE: CPT | Performed by: INTERNAL MEDICINE

## 2025-06-03 PROCEDURE — 71045 X-RAY EXAM CHEST 1 VIEW: CPT | Performed by: EMERGENCY MEDICINE

## 2025-06-03 RX ORDER — CHLORHEXIDINE GLUCONATE 40 MG/ML
SOLUTION TOPICAL
Status: COMPLETED | OUTPATIENT
Start: 2025-06-04 | End: 2025-06-04

## 2025-06-03 RX ORDER — POTASSIUM CHLORIDE 1500 MG/1
40 TABLET, EXTENDED RELEASE ORAL ONCE
Status: COMPLETED | OUTPATIENT
Start: 2025-06-03 | End: 2025-06-03

## 2025-06-03 RX ORDER — METOPROLOL TARTRATE 1 MG/ML
5 INJECTION, SOLUTION INTRAVENOUS EVERY 4 HOURS PRN
Status: DISCONTINUED | OUTPATIENT
Start: 2025-06-03 | End: 2025-06-10

## 2025-06-03 RX ORDER — MAGNESIUM HYDROXIDE/ALUMINUM HYDROXICE/SIMETHICONE 120; 1200; 1200 MG/30ML; MG/30ML; MG/30ML
30 SUSPENSION ORAL 4 TIMES DAILY PRN
Status: DISCONTINUED | OUTPATIENT
Start: 2025-06-03 | End: 2025-06-10

## 2025-06-03 RX ORDER — SODIUM CHLORIDE 9 MG/ML
INJECTION, SOLUTION INTRAVENOUS CONTINUOUS
Status: DISCONTINUED | OUTPATIENT
Start: 2025-06-03 | End: 2025-06-08

## 2025-06-03 NOTE — PROGRESS NOTES
[facility]  part of [facility]  Hospitalist Progress Note     Linda Gancka Patient Status:  Inpatient    1945  80 year old CSN [id number]   Location 303/303-A Attending Rick Chan MD   Hosp Day # 1 PCP ROLY KAUR     Assessment & Plan:   ----------------------------------  NSTEMI, Type I suspected. Heart failure not present. Cardiology consult appreciated  -Cardiac catheterization:    -Heparin drip  -Telemetry  -Echo: Pending  -Antiplatelets: asa  -Beta-blocker: Coreg  -Statin: lipitor  -ACE/ARB: per cards    CKD 3.  Creatinine slightly above baseline on admission, improved today.  Pretty much at baseline now.  - Continue IV fluids in anticipation of cardiac catheterization  - BMP in the morning    Nausea.  Unclear if this is related to anginal equivalent or not.  Patient claims to have had a paradoxical reaction to Tigan.  Options limited by QTc  - DC morphine  - Maalox  - Tums    Other problems  CAD  Hypertension  Hiatal hernia  Anemia of chronic kidney disease  Osteoarthritis  Restless leg syndrome  Chronic back pain    Supplementary Documentation:   DVT Mechanical Prophylaxis:        DVT Pharmacologic Prophylaxis   Medication    heparin (Porcine) 22030 units/250mL infusion ACS/AFIB CONTINUOUS    DVT Pharmacologic prophylaxis: Aspirin 325 mg    DVT Pharmacologic prophylaxis: Aspirin 81 mg      Code Status: Full Code  Lorenzo: External urinary catheter in place  Lorenzo Duration (in days):   Central line:    LAURI: 2025        **Certification      PHYSICIAN Certification of Need for Inpatient Hospitalization - Initial Certification    Patient will require inpatient services that will reasonably be expected to span two midnight's based on the clinical documentation in H+P.   Based on patients current state of illness, I anticipate that, after discharge, patient will require TBD.           I personally reviewed the available laboratories, imaging including. I discussed/will  discuss the case with consultants. I ordered laboratories and/or radiographic studies. I adjusted medications as detailed above.  Medical decision making high, risk is high.    Subjective:   ----------------------------------  Continues to have nausea.  Felt that Tigan made it worse.  No further chest pain.  Agreeable to angiogram.      Objective:   Chief Complaint:   Chief Complaint   Patient presents with    Fall     ----------------------------------  Temp:  [98 °F (36.7 °C)-98.9 °F (37.2 °C)] 98.9 °F (37.2 °C)  Pulse:  [] 94  Resp:  [16-28] 16  BP: (128-175)/() 143/94  SpO2:  [92 %-98 %] 95 %  Gen: A+Ox3.  No distress.   HEENT: NCAT, neck supple, no carotid bruit.  CV: RRR, S1S2, and intact distal pulses. No gallop, rub, murmur.  Pulm: Effort and breath sounds normal. No distress, wheezes, rales, rhonchi.  Abd: Soft, NTND, BS normal, no mass, no HSM, no rebound/guarding.   Neuro: Normal reflexes, CN. Sensory/motor exams grossly normal deficit.   MS: No joint effusions.  No peripheral edema.  Skin: Skin is warm and dry. No rashes, erythema, diaphoresis.   Psych: Normal mood and affect. Calm, cooperative    Labs:  Lab Results   Component Value Date    HGB 13.7 06/03/2025    WBC 16.7 (H) 06/03/2025    .0 06/03/2025     06/03/2025    K 3.2 (L) 06/03/2025    CREATSERUM 1.62 (H) 06/03/2025    AST 14 06/02/2025    ALT 7 (L) 06/02/2025    TROP <0.045 10/01/2020           Scheduled Medications[1]  PRN Medications[2]             [1]    pantoprazole  40 mg Intravenous Daily    aspirin  325 mg Oral Daily    buPROPion ER  150 mg Oral Daily    buPROPion ER  300 mg Oral Daily    carvedilol  3.125 mg Oral BID with meals    escitalopram  20 mg Oral Daily    gabapentin  600 mg Oral BID    isosorbide dinitrate  10 mg Oral BID    rOPINIRole  1 mg Oral TID    spironolactone  12.5 mg Oral Daily    venlafaxine ER  37.5 mg Oral Daily with breakfast    atorvastatin  40 mg Oral Nightly   [2]   metoprolol     alum-mag hydroxide-simethicone    nitroglycerin    acetaminophen    temazepam    tiZANidine    calcium carbonate

## 2025-06-03 NOTE — PLAN OF CARE
Patient is alert and oriented x4 on 2L of oxygen able to move x1 with walker. Heparin gtt infusing, IV fluids infusing. PRN medications given for nausea. Plan for cath tomorrow afternoon, patient aware and consent in chart.     Problem: Patient Centered Care  Goal: Patient preferences are identified and integrated in the patient's plan of care  Description: Interventions:  - What would you like us to know as we care for you? From [facility]  - Provide timely, complete, and accurate information to patient/family  - Incorporate patient and family knowledge, values, beliefs, and cultural backgrounds into the planning and delivery of care  - Encourage patient/family to participate in care and decision-making at the level they choose  - Honor patient and family perspectives and choices  Outcome: Progressing     Problem: Patient/Family Goals  Goal: Patient/Family Long Term Goal  Description: Patient's Long Term Goal: Go home    Interventions:  - Monitor labs  - Diagnostic testing  - Follow MD orders  - See additional Care Plan goals for specific interventions  Outcome: Progressing  Goal: Patient/Family Short Term Goal  Description: Patient's Short Term Goal: Decreased pain    Interventions:   - Pain management  - Diagnostic testing  - Follow MD orders  - See additional Care Plan goals for specific interventions  Outcome: Progressing     Problem: CARDIOVASCULAR - ADULT  Goal: Maintains optimal cardiac output and hemodynamic stability  Description: INTERVENTIONS:  - Monitor vital signs, rhythm, and trends  - Monitor for bleeding, hypotension and signs of decreased cardiac output  - Evaluate effectiveness of vasoactive medications to optimize hemodynamic stability  - Monitor arterial and/or venous puncture sites for bleeding and/or hematoma  - Assess quality of pulses, skin color and temperature  - Assess for signs of decreased coronary artery perfusion - ex. Angina  - Evaluate fluid balance, assess for edema,  trend weights  Outcome: Progressing     Problem: RESPIRATORY - ADULT  Goal: Achieves optimal ventilation and oxygenation  Description: INTERVENTIONS:  - Assess for changes in respiratory status  - Assess for changes in mentation and behavior  - Position to facilitate oxygenation and minimize respiratory effort  - Oxygen supplementation based on oxygen saturation or ABGs  - Provide Smoking Cessation handout, if applicable  - Encourage broncho-pulmonary hygiene including cough, deep breathe, Incentive Spirometry  - Assess the need for suctioning and perform as needed  - Assess and instruct to report SOB or any respiratory difficulty  - Respiratory Therapy support as indicated  - Manage/alleviate anxiety  - Monitor for signs/symptoms of CO2 retention  Outcome: Progressing     Problem: METABOLIC/FLUID AND ELECTROLYTES - ADULT  Goal: Electrolytes maintained within normal limits  Description: INTERVENTIONS:  - Monitor labs and rhythm and assess patient for signs and symptoms of electrolyte imbalances  - Administer electrolyte replacement as ordered  - Monitor response to electrolyte replacements, including rhythm and repeat lab results as appropriate  - Fluid restriction as ordered  - Instruct patient on fluid and nutrition restrictions as appropriate  Outcome: Progressing     Problem: HEMATOLOGIC - ADULT  Goal: Maintains hematologic stability  Description: INTERVENTIONS  - Assess for signs and symptoms of bleeding or hemorrhage  - Monitor labs and vital signs for trends  - Administer supportive blood products/factors, fluids and medications as ordered and appropriate  - Administer supportive blood products/factors as ordered and appropriate  Outcome: Progressing  Goal: Free from bleeding injury  Description: (Example usage: patient with low platelets)  INTERVENTIONS:  - Avoid intramuscular injections, enemas and rectal medication administration  - Ensure safe mobilization of patient  - Hold pressure on venipuncture sites  to achieve adequate hemostasis  - Assess for signs and symptoms of internal bleeding  - Monitor lab trends  - Patient is to report abnormal signs of bleeding to staff  - Avoid use of toothpicks and dental floss  - Use electric shaver for shaving  - Use soft bristle tooth brush  - Limit straining and forceful nose blowing  Outcome: Progressing     Problem: MUSCULOSKELETAL - ADULT  Goal: Return mobility to safest level of function  Description: INTERVENTIONS:  - Assess patient stability and activity tolerance for standing, transferring and ambulating w/ or w/o assistive devices  - Assist with transfers and ambulation using safe patient handling equipment as needed  - Ensure adequate protection for wounds/incisions during mobilization  - Obtain PT/OT consults as needed  - Advance activity as appropriate  - Communicate ordered activity level and limitations with patient/family  Outcome: Progressing

## 2025-06-03 NOTE — PLAN OF CARE
Susan Ruzicka is A&O x 4 with forgetfulness.  Lives at [facility].    Mod assist with ADLs.  Incontinent of bowel and bladder.  VS:  stable.  Pain: PRN morphine.  Plan:  pain management, heparin gtt, Lexiscan in AM  Bed in lowest position, alarms on, and call light within reach.  Continuation of care in place.    Problem: Patient Centered Care  Goal: Patient preferences are identified and integrated in the patient's plan of care  Description: Interventions:  - What would you like us to know as we care for you?   - Provide timely, complete, and accurate information to patient/family  - Incorporate patient and family knowledge, values, beliefs, and cultural backgrounds into the planning and delivery of care  - Encourage patient/family to participate in care and decision-making at the level they choose  - Honor patient and family perspectives and choices  Outcome: Progressing     Problem: Patient/Family Goals  Goal: Patient/Family Long Term Goal  Description: Patient's Long Term Goal: Discharge    Interventions:  - Plan of care compliance; medication management, care, and education   - See additional Care Plan goals for specific interventions  Outcome: Progressing  Goal: Patient/Family Short Term Goal  Description: Patient's Short Term Goal: remain free from falls    Interventions:   - Plan of care compliance; medication management, care, and education   - See additional Care Plan goals for specific interventions  Outcome: Progressing     Problem: CARDIOVASCULAR - ADULT  Goal: Maintains optimal cardiac output and hemodynamic stability  Description: INTERVENTIONS:  - Monitor vital signs, rhythm, and trends  - Monitor for bleeding, hypotension and signs of decreased cardiac output  - Evaluate effectiveness of vasoactive medications to optimize hemodynamic stability  - Monitor arterial and/or venous puncture sites for bleeding and/or hematoma  - Assess quality of pulses, skin color and temperature  - Assess for signs of  decreased coronary artery perfusion - ex. Angina  - Evaluate fluid balance, assess for edema, trend weights  Outcome: Progressing     Problem: RESPIRATORY - ADULT  Goal: Achieves optimal ventilation and oxygenation  Description: INTERVENTIONS:  - Assess for changes in respiratory status  - Assess for changes in mentation and behavior  - Position to facilitate oxygenation and minimize respiratory effort  - Oxygen supplementation based on oxygen saturation or ABGs  - Provide Smoking Cessation handout, if applicable  - Encourage broncho-pulmonary hygiene including cough, deep breathe, Incentive Spirometry  - Assess the need for suctioning and perform as needed  - Assess and instruct to report SOB or any respiratory difficulty  - Respiratory Therapy support as indicated  - Manage/alleviate anxiety  - Monitor for signs/symptoms of CO2 retention  Outcome: Progressing     Problem: METABOLIC/FLUID AND ELECTROLYTES - ADULT  Goal: Electrolytes maintained within normal limits  Description: INTERVENTIONS:  - Monitor labs and rhythm and assess patient for signs and symptoms of electrolyte imbalances  - Administer electrolyte replacement as ordered  - Monitor response to electrolyte replacements, including rhythm and repeat lab results as appropriate  - Fluid restriction as ordered  - Instruct patient on fluid and nutrition restrictions as appropriate  Outcome: Progressing     Problem: HEMATOLOGIC - ADULT  Goal: Maintains hematologic stability  Description: INTERVENTIONS  - Assess for signs and symptoms of bleeding or hemorrhage  - Monitor labs and vital signs for trends  - Administer supportive blood products/factors, fluids and medications as ordered and appropriate  - Administer supportive blood products/factors as ordered and appropriate  Outcome: Progressing  Goal: Free from bleeding injury  Description: (Example usage: patient with low platelets)  INTERVENTIONS:  - Avoid intramuscular injections, enemas and rectal  medication administration  - Ensure safe mobilization of patient  - Hold pressure on venipuncture sites to achieve adequate hemostasis  - Assess for signs and symptoms of internal bleeding  - Monitor lab trends  - Patient is to report abnormal signs of bleeding to staff  - Avoid use of toothpicks and dental floss  - Use electric shaver for shaving  - Use soft bristle tooth brush  - Limit straining and forceful nose blowing  Outcome: Progressing     Problem: MUSCULOSKELETAL - ADULT  Goal: Return mobility to safest level of function  Description: INTERVENTIONS:  - Assess patient stability and activity tolerance for standing, transferring and ambulating w/ or w/o assistive devices  - Assist with transfers and ambulation using safe patient handling equipment as needed  - Ensure adequate protection for wounds/incisions during mobilization  - Obtain PT/OT consults as needed  - Advance activity as appropriate  - Communicate ordered activity level and limitations with patient/family  Outcome: Progressing

## 2025-06-03 NOTE — PLAN OF CARE
Problem: Patient Centered Care  Goal: Patient preferences are identified and integrated in the patient's plan of care  Description: Interventions:  - What would you like us to know as we care for you? From [facility]  - Provide timely, complete, and accurate information to patient/family  - Incorporate patient and family knowledge, values, beliefs, and cultural backgrounds into the planning and delivery of care  - Encourage patient/family to participate in care and decision-making at the level they choose  - Honor patient and family perspectives and choices  Outcome: Progressing     Problem: Patient/Family Goals  Goal: Patient/Family Long Term Goal  Description: Patient's Long Term Goal: Go home    Interventions:  - Monitor labs  - Diagnostic testing  - Follow MD orders  - See additional Care Plan goals for specific interventions  Outcome: Progressing  Goal: Patient/Family Short Term Goal  Description: Patient's Short Term Goal: Decreased pain    Interventions:   - Pain management  - Diagnostic testing  - Follow MD orders  - See additional Care Plan goals for specific interventions  Outcome: Progressing     Problem: CARDIOVASCULAR - ADULT  Goal: Maintains optimal cardiac output and hemodynamic stability  Description: INTERVENTIONS:  - Monitor vital signs, rhythm, and trends  - Monitor for bleeding, hypotension and signs of decreased cardiac output  - Evaluate effectiveness of vasoactive medications to optimize hemodynamic stability  - Monitor arterial and/or venous puncture sites for bleeding and/or hematoma  - Assess quality of pulses, skin color and temperature  - Assess for signs of decreased coronary artery perfusion - ex. Angina  - Evaluate fluid balance, assess for edema, trend weights  Outcome: Progressing     Problem: RESPIRATORY - ADULT  Goal: Achieves optimal ventilation and oxygenation  Description: INTERVENTIONS:  - Assess for changes in respiratory status  - Assess for changes in mentation  and behavior  - Position to facilitate oxygenation and minimize respiratory effort  - Oxygen supplementation based on oxygen saturation or ABGs  - Provide Smoking Cessation handout, if applicable  - Encourage broncho-pulmonary hygiene including cough, deep breathe, Incentive Spirometry  - Assess the need for suctioning and perform as needed  - Assess and instruct to report SOB or any respiratory difficulty  - Respiratory Therapy support as indicated  - Manage/alleviate anxiety  - Monitor for signs/symptoms of CO2 retention  Outcome: Progressing     Problem: METABOLIC/FLUID AND ELECTROLYTES - ADULT  Goal: Electrolytes maintained within normal limits  Description: INTERVENTIONS:  - Monitor labs and rhythm and assess patient for signs and symptoms of electrolyte imbalances  - Administer electrolyte replacement as ordered  - Monitor response to electrolyte replacements, including rhythm and repeat lab results as appropriate  - Fluid restriction as ordered  - Instruct patient on fluid and nutrition restrictions as appropriate  Outcome: Progressing     Problem: HEMATOLOGIC - ADULT  Goal: Maintains hematologic stability  Description: INTERVENTIONS  - Assess for signs and symptoms of bleeding or hemorrhage  - Monitor labs and vital signs for trends  - Administer supportive blood products/factors, fluids and medications as ordered and appropriate  - Administer supportive blood products/factors as ordered and appropriate  Outcome: Progressing  Goal: Free from bleeding injury  Description: (Example usage: patient with low platelets)  INTERVENTIONS:  - Avoid intramuscular injections, enemas and rectal medication administration  - Ensure safe mobilization of patient  - Hold pressure on venipuncture sites to achieve adequate hemostasis  - Assess for signs and symptoms of internal bleeding  - Monitor lab trends  - Patient is to report abnormal signs of bleeding to staff  - Avoid use of toothpicks and dental floss  - Use electric  shaver for shaving  - Use soft bristle tooth brush  - Limit straining and forceful nose blowing  Outcome: Progressing     Problem: MUSCULOSKELETAL - ADULT  Goal: Return mobility to safest level of function  Description: INTERVENTIONS:  - Assess patient stability and activity tolerance for standing, transferring and ambulating w/ or w/o assistive devices  - Assist with transfers and ambulation using safe patient handling equipment as needed  - Ensure adequate protection for wounds/incisions during mobilization  - Obtain PT/OT consults as needed  - Advance activity as appropriate  - Communicate ordered activity level and limitations with patient/family  Outcome: Progressing

## 2025-06-03 NOTE — SPIRITUAL CARE NOTE
Spiritual Care Visit Note    Patient Name: Susan P Ruzicka Date of Spiritual Care Visit: 25   : 1945 Primary Dx: NSTEMI (non-ST elevated myocardial infarction) (HCC)     Referred By: Referral From: Nurse  Spiritual Care Taxonomy:  Intended Effects: Promote a sense of peace  Methods: Offer emotional support  Interventions: Acknowledge response to difficult experience, Active listening, Ask guided questions, Share words of hope and inspiration  Visit Type/Summary:   - Spiritual Care: Consulted with RN prior to visit. Offered empathic listening and emotional support.  offered if any spiritual support needed, but patient was too tired to continue the visit.  available as needed.    Spiritual Care support can be requested via an Epic consult. For urgent/immediate needs, please contact the On Call  at: [place]: ext [phone]   Rev Tash Felder

## 2025-06-03 NOTE — CONSULTS
HPI    Patient presented to the hospital with nausea, vomiting, epigastric/central chest heaviness, and dehydration. She reported falling three times in her apartment prior to admission. She was found to be in acute renal failure with significantly elevated troponin levels, starting at 172 and rising to 1,914. Patient also presented with marked hypertension with blood pressure readings of 171/95 and 171/102. She has had similar presentations in the past with non-ST elevation myocardial infarction with hypertensive emergency.    Past medical history: Hypertension, chronic diastolic heart failure, coronary artery disease status post percutaneous intervention to the mid right coronary artery (09/2023), chronic left bundle branch block, anemia of chronic disease, chronic kidney disease stage 3B  Allergies: None documented  Social history: Non-smoker, no alcohol use  Cardiac medications: Prinivil 10 mg BID, Toprol 12.5 mg daily    ROS    Patient denies abdominal pain but reports persistent nausea. Denies diarrhea, cough, congestion, fever, and chills. Pulmonary system otherwise negative. 12 point review of systems otherwise negative.    Physical Exam  Vitals: BP: 171/95 HR: Not documented  Gen: NAD HEENT: Oral mucosa moist. Lungs: CTA Heart: Normal rate. No murmur. Abdomen: Soft and non tender Extremities: No edema and warm to touch. Neuro: Alert and oriented. Psych: Appropriate mood. Calm affect. Skin: Normal perfusion. No rashes. Neck: Jvp not seen. Trachea midline.    Pertinent diagnostic findings: Creatinine elevated at 1.78, improved to 1.62. Troponin elevated at 172, rising to 1,914. Chest X-ray shows no active abnormalities.        Assessment / Plan  1. Non-ST Elevation Myocardial Infarction -  Patient is currently on heparin weight-based protocol, oral nitrates, atorvastatin 40 mg daily, and aspirin 325 mg daily. Plan to proceed with cardiac catheterization with possible intervention once renal function  improves.    I21.4 Non-ST elevation myocardial infarction        2. Acute Renal Failure -  Creatinine increased from baseline of 1.37 to 1.78, and improved to 1.62 this morning. Diuretics are being held. Patient started on IV fluids with normal saline at 83 ml/hour.    N17.9 Acute kidney failure, unspecified        3. Chronic Left Ventricular Heart Failure -  Evidence of dehydration with decreased oral intake.    I50.32 Chronic diastolic heart failure        4. Coronary Artery Disease -  Status post percutaneous intervention to mid right coronary artery in 09/2023.    I25.10 Atherosclerotic heart disease of native coronary artery without angina pectoris        Discussion Notes    Patient reported that medication prescribed for nausea yesterday \"did a number on me\" and made her feel \"twice as bad.\" Will defer to primary. Will proceed with cardiac catheterization tomorrow. Starting patient on saline hydration at 83 ml/hour.      Thank you for the consult.

## 2025-06-04 ENCOUNTER — APPOINTMENT (OUTPATIENT)
Dept: INTERVENTIONAL RADIOLOGY/VASCULAR | Facility: HOSPITAL | Age: 80
End: 2025-06-04
Attending: INTERNAL MEDICINE
Payer: MEDICARE

## 2025-06-04 LAB
ANION GAP SERPL CALC-SCNC: 8 MMOL/L (ref 0–18)
APTT PPP: 128.3 SECONDS (ref 23–36)
APTT PPP: 61.6 SECONDS (ref 23–36)
ATRIAL RATE: 57 BPM
BASOPHILS # BLD AUTO: 0.04 X10(3) UL (ref 0–0.2)
BASOPHILS # BLD AUTO: 0.06 X10(3) UL (ref 0–0.2)
BASOPHILS NFR BLD AUTO: 0.4 %
BASOPHILS NFR BLD AUTO: 0.4 %
BUN BLD-MCNC: 23 MG/DL (ref 9–23)
BUN/CREAT SERPL: 12.8 (ref 10–20)
CALCIUM BLD-MCNC: 7.9 MG/DL (ref 8.7–10.4)
CHLORIDE SERPL-SCNC: 104 MMOL/L (ref 98–112)
CO2 SERPL-SCNC: 25 MMOL/L (ref 21–32)
CREAT BLD-MCNC: 1.79 MG/DL (ref 0.55–1.02)
DEPRECATED RDW RBC AUTO: 42.8 FL (ref 35.1–46.3)
DEPRECATED RDW RBC AUTO: 43.1 FL (ref 35.1–46.3)
EGFRCR SERPLBLD CKD-EPI 2021: 29 ML/MIN/1.73M2 (ref 60–?)
EOSINOPHIL # BLD AUTO: 0.08 X10(3) UL (ref 0–0.7)
EOSINOPHIL # BLD AUTO: 0.25 X10(3) UL (ref 0–0.7)
EOSINOPHIL NFR BLD AUTO: 0.7 %
EOSINOPHIL NFR BLD AUTO: 1.9 %
ERYTHROCYTE [DISTWIDTH] IN BLOOD BY AUTOMATED COUNT: 12.8 % (ref 11–15)
ERYTHROCYTE [DISTWIDTH] IN BLOOD BY AUTOMATED COUNT: 12.8 % (ref 11–15)
GLUCOSE BLD-MCNC: 121 MG/DL (ref 70–99)
HCT VFR BLD AUTO: 30 % (ref 35–48)
HCT VFR BLD AUTO: 31 % (ref 35–48)
HGB BLD-MCNC: 10.2 G/DL (ref 12–16)
HGB BLD-MCNC: 9.9 G/DL (ref 12–16)
IMM GRANULOCYTES # BLD AUTO: 0.02 X10(3) UL (ref 0–1)
IMM GRANULOCYTES # BLD AUTO: 0.04 X10(3) UL (ref 0–1)
IMM GRANULOCYTES NFR BLD: 0.2 %
IMM GRANULOCYTES NFR BLD: 0.3 %
LYMPHOCYTES # BLD AUTO: 2.39 X10(3) UL (ref 1–4)
LYMPHOCYTES # BLD AUTO: 3.08 X10(3) UL (ref 1–4)
LYMPHOCYTES NFR BLD AUTO: 21.9 %
LYMPHOCYTES NFR BLD AUTO: 23.1 %
MCH RBC QN AUTO: 30.4 PG (ref 26–34)
MCH RBC QN AUTO: 30.4 PG (ref 26–34)
MCHC RBC AUTO-ENTMCNC: 32.9 G/DL (ref 31–37)
MCHC RBC AUTO-ENTMCNC: 33 G/DL (ref 31–37)
MCV RBC AUTO: 92 FL (ref 80–100)
MCV RBC AUTO: 92.3 FL (ref 80–100)
MONOCYTES # BLD AUTO: 0.71 X10(3) UL (ref 0.1–1)
MONOCYTES # BLD AUTO: 0.98 X10(3) UL (ref 0.1–1)
MONOCYTES NFR BLD AUTO: 6.5 %
MONOCYTES NFR BLD AUTO: 7.3 %
NEUTROPHILS # BLD AUTO: 7.65 X10 (3) UL (ref 1.5–7.7)
NEUTROPHILS # BLD AUTO: 7.65 X10(3) UL (ref 1.5–7.7)
NEUTROPHILS # BLD AUTO: 8.95 X10 (3) UL (ref 1.5–7.7)
NEUTROPHILS # BLD AUTO: 8.95 X10(3) UL (ref 1.5–7.7)
NEUTROPHILS NFR BLD AUTO: 67 %
NEUTROPHILS NFR BLD AUTO: 70.3 %
OSMOLALITY SERPL CALC.SUM OF ELEC: 289 MOSM/KG (ref 275–295)
P AXIS: 50 DEGREES
P-R INTERVAL: 156 MS
PLATELET # BLD AUTO: 261 10(3)UL (ref 150–450)
PLATELET # BLD AUTO: 305 10(3)UL (ref 150–450)
POTASSIUM SERPL-SCNC: 3.8 MMOL/L (ref 3.5–5.1)
POTASSIUM SERPL-SCNC: 3.8 MMOL/L (ref 3.5–5.1)
Q-T INTERVAL: 684 MS
QRS DURATION: 138 MS
QTC CALCULATION (BEZET): 665 MS
R AXIS: 1 DEGREES
RBC # BLD AUTO: 3.26 X10(6)UL (ref 3.8–5.3)
RBC # BLD AUTO: 3.36 X10(6)UL (ref 3.8–5.3)
SODIUM SERPL-SCNC: 137 MMOL/L (ref 136–145)
T AXIS: 204 DEGREES
VENTRICULAR RATE: 57 BPM
WBC # BLD AUTO: 10.9 X10(3) UL (ref 4–11)
WBC # BLD AUTO: 13.4 X10(3) UL (ref 4–11)

## 2025-06-04 PROCEDURE — B2111ZZ FLUOROSCOPY OF MULTIPLE CORONARY ARTERIES USING LOW OSMOLAR CONTRAST: ICD-10-PCS | Performed by: INTERNAL MEDICINE

## 2025-06-04 PROCEDURE — 99233 SBSQ HOSP IP/OBS HIGH 50: CPT | Performed by: HOSPITALIST

## 2025-06-04 PROCEDURE — 99223 1ST HOSP IP/OBS HIGH 75: CPT | Performed by: INTERNAL MEDICINE

## 2025-06-04 RX ORDER — HEPARIN SODIUM 1000 [USP'U]/ML
INJECTION, SOLUTION INTRAVENOUS; SUBCUTANEOUS
Status: COMPLETED
Start: 2025-06-04 | End: 2025-06-04

## 2025-06-04 RX ORDER — MIDAZOLAM HYDROCHLORIDE 1 MG/ML
INJECTION INTRAMUSCULAR; INTRAVENOUS
Status: COMPLETED
Start: 2025-06-04 | End: 2025-06-04

## 2025-06-04 RX ORDER — VERAPAMIL HYDROCHLORIDE 2.5 MG/ML
INJECTION INTRAVENOUS
Status: COMPLETED
Start: 2025-06-04 | End: 2025-06-04

## 2025-06-04 RX ORDER — ACETAMINOPHEN 500 MG
500 TABLET ORAL EVERY 4 HOURS PRN
Status: DISCONTINUED | OUTPATIENT
Start: 2025-06-04 | End: 2025-06-10

## 2025-06-04 RX ORDER — NITROGLYCERIN 20 MG/100ML
INJECTION INTRAVENOUS
Status: COMPLETED
Start: 2025-06-04 | End: 2025-06-04

## 2025-06-04 RX ORDER — IOPAMIDOL 612 MG/ML
12 INJECTION, SOLUTION INTRAVASCULAR
Status: COMPLETED | OUTPATIENT
Start: 2025-06-04 | End: 2025-06-04

## 2025-06-04 NOTE — CONSULTS
Initial Pulmonary/ICU/Critical Care Consultation        Reason for Consultation: NSTEMI, hypotension   Referring Physician: Dr. Bailon      HPI: 79 yo woman with hx of HFpEF, CAD, s/p RCA PCI in the past, CKD, HLP, RLS, neuropathy, HTN admitted with multiple falls from weakness, nausea, epigastric/chest discomfort concerning for NSTEMI based on EKG and symptoms.  Cardiology consulted and started on hep gtt. Plan for cardiac cath but this am transferred to the ICU for hypotension.   Started on levophed at 4 mg.  Cards aware of recent events.  Pt on NC, feels better.      REVIEW OF SYSTEMS:  Positives and negatives as noted in HPI. All other review of systems otherwise are either limited (due to pt/family inability to provide) or negative.      PAST MEDICAL HISTORY:  Past Medical History[1]      PAST SURGICAL HISTORY:  Past Surgical History[2]      PAST SOCIAL HISTORY:  Social Hx on file[3]      PAST FAMILY HISTORY:  Family History[4]      ALLERGIES:  Allergies[5]      MEDS:  Home Medications:  Medications Taking[6]    Scheduled Medication:  Scheduled Medications[7]  Continuous Infusing Medication:  Medication Infusions[8]  PRN Medications:  PRN Medications[9]       PHYSICAL EXAM:  /61   Pulse 64   Temp 97.7 °F (36.5 °C) (Temporal)   Resp 18   Wt 150 lb 4.8 oz (68.2 kg)   SpO2 95%   BMI 27.49 kg/m²      CONSTITUTIONAL: alert, oriented, no apparent distress  HEENT: atraumatic normocephalic  MOUTH: mucous membranes are moist. No OP exudates  NECK/THROAT: no JVD. Trachea midline. No obvious thyromegaly  LUNG: clear b/l no wheezing, crackles. Chest symmetric with respiratory motion  HEART: regular rate and rhythm, no obvious murmers or gallops noted  ABD: soft non tender. + bowel sounds. No organomegaly noted  EXT: no clubbing, cyanosis, or edema noted. Pulses intact grossly  NEURO/MUSCULOSKELETAL: no gross deficits  SKIN: warm, dry. No obvious lesions noted  LYMPH: no obvious LAD      IMAGES:   CT brain w/o  acute abnormalities    CT abd showed large HH, diverticulosis    CXR clear       LABS:  Recent Labs   Lab 06/03/25  0804 06/04/25  0004 06/04/25  0615   RBC 4.63 3.26* 3.36*   HGB 13.7 9.9* 10.2*   HCT 41.4 30.0* 31.0*   MCV 89.4 92.0 92.3   MCH 29.6 30.4 30.4   MCHC 33.1 33.0 32.9   RDW 12.7 12.8 12.8   NEPRELIM 13.51* 7.65 8.95*   WBC 16.7* 10.9 13.4*   .0 261.0 305.0       Recent Labs   Lab 06/02/25  1222 06/03/25  0804 06/03/25  2252 06/04/25  0509   * 130* 94 121*   BUN 18 19 21 23   CREATSERUM 1.78* 1.62* 1.58* 1.79*   EGFRCR 29* 32* 33* 29*   CA 9.9 9.9 7.2* 7.9*   ALB 4.7  --  3.3  --     139 139 137   K 3.9 3.2* 3.4* 3.8  3.8    101 107 104   CO2 25.0 23.0 22.0 25.0   ALKPHO 123  --  79  --    AST 14  --  26  --    ALT 7*  --  8*  --    BILT 0.7  --  0.6  --    TP 7.2  --  5.4*  --          ASSESSMENT/PLAN:  NSTEMI  -cardiology consulted  -on hep gtt, asa, statin  -holding bb for hypotension  -troponin up trending    Hypotension/shock   -likely cardiogenic shock  -continue levophed for MAP > 65  -check LA  -keep NPO  -IVF    Proph  -DVT: hep gtt    Dispo  -full code      Thank you for the opportunity to care for Susan P Ruzicka.      TARA Can DO, MPH  Pulmonary Critical Care Medicine  [facility]                         [1]   Past Medical History:  Diagnosis Date    Congestive heart disease (HCC)     Diverticulitis     Esophageal reflux     Fibromyalgia     High blood pressure     Osteoarthrosis, unspecified whether generalized or localized, unspecified site     RLS (restless legs syndrome)     Unspecified essential hypertension    [2]   Past Surgical History:  Procedure Laterality Date    Cath drug eluting stent      Excis lumbar disk,one level      Removal gallbladder     [3]   Social History  Socioeconomic History    Marital status:    Tobacco Use    Smoking status: Former     Types: Cigarettes     Passive exposure: Past     Smokeless tobacco: Never   Substance and Sexual Activity    Alcohol use: No   [4] No family history on file.  [5]   Allergies  Allergen Reactions    Bumetanide SWELLING    Hydromorphone OTHER (SEE COMMENTS)     States, \"doesn't work.\"  Not true allergy   [6]   Outpatient Medications Marked as Taking for the 6/2/25 encounter (Hospital Encounter)   Medication Sig Dispense Refill    aspirin 81 MG Oral Tab EC Take 1 tablet (81 mg total) by mouth daily.      carvedilol 3.125 MG Oral Tab Take 1 tablet (3.125 mg total) by mouth 2 (two) times daily with meals.      citalopram 40 MG Oral Tab Take 1 tablet (40 mg total) by mouth daily.      buPROPion  MG Oral Tablet 24 Hr Take 1 tablet (150 mg total) by mouth daily.      buPROPion  MG Oral Tablet 24 Hr Take 1 tablet (300 mg total) by mouth daily.      Alpha Lipoic Acid 200 MG Oral Cap Take 1 capsule by mouth daily.      venlafaxine ER 37.5 MG Oral Capsule SR 24 Hr Take 1 capsule (37.5 mg total) by mouth daily with breakfast. 30 capsule 0    dapagliflozin (FARXIGA) 10 MG Oral Tab Take 1 tablet (10 mg total) by mouth in the morning.      torsemide 20 MG Oral Tab Take 2 tablets (40 mg total) by mouth daily.      spironolactone 25 MG Oral Tab Take 1 tablet (25 mg total) by mouth daily. (Patient taking differently: Take 0.5 tablets (12.5 mg total) by mouth in the morning.)      gabapentin 600 MG Oral Tab Take 1 tablet (600 mg total) by mouth 2 (two) times daily.      isosorbide dinitrate 10 MG Oral Tab Take 1 tablet (10 mg total) by mouth in the morning and 1 tablet (10 mg total) before bedtime.      rOPINIRole 1 MG Oral Tab Take 1 tablet (1 mg total) by mouth 3 (three) times daily.  0    pantoprazole 40 MG Oral Tab EC Take 1 tablet (40 mg total) by mouth 2 (two) times daily before meals.      atorvastatin 40 MG Oral Tab Take 1 tablet (40 mg total) by mouth daily with dinner.      acetaminophen 500 MG Oral Tab Take 1 tablet (500 mg total) by mouth every 6 (six)  hours as needed for Pain.      tiZANidine HCl 2 MG Oral Tab Take 1 tablet (2 mg total) by mouth nightly as needed (muscle spasms).     [7]    pantoprazole  40 mg Intravenous Daily    aspirin  325 mg Oral Daily    buPROPion ER  150 mg Oral Daily    buPROPion ER  300 mg Oral Daily    carvedilol  3.125 mg Oral BID with meals    escitalopram  20 mg Oral Daily    gabapentin  600 mg Oral BID    rOPINIRole  1 mg Oral TID    venlafaxine ER  37.5 mg Oral Daily with breakfast    atorvastatin  40 mg Oral Nightly   [8]    norepinephrine 4 mcg/min (06/04/25 0400)    sodium chloride 83 mL/hr at 06/04/25 0649    continuous dose heparin 700 Units/hr (06/04/25 0730)   [9]   acetaminophen    metoprolol    alum-mag hydroxide-simethicone    nitroglycerin    temazepam    tiZANidine    calcium carbonate

## 2025-06-04 NOTE — OPERATIVE REPORT
Procedures performed:  Cardiac catheterization with coronary angiography 64921    Level of anesthesia/sedation: Moderate sedation (conscious sedation)  Anesthesia/sedation administered by: Nurse or other who has no other duties with continuous monitoring of the patient's level of consciousness and physiologic status, under the personal supervision of the attending physician.  Total intra-service sedation time: 20 minutes using Versed and Fentanyl.    Date of procedure:  06/04/2025    Complications:  None    Estimated blood loss:  Minimal    Specimens:  None    Preprocedural diagnosis:  ST elevation myocardial infarction    Postprocedure diagnosis:  Non-ST elevation myocardial infarction, non-ischemic cardiomyopathy, possible stress-induced cardiomyopathy    The patient was brought to the cardiac catheterization lab. Informed consent was obtained. The right wrist was prepped and draped in sterile fashion. Following light anesthesia and ultrasound guidance, right radial access was obtained. A 6 [place] sheath was inserted. The patient received heparin, verapamil, and nitroglycerin during the procedure. A TIG catheter was used for left coronary angiography. At the end of the procedure, a radial band was applied to achieve hemostasis. A significant effort was made to limit contrast administration due to acute renal failure, with a total of 15 mL of contrast used.      Findings:  Right coronary artery: Small vessel distally with 40-50% diffuse stenosis. Mid vessel stent is widely patent. Apically, very small vessel. Dominant vessel giving rise to a small posterior descending artery. Diffuse distal disease noted.    Left main: Short without obstruction. [place] are separate to the circumflex and left anterior descending artery. Mild disease appreciated.    Circumflex coronary artery: Very large vessel with minor luminal irregularities, very tortuous throughout. Gives rise to two marginal branches with mild ostial  disease.    Left anterior descending artery: Moderate sized, heavily calcified vessel with moderate diffuse disease, very tortuous. One major diagonal branch which is patent.    AGUILA 3 flow was noted in all vessels with no occlusions appreciated.    Interventional procedure:  No intervention performed.      Impression:  Right coronary artery stent patent and moderate disease of the coronary arteries, similar to previous episodes of non-ST elevation myocardial infarction. Findings are consistent with non-ischemic cardiomyopathy, possibly stress-induced cardiomyopathy with the current presentation. Will stop heparin weight-based protocol. Initiate heart failure therapy once blood pressure tolerates. Continue blood pressure support with pressors at this time.

## 2025-06-04 NOTE — BH PROGRESS NOTE
[facility] was consulted for PHQ-4 score = 8.     Writer met with pt at bedside. Pt calm and pleasant. Per chart review pt has a prescription of Wellbutrin XL, Effexor-XR and a hx of depression. Pt reports feeling depressed over the last 30 days due to interpersonal issues. Pt reports passive SI but no plan or intent. Pt will be followed by psych. Pt will continue care with current OP providers for psychiatry following discharge. Pt declined additional referrals for therapy and had no further questions/concerns at this time.

## 2025-06-04 NOTE — PROGRESS NOTES
Patient alert and oriented x4, on 2L NC. Report given to Cathryn REHMAN, transferred patient care, all questions answered.

## 2025-06-04 NOTE — PROGRESS NOTES
On call Nocturnist 06/03/25  Called to eval patient with low bp.  sBP 70's.  No fever.  Has vague 1/10 l sided chest pressure she reports she has had since prior to coming to hospital.  Denies SOB.  Had markedly elevated bp on arrival to hospital last night with sbp in 170 range.  Cardiology has seen her, placed on hepari and asa.    BP (!) 75/51 (BP Location: Left arm)   Pulse 56   Temp 97.9 °F (36.6 °C) (Oral)   Resp 16   Wt 150 lb 4.8 oz (68.2 kg)   SpO2 96%   BMI 27.49 kg/m²   Resting comfortably.  No distress.  Lungs cta bilateral.  Heart is regular .  Abd soft non tender.  She is awake and alert.    -will get stat EKG and labs  -notified Dr. Singh will transfer to ICU

## 2025-06-04 NOTE — PROGRESS NOTES
[facility]  part of [facility]  Hospitalist Progress Note     Susan P Ruzicka Patient Status:  Inpatient    1945  80 year old CSN [id number]   Location 303/303-A Attending Yaw Bailon MD   Hosp Day # 2 PCP ROLY KAUR     Assessment & Plan:   ----------------------------------  NSTEMI, Type I suspected. Cardiology consult appreciated  -Cardiac catheterization:  Scheduled for today  -Cont to monitor in ICU  -cont Heparin drip  -Telemetry  -Echo: Pending  -Antiplatelets: asa  -Beta-blocker: Coreg  -Statin: lipitor  -ACE/ARB: per cards    CKD 3.  Creatinine slightly above baseline on admission.   - Continue IV fluids in anticipation of cardiac catheterization  - BMP in the morning  - Cr up trended    Nausea.  Unclear if this is related to anginal equivalent or not. Options limited by Qtc  - DC morphine  - improved with Maalox  - cont Tums PRN    Other problems  CAD  Hypertension  Hiatal hernia  Anemia of chronic kidney disease  Osteoarthritis  Restless leg syndrome  Chronic back pain    Supplementary Documentation:   DVT Mechanical Prophylaxis:        DVT Pharmacologic Prophylaxis   Medication    heparin (Porcine) 61833 units/250mL infusion ACS/AFIB CONTINUOUS    DVT Pharmacologic prophylaxis: Aspirin 325 mg    DVT Pharmacologic prophylaxis: Aspirin 81 mg      Code Status: Full Code  Lorenzo: External urinary catheter in place  Lorenzo Duration (in days):   Central line:    LAURI: 2025    MDM: HIGH    Subjective:   ----------------------------------  Pt was seen and examined  Resting comfortably in bed  Earlier c/o some chest tightness  No nausea or vomiting  No acute distress      Objective:   Chief Complaint:   Chief Complaint   Patient presents with    Fall     ----------------------------------  Temp:  [97.2 °F (36.2 °C)-97.9 °F (36.6 °C)] 97.7 °F (36.5 °C)  Pulse:  [42-68] 59  Resp:  [10-22] 22  BP: ()/(46-92) 97/53  SpO2:  [93 %-100 %] 93 %  Gen: A+Ox3.  No distress.    HEENT: NCAT, neck supple, no carotid bruit.  CV: RRR, S1S2, and intact distal pulses. No gallop, rub, murmur.  Pulm: Effort and breath sounds normal. No distress, wheezes, rales, rhonchi.  Abd: Soft, NTND, BS normal, no mass, no HSM, no rebound/guarding.   Neuro: Normal reflexes, CN. Sensory/motor exams grossly normal deficit.   MS: No joint effusions.  No peripheral edema.  Skin: Skin is warm and dry. No rashes, erythema, diaphoresis.   Psych: Normal mood and affect. Calm, cooperative    Labs:  Lab Results   Component Value Date    HGB 10.2 (L) 06/04/2025    WBC 13.4 (H) 06/04/2025    .0 06/04/2025     06/04/2025    K 3.8 06/04/2025    K 3.8 06/04/2025    CREATSERUM 1.79 (H) 06/04/2025    AST 26 06/03/2025    ALT 8 (L) 06/03/2025    TROP <0.045 10/01/2020           Scheduled Medications[1]  PRN Medications[2]             [1]    pantoprazole  40 mg Intravenous Daily    aspirin  325 mg Oral Daily    buPROPion ER  150 mg Oral Daily    buPROPion ER  300 mg Oral Daily    carvedilol  3.125 mg Oral BID with meals    escitalopram  20 mg Oral Daily    gabapentin  600 mg Oral BID    rOPINIRole  1 mg Oral TID    venlafaxine ER  37.5 mg Oral Daily with breakfast    atorvastatin  40 mg Oral Nightly   [2]   acetaminophen    metoprolol    alum-mag hydroxide-simethicone    nitroglycerin    temazepam    tiZANidine    calcium carbonate

## 2025-06-04 NOTE — PLAN OF CARE
Problem: RESPIRATORY - ADULT  Goal: Achieves optimal ventilation and oxygenation  Description: INTERVENTIONS:  - Assess for changes in respiratory status  - Assess for changes in mentation and behavior  - Position to facilitate oxygenation and minimize respiratory effort  - Oxygen supplementation based on oxygen saturation or ABGs  - Provide Smoking Cessation handout, if applicable  - Encourage broncho-pulmonary hygiene including cough, deep breathe, Incentive Spirometry  - Assess the need for suctioning and perform as needed  - Assess and instruct to report SOB or any respiratory difficulty  - Respiratory Therapy support as indicated  - Manage/alleviate anxiety  - Monitor for signs/symptoms of CO2 retention  6/4/2025 0329 by Divya Valdes RN  Outcome: Progressing  6/4/2025 0326 by Divya Valdes RN  Outcome: Progressing     Problem: METABOLIC/FLUID AND ELECTROLYTES - ADULT  Goal: Electrolytes maintained within normal limits  Description: INTERVENTIONS:  - Monitor labs and rhythm and assess patient for signs and symptoms of electrolyte imbalances  - Administer electrolyte replacement as ordered  - Monitor response to electrolyte replacements, including rhythm and repeat lab results as appropriate  - Fluid restriction as ordered  - Instruct patient on fluid and nutrition restrictions as appropriate  Outcome: Progressing     Problem: PAIN - ADULT  Goal: Verbalizes/displays adequate comfort level or patient's stated pain goal  Description: INTERVENTIONS:  - Encourage pt to monitor pain and request assistance  - Assess pain using appropriate pain scale  - Administer analgesics based on type and severity of pain and evaluate response  - Implement non-pharmacological measures as appropriate and evaluate response  - Consider cultural and social influences on pain and pain management  - Manage/alleviate anxiety  - Utilize distraction and/or relaxation techniques  - Monitor for opioid side effects  - Notify MD/LIP  if interventions unsuccessful or patient reports new pain  - Anticipate increased pain with activity and pre-medicate as appropriate  Outcome: Progressing     Problem: RISK FOR INFECTION - ADULT  Goal: Absence of fever/infection during anticipated neutropenic period  Description: INTERVENTIONS  - Monitor WBC  - Administer growth factors as ordered  - Implement neutropenic guidelines  Outcome: Progressing     Problem: SAFETY ADULT - FALL  Goal: Free from fall injury  Description: INTERVENTIONS:  - Assess pt frequently for physical needs  - Identify cognitive and physical deficits and behaviors that affect risk of falls.  - [place] fall precautions as indicated by assessment.  - Educate pt/family on patient safety including physical limitations  - Instruct pt to call for assistance with activity based on assessment  - Modify environment to reduce risk of injury  - Provide assistive devices as appropriate  - Consider OT/PT consult to assist with strengthening/mobility  - Encourage toileting schedule  Outcome: Progressing     Problem: GASTROINTESTINAL - ADULT  Goal: Minimal or absence of nausea and vomiting  Description: INTERVENTIONS:  - Maintain adequate hydration with IV or PO as ordered and tolerated  - Nasogastric tube to low intermittent suction as ordered  - Evaluate effectiveness of ordered antiemetic medications  - Provide nonpharmacologic comfort measures as appropriate  - Advance diet as tolerated, if ordered  - Obtain nutritional consult as needed  - Evaluate fluid balance  Outcome: Progressing     Problem: SKIN/TISSUE INTEGRITY - ADULT  Goal: Skin integrity remains intact  Description: INTERVENTIONS  - Assess and document risk factors for pressure ulcer development  - Assess and document skin integrity  - Monitor for areas of redness and/or skin breakdown  - Initiate interventions, skin care algorithm/standards of care as needed  Outcome: Progressing     Problem: Impaired Functional Mobility  Goal:  Achieve highest/safest level of mobility/gait  Description: Interventions:  - Assess patient's functional ability and stability  - Promote increasing activity/tolerance for mobility and gait  - Educate and engage patient/family in tolerated activity level and precautions  - Recommend use of  RW for transfers and ambulation  Outcome: Progressing

## 2025-06-04 NOTE — PROGRESS NOTES
Patient transferring to room 211. Report given to ORI Corona. All questions answered and patient sent with all personal belongings.

## 2025-06-04 NOTE — PROGRESS NOTES
Transfer to ICU for hypotension. DW ICU doc last night, RN. CCT 35 min. Denies cp or sob. States nausea is better. No abd pain. No cough/congestion. BP has been stable now.     Vitals:    06/04/25 0700   BP: 112/59   Pulse: 56   Resp: 19   Temp:        Intake/Output Summary (Last 24 hours) at 6/4/2025 0759  Last data filed at 6/4/2025 0522  Gross per 24 hour   Intake 4422.7 ml   Output 1600 ml   Net 2822.7 ml     Wt Readings from Last 1 Encounters:   06/03/25 150 lb 4.8 oz (68.2 kg)        General: No acute distress.  Neck: Jugular venous pulsations not seen.  Lungs: Clear to auscultation.  Heart: Normal rate. MAGI.  Abdomen: Soft. Non tender  Extremities: No edema.  Neurological: Alert. No focal deficits.  Psychiatric: Appropriate mood and affect.  Current Hospital Medications[1]  Prescriptions Prior to Admission[2]  CT SPINE CERVICAL (CPT=72125)  Result Date: 6/2/2025  CONCLUSION:   Degenerative disc, facet, uncovertebral joint disease throughout the cervical spine without acute fracture or dislocation..    Dictated by (CST): Celestine Geiger MD on 6/02/2025 at 3:19 PM     Finalized by (CST): Celestine Geiger MD on 6/02/2025 at 3:21 PM          CT BRAIN OR HEAD (CPT=70450)  Result Date: 6/2/2025  CONCLUSION:  Mild chronic microvascular ischemic disease without acute intracranial abnormality.   Dictated by (CST): Celestine Geiger MD on 6/02/2025 at 3:13 PM     Finalized by (CST): Celestine Geiger MD on 6/02/2025 at 3:19 PM          CT ABDOMEN+PELVIS(CPT=74176)  Result Date: 6/2/2025  CONCLUSION:   Large hiatal hernia containing the entire stomach, unchanged.  Diverticulosis without diverticulitis.  Chronic pancreatitis    Dictated by (CST): Celestine Geiger MD on 6/02/2025 at 2:57 PM     Finalized by (CST): Celestine Geiger MD on 6/02/2025 at 3:01 PM          XR CHEST AP PORTABLE  (CPT=71045)  Result Date: 6/2/2025  CONCLUSION:  1. No acute cardiopulmonary process. 2. Stable large retrocardiac hiatal hernia.    Dictated by (CST):  Khang Romero MD on 6/02/2025 at 1:37 PM     Finalized by (CST): Khang Romero MD on 6/02/2025 at 1:39 PM            Lab Results   Component Value Date    WBC 13.4 06/04/2025    HGB 10.2 06/04/2025    HCT 31.0 06/04/2025    .0 06/04/2025    CREATSERUM 1.79 06/04/2025    BUN 23 06/04/2025     06/04/2025    K 3.8 06/04/2025    K 3.8 06/04/2025     06/04/2025    CO2 25.0 06/04/2025     06/04/2025    CA 7.9 06/04/2025    ALB 3.3 06/03/2025    ALKPHO 79 06/03/2025    BILT 0.6 06/03/2025    TP 5.4 06/03/2025    AST 26 06/03/2025    ALT 8 06/03/2025    .3 06/04/2025       HPI     Patient presented to the hospital with nausea, vomiting, epigastric/central chest heaviness, and dehydration. She reported falling three times in her apartment prior to admission. She was found to be in acute renal failure with significantly elevated troponin levels, starting at 172 and rising to 1,914. Patient also presented with marked hypertension with blood pressure readings of 171/95 and 171/102. She has had similar presentations in the past with non-ST elevation myocardial infarction with hypertensive emergency.     Past medical history: Hypertension, chronic diastolic heart failure, coronary artery disease status post percutaneous intervention to the mid right coronary artery (09/2023), chronic left bundle branch block, anemia of chronic disease, chronic kidney disease stage 3B  Allergies: None documented  Social history: Non-smoker, no alcohol use  Cardiac medications: Prinivil 10 mg BID, Toprol 12.5 mg daily     ROS     Patient denies abdominal pain but reports persistent nausea. Denies diarrhea, cough, congestion, fever, and chills. Pulmonary system otherwise negative. 12 point review of systems otherwise negative.     Physical Exam  Vitals: BP: 171/95 HR: Not documented  Gen: NAD HEENT: Oral mucosa moist. Lungs: CTA Heart: Normal rate. No murmur. Abdomen: Soft and non tender Extremities: No  edema and warm to touch. Neuro: Alert and oriented. Psych: Appropriate mood. Calm affect. Skin: Normal perfusion. No rashes. Neck: Jvp not seen. Trachea midline.     Pertinent diagnostic findings: Creatinine elevated at 1.78, improved to 1.62. Troponin elevated at 172, rising to 1,914. Chest X-ray shows no active abnormalities.           Assessment / Plan  1. Non-ST Elevation Myocardial Infarction -  Patient is currently on heparin weight-based protocol, oral nitrates, atorvastatin 40 mg daily, and aspirin 325 mg daily. Plan to proceed with cardiac catheterization with possible intervention once renal function improves.     I21.4 Non-ST elevation myocardial infarction           2. Acute Renal Failure -  Creatinine increased from baseline of 1.37 to 1.78, and improved to 1.62 this morning. Diuretics are being held. Patient started on IV fluids with normal saline at 83 ml/hour.     N17.9 Acute kidney failure, unspecified           3. Chronic Left Ventricular Heart Failure -  Evidence of dehydration with decreased oral intake.     I50.32 Chronic diastolic heart failure           4. Coronary Artery Disease -  Status post percutaneous intervention to mid right coronary artery in 09/2023.     I25.10 Atherosclerotic heart disease of native coronary artery without angina pectoris           Discussion Notes     Worsening renal function. Will give 0.9% NS bolus 500 mL x 1. Try to wean off pressors. No new symptoms overnight. No reported cp, sob, nausea is better. Plan for cath today.         [1]   Current Facility-Administered Medications   Medication Dose Route Frequency    norepinephrine (Levophed) 4 mg/250mL infusion premix  0.5-50 mcg/min Intravenous Continuous    sodium chloride 0.9 % IV bolus 500 mL  500 mL Intravenous Once    metoprolol (Lopressor) 5 mg/5mL injection 5 mg  5 mg Intravenous Q4H PRN    sodium chloride 0.9% infusion   Intravenous Continuous    alum-mag hydroxide-simethicone (Maalox) 200-200-20 MG/5ML oral  suspension 30 mL  30 mL Oral QID PRN    pantoprazole (Protonix) 40 mg in sodium chloride 0.9% PF 10 mL IV push  40 mg Intravenous Daily    heparin (Porcine) 52081 units/250mL infusion ACS/AFIB CONTINUOUS  200-3,000 Units/hr Intravenous Continuous    aspirin tab 325 mg  325 mg Oral Daily    nitroglycerin (Nitrostat) SL tab 0.4 mg  0.4 mg Sublingual Q5 Min PRN    acetaminophen (Tylenol Extra Strength) tab 500 mg  500 mg Oral Q4H PRN    temazepam (Restoril) cap 15 mg  15 mg Oral Nightly PRN    buPROPion ER (Wellbutrin XL) 24 hr tab 150 mg  150 mg Oral Daily    buPROPion ER (Wellbutrin XL) 24 hr tab 300 mg  300 mg Oral Daily    carvedilol (Coreg) tab 3.125 mg  3.125 mg Oral BID with meals    escitalopram (Lexapro) tab 20 mg  20 mg Oral Daily    gabapentin (Neurontin) tab 600 mg  600 mg Oral BID    isosorbide dinitrate (Isordil) tab 10 mg  10 mg Oral BID    rOPINIRole (Requip) tab 1 mg  1 mg Oral TID    spironolactone (Aldactone) partial tab 12.5 mg  12.5 mg Oral Daily    tiZANidine (Zanaflex) tab 2 mg  2 mg Oral Nightly PRN    venlafaxine ER (Effexor-XR) 24 hr cap 37.5 mg  37.5 mg Oral Daily with breakfast    atorvastatin (Lipitor) tab 40 mg  40 mg Oral Nightly    calcium carbonate (Tums) chewable tab 500 mg  500 mg Oral Q6H PRN   [2]   Medications Prior to Admission   Medication Sig    aspirin 81 MG Oral Tab EC Take 1 tablet (81 mg total) by mouth daily.    carvedilol 3.125 MG Oral Tab Take 1 tablet (3.125 mg total) by mouth 2 (two) times daily with meals.    citalopram 40 MG Oral Tab Take 1 tablet (40 mg total) by mouth daily.    buPROPion  MG Oral Tablet 24 Hr Take 1 tablet (150 mg total) by mouth daily.    buPROPion  MG Oral Tablet 24 Hr Take 1 tablet (300 mg total) by mouth daily.    Alpha Lipoic Acid 200 MG Oral Cap Take 1 capsule by mouth daily.    venlafaxine ER 37.5 MG Oral Capsule SR 24 Hr Take 1 capsule (37.5 mg total) by mouth daily with breakfast.    dapagliflozin (FARXIGA) 10 MG Oral Tab Take 1  tablet (10 mg total) by mouth in the morning.    torsemide 20 MG Oral Tab Take 2 tablets (40 mg total) by mouth daily.    spironolactone 25 MG Oral Tab Take 1 tablet (25 mg total) by mouth daily. (Patient taking differently: Take 0.5 tablets (12.5 mg total) by mouth in the morning.)    gabapentin 600 MG Oral Tab Take 1 tablet (600 mg total) by mouth 2 (two) times daily.    isosorbide dinitrate 10 MG Oral Tab Take 1 tablet (10 mg total) by mouth in the morning and 1 tablet (10 mg total) before bedtime.    rOPINIRole 1 MG Oral Tab Take 1 tablet (1 mg total) by mouth 3 (three) times daily.    pantoprazole 40 MG Oral Tab EC Take 1 tablet (40 mg total) by mouth 2 (two) times daily before meals.    atorvastatin 40 MG Oral Tab Take 1 tablet (40 mg total) by mouth daily with dinner.    acetaminophen 500 MG Oral Tab Take 1 tablet (500 mg total) by mouth every 6 (six) hours as needed for Pain.    tiZANidine HCl 2 MG Oral Tab Take 1 tablet (2 mg total) by mouth nightly as needed (muscle spasms).    prochlorperazine (COMPAZINE) 10 mg tablet Take 1 tablet (10 mg total) by mouth every 6 (six) hours as needed for Nausea.

## 2025-06-05 LAB
ALBUMIN SERPL-MCNC: 3.5 G/DL (ref 3.2–4.8)
ANION GAP SERPL CALC-SCNC: 7 MMOL/L (ref 0–18)
BASOPHILS # BLD AUTO: 0.06 X10(3) UL (ref 0–0.2)
BASOPHILS NFR BLD AUTO: 0.5 %
BILIRUB UR QL: NEGATIVE
BUN BLD-MCNC: 24 MG/DL (ref 9–23)
BUN/CREAT SERPL: 13 (ref 10–20)
CALCIUM BLD-MCNC: 8.1 MG/DL (ref 8.7–10.4)
CHLORIDE SERPL-SCNC: 106 MMOL/L (ref 98–112)
CO2 SERPL-SCNC: 23 MMOL/L (ref 21–32)
COLOR UR: YELLOW
CORTIS SERPL-MCNC: 12.8 UG/DL
CREAT BLD-MCNC: 1.85 MG/DL (ref 0.55–1.02)
DEPRECATED RDW RBC AUTO: 44.6 FL (ref 35.1–46.3)
EGFRCR SERPLBLD CKD-EPI 2021: 28 ML/MIN/1.73M2 (ref 60–?)
EOSINOPHIL # BLD AUTO: 0.57 X10(3) UL (ref 0–0.7)
EOSINOPHIL NFR BLD AUTO: 4.7 %
ERYTHROCYTE [DISTWIDTH] IN BLOOD BY AUTOMATED COUNT: 12.9 % (ref 11–15)
GLUCOSE BLD-MCNC: 98 MG/DL (ref 70–99)
GLUCOSE UR-MCNC: 300 MG/DL
HCT VFR BLD AUTO: 29.5 % (ref 35–48)
HGB BLD-MCNC: 9.4 G/DL (ref 12–16)
IMM GRANULOCYTES # BLD AUTO: 0.04 X10(3) UL (ref 0–1)
IMM GRANULOCYTES NFR BLD: 0.3 %
KETONES UR-MCNC: NEGATIVE MG/DL
LEUKOCYTE ESTERASE UR QL STRIP.AUTO: 500
LYMPHOCYTES # BLD AUTO: 1.71 X10(3) UL (ref 1–4)
LYMPHOCYTES NFR BLD AUTO: 14.1 %
MAGNESIUM SERPL-MCNC: 1.8 MG/DL (ref 1.6–2.6)
MCH RBC QN AUTO: 30.2 PG (ref 26–34)
MCHC RBC AUTO-ENTMCNC: 31.9 G/DL (ref 31–37)
MCV RBC AUTO: 94.9 FL (ref 80–100)
MONOCYTES # BLD AUTO: 0.92 X10(3) UL (ref 0.1–1)
MONOCYTES NFR BLD AUTO: 7.6 %
MRSA DNA SPEC QL NAA+PROBE: NEGATIVE
NEUTROPHILS # BLD AUTO: 8.86 X10 (3) UL (ref 1.5–7.7)
NEUTROPHILS # BLD AUTO: 8.86 X10(3) UL (ref 1.5–7.7)
NEUTROPHILS NFR BLD AUTO: 72.8 %
OSMOLALITY SERPL CALC.SUM OF ELEC: 286 MOSM/KG (ref 275–295)
PH UR: 5.5 [PH] (ref 5–8)
PHOSPHATE SERPL-MCNC: 3.9 MG/DL (ref 2.4–5.1)
PLATELET # BLD AUTO: 245 10(3)UL (ref 150–450)
POTASSIUM SERPL-SCNC: 4 MMOL/L (ref 3.5–5.1)
PROT UR-MCNC: 20 MG/DL
RBC # BLD AUTO: 3.11 X10(6)UL (ref 3.8–5.3)
RBC #/AREA URNS AUTO: >10 /HPF
SODIUM SERPL-SCNC: 136 MMOL/L (ref 136–145)
SP GR UR STRIP: 1.01 (ref 1–1.03)
UROBILINOGEN UR STRIP-ACNC: NORMAL
WBC # BLD AUTO: 12.2 X10(3) UL (ref 4–11)
WBC #/AREA URNS AUTO: >50 /HPF
WBC CLUMPS UR QL AUTO: PRESENT /HPF

## 2025-06-05 PROCEDURE — 99233 SBSQ HOSP IP/OBS HIGH 50: CPT | Performed by: INTERNAL MEDICINE

## 2025-06-05 PROCEDURE — 99233 SBSQ HOSP IP/OBS HIGH 50: CPT | Performed by: HOSPITALIST

## 2025-06-05 RX ORDER — HEPARIN SODIUM 5000 [USP'U]/ML
5000 INJECTION, SOLUTION INTRAVENOUS; SUBCUTANEOUS EVERY 8 HOURS SCHEDULED
Status: DISCONTINUED | OUTPATIENT
Start: 2025-06-05 | End: 2025-06-10

## 2025-06-05 RX ORDER — MIDODRINE HYDROCHLORIDE 5 MG/1
5 TABLET ORAL 3 TIMES DAILY
Status: DISCONTINUED | OUTPATIENT
Start: 2025-06-05 | End: 2025-06-07

## 2025-06-05 RX ORDER — MAGNESIUM OXIDE 400 MG/1
400 TABLET ORAL ONCE
Status: COMPLETED | OUTPATIENT
Start: 2025-06-05 | End: 2025-06-05

## 2025-06-05 NOTE — PLAN OF CARE
Problem: METABOLIC/FLUID AND ELECTROLYTES - ADULT  Goal: Electrolytes maintained within normal limits  Description: INTERVENTIONS:  - Monitor labs and rhythm and assess patient for signs and symptoms of electrolyte imbalances  - Administer electrolyte replacement as ordered  - Monitor response to electrolyte replacements, including rhythm and repeat lab results as appropriate  - Fluid restriction as ordered  - Instruct patient on fluid and nutrition restrictions as appropriate  Outcome: Progressing     Problem: HEMATOLOGIC - ADULT  Goal: Free from bleeding injury  Description: (Example usage: patient with low platelets)  INTERVENTIONS:  - Avoid intramuscular injections, enemas and rectal medication administration  - Ensure safe mobilization of patient  - Hold pressure on venipuncture sites to achieve adequate hemostasis  - Assess for signs and symptoms of internal bleeding  - Monitor lab trends  - Patient is to report abnormal signs of bleeding to staff  - Avoid use of toothpicks and dental floss  - Use electric shaver for shaving  - Use soft bristle tooth brush  - Limit straining and forceful nose blowing  Outcome: Progressing     Problem: MUSCULOSKELETAL - ADULT  Goal: Return mobility to safest level of function  Description: INTERVENTIONS:  - Assess patient stability and activity tolerance for standing, transferring and ambulating w/ or w/o assistive devices  - Assist with transfers and ambulation using safe patient handling equipment as needed  - Ensure adequate protection for wounds/incisions during mobilization  - Obtain PT/OT consults as needed  - Advance activity as appropriate  - Communicate ordered activity level and limitations with patient/family  Outcome: Progressing     Problem: SAFETY ADULT - FALL  Goal: Free from fall injury  Description: INTERVENTIONS:  - Assess pt frequently for physical needs  - Identify cognitive and physical deficits and behaviors that affect risk of falls.  - [place] fall  precautions as indicated by assessment.  - Educate pt/family on patient safety including physical limitations  - Instruct pt to call for assistance with activity based on assessment  - Modify environment to reduce risk of injury  - Provide assistive devices as appropriate  - Consider OT/PT consult to assist with strengthening/mobility  - Encourage toileting schedule  Outcome: Progressing     Problem: SKIN/TISSUE INTEGRITY - ADULT  Goal: Skin integrity remains intact  Description: INTERVENTIONS  - Assess and document risk factors for pressure ulcer development  - Assess and document skin integrity  - Monitor for areas of redness and/or skin breakdown  - Initiate interventions, skin care algorithm/standards of care as needed  Outcome: Progressing     Problem: Impaired Functional Mobility  Goal: Achieve highest/safest level of mobility/gait  Description: Interventions:  - Assess patient's functional ability and stability  - Promote increasing activity/tolerance for mobility and gait  - Educate and engage patient/family in tolerated activity level and precautions    Outcome: Progressing

## 2025-06-05 NOTE — PLAN OF CARE
Problem: Patient Centered Care  Goal: Patient preferences are identified and integrated in the patient's plan of care  Description: Interventions:  - What would you like us to know as we care for you? From Replaced by [facility]  - Provide timely, complete, and accurate information to patient/family  - Incorporate patient and family knowledge, values, beliefs, and cultural backgrounds into the planning and delivery of care  - Encourage patient/family to participate in care and decision-making at the level they choose  - Honor patient and family perspectives and choices  Outcome: Progressing     Problem: CARDIOVASCULAR - ADULT  Goal: Maintains optimal cardiac output and hemodynamic stability  Description: INTERVENTIONS:  - Monitor vital signs, rhythm, and trends  - Monitor for bleeding, hypotension and signs of decreased cardiac output  - Evaluate effectiveness of vasoactive medications to optimize hemodynamic stability  - Monitor arterial and/or venous puncture sites for bleeding and/or hematoma  - Assess quality of pulses, skin color and temperature  - Assess for signs of decreased coronary artery perfusion - ex. Angina  - Evaluate fluid balance, assess for edema, trend weights  Outcome: Progressing     Problem: RESPIRATORY - ADULT  Goal: Achieves optimal ventilation and oxygenation  Description: INTERVENTIONS:  - Assess for changes in respiratory status  - Assess for changes in mentation and behavior  - Position to facilitate oxygenation and minimize respiratory effort  - Oxygen supplementation based on oxygen saturation or ABGs  - Provide Smoking Cessation handout, if applicable  - Encourage broncho-pulmonary hygiene including cough, deep breathe, Incentive Spirometry  - Assess the need for suctioning and perform as needed  - Assess and instruct to report SOB or any respiratory difficulty  - Respiratory Therapy support as indicated  - Manage/alleviate anxiety  - Monitor for signs/symptoms of CO2  retention  Outcome: Progressing     Problem: HEMATOLOGIC - ADULT  Goal: Free from bleeding injury  Description: (Example usage: patient with low platelets)  INTERVENTIONS:  - Avoid intramuscular injections, enemas and rectal medication administration  - Ensure safe mobilization of patient  - Hold pressure on venipuncture sites to achieve adequate hemostasis  - Assess for signs and symptoms of internal bleeding  - Monitor lab trends  - Patient is to report abnormal signs of bleeding to staff  - Avoid use of toothpicks and dental floss  - Use electric shaver for shaving  - Use soft bristle tooth brush  - Limit straining and forceful nose blowing  Outcome: Progressing     Problem: SAFETY ADULT - FALL  Goal: Free from fall injury  Description: INTERVENTIONS:  - Assess pt frequently for physical needs  - Identify cognitive and physical deficits and behaviors that affect risk of falls.  - [place] fall precautions as indicated by assessment.  - Educate pt/family on patient safety including physical limitations  - Instruct pt to call for assistance with activity based on assessment  - Modify environment to reduce risk of injury  - Provide assistive devices as appropriate  - Consider OT/PT consult to assist with strengthening/mobility  - Encourage toileting schedule  Outcome: Progressing     Problem: GASTROINTESTINAL - ADULT  Goal: Minimal or absence of nausea and vomiting  Description: INTERVENTIONS:  - Maintain adequate hydration with IV or PO as ordered and tolerated  - Nasogastric tube to low intermittent suction as ordered  - Evaluate effectiveness of ordered antiemetic medications  - Provide nonpharmacologic comfort measures as appropriate  - Advance diet as tolerated, if ordered  - Obtain nutritional consult as needed  - Evaluate fluid balance  Outcome: Progressing     Problem: SKIN/TISSUE INTEGRITY - ADULT  Goal: Skin integrity remains intact  Description: INTERVENTIONS  - Assess and document risk factors for  pressure ulcer development  - Assess and document skin integrity  - Monitor for areas of redness and/or skin breakdown  - Initiate interventions, skin care algorithm/standards of care as needed  Outcome: Progressing

## 2025-06-05 NOTE — PLAN OF CARE
Pt A&OX4 on 2L NC overnight. Levo titrated off overnight.     Bed locked in lowest position, call light within reach. Safety maintained.   Problem: Patient Centered Care  Goal: Patient preferences are identified and integrated in the patient's plan of care  Description: Interventions:  - What would you like us to know as we care for you? From [facility]  - Provide timely, complete, and accurate information to patient/family  - Incorporate patient and family knowledge, values, beliefs, and cultural backgrounds into the planning and delivery of care  - Encourage patient/family to participate in care and decision-making at the level they choose  - Honor patient and family perspectives and choices  Outcome: Progressing     Problem: Patient/Family Goals  Goal: Patient/Family Long Term Goal  Description: Patient's Long Term Goal: Go home    Interventions:  - Monitor labs  - Diagnostic testing  - Follow MD orders  - See additional Care Plan goals for specific interventions  Outcome: Progressing  Goal: Patient/Family Short Term Goal  Description: Patient's Short Term Goal: Decreased pain    Interventions:   - Pain management  - Diagnostic testing  - Follow MD orders  - See additional Care Plan goals for specific interventions  Outcome: Progressing     Problem: RESPIRATORY - ADULT  Goal: Achieves optimal ventilation and oxygenation  Description: INTERVENTIONS:  - Assess for changes in respiratory status  - Assess for changes in mentation and behavior  - Position to facilitate oxygenation and minimize respiratory effort  - Oxygen supplementation based on oxygen saturation or ABGs  - Provide Smoking Cessation handout, if applicable  - Encourage broncho-pulmonary hygiene including cough, deep breathe, Incentive Spirometry  - Assess the need for suctioning and perform as needed  - Assess and instruct to report SOB or any respiratory difficulty  - Respiratory Therapy support as indicated  - Manage/alleviate anxiety  -  Monitor for signs/symptoms of CO2 retention  Outcome: Progressing     Problem: METABOLIC/FLUID AND ELECTROLYTES - ADULT  Goal: Electrolytes maintained within normal limits  Description: INTERVENTIONS:  - Monitor labs and rhythm and assess patient for signs and symptoms of electrolyte imbalances  - Administer electrolyte replacement as ordered  - Monitor response to electrolyte replacements, including rhythm and repeat lab results as appropriate  - Fluid restriction as ordered  - Instruct patient on fluid and nutrition restrictions as appropriate  Outcome: Progressing     Problem: HEMATOLOGIC - ADULT  Goal: Maintains hematologic stability  Description: INTERVENTIONS  - Assess for signs and symptoms of bleeding or hemorrhage  - Monitor labs and vital signs for trends  - Administer supportive blood products/factors, fluids and medications as ordered and appropriate  - Administer supportive blood products/factors as ordered and appropriate  Outcome: Progressing  Goal: Free from bleeding injury  Description: (Example usage: patient with low platelets)  INTERVENTIONS:  - Avoid intramuscular injections, enemas and rectal medication administration  - Ensure safe mobilization of patient  - Hold pressure on venipuncture sites to achieve adequate hemostasis  - Assess for signs and symptoms of internal bleeding  - Monitor lab trends  - Patient is to report abnormal signs of bleeding to staff  - Avoid use of toothpicks and dental floss  - Use electric shaver for shaving  - Use soft bristle tooth brush  - Limit straining and forceful nose blowing  Outcome: Progressing     Problem: MUSCULOSKELETAL - ADULT  Goal: Return mobility to safest level of function  Description: INTERVENTIONS:  - Assess patient stability and activity tolerance for standing, transferring and ambulating w/ or w/o assistive devices  - Assist with transfers and ambulation using safe patient handling equipment as needed  - Ensure adequate protection for  wounds/incisions during mobilization  - Obtain PT/OT consults as needed  - Advance activity as appropriate  - Communicate ordered activity level and limitations with patient/family  Outcome: Progressing     Problem: PAIN - ADULT  Goal: Verbalizes/displays adequate comfort level or patient's stated pain goal  Description: INTERVENTIONS:  - Encourage pt to monitor pain and request assistance  - Assess pain using appropriate pain scale  - Administer analgesics based on type and severity of pain and evaluate response  - Implement non-pharmacological measures as appropriate and evaluate response  - Consider cultural and social influences on pain and pain management  - Manage/alleviate anxiety  - Utilize distraction and/or relaxation techniques  - Monitor for opioid side effects  - Notify MD/LIP if interventions unsuccessful or patient reports new pain  - Anticipate increased pain with activity and pre-medicate as appropriate  Outcome: Progressing     Problem: RISK FOR INFECTION - ADULT  Goal: Absence of fever/infection during anticipated neutropenic period  Description: INTERVENTIONS  - Monitor WBC  - Administer growth factors as ordered  - Implement neutropenic guidelines  Outcome: Progressing     Problem: SAFETY ADULT - FALL  Goal: Free from fall injury  Description: INTERVENTIONS:  - Assess pt frequently for physical needs  - Identify cognitive and physical deficits and behaviors that affect risk of falls.  - [place] fall precautions as indicated by assessment.  - Educate pt/family on patient safety including physical limitations  - Instruct pt to call for assistance with activity based on assessment  - Modify environment to reduce risk of injury  - Provide assistive devices as appropriate  - Consider OT/PT consult to assist with strengthening/mobility  - Encourage toileting schedule  Outcome: Progressing     Problem: GASTROINTESTINAL - ADULT  Goal: Minimal or absence of nausea and vomiting  Description:  INTERVENTIONS:  - Maintain adequate hydration with IV or PO as ordered and tolerated  - Nasogastric tube to low intermittent suction as ordered  - Evaluate effectiveness of ordered antiemetic medications  - Provide nonpharmacologic comfort measures as appropriate  - Advance diet as tolerated, if ordered  - Obtain nutritional consult as needed  - Evaluate fluid balance  Outcome: Progressing     Problem: SKIN/TISSUE INTEGRITY - ADULT  Goal: Skin integrity remains intact  Description: INTERVENTIONS  - Assess and document risk factors for pressure ulcer development  - Assess and document skin integrity  - Monitor for areas of redness and/or skin breakdown  - Initiate interventions, skin care algorithm/standards of care as needed  Outcome: Progressing     Problem: Impaired Functional Mobility  Goal: Achieve highest/safest level of mobility/gait  Description: Interventions:  - Assess patient's functional ability and stability  - Promote increasing activity/tolerance for mobility and gait  - Educate and engage patient/family in tolerated activity level and precautions  - Recommend use of chair position in bed 3 times per day  Outcome: Progressing     Problem: Impaired Activities of Daily Living  Goal: Achieve highest/safest level of independence in self care  Description: Interventions:  - Assess ability and encourage patient to participate in ADLs to maximize function  - Promote sitting position while performing ADLs such as feeding, grooming, and bathing  - Educate and encourage patient/family in tolerated functional activity level and precautions during self-care  Outcome: Progressing     Problem: CARDIOVASCULAR - ADULT  Goal: Maintains optimal cardiac output and hemodynamic stability  Description: INTERVENTIONS:  - Monitor vital signs, rhythm, and trends  - Monitor for bleeding, hypotension and signs of decreased cardiac output  - Evaluate effectiveness of vasoactive medications to optimize hemodynamic stability  -  Monitor arterial and/or venous puncture sites for bleeding and/or hematoma  - Assess quality of pulses, skin color and temperature  - Assess for signs of decreased coronary artery perfusion - ex. Angina  - Evaluate fluid balance, assess for edema, trend weights  Outcome: Not Progressing

## 2025-06-05 NOTE — PROGRESS NOTES
Pulmonary/ICU/Critical Care Progress Note        Reason for Consultation: NSTEMI, hypotension   Referring Physician: Dr. Bailon    Subjective:  Went for cath on 6/4/25. No intervention done. Patent RCA stent and moderate CAD similar to previous  Weaned off levophed  On 2 LNC  Blood pressures are low  Pt still has similar epigastric discomfort      From the initial consultation  HPI: 79 yo woman with hx of HFpEF, CAD, s/p RCA PCI in the past, CKD, HLP, RLS, neuropathy, HTN admitted with multiple falls from weakness, nausea, epigastric/chest discomfort concerning for NSTEMI based on EKG and symptoms.  Cardiology consulted and started on hep gtt. Plan for cardiac cath but this am transferred to the ICU for hypotension.   Started on levophed at 4 mg.  Cards aware of recent events.  Pt on NC, feels better.      REVIEW OF SYSTEMS:  Positives and negatives as noted in HPI. All other review of systems otherwise are either limited (due to pt/family inability to provide) or negative.      PAST MEDICAL HISTORY:  Past Medical History[1]      PAST SURGICAL HISTORY:  Past Surgical History[2]      PAST SOCIAL HISTORY:  Social Hx on file[3]      PAST FAMILY HISTORY:  Family History[4]      ALLERGIES:  Allergies[5]      MEDS:  Home Medications:  Medications Taking[6]    Scheduled Medication:  Scheduled Medications[7]  Continuous Infusing Medication:  Medication Infusions[8]  PRN Medications:  PRN Medications[9]       PHYSICAL EXAM:  BP 95/77 (BP Location: Left arm)   Pulse 63   Temp 97.3 °F (36.3 °C) (Temporal)   Resp 16   Ht 5' 1\" (1.549 m)   Wt 150 lb 4.8 oz (68.2 kg)   SpO2 97%   BMI 28.40 kg/m²      CONSTITUTIONAL: alert, oriented, no apparent distress  HEENT: atraumatic normocephalic  MOUTH: mucous membranes are moist. No OP exudates  NECK/THROAT: no JVD. Trachea midline. No obvious thyromegaly  LUNG: clear b/l no wheezing, faint basilar crackles. Chest symmetric with respiratory motion  HEART: regular rate and rhythm,  no obvious murmers or gallops noted  ABD: soft non tender. + bowel sounds. No organomegaly noted  EXT: no clubbing, cyanosis, or edema noted      IMAGES:   Cardiac cath 6/4/25  Impression:  Right coronary artery stent patent and moderate disease of the coronary arteries, similar to previous episodes of non-ST elevation myocardial infarction. Findings are consistent with non-ischemic cardiomyopathy, possibly stress-induced cardiomyopathy with the current presentation. Will stop heparin weight-based protocol. Initiate heart failure therapy once blood pressure tolerates. Continue blood pressure support with pressors at this time.    CT brain w/o acute abnormalities    CT abd showed large HH, diverticulosis    CXR clear       LABS:  Recent Labs   Lab 06/04/25  0004 06/04/25  0615 06/05/25  0447   RBC 3.26* 3.36* 3.11*   HGB 9.9* 10.2* 9.4*   HCT 30.0* 31.0* 29.5*   MCV 92.0 92.3 94.9   MCH 30.4 30.4 30.2   MCHC 33.0 32.9 31.9   RDW 12.8 12.8 12.9   NEPRELIM 7.65 8.95* 8.86*   WBC 10.9 13.4* 12.2*   .0 305.0 245.0       Recent Labs   Lab 06/02/25  1222 06/03/25  0804 06/03/25  2252 06/04/25  0509 06/05/25  0447   *   < > 94 121* 98   BUN 18   < > 21 23 24*   CREATSERUM 1.78*   < > 1.58* 1.79* 1.85*   EGFRCR 29*   < > 33* 29* 28*   CA 9.9   < > 7.2* 7.9* 8.1*   ALB 4.7  --  3.3  --  3.5      < > 139 137 136   K 3.9   < > 3.4* 3.8  3.8 4.0      < > 107 104 106   CO2 25.0   < > 22.0 25.0 23.0   ALKPHO 123  --  79  --   --    AST 14  --  26  --   --    ALT 7*  --  8*  --   --    BILT 0.7  --  0.6  --   --    TP 7.2  --  5.4*  --   --     < > = values in this interval not displayed.       ASSESSMENT/PLAN:  NSTEMI  -cardiology consulted  -went for cath on 6/4/25 w/o intervention-see above. Stable CAD seen  -asa, statin  -hep gtt stopped  -holding bb for hypotension  -consider diuresis when BP is more stable    Hypotension/shock   -likely cardiogenic shock  -weaned off levophed for MAP > 65 but may  need to restart this am  -check LA-1.2  -on IVF  -if cardiology feels shock is not explained by her cardiac issues, then recommend checking infectious workup and starting empiric abx  -consider starting low dose midodrine    Proph  -DVT: hep subcutaneous defer to primary     Dispo  -full code      Thank you for the opportunity to care for Susan P RuzickaDeni Can DO, MPH  Pulmonary Critical Care Medicine  [facility] Pulmonary and Critical Care Medicine                         [1]   Past Medical History:  Diagnosis Date    Congestive heart disease (HCC)     Diverticulitis     Esophageal reflux     Fibromyalgia     High blood pressure     Osteoarthrosis, unspecified whether generalized or localized, unspecified site     RLS (restless legs syndrome)     Unspecified essential hypertension    [2]   Past Surgical History:  Procedure Laterality Date    Cath drug eluting stent      Excis lumbar disk,one level      Removal gallbladder     [3]   Social History  Socioeconomic History    Marital status:    Tobacco Use    Smoking status: Former     Types: Cigarettes     Passive exposure: Past    Smokeless tobacco: Never   Substance and Sexual Activity    Alcohol use: No   [4] No family history on file.  [5]   Allergies  Allergen Reactions    Bumetanide SWELLING    Hydromorphone OTHER (SEE COMMENTS)     States, \"doesn't work.\"  Not true allergy   [6]   Outpatient Medications Marked as Taking for the 6/2/25 encounter (Hospital Encounter)   Medication Sig Dispense Refill    aspirin 81 MG Oral Tab EC Take 1 tablet (81 mg total) by mouth daily.      carvedilol 3.125 MG Oral Tab Take 1 tablet (3.125 mg total) by mouth 2 (two) times daily with meals.      citalopram 40 MG Oral Tab Take 1 tablet (40 mg total) by mouth daily.      buPROPion  MG Oral Tablet 24 Hr Take 1 tablet (150 mg total) by mouth daily.      buPROPion  MG Oral Tablet 24 Hr Take 1 tablet (300 mg total) by mouth daily.      Alpha  Lipoic Acid 200 MG Oral Cap Take 1 capsule by mouth daily.      venlafaxine ER 37.5 MG Oral Capsule SR 24 Hr Take 1 capsule (37.5 mg total) by mouth daily with breakfast. 30 capsule 0    dapagliflozin (FARXIGA) 10 MG Oral Tab Take 1 tablet (10 mg total) by mouth in the morning.      torsemide 20 MG Oral Tab Take 2 tablets (40 mg total) by mouth daily.      spironolactone 25 MG Oral Tab Take 1 tablet (25 mg total) by mouth daily. (Patient taking differently: Take 0.5 tablets (12.5 mg total) by mouth in the morning.)      gabapentin 600 MG Oral Tab Take 1 tablet (600 mg total) by mouth 2 (two) times daily.      isosorbide dinitrate 10 MG Oral Tab Take 1 tablet (10 mg total) by mouth in the morning and 1 tablet (10 mg total) before bedtime.      rOPINIRole 1 MG Oral Tab Take 1 tablet (1 mg total) by mouth 3 (three) times daily.  0    pantoprazole 40 MG Oral Tab EC Take 1 tablet (40 mg total) by mouth 2 (two) times daily before meals.      atorvastatin 40 MG Oral Tab Take 1 tablet (40 mg total) by mouth daily with dinner.      acetaminophen 500 MG Oral Tab Take 1 tablet (500 mg total) by mouth every 6 (six) hours as needed for Pain.      tiZANidine HCl 2 MG Oral Tab Take 1 tablet (2 mg total) by mouth nightly as needed (muscle spasms).     [7]    pantoprazole  40 mg Intravenous Daily    aspirin  325 mg Oral Daily    buPROPion ER  150 mg Oral Daily    buPROPion ER  300 mg Oral Daily    carvedilol  3.125 mg Oral BID with meals    escitalopram  20 mg Oral Daily    gabapentin  600 mg Oral BID    rOPINIRole  1 mg Oral TID    venlafaxine ER  37.5 mg Oral Daily with breakfast    atorvastatin  40 mg Oral Nightly   [8]    norepinephrine Stopped (06/05/25 0400)    sodium chloride 83 mL/hr at 06/05/25 0500   [9]   acetaminophen    metoprolol    alum-mag hydroxide-simethicone    nitroglycerin    temazepam    tiZANidine    calcium carbonate

## 2025-06-05 NOTE — PROGRESS NOTES
Weaned off pressors now. No CP/SOB. Possible concern for infection. Agree with further work up.    Vitals:    06/05/25 0700   BP: 95/77   Pulse: 63   Resp: 16   Temp:        Intake/Output Summary (Last 24 hours) at 6/5/2025 0820  Last data filed at 6/5/2025 0500  Gross per 24 hour   Intake 2779.8 ml   Output 400 ml   Net 2379.8 ml     Wt Readings from Last 1 Encounters:   06/03/25 150 lb 4.8 oz (68.2 kg)        General: No acute distress.  Neck: Jugular venous pulsations not seen.  Lungs: Clear to auscultation.  Heart: Normal rate. MAGI.  Abdomen: Soft. Non tender  Extremities: No edema.  Neurological: Alert. No focal deficits.  Psychiatric: Appropriate mood and affect.  Current Hospital Medications[1]  Prescriptions Prior to Admission[2]  XR CHEST AP PORTABLE  (CPT=71045)  Result Date: 6/4/2025  CONCLUSION:   Mild interstitial edema.  Small left pleural effusion.  Large hiatal hernia.  Preliminary report was submitted by the [facility] teleradiologist and there are no significant discrepancies.  Dictated by (CST): Celestine Geiger MD on 6/04/2025 at 10:15 AM     Finalized by (CST): Celestine Geiger MD on 6/04/2025 at 10:17 AM            Lab Results   Component Value Date    WBC 12.2 06/05/2025    HGB 9.4 06/05/2025    HCT 29.5 06/05/2025    .0 06/05/2025    CREATSERUM 1.85 06/05/2025    BUN 24 06/05/2025     06/05/2025    K 4.0 06/05/2025     06/05/2025    CO2 23.0 06/05/2025    GLU 98 06/05/2025    CA 8.1 06/05/2025    ALB 3.5 06/05/2025    PTT 61.6 06/04/2025    MG 1.8 06/05/2025    PHOS 3.9 06/05/2025            Assessment / Plan  1. Non-ST Elevation Myocardial Infarction -  Patient is currently on heparin weight-based protocol, oral nitrates, atorvastatin 40 mg daily, and aspirin 325 mg daily. Plan to proceed with cardiac catheterization with possible intervention once renal function improves.     I21.4 Non-ST elevation myocardial infarction           2. Acute Renal Failure -    N17.9 Acute kidney  failure, unspecified           3. Chronic Left Ventricular Heart Failure -  Evidence of dehydration with decreased oral intake.     I50.32 Chronic diastolic heart failure           4. Coronary Artery Disease -  Status post percutaneous intervention to mid right coronary artery in 09/2023.     I25.10 Atherosclerotic heart disease of native coronary artery without angina pectoris           Discussion Notes     Cath with no intervention. Agree with infectious work up.         [1]   Current Facility-Administered Medications   Medication Dose Route Frequency    norepinephrine (Levophed) 4 mg/250mL infusion premix  0.5-50 mcg/min Intravenous Continuous    acetaminophen (Tylenol Extra Strength) tab 500 mg  500 mg Oral Q4H PRN    metoprolol (Lopressor) 5 mg/5mL injection 5 mg  5 mg Intravenous Q4H PRN    sodium chloride 0.9% infusion   Intravenous Continuous    alum-mag hydroxide-simethicone (Maalox) 200-200-20 MG/5ML oral suspension 30 mL  30 mL Oral QID PRN    pantoprazole (Protonix) 40 mg in sodium chloride 0.9% PF 10 mL IV push  40 mg Intravenous Daily    aspirin tab 325 mg  325 mg Oral Daily    nitroglycerin (Nitrostat) SL tab 0.4 mg  0.4 mg Sublingual Q5 Min PRN    temazepam (Restoril) cap 15 mg  15 mg Oral Nightly PRN    buPROPion ER (Wellbutrin XL) 24 hr tab 150 mg  150 mg Oral Daily    buPROPion ER (Wellbutrin XL) 24 hr tab 300 mg  300 mg Oral Daily    carvedilol (Coreg) tab 3.125 mg  3.125 mg Oral BID with meals    escitalopram (Lexapro) tab 20 mg  20 mg Oral Daily    gabapentin (Neurontin) tab 600 mg  600 mg Oral BID    rOPINIRole (Requip) tab 1 mg  1 mg Oral TID    tiZANidine (Zanaflex) tab 2 mg  2 mg Oral Nightly PRN    venlafaxine ER (Effexor-XR) 24 hr cap 37.5 mg  37.5 mg Oral Daily with breakfast    atorvastatin (Lipitor) tab 40 mg  40 mg Oral Nightly    calcium carbonate (Tums) chewable tab 500 mg  500 mg Oral Q6H PRN   [2]   Medications Prior to Admission   Medication Sig    aspirin 81 MG Oral Tab EC Take  1 tablet (81 mg total) by mouth daily.    carvedilol 3.125 MG Oral Tab Take 1 tablet (3.125 mg total) by mouth 2 (two) times daily with meals.    citalopram 40 MG Oral Tab Take 1 tablet (40 mg total) by mouth daily.    buPROPion  MG Oral Tablet 24 Hr Take 1 tablet (150 mg total) by mouth daily.    buPROPion  MG Oral Tablet 24 Hr Take 1 tablet (300 mg total) by mouth daily.    Alpha Lipoic Acid 200 MG Oral Cap Take 1 capsule by mouth daily.    venlafaxine ER 37.5 MG Oral Capsule SR 24 Hr Take 1 capsule (37.5 mg total) by mouth daily with breakfast.    dapagliflozin (FARXIGA) 10 MG Oral Tab Take 1 tablet (10 mg total) by mouth in the morning.    torsemide 20 MG Oral Tab Take 2 tablets (40 mg total) by mouth daily.    spironolactone 25 MG Oral Tab Take 1 tablet (25 mg total) by mouth daily. (Patient taking differently: Take 0.5 tablets (12.5 mg total) by mouth in the morning.)    gabapentin 600 MG Oral Tab Take 1 tablet (600 mg total) by mouth 2 (two) times daily.    isosorbide dinitrate 10 MG Oral Tab Take 1 tablet (10 mg total) by mouth in the morning and 1 tablet (10 mg total) before bedtime.    rOPINIRole 1 MG Oral Tab Take 1 tablet (1 mg total) by mouth 3 (three) times daily.    pantoprazole 40 MG Oral Tab EC Take 1 tablet (40 mg total) by mouth 2 (two) times daily before meals.    atorvastatin 40 MG Oral Tab Take 1 tablet (40 mg total) by mouth daily with dinner.    acetaminophen 500 MG Oral Tab Take 1 tablet (500 mg total) by mouth every 6 (six) hours as needed for Pain.    tiZANidine HCl 2 MG Oral Tab Take 1 tablet (2 mg total) by mouth nightly as needed (muscle spasms).    prochlorperazine (COMPAZINE) 10 mg tablet Take 1 tablet (10 mg total) by mouth every 6 (six) hours as needed for Nausea.

## 2025-06-05 NOTE — PROGRESS NOTES
[facility]  part of [facility]  Hospitalist Progress Note     Susan P Ruzicka Patient Status:  Inpatient    1945  80 year old CSN [id number]   Location 303/303-A Attending Yaw Bailon MD   Hosp Day # 3 PCP ROLY KAUR     Assessment & Plan:   ----------------------------------  NSTEMI, Type I suspected. Cardiology consult appreciated  -Cardiac catheterization:  showing patent RCA stent, findings c/w NICMP  -heparin gtt dc'd  -Telemetry  -Echo: reviewed  -Antiplatelets: asa  -Beta-blocker: Coreg  -Statin: lipitor  -ACE/ARB: per cards    CKD 3.  Creatinine slightly above baseline on admission.   - Continue IV fluids   - BMP in the morning  - Cr up trending, cont IVF    Nausea.  Unclear if this is related to anginal equivalent or not. Pt has a large hiatal hernia that could be contributing  This has resolved today  - cont PRN nausea meds as needed    Other problems  CAD  Hypertension -> remains low-normal but currently off all pressors, cont to monitor   Hiatal hernia  Anemia of chronic kidney disease  Osteoarthritis  Restless leg syndrome  Chronic back pain    Supplementary Documentation:   DVT Mechanical Prophylaxis:        DVT Pharmacologic Prophylaxis   Medication    heparin (Porcine) 5000 UNIT/ML injection 5,000 Units    DVT Pharmacologic prophylaxis: Aspirin 325 mg    DVT Pharmacologic prophylaxis: Aspirin 81 mg      Code Status: Full Code  Lorenzo: External urinary catheter in place  Lorenzo Duration (in days):   Central line:    LAURI: 2025    MDM: HIGH    Subjective:   ----------------------------------  Pt was seen and examined  Resting comfortably in bed  No cp, sob, f,c,n,v, abd pain or HA      Objective:   Chief Complaint:   Chief Complaint   Patient presents with    Fall     ----------------------------------  Temp:  [97 °F (36.1 °C)-97.6 °F (36.4 °C)] 97.6 °F (36.4 °C)  Pulse:  [48-65] 60  Resp:  [11-20] 19  BP: ()/(41-77) 106/60  SpO2:  [90 %-98 %] 93 %  Gen:  A+Ox3.  No distress.   HEENT: NCAT, neck supple, no carotid bruit.  CV: RRR, S1S2, and intact distal pulses. No gallop, rub, murmur.  Pulm: Effort and breath sounds normal. No distress, wheezes, rales, rhonchi.  Abd: Soft, NTND, BS normal, no mass, no HSM, no rebound/guarding.   Neuro: Normal reflexes, CN. Sensory/motor exams grossly normal deficit.   MS: No joint effusions.  No peripheral edema.  Skin: Skin is warm and dry. No rashes, erythema, diaphoresis.   Psych: Normal mood and affect. Calm, cooperative    Labs:  Lab Results   Component Value Date    HGB 9.4 (L) 06/05/2025    WBC 12.2 (H) 06/05/2025    .0 06/05/2025     06/05/2025    K 4.0 06/05/2025    CREATSERUM 1.85 (H) 06/05/2025    AST 26 06/03/2025    ALT 8 (L) 06/03/2025    TROP <0.045 10/01/2020           Scheduled Medications[1]  PRN Medications[2]             [1]    heparin  5,000 Units Subcutaneous Q8H GET    midodrine  5 mg Oral TID    piperacillin-tazobactam  4.5 g Intravenous Q12H    pantoprazole  40 mg Intravenous Daily    aspirin  325 mg Oral Daily    buPROPion ER  150 mg Oral Daily    buPROPion ER  300 mg Oral Daily    carvedilol  3.125 mg Oral BID with meals    escitalopram  20 mg Oral Daily    gabapentin  600 mg Oral BID    rOPINIRole  1 mg Oral TID    venlafaxine ER  37.5 mg Oral Daily with breakfast    atorvastatin  40 mg Oral Nightly   [2]   acetaminophen    metoprolol    alum-mag hydroxide-simethicone    nitroglycerin    temazepam    tiZANidine    calcium carbonate

## 2025-06-06 LAB
ALBUMIN SERPL-MCNC: 3.4 G/DL (ref 3.2–4.8)
ANION GAP SERPL CALC-SCNC: 5 MMOL/L (ref 0–18)
BASOPHILS # BLD AUTO: 0.04 X10(3) UL (ref 0–0.2)
BASOPHILS NFR BLD AUTO: 0.5 %
BUN BLD-MCNC: 22 MG/DL (ref 9–23)
BUN/CREAT SERPL: 13.8 (ref 10–20)
CALCIUM BLD-MCNC: 8.1 MG/DL (ref 8.7–10.4)
CHLORIDE SERPL-SCNC: 109 MMOL/L (ref 98–112)
CO2 SERPL-SCNC: 23 MMOL/L (ref 21–32)
CREAT BLD-MCNC: 1.6 MG/DL (ref 0.55–1.02)
DEPRECATED RDW RBC AUTO: 45.1 FL (ref 35.1–46.3)
EGFRCR SERPLBLD CKD-EPI 2021: 33 ML/MIN/1.73M2 (ref 60–?)
EOSINOPHIL # BLD AUTO: 0.48 X10(3) UL (ref 0–0.7)
EOSINOPHIL NFR BLD AUTO: 6.3 %
ERYTHROCYTE [DISTWIDTH] IN BLOOD BY AUTOMATED COUNT: 13 % (ref 11–15)
GLUCOSE BLD-MCNC: 95 MG/DL (ref 70–99)
HCT VFR BLD AUTO: 26.4 % (ref 35–48)
HGB BLD-MCNC: 8.3 G/DL (ref 12–16)
IMM GRANULOCYTES # BLD AUTO: 0.03 X10(3) UL (ref 0–1)
IMM GRANULOCYTES NFR BLD: 0.4 %
LYMPHOCYTES # BLD AUTO: 1.38 X10(3) UL (ref 1–4)
LYMPHOCYTES NFR BLD AUTO: 18.2 %
MAGNESIUM SERPL-MCNC: 1.9 MG/DL (ref 1.6–2.6)
MCH RBC QN AUTO: 30.3 PG (ref 26–34)
MCHC RBC AUTO-ENTMCNC: 31.4 G/DL (ref 31–37)
MCV RBC AUTO: 96.4 FL (ref 80–100)
MONOCYTES # BLD AUTO: 0.58 X10(3) UL (ref 0.1–1)
MONOCYTES NFR BLD AUTO: 7.6 %
NEUTROPHILS # BLD AUTO: 5.09 X10 (3) UL (ref 1.5–7.7)
NEUTROPHILS # BLD AUTO: 5.09 X10(3) UL (ref 1.5–7.7)
NEUTROPHILS NFR BLD AUTO: 67 %
OSMOLALITY SERPL CALC.SUM OF ELEC: 287 MOSM/KG (ref 275–295)
PHOSPHATE SERPL-MCNC: 3.1 MG/DL (ref 2.4–5.1)
PLATELET # BLD AUTO: 189 10(3)UL (ref 150–450)
POTASSIUM SERPL-SCNC: 4.2 MMOL/L (ref 3.5–5.1)
RBC # BLD AUTO: 2.74 X10(6)UL (ref 3.8–5.3)
SODIUM SERPL-SCNC: 137 MMOL/L (ref 136–145)
WBC # BLD AUTO: 7.6 X10(3) UL (ref 4–11)

## 2025-06-06 PROCEDURE — 99233 SBSQ HOSP IP/OBS HIGH 50: CPT | Performed by: HOSPITALIST

## 2025-06-06 PROCEDURE — 99232 SBSQ HOSP IP/OBS MODERATE 35: CPT | Performed by: INTERNAL MEDICINE

## 2025-06-06 RX ORDER — PANTOPRAZOLE SODIUM 40 MG/1
40 TABLET, DELAYED RELEASE ORAL
Status: DISCONTINUED | OUTPATIENT
Start: 2025-06-07 | End: 2025-06-10

## 2025-06-06 NOTE — PLAN OF CARE
Received patient from CCU. Patient alert. Denies pain. Oriented to room and use of call light. Fall precautions in place. Skin check completed. Remains on IVF. Assisted to chair. PT/OT eval pending.     Problem: Patient Centered Care  Goal: Patient preferences are identified and integrated in the patient's plan of care  Description: Interventions:  - What would you like us to know as we care for you? From [facility]  - Provide timely, complete, and accurate information to patient/family  - Incorporate patient and family knowledge, values, beliefs, and cultural backgrounds into the planning and delivery of care  - Encourage patient/family to participate in care and decision-making at the level they choose  - Honor patient and family perspectives and choices  Outcome: Progressing     Problem: Patient/Family Goals  Goal: Patient/Family Long Term Goal  Description: Patient's Long Term Goal: Go home    Interventions:  - Monitor labs  - Diagnostic testing  - Follow MD orders  - See additional Care Plan goals for specific interventions  Outcome: Progressing  Goal: Patient/Family Short Term Goal  Description: Patient's Short Term Goal: Decreased pain    Interventions:   - Pain management  - Diagnostic testing  - Follow MD orders  - See additional Care Plan goals for specific interventions  Outcome: Progressing     Problem: CARDIOVASCULAR - ADULT  Goal: Maintains optimal cardiac output and hemodynamic stability  Description: INTERVENTIONS:  - Monitor vital signs, rhythm, and trends  - Monitor for bleeding, hypotension and signs of decreased cardiac output  - Evaluate effectiveness of vasoactive medications to optimize hemodynamic stability  - Monitor arterial and/or venous puncture sites for bleeding and/or hematoma  - Assess quality of pulses, skin color and temperature  - Assess for signs of decreased coronary artery perfusion - ex. Angina  - Evaluate fluid balance, assess for edema, trend weights  Outcome:  Progressing     Problem: RESPIRATORY - ADULT  Goal: Achieves optimal ventilation and oxygenation  Description: INTERVENTIONS:  - Assess for changes in respiratory status  - Assess for changes in mentation and behavior  - Position to facilitate oxygenation and minimize respiratory effort  - Oxygen supplementation based on oxygen saturation or ABGs  - Provide Smoking Cessation handout, if applicable  - Encourage broncho-pulmonary hygiene including cough, deep breathe, Incentive Spirometry  - Assess the need for suctioning and perform as needed  - Assess and instruct to report SOB or any respiratory difficulty  - Respiratory Therapy support as indicated  - Manage/alleviate anxiety  - Monitor for signs/symptoms of CO2 retention  Outcome: Progressing     Problem: METABOLIC/FLUID AND ELECTROLYTES - ADULT  Goal: Electrolytes maintained within normal limits  Description: INTERVENTIONS:  - Monitor labs and rhythm and assess patient for signs and symptoms of electrolyte imbalances  - Administer electrolyte replacement as ordered  - Monitor response to electrolyte replacements, including rhythm and repeat lab results as appropriate  - Fluid restriction as ordered  - Instruct patient on fluid and nutrition restrictions as appropriate  Outcome: Progressing     Problem: HEMATOLOGIC - ADULT  Goal: Maintains hematologic stability  Description: INTERVENTIONS  - Assess for signs and symptoms of bleeding or hemorrhage  - Monitor labs and vital signs for trends  - Administer supportive blood products/factors, fluids and medications as ordered and appropriate  - Administer supportive blood products/factors as ordered and appropriate  Outcome: Progressing  Goal: Free from bleeding injury  Description: (Example usage: patient with low platelets)  INTERVENTIONS:  - Avoid intramuscular injections, enemas and rectal medication administration  - Ensure safe mobilization of patient  - Hold pressure on venipuncture sites to achieve adequate  hemostasis  - Assess for signs and symptoms of internal bleeding  - Monitor lab trends  - Patient is to report abnormal signs of bleeding to staff  - Avoid use of toothpicks and dental floss  - Use electric shaver for shaving  - Use soft bristle tooth brush  - Limit straining and forceful nose blowing  Outcome: Progressing     Problem: MUSCULOSKELETAL - ADULT  Goal: Return mobility to safest level of function  Description: INTERVENTIONS:  - Assess patient stability and activity tolerance for standing, transferring and ambulating w/ or w/o assistive devices  - Assist with transfers and ambulation using safe patient handling equipment as needed  - Ensure adequate protection for wounds/incisions during mobilization  - Obtain PT/OT consults as needed  - Advance activity as appropriate  - Communicate ordered activity level and limitations with patient/family  Outcome: Progressing     Problem: PAIN - ADULT  Goal: Verbalizes/displays adequate comfort level or patient's stated pain goal  Description: INTERVENTIONS:  - Encourage pt to monitor pain and request assistance  - Assess pain using appropriate pain scale  - Administer analgesics based on type and severity of pain and evaluate response  - Implement non-pharmacological measures as appropriate and evaluate response  - Consider cultural and social influences on pain and pain management  - Manage/alleviate anxiety  - Utilize distraction and/or relaxation techniques  - Monitor for opioid side effects  - Notify MD/LIP if interventions unsuccessful or patient reports new pain  - Anticipate increased pain with activity and pre-medicate as appropriate  Outcome: Progressing     Problem: RISK FOR INFECTION - ADULT  Goal: Absence of fever/infection during anticipated neutropenic period  Description: INTERVENTIONS  - Monitor WBC  - Administer growth factors as ordered  - Implement neutropenic guidelines  Outcome: Progressing     Problem: SAFETY ADULT - FALL  Goal: Free from fall  injury  Description: INTERVENTIONS:  - Assess pt frequently for physical needs  - Identify cognitive and physical deficits and behaviors that affect risk of falls.  - [place]au fall precautions as indicated by assessment.  - Educate pt/family on patient safety including physical limitations  - Instruct pt to call for assistance with activity based on assessment  - Modify environment to reduce risk of injury  - Provide assistive devices as appropriate  - Consider OT/PT consult to assist with strengthening/mobility  - Encourage toileting schedule  Outcome: Progressing     Problem: GASTROINTESTINAL - ADULT  Goal: Minimal or absence of nausea and vomiting  Description: INTERVENTIONS:  - Maintain adequate hydration with IV or PO as ordered and tolerated  - Nasogastric tube to low intermittent suction as ordered  - Evaluate effectiveness of ordered antiemetic medications  - Provide nonpharmacologic comfort measures as appropriate  - Advance diet as tolerated, if ordered  - Obtain nutritional consult as needed  - Evaluate fluid balance  Outcome: Progressing     Problem: SKIN/TISSUE INTEGRITY - ADULT  Goal: Skin integrity remains intact  Description: INTERVENTIONS  - Assess and document risk factors for pressure ulcer development  - Assess and document skin integrity  - Monitor for areas of redness and/or skin breakdown  - Initiate interventions, skin care algorithm/standards of care as needed  Outcome: Progressing

## 2025-06-06 NOTE — CM/SW NOTE
Met with patient for assessment. Patient is from [place] at [facility]. She is typically independent with ADLs. Patient owns a walker, rollator, RTS & shower chair. She has a h/o home health through [facility], but denies current home services. She went to [facility] about 2 years ago for rehab.    Discussed discharge plan, patient anticipates returning to Independent Living. She is opened to home health if needed. Requested PT/OT evaluations. SW/ROB to f/u.    YOHAN Little [id number]

## 2025-06-06 NOTE — PROGRESS NOTES
[facility]  part of [facility]     Progress Note    Susan P Ruzicka Patient Status:  Inpatient    1945 MRN [id number]   Location [facility] 2W/SW Attending Rick Chan MD   Hosp Day # 4 PCP ROLY KAUR       Subjective:   Patient seen and examined.  Resting in bed.  Denies significant chest pain or dyspnea at rest.      Objective:   Blood pressure 108/90, pulse 60, temperature 97.1 °F (36.2 °C), temperature source Temporal, resp. rate 20, height 5' 1\" (1.549 m), weight 150 lb 9.2 oz (68.3 kg), SpO2 92%.  Intake/Output:   Last 3 shifts: I/O last 3 completed shifts:  In: 3094.2 [P.O.:150; I.V.:2763.4; IV PIGGYBACK:180.8]  Out: 895 [Urine:895]   This shift: No intake/output data recorded.     Vent Settings:      Hemodynamic parameters (last 24 hours):      Scheduled Meds: Current Hospital Medications[1]    Continuous Infusions: Medication Infusions[2]    Physical Exam  Constitutional: no acute distress  Eyes: PERRL  ENT: nares pateint  Neck: supple, no JVD  Cardio: RRR, S1 S2  Respiratory: clear to auscultation bilaterally, no wheezing, rales, rhonchi, crackles  GI: abdomen soft, non tender, active bowel sounds, no organomegaly  Extremities: no clubbing, cyanosis, edema  Neurologic: no gross motor deficits  Skin: warm, dry      Results:     Lab Results   Component Value Date    WBC 7.6 2025    HGB 8.3 2025    HCT 26.4 2025    .0 2025    CREATSERUM 1.60 2025    BUN 22 2025     2025    K 4.2 2025     2025    CO2 23.0 2025    GLU 95 2025    CA 8.1 2025    ALB 3.4 2025    MG 1.9 2025    PHOS 3.1 2025       No results found.          Assessment   1.  Non-ST elevation myocardial infarction  2.  Shock state, improved  3.  Coronary artery disease  4.  Anemia  5.  Chronic kidney disease     Plan   -Patient presenting with evidence of chest pain concern for non-ST elevation  myocardial infarction.  - Status post cardiac cath without significant new coronary disease seen.  - With recommendations per cardiology  - Weaned off pressor support  - Patient started on empiric antibiotic therapy however no obvious source of sepsis identified at this time  - DVT prophylaxis: Heparin  - Possible transfer to floor later today    Beena Whitten DO  Pulmonary Critical Care Medicine  [facility]        [1]   Current Facility-Administered Medications   Medication Dose Route Frequency    heparin (Porcine) 5000 UNIT/ML injection 5,000 Units  5,000 Units Subcutaneous Q8H GET    midodrine (ProAmatine) tab 5 mg  5 mg Oral TID    piperacillin-tazobactam (Zosyn) 4.5 g in dextrose 5% 100 mL IVPB-ADDV  4.5 g Intravenous Q12H    norepinephrine (Levophed) 4 mg/250mL infusion premix  0.5-50 mcg/min Intravenous Continuous    acetaminophen (Tylenol Extra Strength) tab 500 mg  500 mg Oral Q4H PRN    metoprolol (Lopressor) 5 mg/5mL injection 5 mg  5 mg Intravenous Q4H PRN    sodium chloride 0.9% infusion   Intravenous Continuous    alum-mag hydroxide-simethicone (Maalox) 200-200-20 MG/5ML oral suspension 30 mL  30 mL Oral QID PRN    pantoprazole (Protonix) 40 mg in sodium chloride 0.9% PF 10 mL IV push  40 mg Intravenous Daily    aspirin tab 325 mg  325 mg Oral Daily    nitroglycerin (Nitrostat) SL tab 0.4 mg  0.4 mg Sublingual Q5 Min PRN    temazepam (Restoril) cap 15 mg  15 mg Oral Nightly PRN    buPROPion ER (Wellbutrin XL) 24 hr tab 150 mg  150 mg Oral Daily    buPROPion ER (Wellbutrin XL) 24 hr tab 300 mg  300 mg Oral Daily    carvedilol (Coreg) tab 3.125 mg  3.125 mg Oral BID with meals    escitalopram (Lexapro) tab 20 mg  20 mg Oral Daily    gabapentin (Neurontin) tab 600 mg  600 mg Oral BID    rOPINIRole (Requip) tab 1 mg  1 mg Oral TID    tiZANidine (Zanaflex) tab 2 mg  2 mg Oral Nightly PRN    venlafaxine ER (Effexor-XR) 24 hr cap 37.5 mg  37.5 mg Oral Daily with breakfast    atorvastatin (Lipitor) tab  40 mg  40 mg Oral Nightly    calcium carbonate (Tums) chewable tab 500 mg  500 mg Oral Q6H PRN   [2]    norepinephrine Stopped (06/05/25 0400)    sodium chloride 83 mL/hr at 06/05/25 1208

## 2025-06-06 NOTE — PROGRESS NOTES
[facility], [place]  part of [facility]  Hospitalist Progress Note     Susan P Ruzicka Patient Status:  Inpatient    1945  80 year old CSN [id number]   Location -A Attending Rick Chan MD   Hosp Day # 4 PCP ROLY KAUR     Assessment & Plan:   ----------------------------------  NSTEMI, type I.  Cardiac catheterization  without major obstructive disease findings.  Now chest pain-free.  - Cardiology following  - Echo noted  - Aspirin, Coreg, Lipitor  - Transfer to telemetry    CKD stage III.  At baseline  - Repeat BMP if clinical change    Hypotension.  Exact nature unclear.  Treated for septic source with IV fluids, pressors, antibiotics.  However workup has been negative.  Now off pressors.  Doing well.  Does not appear to be cardiogenic based on echo, catheterization results.  - Continue antibiotics for now, stop in next 1-2 days if cultures remain negative  - IV fluids 83    Hiatal hernia.  May be causing her nausea.  Nausea no resolved.  She is not interested in surgery if at all possible to avoid this.  - Acid suppression  - Outpatient surgical follow-up if necessary    Other problems  CAD  Hypertension  Hiatal hernia  Anemia of chronic kidney disease  Osteoarthritis  Restless leg syndrome  Chronic back pain    Supplementary Documentation:   DVT Mechanical Prophylaxis:        DVT Pharmacologic Prophylaxis   Medication    heparin (Porcine) 5000 UNIT/ML injection 5,000 Units    DVT Pharmacologic prophylaxis: Aspirin 325 mg    DVT Pharmacologic prophylaxis: Aspirin 81 mg      Code Status: Full Code  Lorenzo: External urinary catheter in place  Lorenzo Duration (in days):   Central line:    LAURI: 2025      I personally reviewed the available laboratories, imaging including. I discussed/will discuss the case with consultants. I ordered laboratories and/or radiographic studies. I adjusted medications as detailed above.  Medical decision making high, risk is high.    Subjective:    ----------------------------------  Denies chest pain or shortness of breath.  She feels very weak in general.  No nausea or abdominal pain.  Not interested in abdominal surgery to correct her hiatal hernia at this point.      Objective:   Chief Complaint:   Chief Complaint   Patient presents with    Fall     ----------------------------------  Temp:  [97.1 °F (36.2 °C)-98.1 °F (36.7 °C)] 98 °F (36.7 °C)  Pulse:  [55-73] 65  Resp:  [13-21] 21  BP: ()/(41-95) 93/77  SpO2:  [89 %-99 %] 93 %  Gen: A+Ox3.  No distress.   HEENT: NCAT, neck supple, no carotid bruit.  CV: RRR, S1S2, and intact distal pulses. No gallop, rub, murmur.  Pulm: Effort and breath sounds normal. No distress, wheezes, rales, rhonchi.  Abd: Soft, NTND, BS normal, no mass, no HSM, no rebound/guarding.   Neuro: Normal reflexes, CN. Sensory/motor exams grossly normal deficit.   MS: No joint effusions.  No peripheral edema.  Skin: Skin is warm and dry. No rashes, erythema, diaphoresis.   Psych: Normal mood and affect. Calm, cooperative    Labs:  Lab Results   Component Value Date    HGB 8.3 (L) 06/06/2025    WBC 7.6 06/06/2025    .0 06/06/2025     06/06/2025    K 4.2 06/06/2025    CREATSERUM 1.60 (H) 06/06/2025    AST 26 06/03/2025    ALT 8 (L) 06/03/2025    TROP <0.045 10/01/2020           Scheduled Medications[1]  PRN Medications[2]             [1]    [START ON 6/7/2025] pantoprazole  40 mg Oral QAM AC    piperacillin-tazobactam  4.5 g Intravenous Q8H    heparin  5,000 Units Subcutaneous Q8H GET    midodrine  5 mg Oral TID    aspirin  325 mg Oral Daily    buPROPion ER  150 mg Oral Daily    buPROPion ER  300 mg Oral Daily    carvedilol  3.125 mg Oral BID with meals    escitalopram  20 mg Oral Daily    gabapentin  600 mg Oral BID    rOPINIRole  1 mg Oral TID    venlafaxine ER  37.5 mg Oral Daily with breakfast    atorvastatin  40 mg Oral Nightly   [2]   acetaminophen    metoprolol    alum-mag hydroxide-simethicone    nitroglycerin     temazepam    tiZANidine    calcium carbonate

## 2025-06-06 NOTE — PLAN OF CARE
Transfer orders received and carried out. Report given to receiving RN.     Double RN skin check done prior to transfer off unit. Skin check performed by this RN and ORI Chow.  Wounds are as follows: right radial access point; generalized bruising.     Will remain available for any further questions or concerns.     Problem: Patient Centered Care  Goal: Patient preferences are identified and integrated in the patient's plan of care  Description: Interventions:  - What would you like us to know as we care for you? From [facility]  - Provide timely, complete, and accurate information to patient/family  - Incorporate patient and family knowledge, values, beliefs, and cultural backgrounds into the planning and delivery of care  - Encourage patient/family to participate in care and decision-making at the level they choose  - Honor patient and family perspectives and choices  Outcome: Progressing     Problem: CARDIOVASCULAR - ADULT  Goal: Maintains optimal cardiac output and hemodynamic stability  Description: INTERVENTIONS:  - Monitor vital signs, rhythm, and trends  - Monitor for bleeding, hypotension and signs of decreased cardiac output  - Evaluate effectiveness of vasoactive medications to optimize hemodynamic stability  - Monitor arterial and/or venous puncture sites for bleeding and/or hematoma  - Assess quality of pulses, skin color and temperature  - Assess for signs of decreased coronary artery perfusion - ex. Angina  - Evaluate fluid balance, assess for edema, trend weights  Outcome: Progressing     Problem: RESPIRATORY - ADULT  Goal: Achieves optimal ventilation and oxygenation  Description: INTERVENTIONS:  - Assess for changes in respiratory status  - Assess for changes in mentation and behavior  - Position to facilitate oxygenation and minimize respiratory effort  - Oxygen supplementation based on oxygen saturation or ABGs  - Provide Smoking Cessation handout, if applicable  - Encourage  broncho-pulmonary hygiene including cough, deep breathe, Incentive Spirometry  - Assess the need for suctioning and perform as needed  - Assess and instruct to report SOB or any respiratory difficulty  - Respiratory Therapy support as indicated  - Manage/alleviate anxiety  - Monitor for signs/symptoms of CO2 retention  Outcome: Progressing     Problem: METABOLIC/FLUID AND ELECTROLYTES - ADULT  Goal: Electrolytes maintained within normal limits  Description: INTERVENTIONS:  - Monitor labs and rhythm and assess patient for signs and symptoms of electrolyte imbalances  - Administer electrolyte replacement as ordered  - Monitor response to electrolyte replacements, including rhythm and repeat lab results as appropriate  - Fluid restriction as ordered  - Instruct patient on fluid and nutrition restrictions as appropriate  Outcome: Progressing     Problem: HEMATOLOGIC - ADULT  Goal: Maintains hematologic stability  Description: INTERVENTIONS  - Assess for signs and symptoms of bleeding or hemorrhage  - Monitor labs and vital signs for trends  - Administer supportive blood products/factors, fluids and medications as ordered and appropriate  - Administer supportive blood products/factors as ordered and appropriate  Outcome: Progressing  Goal: Free from bleeding injury  Description: (Example usage: patient with low platelets)  INTERVENTIONS:  - Avoid intramuscular injections, enemas and rectal medication administration  - Ensure safe mobilization of patient  - Hold pressure on venipuncture sites to achieve adequate hemostasis  - Assess for signs and symptoms of internal bleeding  - Monitor lab trends  - Patient is to report abnormal signs of bleeding to staff  - Avoid use of toothpicks and dental floss  - Use electric shaver for shaving  - Use soft bristle tooth brush  - Limit straining and forceful nose blowing  Outcome: Progressing     Problem: MUSCULOSKELETAL - ADULT  Goal: Return mobility to safest level of  function  Description: INTERVENTIONS:  - Assess patient stability and activity tolerance for standing, transferring and ambulating w/ or w/o assistive devices  - Assist with transfers and ambulation using safe patient handling equipment as needed  - Ensure adequate protection for wounds/incisions during mobilization  - Obtain PT/OT consults as needed  - Advance activity as appropriate  - Communicate ordered activity level and limitations with patient/family  Outcome: Progressing     Problem: PAIN - ADULT  Goal: Verbalizes/displays adequate comfort level or patient's stated pain goal  Description: INTERVENTIONS:  - Encourage pt to monitor pain and request assistance  - Assess pain using appropriate pain scale  - Administer analgesics based on type and severity of pain and evaluate response  - Implement non-pharmacological measures as appropriate and evaluate response  - Consider cultural and social influences on pain and pain management  - Manage/alleviate anxiety  - Utilize distraction and/or relaxation techniques  - Monitor for opioid side effects  - Notify MD/LIP if interventions unsuccessful or patient reports new pain  - Anticipate increased pain with activity and pre-medicate as appropriate  Outcome: Progressing     Problem: RISK FOR INFECTION - ADULT  Goal: Absence of fever/infection during anticipated neutropenic period  Description: INTERVENTIONS  - Monitor WBC  - Administer growth factors as ordered  - Implement neutropenic guidelines  Outcome: Progressing     Problem: SAFETY ADULT - FALL  Goal: Free from fall injury  Description: INTERVENTIONS:  - Assess pt frequently for physical needs  - Identify cognitive and physical deficits and behaviors that affect risk of falls.  - [place] fall precautions as indicated by assessment.  - Educate pt/family on patient safety including physical limitations  - Instruct pt to call for assistance with activity based on assessment  - Modify environment to reduce risk of  injury  - Provide assistive devices as appropriate  - Consider OT/PT consult to assist with strengthening/mobility  - Encourage toileting schedule  Outcome: Progressing     Problem: GASTROINTESTINAL - ADULT  Goal: Minimal or absence of nausea and vomiting  Description: INTERVENTIONS:  - Maintain adequate hydration with IV or PO as ordered and tolerated  - Nasogastric tube to low intermittent suction as ordered  - Evaluate effectiveness of ordered antiemetic medications  - Provide nonpharmacologic comfort measures as appropriate  - Advance diet as tolerated, if ordered  - Obtain nutritional consult as needed  - Evaluate fluid balance  Outcome: Progressing     Problem: SKIN/TISSUE INTEGRITY - ADULT  Goal: Skin integrity remains intact  Description: INTERVENTIONS  - Assess and document risk factors for pressure ulcer development  - Assess and document skin integrity  - Monitor for areas of redness and/or skin breakdown  - Initiate interventions, skin care algorithm/standards of care as needed  Outcome: Progressing

## 2025-06-06 NOTE — PLAN OF CARE
Pt. A&Ox4. Levo drip off. Safety measures in place.     Problem: Patient Centered Care  Goal: Patient preferences are identified and integrated in the patient's plan of care  Description: Interventions:  - What would you like us to know as we care for you? From [facility]  - Provide timely, complete, and accurate information to patient/family  - Incorporate patient and family knowledge, values, beliefs, and cultural backgrounds into the planning and delivery of care  - Encourage patient/family to participate in care and decision-making at the level they choose  - Honor patient and family perspectives and choices  Outcome: Progressing     Problem: Patient/Family Goals  Goal: Patient/Family Long Term Goal  Description: Patient's Long Term Goal: Go home    Interventions:  - Monitor labs  - Diagnostic testing  - Follow MD orders  - See additional Care Plan goals for specific interventions  Outcome: Progressing  Goal: Patient/Family Short Term Goal  Description: Patient's Short Term Goal: Decreased pain    Interventions:   - Pain management  - Diagnostic testing  - Follow MD orders  - See additional Care Plan goals for specific interventions  Outcome: Progressing     Problem: CARDIOVASCULAR - ADULT  Goal: Maintains optimal cardiac output and hemodynamic stability  Description: INTERVENTIONS:  - Monitor vital signs, rhythm, and trends  - Monitor for bleeding, hypotension and signs of decreased cardiac output  - Evaluate effectiveness of vasoactive medications to optimize hemodynamic stability  - Monitor arterial and/or venous puncture sites for bleeding and/or hematoma  - Assess quality of pulses, skin color and temperature  - Assess for signs of decreased coronary artery perfusion - ex. Angina  - Evaluate fluid balance, assess for edema, trend weights  Outcome: Progressing     Problem: RESPIRATORY - ADULT  Goal: Achieves optimal ventilation and oxygenation  Description: INTERVENTIONS:  - Assess for changes in  respiratory status  - Assess for changes in mentation and behavior  - Position to facilitate oxygenation and minimize respiratory effort  - Oxygen supplementation based on oxygen saturation or ABGs  - Provide Smoking Cessation handout, if applicable  - Encourage broncho-pulmonary hygiene including cough, deep breathe, Incentive Spirometry  - Assess the need for suctioning and perform as needed  - Assess and instruct to report SOB or any respiratory difficulty  - Respiratory Therapy support as indicated  - Manage/alleviate anxiety  - Monitor for signs/symptoms of CO2 retention  Outcome: Progressing     Problem: METABOLIC/FLUID AND ELECTROLYTES - ADULT  Goal: Electrolytes maintained within normal limits  Description: INTERVENTIONS:  - Monitor labs and rhythm and assess patient for signs and symptoms of electrolyte imbalances  - Administer electrolyte replacement as ordered  - Monitor response to electrolyte replacements, including rhythm and repeat lab results as appropriate  - Fluid restriction as ordered  - Instruct patient on fluid and nutrition restrictions as appropriate  Outcome: Progressing     Problem: HEMATOLOGIC - ADULT  Goal: Maintains hematologic stability  Description: INTERVENTIONS  - Assess for signs and symptoms of bleeding or hemorrhage  - Monitor labs and vital signs for trends  - Administer supportive blood products/factors, fluids and medications as ordered and appropriate  - Administer supportive blood products/factors as ordered and appropriate  Outcome: Progressing  Goal: Free from bleeding injury  Description: (Example usage: patient with low platelets)  INTERVENTIONS:  - Avoid intramuscular injections, enemas and rectal medication administration  - Ensure safe mobilization of patient  - Hold pressure on venipuncture sites to achieve adequate hemostasis  - Assess for signs and symptoms of internal bleeding  - Monitor lab trends  - Patient is to report abnormal signs of bleeding to staff  -  Avoid use of toothpicks and dental floss  - Use electric shaver for shaving  - Use soft bristle tooth brush  - Limit straining and forceful nose blowing  Outcome: Progressing     Problem: MUSCULOSKELETAL - ADULT  Goal: Return mobility to safest level of function  Description: INTERVENTIONS:  - Assess patient stability and activity tolerance for standing, transferring and ambulating w/ or w/o assistive devices  - Assist with transfers and ambulation using safe patient handling equipment as needed  - Ensure adequate protection for wounds/incisions during mobilization  - Obtain PT/OT consults as needed  - Advance activity as appropriate  - Communicate ordered activity level and limitations with patient/family  Outcome: Progressing     Problem: PAIN - ADULT  Goal: Verbalizes/displays adequate comfort level or patient's stated pain goal  Description: INTERVENTIONS:  - Encourage pt to monitor pain and request assistance  - Assess pain using appropriate pain scale  - Administer analgesics based on type and severity of pain and evaluate response  - Implement non-pharmacological measures as appropriate and evaluate response  - Consider cultural and social influences on pain and pain management  - Manage/alleviate anxiety  - Utilize distraction and/or relaxation techniques  - Monitor for opioid side effects  - Notify MD/LIP if interventions unsuccessful or patient reports new pain  - Anticipate increased pain with activity and pre-medicate as appropriate  Outcome: Progressing     Problem: RISK FOR INFECTION - ADULT  Goal: Absence of fever/infection during anticipated neutropenic period  Description: INTERVENTIONS  - Monitor WBC  - Administer growth factors as ordered  - Implement neutropenic guidelines  Outcome: Progressing     Problem: SAFETY ADULT - FALL  Goal: Free from fall injury  Description: INTERVENTIONS:  - Assess pt frequently for physical needs  - Identify cognitive and physical deficits and behaviors that affect  risk of falls.  - [place] fall precautions as indicated by assessment.  - Educate pt/family on patient safety including physical limitations  - Instruct pt to call for assistance with activity based on assessment  - Modify environment to reduce risk of injury  - Provide assistive devices as appropriate  - Consider OT/PT consult to assist with strengthening/mobility  - Encourage toileting schedule  Outcome: Progressing     Problem: GASTROINTESTINAL - ADULT  Goal: Minimal or absence of nausea and vomiting  Description: INTERVENTIONS:  - Maintain adequate hydration with IV or PO as ordered and tolerated  - Nasogastric tube to low intermittent suction as ordered  - Evaluate effectiveness of ordered antiemetic medications  - Provide nonpharmacologic comfort measures as appropriate  - Advance diet as tolerated, if ordered  - Obtain nutritional consult as needed  - Evaluate fluid balance  Outcome: Progressing     Problem: SKIN/TISSUE INTEGRITY - ADULT  Goal: Skin integrity remains intact  Description: INTERVENTIONS  - Assess and document risk factors for pressure ulcer development  - Assess and document skin integrity  - Monitor for areas of redness and/or skin breakdown  - Initiate interventions, skin care algorithm/standards of care as needed  Outcome: Progressing     Problem: Impaired Functional Mobility  Goal: Achieve highest/safest level of mobility/gait  Description: Interventions:  - Assess patient's functional ability and stability  - Promote increasing activity/tolerance for mobility and gait  - Educate and engage patient/family in tolerated activity level and precautions    Outcome: Progressing     Problem: Impaired Activities of Daily Living  Goal: Achieve highest/safest level of independence in self care  Description: Interventions:  - Assess ability and encourage patient to participate in ADLs to maximize function  - Promote sitting position while performing ADLs such as feeding, grooming, and bathing  -  Educate and encourage patient/family in tolerated functional activity level and precautions during self-care    Outcome: Progressing

## 2025-06-06 NOTE — PROGRESS NOTES
Weaned off pressors now. No CP/SOB. Possible concern for infection. Agree with further work up.  Sitting up in chair   Weak   Vitals:    06/06/25 0800   BP: (!) 83/57   Pulse: 55   Resp: 13   Temp: 97.5 °F (36.4 °C)       Intake/Output Summary (Last 24 hours) at 6/6/2025 1105  Last data filed at 6/6/2025 1000  Gross per 24 hour   Intake 1894.2 ml   Output 720 ml   Net 1174.2 ml     Wt Readings from Last 1 Encounters:   06/05/25 150 lb 9.2 oz (68.3 kg)        General: No acute distress.  Neck: Jugular venous pulsations not seen.  Lungs: Clear to auscultation.  Heart: Normal rate. MAGI.  Abdomen: Soft. Non tender  Extremities: No edema.  Neurological: Alert. No focal deficits.  Psychiatric: Appropriate mood and affect.  Current Hospital Medications[1]  Prescriptions Prior to Admission[2]  No results found.      Lab Results   Component Value Date    WBC 7.6 06/06/2025    HGB 8.3 06/06/2025    HCT 26.4 06/06/2025    .0 06/06/2025    CREATSERUM 1.60 06/06/2025    BUN 22 06/06/2025     06/06/2025    K 4.2 06/06/2025     06/06/2025    CO2 23.0 06/06/2025    GLU 95 06/06/2025    CA 8.1 06/06/2025    ALB 3.4 06/06/2025    MG 1.9 06/06/2025    PHOS 3.1 06/06/2025            Assessment / Plan  1. Non-ST Elevation Myocardial Infarction -  Patient is currently on heparin weight-based protocol, oral nitrates, atorvastatin 40 mg daily, and aspirin 325 mg daily. Plan to proceed with cardiac catheterization with possible intervention once renal function improves.     I21.4 Non-ST elevation myocardial infarction           2. Acute Renal Failure -    N17.9 Acute kidney failure, unspecified           3. Chronic Left Ventricular Heart Failure -  Evidence of dehydration with decreased oral intake.     I50.32 Chronic diastolic heart failure           4. Coronary Artery Disease -  Status post percutaneous intervention to mid right coronary artery in 09/2023.     I25.10 Atherosclerotic heart disease of native coronary  artery without angina pectoris           Discussion Notes     Cath with no intervention. Agree with infectious work up.  Cardiac rehab to see and follow        D/W patient and nursing staff      Jorgito Pollack MD  [facility]       [1]   Current Facility-Administered Medications   Medication Dose Route Frequency    heparin (Porcine) 5000 UNIT/ML injection 5,000 Units  5,000 Units Subcutaneous Q8H GET    midodrine (ProAmatine) tab 5 mg  5 mg Oral TID    piperacillin-tazobactam (Zosyn) 4.5 g in dextrose 5% 100 mL IVPB-ADDV  4.5 g Intravenous Q12H    norepinephrine (Levophed) 4 mg/250mL infusion premix  0.5-50 mcg/min Intravenous Continuous    acetaminophen (Tylenol Extra Strength) tab 500 mg  500 mg Oral Q4H PRN    metoprolol (Lopressor) 5 mg/5mL injection 5 mg  5 mg Intravenous Q4H PRN    sodium chloride 0.9% infusion   Intravenous Continuous    alum-mag hydroxide-simethicone (Maalox) 200-200-20 MG/5ML oral suspension 30 mL  30 mL Oral QID PRN    pantoprazole (Protonix) 40 mg in sodium chloride 0.9% PF 10 mL IV push  40 mg Intravenous Daily    aspirin tab 325 mg  325 mg Oral Daily    nitroglycerin (Nitrostat) SL tab 0.4 mg  0.4 mg Sublingual Q5 Min PRN    temazepam (Restoril) cap 15 mg  15 mg Oral Nightly PRN    buPROPion ER (Wellbutrin XL) 24 hr tab 150 mg  150 mg Oral Daily    buPROPion ER (Wellbutrin XL) 24 hr tab 300 mg  300 mg Oral Daily    carvedilol (Coreg) tab 3.125 mg  3.125 mg Oral BID with meals    escitalopram (Lexapro) tab 20 mg  20 mg Oral Daily    gabapentin (Neurontin) tab 600 mg  600 mg Oral BID    rOPINIRole (Requip) tab 1 mg  1 mg Oral TID    tiZANidine (Zanaflex) tab 2 mg  2 mg Oral Nightly PRN    venlafaxine ER (Effexor-XR) 24 hr cap 37.5 mg  37.5 mg Oral Daily with breakfast    atorvastatin (Lipitor) tab 40 mg  40 mg Oral Nightly    calcium carbonate (Tums) chewable tab 500 mg  500 mg Oral Q6H PRN   [2]   Medications Prior to Admission   Medication Sig    aspirin 81 MG Oral Tab EC Take 1  tablet (81 mg total) by mouth daily.    carvedilol 3.125 MG Oral Tab Take 1 tablet (3.125 mg total) by mouth 2 (two) times daily with meals.    citalopram 40 MG Oral Tab Take 1 tablet (40 mg total) by mouth daily.    buPROPion  MG Oral Tablet 24 Hr Take 1 tablet (150 mg total) by mouth daily.    buPROPion  MG Oral Tablet 24 Hr Take 1 tablet (300 mg total) by mouth daily.    Alpha Lipoic Acid 200 MG Oral Cap Take 1 capsule by mouth daily.    venlafaxine ER 37.5 MG Oral Capsule SR 24 Hr Take 1 capsule (37.5 mg total) by mouth daily with breakfast.    dapagliflozin (FARXIGA) 10 MG Oral Tab Take 1 tablet (10 mg total) by mouth in the morning.    torsemide 20 MG Oral Tab Take 2 tablets (40 mg total) by mouth daily.    spironolactone 25 MG Oral Tab Take 1 tablet (25 mg total) by mouth daily. (Patient taking differently: Take 0.5 tablets (12.5 mg total) by mouth in the morning.)    gabapentin 600 MG Oral Tab Take 1 tablet (600 mg total) by mouth 2 (two) times daily.    isosorbide dinitrate 10 MG Oral Tab Take 1 tablet (10 mg total) by mouth in the morning and 1 tablet (10 mg total) before bedtime.    rOPINIRole 1 MG Oral Tab Take 1 tablet (1 mg total) by mouth 3 (three) times daily.    pantoprazole 40 MG Oral Tab EC Take 1 tablet (40 mg total) by mouth 2 (two) times daily before meals.    atorvastatin 40 MG Oral Tab Take 1 tablet (40 mg total) by mouth daily with dinner.    acetaminophen 500 MG Oral Tab Take 1 tablet (500 mg total) by mouth every 6 (six) hours as needed for Pain.    tiZANidine HCl 2 MG Oral Tab Take 1 tablet (2 mg total) by mouth nightly as needed (muscle spasms).    prochlorperazine (COMPAZINE) 10 mg tablet Take 1 tablet (10 mg total) by mouth every 6 (six) hours as needed for Nausea.

## 2025-06-07 LAB
ANION GAP SERPL CALC-SCNC: 8 MMOL/L (ref 0–18)
BASOPHILS # BLD AUTO: 0.03 X10(3) UL (ref 0–0.2)
BASOPHILS NFR BLD AUTO: 0.4 %
BUN BLD-MCNC: 16 MG/DL (ref 9–23)
BUN/CREAT SERPL: 11.7 (ref 10–20)
CALCIUM BLD-MCNC: 8.5 MG/DL (ref 8.7–10.4)
CHLORIDE SERPL-SCNC: 109 MMOL/L (ref 98–112)
CO2 SERPL-SCNC: 22 MMOL/L (ref 21–32)
CREAT BLD-MCNC: 1.37 MG/DL (ref 0.55–1.02)
DEPRECATED RDW RBC AUTO: 43.8 FL (ref 35.1–46.3)
EGFRCR SERPLBLD CKD-EPI 2021: 40 ML/MIN/1.73M2 (ref 60–?)
EOSINOPHIL # BLD AUTO: 0.46 X10(3) UL (ref 0–0.7)
EOSINOPHIL NFR BLD AUTO: 6.2 %
ERYTHROCYTE [DISTWIDTH] IN BLOOD BY AUTOMATED COUNT: 12.9 % (ref 11–15)
GLUCOSE BLD-MCNC: 96 MG/DL (ref 70–99)
HCT VFR BLD AUTO: 27.6 % (ref 35–48)
HGB BLD-MCNC: 8.7 G/DL (ref 12–16)
IMM GRANULOCYTES # BLD AUTO: 0.02 X10(3) UL (ref 0–1)
IMM GRANULOCYTES NFR BLD: 0.3 %
LYMPHOCYTES # BLD AUTO: 1.24 X10(3) UL (ref 1–4)
LYMPHOCYTES NFR BLD AUTO: 16.7 %
MCH RBC QN AUTO: 29.4 PG (ref 26–34)
MCHC RBC AUTO-ENTMCNC: 31.5 G/DL (ref 31–37)
MCV RBC AUTO: 93.2 FL (ref 80–100)
MONOCYTES # BLD AUTO: 0.46 X10(3) UL (ref 0.1–1)
MONOCYTES NFR BLD AUTO: 6.2 %
NEUTROPHILS # BLD AUTO: 5.21 X10 (3) UL (ref 1.5–7.7)
NEUTROPHILS # BLD AUTO: 5.21 X10(3) UL (ref 1.5–7.7)
NEUTROPHILS NFR BLD AUTO: 70.2 %
OSMOLALITY SERPL CALC.SUM OF ELEC: 289 MOSM/KG (ref 275–295)
PLATELET # BLD AUTO: 203 10(3)UL (ref 150–450)
POTASSIUM SERPL-SCNC: 4.4 MMOL/L (ref 3.5–5.1)
RBC # BLD AUTO: 2.96 X10(6)UL (ref 3.8–5.3)
SODIUM SERPL-SCNC: 139 MMOL/L (ref 136–145)
WBC # BLD AUTO: 7.4 X10(3) UL (ref 4–11)

## 2025-06-07 PROCEDURE — 99232 SBSQ HOSP IP/OBS MODERATE 35: CPT | Performed by: INTERNAL MEDICINE

## 2025-06-07 PROCEDURE — 99233 SBSQ HOSP IP/OBS HIGH 50: CPT | Performed by: HOSPITALIST

## 2025-06-07 RX ORDER — CLOTRIMAZOLE 1 %
CREAM (GRAM) TOPICAL 2 TIMES DAILY
Status: DISCONTINUED | OUTPATIENT
Start: 2025-06-07 | End: 2025-06-10

## 2025-06-07 NOTE — PROGRESS NOTES
[facility]  part of [facility]  Hospitalist Progress Note     Susan P Ruzicka Patient Status:  Inpatient    1945  80 year old CSN [id number]   Location -A Attending Rick Chan MD   Hosp Day # 5 PCP ROLY KAUR     Assessment & Plan:   ----------------------------------  NSTEMI, type I.  Cardiac catheterization  without major obstructive disease findings.  Now chest pain-free.  - Cardiology following  - Echo noted  - Aspirin, Coreg, Lipitor  - Transfer to telemetry    CKD stage III.  At baseline  - Repeat BMP if clinical change    Hypotension.  Exact nature unclear.  Treated for septic source with IV fluids, pressors, antibiotics.  However workup has been negative.  Now off pressors.  Doing well.  Does not appear to be cardiogenic based on echo, catheterization results.  - DC antibiotics  - Monitor cultures  - Decrease IV fluids    Hiatal hernia.  May be causing her nausea.  Nausea no resolved.  She is not interested in surgery if at all possible to avoid this.  - Acid suppression  - Outpatient surgical follow-up if necessary    Other problems  CAD  Hypertension  Hiatal hernia  Anemia of chronic kidney disease  Osteoarthritis  Restless leg syndrome  Chronic back pain    Medically stable for discharge as of , awaiting rehab placement    Supplementary Documentation:   DVT Mechanical Prophylaxis:        DVT Pharmacologic Prophylaxis   Medication    heparin (Porcine) 5000 UNIT/ML injection 5,000 Units    DVT Pharmacologic prophylaxis: Aspirin 325 mg    DVT Pharmacologic prophylaxis: Aspirin 81 mg      Code Status: Full Code  Lorenzo: External urinary catheter in place  Lorenzo Duration (in days):   Central line:    LAURI: 2025      I personally reviewed the available laboratories, imaging including. I discussed/will discuss the case with consultants. I ordered laboratories and/or radiographic studies. I adjusted medications as detailed above.  Medical decision making high,  risk is high.    Subjective:   ----------------------------------  Feels about the same.  Mild shortness of breath with exertion but no chest pain.  Tolerating diet.  She feels very weak.  She lives at home alone.  Agreeable to rehab      Objective:   Chief Complaint:   Chief Complaint   Patient presents with    Fall     ----------------------------------  Temp:  [98.1 °F (36.7 °C)-98.4 °F (36.9 °C)] 98.3 °F (36.8 °C)  Pulse:  [57-82] 66  Resp:  [14-19] 18  BP: ()/(43-85) 134/74  SpO2:  [89 %-98 %] 92 %  Gen: A+Ox3.  No distress.   HEENT: NCAT, neck supple, no carotid bruit.  CV: RRR, S1S2, and intact distal pulses. No gallop, rub, murmur.  Pulm: Effort and breath sounds normal. No distress, wheezes, rales, rhonchi.  Abd: Soft, NTND, BS normal, no mass, no HSM, no rebound/guarding.   Neuro: Normal reflexes, CN. Sensory/motor exams grossly normal deficit.   MS: No joint effusions.  No peripheral edema.  Skin: Skin is warm and dry. No rashes, erythema, diaphoresis.   Psych: Normal mood and affect. Calm, cooperative    Labs:  Lab Results   Component Value Date    HGB 8.7 (L) 06/07/2025    WBC 7.4 06/07/2025    .0 06/07/2025     06/07/2025    K 4.4 06/07/2025    CREATSERUM 1.37 (H) 06/07/2025    AST 26 06/03/2025    ALT 8 (L) 06/03/2025    TROP <0.045 10/01/2020           Scheduled Medications[1]  PRN Medications[2]             [1]    pantoprazole  40 mg Oral QAM AC    piperacillin-tazobactam  4.5 g Intravenous Q8H    heparin  5,000 Units Subcutaneous Q8H GET    midodrine  5 mg Oral TID    aspirin  325 mg Oral Daily    buPROPion ER  150 mg Oral Daily    buPROPion ER  300 mg Oral Daily    carvedilol  3.125 mg Oral BID with meals    escitalopram  20 mg Oral Daily    gabapentin  600 mg Oral BID    rOPINIRole  1 mg Oral TID    venlafaxine ER  37.5 mg Oral Daily with breakfast    atorvastatin  40 mg Oral Nightly   [2]   acetaminophen    metoprolol    alum-mag hydroxide-simethicone    nitroglycerin     temazepam    tiZANidine    calcium carbonate

## 2025-06-07 NOTE — PLAN OF CARE
Problem: Patient Centered Care  Goal: Patient preferences are identified and integrated in the patient's plan of care  Description: Interventions:  - What would you like us to know as we care for you? From [facility]  - Provide timely, complete, and accurate information to patient/family  - Incorporate patient and family knowledge, values, beliefs, and cultural backgrounds into the planning and delivery of care  - Encourage patient/family to participate in care and decision-making at the level they choose  - Honor patient and family perspectives and choices  Outcome: Progressing     Problem: Patient/Family Goals  Goal: Patient/Family Long Term Goal  Description: Patient's Long Term Goal: Go home    Interventions:  - Monitor labs  - Diagnostic testing  - Follow MD orders  - See additional Care Plan goals for specific interventions  Outcome: Progressing  Goal: Patient/Family Short Term Goal  Description: Patient's Short Term Goal: Decreased pain    Interventions:   - Pain management  - Diagnostic testing  - Follow MD orders  - See additional Care Plan goals for specific interventions  Outcome: Progressing     Problem: CARDIOVASCULAR - ADULT  Goal: Maintains optimal cardiac output and hemodynamic stability  Description: INTERVENTIONS:  - Monitor vital signs, rhythm, and trends  - Monitor for bleeding, hypotension and signs of decreased cardiac output  - Evaluate effectiveness of vasoactive medications to optimize hemodynamic stability  - Monitor arterial and/or venous puncture sites for bleeding and/or hematoma  - Assess quality of pulses, skin color and temperature  - Assess for signs of decreased coronary artery perfusion - ex. Angina  - Evaluate fluid balance, assess for edema, trend weights  Outcome: Progressing     Problem: RESPIRATORY - ADULT  Goal: Achieves optimal ventilation and oxygenation  Description: INTERVENTIONS:  - Assess for changes in respiratory status  - Assess for changes in mentation  and behavior  - Position to facilitate oxygenation and minimize respiratory effort  - Oxygen supplementation based on oxygen saturation or ABGs  - Provide Smoking Cessation handout, if applicable  - Encourage broncho-pulmonary hygiene including cough, deep breathe, Incentive Spirometry  - Assess the need for suctioning and perform as needed  - Assess and instruct to report SOB or any respiratory difficulty  - Respiratory Therapy support as indicated  - Manage/alleviate anxiety  - Monitor for signs/symptoms of CO2 retention  Outcome: Progressing     Problem: HEMATOLOGIC - ADULT  Goal: Maintains hematologic stability  Description: INTERVENTIONS  - Assess for signs and symptoms of bleeding or hemorrhage  - Monitor labs and vital signs for trends  - Administer supportive blood products/factors, fluids and medications as ordered and appropriate  - Administer supportive blood products/factors as ordered and appropriate  Outcome: Progressing  Goal: Free from bleeding injury  Description: (Example usage: patient with low platelets)  INTERVENTIONS:  - Avoid intramuscular injections, enemas and rectal medication administration  - Ensure safe mobilization of patient  - Hold pressure on venipuncture sites to achieve adequate hemostasis  - Assess for signs and symptoms of internal bleeding  - Monitor lab trends  - Patient is to report abnormal signs of bleeding to staff  - Avoid use of toothpicks and dental floss  - Use electric shaver for shaving  - Use soft bristle tooth brush  - Limit straining and forceful nose blowing  Outcome: Progressing     Problem: METABOLIC/FLUID AND ELECTROLYTES - ADULT  Goal: Electrolytes maintained within normal limits  Description: INTERVENTIONS:  - Monitor labs and rhythm and assess patient for signs and symptoms of electrolyte imbalances  - Administer electrolyte replacement as ordered  - Monitor response to electrolyte replacements, including rhythm and repeat lab results as appropriate  - Fluid  restriction as ordered  - Instruct patient on fluid and nutrition restrictions as appropriate  Outcome: Progressing     Problem: MUSCULOSKELETAL - ADULT  Goal: Return mobility to safest level of function  Description: INTERVENTIONS:  - Assess patient stability and activity tolerance for standing, transferring and ambulating w/ or w/o assistive devices  - Assist with transfers and ambulation using safe patient handling equipment as needed  - Ensure adequate protection for wounds/incisions during mobilization  - Obtain PT/OT consults as needed  - Advance activity as appropriate  - Communicate ordered activity level and limitations with patient/family  Outcome: Progressing     Problem: GASTROINTESTINAL - ADULT  Goal: Minimal or absence of nausea and vomiting  Description: INTERVENTIONS:  - Maintain adequate hydration with IV or PO as ordered and tolerated  - Nasogastric tube to low intermittent suction as ordered  - Evaluate effectiveness of ordered antiemetic medications  - Provide nonpharmacologic comfort measures as appropriate  - Advance diet as tolerated, if ordered  - Obtain nutritional consult as needed  - Evaluate fluid balance  Outcome: Progressing     Problem: Impaired Functional Mobility  Goal: Achieve highest/safest level of mobility/gait  Description: Interventions:  - Assess patient's functional ability and stability  - Promote increasing activity/tolerance for mobility and gait  - Educate and engage patient/family in tolerated activity level and precautions    Problem: SAFETY ADULT - FALL  Goal: Free from fall injury  Description: INTERVENTIONS:  - Assess pt frequently for physical needs  - Identify cognitive and physical deficits and behaviors that affect risk of falls.  - Marina fall precautions as indicated by assessment.  - Educate pt/family on patient safety including physical limitations  - Instruct pt to call for assistance with activity based on assessment  - Modify environment to reduce risk  of injury  - Provide assistive devices as appropriate  - Consider OT/PT consult to assist with strengthening/mobility  - Encourage toileting schedule  Outcome: Progressing     Problem: RISK FOR INFECTION - ADULT  Goal: Absence of fever/infection during anticipated neutropenic period  Description: INTERVENTIONS  - Monitor WBC  - Administer growth factors as ordered  - Implement neutropenic guidelines  Outcome: Progressing     Outcome: Progressing     Problem: Impaired Activities of Daily Living  Goal: Achieve highest/safest level of independence in self care  Description: Interventions:  - Assess ability and encourage patient to participate in ADLs to maximize function  - Promote sitting position while performing ADLs such as feeding, grooming, and bathing  - Educate and encourage patient/family in tolerated functional activity level and precautions during self-care    Outcome: Progressing

## 2025-06-07 NOTE — PHYSICAL THERAPY NOTE
PHYSICAL THERAPY EVALUATION - INPATIENT     Room Number: 339/339-A  Evaluation Date: 6/7/2025  Type of Evaluation: Initial        Presenting Problem: Chest pain / NSTEMI ---3 falls in one day prior to admission . Pt reports feels difficulty with word finding / slower speech and balance changes from baseline .   PMH sign for chronic right side weakness / right RTC injury / blind right / eye  Co-Morbidities : CAD / HTN/ Anemia with CKD/ OA / RLS / Chronic LBP and left hip pian  Reason for Therapy: Mobility Dysfunction and Discharge Planning    PHYSICAL THERAPY ASSESSMENT   Chart reviewed , Co session with OT , RN approve participation.  Pt presents in bed new to O2 on 3L .  Pt is alert oriented to place , person and situation.     Patient is a 80 year old female admitted 6/2/2025 for Presenting Problem: Chest pain / NSTEMI / cardiology on consult ---Pt reports 3 falls in one day on the day prior to admission .  Pt reports feels difficulty with word finding / slower speech and new balance changes comparable to her baseline .  Pt reports her  PMH  includes chronic right side weakness / right RTC injury / blind right / eye.      Prior to admission, patient's baseline is Indep  ADL and ambulation in the home with RW and in community with rollator living alone . Pt explains recently has been having more difficulty requiring family help intermittently therefore currently in process of moving into [facility] apartment where meals provided.  Pt denies O2 use at baseline.    Patient is currently functioning below baseline with bed mobility, transfers, gait, maintaining seated position, standing prolonged periods, and performing household tasks.  Patient is requiring moderate assist as a result of the following impairments: decreased functional strength, decreased endurance/aerobic capacity, impaired sit and standing balance, decreased muscular endurance, cognitive deficits (per pt feels more difficulty with word finding and slower  speech denies difficulty with swallowing ), medical status, and chronic right side weakness compared to left at her baseline .  Physical Therapy will continue to follow for duration of hospitalization.    Patient will benefit from continued skilled PT Services to promote return to prior level of function and safety with continuous assistance and gradual rehabilitative therapy . Discussed pt reports to therapist/pt status with RN for further assessment as needed .  RN to discuss with MD .    Therapy will update DC recommendations pending further medical management and progress with therapy.    Pt reports a goal for DC to rehab facility.   Pt does not appear with ability at this time to return to independent living situation, therapy recommending rehab facility as next level of care.   No family is present.    SW to work with pt / pt family on optimal DC Plan.     PLAN DURING HOSPITALIZATION  Nursing Mobility Recommendation : 1 Assist  PT Device Recommendation: Rollator, Rolling walker (shower chair)  PT Treatment Plan: Bed mobility, Body mechanics, Endurance, Energy conservation, Patient education, Family education, Gait training, Balance training, Transfer training  Rehab Potential : Good  Frequency (Obs): 5x/week     PHYSICAL THERAPY MEDICAL/SOCIAL HISTORY   History related to current admission:    From primary care MD    NSTEMI, type I.  Cardiac catheterization 6/4 without major obstructive disease findings.  Now chest pain-free.  - Cardiology following  - Echo noted  - Aspirin, Coreg, Lipitor  - Transfer to telemetry     CKD stage III.  At baseline  - Repeat BMP if clinical change     Hypotension.  Exact nature unclear.  Treated for septic source with IV fluids, pressors, antibiotics.  However workup has been negative.  Now off pressors.  Doing well.  Does not appear to be cardiogenic based on echo, catheterization results.  - Continue antibiotics for now, stop in next 1-2 days if cultures remain negative  - IV  fluids 83     Hiatal hernia.  May be causing her nausea.  Nausea no resolved.  She is not interested in surgery if at all possible to avoid this.  - Acid suppression  - Outpatient surgical follow-up if necessary     Other problems  CAD  Hypertension  Hiatal hernia  Anemia of chronic kidney disease  Osteoarthritis  Restless leg syndrome  Chronic back pain       Problem List  Principal Problem:    NSTEMI (non-ST elevated myocardial infarction) ([facility])  Active Problems:    Weakness generalized    Frequent falls    Difficult intravenous access - Recommend US Guided PIVs      HOME SITUATION  Type of Home: Independent living facility (Pt in process of moving to [facility])  Home Layout: One level                     Lives With: Alone (pt denies support at DC from family)    Drives: No   Patient Regularly Uses: Rolling walker         SUBJECTIVE  \"My kids say I am talking differently \"    \" I am talking slower and am having trouble finding the right words \"   \" My balance is off compared to before \"   Pt reports chronic right side weakness compared to her left and explains leaned often to right side at rest prior to admission.  Pt denies any swallowing difficulty .     PHYSICAL THERAPY EXAMINATION   OBJECTIVE  Precautions: Bed/chair alarm, Limb alert - right, Cardiac  Fall Risk: High fall risk    WEIGHT BEARING RESTRICTION       PAIN ASSESSMENT  Rating: Other (Comment)  Location: left hip chronic related to OA  Management Techniques: Activity promotion, Body mechanics, Breathing techniques, Relaxation, Repositioning    COGNITION  Overall Cognitive Status:  WFL - within functional limits    RANGE OF MOTION AND STRENGTH ASSESSMENT  Upper extremity ROM and strength are within functional limits ---pt at baseline with decrease right shoulder ROM and strength in right UE compared to left . Pt observed using B hands and UE functionally during session.   Requested help to open a few things on her tray   Lower extremity ROM is within  functional limits   Lower extremity strength is within functional limits -pt at baseline with decrease right  strength in right LE compared to left . Pt able to return demonstration of B AP   BALANCE  Static Sitting: Fair -  Dynamic Sitting: Poor +  Static Standing: Poor +  Dynamic Standing: Poor          NEUROLOGICAL FINDINGS        \"My kids say I am talking differently \"    \" I am talking slower and am having trouble finding the right words \"   \" My balance is off compared to before \"   Pt reports chronic right side weakness compared to her left and explains leaned often to right side at rest prior to admission.  Pt denies any swallowing difficulty .          RN Informed of above ....       ACTIVITY TOLERANCE  Pulse: 79        BP: 134/72 (during therapy activity)             O2 WALK  Oxygen Therapy  SPO2% on Room Air at Rest: 94  SPO2% on Oxygen at Rest: 3  SPO2% Ambulation on Oxygen: 91  Ambulation oxygen flow (liters per minute): 3    AM-PAC '6-Clicks' INPATIENT SHORT FORM - BASIC MOBILITY  How much difficulty does the patient currently have...  Patient Difficulty: Turning over in bed (including adjusting bedclothes, sheets and blankets)?: A Little   Patient Difficulty: Sitting down on and standing up from a chair with arms (e.g., wheelchair, bedside commode, etc.): A Little   Patient Difficulty: Moving from lying on back to sitting on the side of the bed?: A Little   How much help from another person does the patient currently need...   Help from Another: Moving to and from a bed to a chair (including a wheelchair)?: A Lot   Help from Another: Need to walk in hospital room?: A Lot   Help from Another: Climbing 3-5 steps with a railing?: Total     AM-PAC Score:  Raw Score: 14   Approx Degree of Impairment: 61.29%   Standardized Score (AM-PAC Scale): 38.1   CMS Modifier (G-Code): CL    FUNCTIONAL ABILITY STATUS  Functional Mobility/Gait Assessment  Gait Assistance:  (min to brief mod assist for pt with decrease  tolerance , pt declining any further ambulation after a few steps to chair)  Distance (ft): 2-3 ft bed to chair  Assistive Device: Rolling walker  Pattern: R Steppage, L Steppage, R Foot flat, L Foot flat, Shuffle (pt tends to lean right per pt at baseline does lean a little to right with hx of chronic right side weakness   per pt her balance feels off not at her baseline)  Rolling: minimal assist  Supine to Sit: minimal assist    Sit to Stand: minimal assist cues provided         Skilled Therapy Provided: PT eval complete , co session with OT , B AP encouraged , fxn mobility training and DC Planning  Education Provided To: Patient   Patient Education: Role of Physical Therapy, Plan of Care, Discharge Recommendations, DME Recommendations, Functional Transfer Techniques, Fall Prevention, Energy Conservation, Proper Body Mechanics, Gait Training (modified 5 pt RPE scale for pacing of activity)   Patient's Response to Education: Verbalized Understanding, Returned Demonstration, Requires Further Education      The patient's Approx Degree of Impairment: 61.29% has been calculated based on documentation in the [facility] '6 clicks' Inpatient Basic Mobility Short Form.  Research supports that patients with this level of impairment may benefit from rehab facility .  Final disposition will be made by interdisciplinary medical team.    Patient End of Session: Up in chair, Needs met, Call light within reach, RN aware of session/findings, All patient questions and concerns addressed, Alarm set, Hospital anti-slip socks    CURRENT GOALS  Goals to be met by: 6/25/25  Patient Goal Patient's self-stated goal is: rehab    Goal #1 Patient is able to demonstrate supine - sit EOB @ level: CGA     Goal #1   Current Status    Goal #2 Patient is able to demonstrate transfers EOB to/from Chair/Wheelchair at assistance level: CGA with walker - rolling     Goal #2  Current Status    Goal #3 Patient is able to ambulate 20 feet with assist device:  walker - rolling at assistance level: minimum assistance  ( progress distance pending tolerance )    Goal #3   Current Status    Goal #4    Goal #4   Current Status    Goal #5 Patient to demonstrate independence with home activity/exercise instructions provided to patient in preparation for discharge.   Goal #5   Current Status    Goal #6    Goal #6  Current Status      Patient Evaluation Complexity Level:  History Moderate - 1 or 2 personal factors and/or co-morbidities   Examination of body systems Low -  addressing 1-2 elements   Clinical Presentation  Moderate - Evolving   Clinical Decision Making  Moderate Complexity   PT eval and therapy actvity 2 units

## 2025-06-07 NOTE — PROGRESS NOTES
On floor. BP high. No cp or sob. Nausea better.  Sitting up in chair   Weak   Vitals:    06/07/25 1218   BP: 134/74   Pulse: 66   Resp:    Temp:        Intake/Output Summary (Last 24 hours) at 6/7/2025 1406  Last data filed at 6/7/2025 1220  Gross per 24 hour   Intake 1094 ml   Output 650 ml   Net 444 ml     Wt Readings from Last 1 Encounters:   06/07/25 172 lb 3.2 oz (78.1 kg)        General: No acute distress.  Neck: Jugular venous pulsations not seen.  Lungs: Crackles khloe.  Heart: Normal rate. MAGI.  Abdomen: Soft. Non tender  Extremities: No edema.  Neurological: Alert. No focal deficits.  Psychiatric: Appropriate mood and affect.  Current Hospital Medications[1]  Prescriptions Prior to Admission[2]  No results found.      Lab Results   Component Value Date    WBC 7.4 06/07/2025    HGB 8.7 06/07/2025    HCT 27.6 06/07/2025    .0 06/07/2025    CREATSERUM 1.37 06/07/2025    BUN 16 06/07/2025     06/07/2025    K 4.4 06/07/2025     06/07/2025    CO2 22.0 06/07/2025    GLU 96 06/07/2025    CA 8.5 06/07/2025            Assessment / Plan  1. Non-ST Elevation Myocardial Infarction -  Patient is currently on heparin weight-based protocol, oral nitrates, atorvastatin 40 mg daily, and aspirin 325 mg daily. Plan to proceed with cardiac catheterization with possible intervention once renal function improves.     I21.4 Non-ST elevation myocardial infarction           2. Acute Renal Failure -    N17.9 Acute kidney failure, unspecified           3. Chronic Left Ventricular Heart Failure -  Evidence of dehydration with decreased oral intake.     I50.32 Chronic diastolic heart failure           4. Coronary Artery Disease -  Status post percutaneous intervention to mid right coronary artery in 09/2023.     I25.10 Atherosclerotic heart disease of native coronary artery without angina pectoris           Discussion Notes       Start half tablet Entresto as bp was marginal to see if she tolerates. DC planning.         [1]   Current Facility-Administered Medications   Medication Dose Route Frequency    sacubitril-valsartan (Entresto) 24-26 MG per tab 0.5 tablet  0.5 tablet Oral BID    pantoprazole (Protonix) DR tab 40 mg  40 mg Oral QAM AC    heparin (Porcine) 5000 UNIT/ML injection 5,000 Units  5,000 Units Subcutaneous Q8H GET    acetaminophen (Tylenol Extra Strength) tab 500 mg  500 mg Oral Q4H PRN    metoprolol (Lopressor) 5 mg/5mL injection 5 mg  5 mg Intravenous Q4H PRN    sodium chloride 0.9% infusion   Intravenous Continuous    alum-mag hydroxide-simethicone (Maalox) 200-200-20 MG/5ML oral suspension 30 mL  30 mL Oral QID PRN    aspirin tab 325 mg  325 mg Oral Daily    nitroglycerin (Nitrostat) SL tab 0.4 mg  0.4 mg Sublingual Q5 Min PRN    temazepam (Restoril) cap 15 mg  15 mg Oral Nightly PRN    buPROPion ER (Wellbutrin XL) 24 hr tab 150 mg  150 mg Oral Daily    buPROPion ER (Wellbutrin XL) 24 hr tab 300 mg  300 mg Oral Daily    carvedilol (Coreg) tab 3.125 mg  3.125 mg Oral BID with meals    escitalopram (Lexapro) tab 20 mg  20 mg Oral Daily    gabapentin (Neurontin) tab 600 mg  600 mg Oral BID    rOPINIRole (Requip) tab 1 mg  1 mg Oral TID    tiZANidine (Zanaflex) tab 2 mg  2 mg Oral Nightly PRN    venlafaxine ER (Effexor-XR) 24 hr cap 37.5 mg  37.5 mg Oral Daily with breakfast    atorvastatin (Lipitor) tab 40 mg  40 mg Oral Nightly    calcium carbonate (Tums) chewable tab 500 mg  500 mg Oral Q6H PRN   [2]   Medications Prior to Admission   Medication Sig    aspirin 81 MG Oral Tab EC Take 1 tablet (81 mg total) by mouth daily.    carvedilol 3.125 MG Oral Tab Take 1 tablet (3.125 mg total) by mouth 2 (two) times daily with meals.    citalopram 40 MG Oral Tab Take 1 tablet (40 mg total) by mouth daily.    buPROPion  MG Oral Tablet 24 Hr Take 1 tablet (150 mg total) by mouth daily.    buPROPion  MG Oral Tablet 24 Hr Take 1 tablet (300 mg total) by mouth daily.    Alpha Lipoic Acid 200 MG Oral Cap Take 1  capsule by mouth daily.    venlafaxine ER 37.5 MG Oral Capsule SR 24 Hr Take 1 capsule (37.5 mg total) by mouth daily with breakfast.    dapagliflozin (FARXIGA) 10 MG Oral Tab Take 1 tablet (10 mg total) by mouth in the morning.    torsemide 20 MG Oral Tab Take 2 tablets (40 mg total) by mouth daily.    spironolactone 25 MG Oral Tab Take 1 tablet (25 mg total) by mouth daily. (Patient taking differently: Take 0.5 tablets (12.5 mg total) by mouth in the morning.)    gabapentin 600 MG Oral Tab Take 1 tablet (600 mg total) by mouth 2 (two) times daily.    isosorbide dinitrate 10 MG Oral Tab Take 1 tablet (10 mg total) by mouth in the morning and 1 tablet (10 mg total) before bedtime.    rOPINIRole 1 MG Oral Tab Take 1 tablet (1 mg total) by mouth 3 (three) times daily.    pantoprazole 40 MG Oral Tab EC Take 1 tablet (40 mg total) by mouth 2 (two) times daily before meals.    atorvastatin 40 MG Oral Tab Take 1 tablet (40 mg total) by mouth daily with dinner.    acetaminophen 500 MG Oral Tab Take 1 tablet (500 mg total) by mouth every 6 (six) hours as needed for Pain.    tiZANidine HCl 2 MG Oral Tab Take 1 tablet (2 mg total) by mouth nightly as needed (muscle spasms).    prochlorperazine (COMPAZINE) 10 mg tablet Take 1 tablet (10 mg total) by mouth every 6 (six) hours as needed for Nausea.

## 2025-06-07 NOTE — PLAN OF CARE
Patient c/o intermittent vaginal itching, states her PCP had prescribed a topical for a yeast infection. Attending notified.  Uneventful shift.   Problem: Patient Centered Care  Goal: Patient preferences are identified and integrated in the patient's plan of care  Description: Interventions:  - What would you like us to know as we care for you? From On license of [facility]  - Provide timely, complete, and accurate information to patient/family  - Incorporate patient and family knowledge, values, beliefs, and cultural backgrounds into the planning and delivery of care  - Encourage patient/family to participate in care and decision-making at the level they choose  - Honor patient and family perspectives and choices  Outcome: Progressing     Problem: Patient/Family Goals  Goal: Patient/Family Long Term Goal  Description: Patient's Long Term Goal: Go home    Interventions:  - Monitor labs  - Diagnostic testing  - Follow MD orders  - See additional Care Plan goals for specific interventions  Outcome: Progressing  Goal: Patient/Family Short Term Goal  Description: Patient's Short Term Goal: Decreased pain    Interventions:   - Pain management  - Diagnostic testing  - Follow MD orders  - See additional Care Plan goals for specific interventions  Outcome: Progressing     Problem: CARDIOVASCULAR - ADULT  Goal: Maintains optimal cardiac output and hemodynamic stability  Description: INTERVENTIONS:  - Monitor vital signs, rhythm, and trends  - Monitor for bleeding, hypotension and signs of decreased cardiac output  - Evaluate effectiveness of vasoactive medications to optimize hemodynamic stability  - Monitor arterial and/or venous puncture sites for bleeding and/or hematoma  - Assess quality of pulses, skin color and temperature  - Assess for signs of decreased coronary artery perfusion - ex. Angina  - Evaluate fluid balance, assess for edema, trend weights  Outcome: Progressing     Problem: RESPIRATORY - ADULT  Goal: Achieves  optimal ventilation and oxygenation  Description: INTERVENTIONS:  - Assess for changes in respiratory status  - Assess for changes in mentation and behavior  - Position to facilitate oxygenation and minimize respiratory effort  - Oxygen supplementation based on oxygen saturation or ABGs  - Provide Smoking Cessation handout, if applicable  - Encourage broncho-pulmonary hygiene including cough, deep breathe, Incentive Spirometry  - Assess the need for suctioning and perform as needed  - Assess and instruct to report SOB or any respiratory difficulty  - Respiratory Therapy support as indicated  - Manage/alleviate anxiety  - Monitor for signs/symptoms of CO2 retention  Outcome: Progressing     Problem: METABOLIC/FLUID AND ELECTROLYTES - ADULT  Goal: Electrolytes maintained within normal limits  Description: INTERVENTIONS:  - Monitor labs and rhythm and assess patient for signs and symptoms of electrolyte imbalances  - Administer electrolyte replacement as ordered  - Monitor response to electrolyte replacements, including rhythm and repeat lab results as appropriate  - Fluid restriction as ordered  - Instruct patient on fluid and nutrition restrictions as appropriate  Outcome: Progressing     Problem: HEMATOLOGIC - ADULT  Goal: Maintains hematologic stability  Description: INTERVENTIONS  - Assess for signs and symptoms of bleeding or hemorrhage  - Monitor labs and vital signs for trends  - Administer supportive blood products/factors, fluids and medications as ordered and appropriate  - Administer supportive blood products/factors as ordered and appropriate  Outcome: Progressing  Goal: Free from bleeding injury  Description: (Example usage: patient with low platelets)  INTERVENTIONS:  - Avoid intramuscular injections, enemas and rectal medication administration  - Ensure safe mobilization of patient  - Hold pressure on venipuncture sites to achieve adequate hemostasis  - Assess for signs and symptoms of internal  bleeding  - Monitor lab trends  - Patient is to report abnormal signs of bleeding to staff  - Avoid use of toothpicks and dental floss  - Use electric shaver for shaving  - Use soft bristle tooth brush  - Limit straining and forceful nose blowing  Outcome: Progressing     Problem: MUSCULOSKELETAL - ADULT  Goal: Return mobility to safest level of function  Description: INTERVENTIONS:  - Assess patient stability and activity tolerance for standing, transferring and ambulating w/ or w/o assistive devices  - Assist with transfers and ambulation using safe patient handling equipment as needed  - Ensure adequate protection for wounds/incisions during mobilization  - Obtain PT/OT consults as needed  - Advance activity as appropriate  - Communicate ordered activity level and limitations with patient/family  Outcome: Progressing     Problem: GASTROINTESTINAL - ADULT  Goal: Minimal or absence of nausea and vomiting  Description: INTERVENTIONS:  - Maintain adequate hydration with IV or PO as ordered and tolerated  - Nasogastric tube to low intermittent suction as ordered  - Evaluate effectiveness of ordered antiemetic medications  - Provide nonpharmacologic comfort measures as appropriate  - Advance diet as tolerated, if ordered  - Obtain nutritional consult as needed  - Evaluate fluid balance  Outcome: Progressing     Problem: Impaired Functional Mobility  Goal: Achieve highest/safest level of mobility/gait  Description: Interventions:  - Assess patient's functional ability and stability  - Promote increasing activity/tolerance for mobility and gait  - Educate and engage patient/family in tolerated activity level and precautions  - Recommend use of  RW for transfers and ambulation  Outcome: Progressing     Problem: Impaired Activities of Daily Living  Goal: Achieve highest/safest level of independence in self care  Description: Interventions:  - Assess ability and encourage patient to participate in ADLs to maximize  function  - Promote sitting position while performing ADLs such as feeding, grooming, and bathing  - Educate and encourage patient/family in tolerated functional activity level and precautions during self-care  - Encourage patient to incorporate impaired side during daily activities to promote function  Outcome: Progressing     Problem: PAIN - ADULT  Goal: Verbalizes/displays adequate comfort level or patient's stated pain goal  Description: INTERVENTIONS:  - Encourage pt to monitor pain and request assistance  - Assess pain using appropriate pain scale  - Administer analgesics based on type and severity of pain and evaluate response  - Implement non-pharmacological measures as appropriate and evaluate response  - Consider cultural and social influences on pain and pain management  - Manage/alleviate anxiety  - Utilize distraction and/or relaxation techniques  - Monitor for opioid side effects  - Notify MD/LIP if interventions unsuccessful or patient reports new pain  - Anticipate increased pain with activity and pre-medicate as appropriate  Outcome: Progressing     Problem: RISK FOR INFECTION - ADULT  Goal: Absence of fever/infection during anticipated neutropenic period  Description: INTERVENTIONS  - Monitor WBC  - Administer growth factors as ordered  - Implement neutropenic guidelines  Outcome: Progressing     Problem: SAFETY ADULT - FALL  Goal: Free from fall injury  Description: INTERVENTIONS:  - Assess pt frequently for physical needs  - Identify cognitive and physical deficits and behaviors that affect risk of falls.  - [employer] fall precautions as indicated by assessment.  - Educate pt/family on patient safety including physical limitations  - Instruct pt to call for assistance with activity based on assessment  - Modify environment to reduce risk of injury  - Provide assistive devices as appropriate  - Consider OT/PT consult to assist with strengthening/mobility  - Encourage toileting schedule  Outcome:  Progressing

## 2025-06-07 NOTE — PROGRESS NOTES
[facility]  part of [facility]     Progress Note    Susan P Ruzicka Patient Status:  Inpatient    1945 MRN [id number]   Location [facility] 2W/SW Attending Rick Chan MD   Hosp Day # 5 PCP ROLY KAUR       Subjective:   Patient seen and examined.  Resting in bed.  Denies significant chest pain or dyspnea    Objective:   Blood pressure 137/72, pulse 82, temperature 98.4 °F (36.9 °C), temperature source Oral, resp. rate 16, height 5' 1\" (1.549 m), weight 172 lb 3.2 oz (78.1 kg), SpO2 91%.  Intake/Output:   Last 3 shifts: I/O last 3 completed shifts:  In: 2404.8 [P.O.:500; I.V.:1624; IV PIGGYBACK:280.8]  Out: 420 [Urine:420]   This shift: No intake/output data recorded.     Vent Settings:      Hemodynamic parameters (last 24 hours):      Scheduled Meds: Current Hospital Medications[1]    Continuous Infusions: Medication Infusions[2]    Physical Exam  Constitutional: no acute distress  Eyes: PERRL  ENT: nares pateint  Neck: supple, no JVD  Cardio: RRR, S1 S2  Respiratory: clear to auscultation bilaterally, no wheezing, rales, rhonchi, crackles  GI: abdomen soft, non tender, active bowel sounds, no organomegaly  Extremities: no clubbing, cyanosis, edema  Neurologic: no gross motor deficits  Skin: warm, dry      Results:     Lab Results   Component Value Date    WBC 7.4 2025    HGB 8.7 2025    HCT 27.6 2025    .0 2025    CREATSERUM 1.37 2025    BUN 16 2025     2025    K 4.4 2025     2025    CO2 22.0 2025    GLU 96 2025    CA 8.5 2025       No results found.          Assessment   1.  Non-ST elevation myocardial infarction  2.  Shock state, improved  3.  Coronary artery disease  4.  Anemia  5.  Chronic kidney disease     Plan   -Patient presenting with evidence of chest pain concern for non-ST elevation myocardial infarction.  - Status post cardiac cath without significant new coronary  disease seen.  - With recommendations per cardiology  - Weaned off pressor support  - Patient started on empiric antibiotic therapy however no obvious source of sepsis identified at this time  - DVT prophylaxis: Heparin  - No active pulmonary issues will sign off    Beena Whitten DO  Pulmonary Critical Care Medicine  [facility]          [1]   Current Facility-Administered Medications   Medication Dose Route Frequency    pantoprazole (Protonix) DR tab 40 mg  40 mg Oral QAM AC    piperacillin-tazobactam (Zosyn) 4.5 g in dextrose 5% 100 mL IVPB-ADDV  4.5 g Intravenous Q8H    heparin (Porcine) 5000 UNIT/ML injection 5,000 Units  5,000 Units Subcutaneous Q8H GET    midodrine (ProAmatine) tab 5 mg  5 mg Oral TID    acetaminophen (Tylenol Extra Strength) tab 500 mg  500 mg Oral Q4H PRN    metoprolol (Lopressor) 5 mg/5mL injection 5 mg  5 mg Intravenous Q4H PRN    sodium chloride 0.9% infusion   Intravenous Continuous    alum-mag hydroxide-simethicone (Maalox) 200-200-20 MG/5ML oral suspension 30 mL  30 mL Oral QID PRN    aspirin tab 325 mg  325 mg Oral Daily    nitroglycerin (Nitrostat) SL tab 0.4 mg  0.4 mg Sublingual Q5 Min PRN    temazepam (Restoril) cap 15 mg  15 mg Oral Nightly PRN    buPROPion ER (Wellbutrin XL) 24 hr tab 150 mg  150 mg Oral Daily    buPROPion ER (Wellbutrin XL) 24 hr tab 300 mg  300 mg Oral Daily    carvedilol (Coreg) tab 3.125 mg  3.125 mg Oral BID with meals    escitalopram (Lexapro) tab 20 mg  20 mg Oral Daily    gabapentin (Neurontin) tab 600 mg  600 mg Oral BID    rOPINIRole (Requip) tab 1 mg  1 mg Oral TID    tiZANidine (Zanaflex) tab 2 mg  2 mg Oral Nightly PRN    venlafaxine ER (Effexor-XR) 24 hr cap 37.5 mg  37.5 mg Oral Daily with breakfast    atorvastatin (Lipitor) tab 40 mg  40 mg Oral Nightly    calcium carbonate (Tums) chewable tab 500 mg  500 mg Oral Q6H PRN   [2]    sodium chloride 75 mL/hr at 06/06/25 3911

## 2025-06-07 NOTE — OCCUPATIONAL THERAPY NOTE
OCCUPATIONAL THERAPY EVALUATION - INPATIENT     Room Number: 339/339-A  Evaluation Date: 6/7/2025  Type of Evaluation: Initial  Presenting Problem: Generalized weakness, multiple falls, epigastric/central chest heaviness. Patient found to have NSTEMI, type I and is now s/p cardiac catheterization on 6/4/25. PMHx includes but is not limited to hypertension, hyperlipidemia, coronary artery disease post angioplasty and stent placement, congestive heart failure, chronic kidney disease, peripheral neuropathy, osteoporosis, and chronic back pain    Physician Order: IP Consult to Occupational Therapy  Reason for Therapy: ADL/IADL Dysfunction and Discharge Planning    OCCUPATIONAL THERAPY ASSESSMENT   Patient is a 80 year old female admitted 6/2/2025.     Orders received. Chart review complete. RN approved patient participation. Prior to admission, patient's baseline is Mod I with RW for short distances inside apt and rollator when out in the community.  Patient is currently functioning below baseline with toileting, bathing, upper body dressing, lower body dressing, grooming, bed mobility, transfers, static sitting balance, dynamic sitting balance, static standing balance, dynamic standing balance, maintaining seated position, functional standing tolerance, energy conservation strategies, aerobic capacity, and performing household tasks.  Patient is requiring dependent - min A as a result of the following impairments: decreased functional strength, decreased functional reach, decreased endurance, pain, impaired balance, impaired coordination, impaired motor planning, decreased muscular endurance, medical status, increased O2 needs from baseline, decreased insight to deficits, and decreased safety awareness. Occupational Therapy will continue to follow for duration of hospitalization.    Patient will benefit from continued skilled OT Services to promote return to prior level of function and safety with continuous assistance  and gradual rehabilitative therapy.    PLAN DURING HOSPITALIZATION  OT Device Recommendations: TBD  OT Treatment Plan: Balance activities, Energy conservation/work simplification techniques, ADL training, IADL training, UE strengthening/ROM, Endurance training, Patient/Family education, Patient/Family training, Equipment eval/education, Compensatory technique education, Continued evaluation, Neuromuscluar reeducation     OCCUPATIONAL THERAPY MEDICAL/SOCIAL HISTORY   Problem List  Principal Problem:    NSTEMI (non-ST elevated myocardial infarction) (HCC)  Active Problems:    Weakness generalized    Frequent falls    Difficult intravenous access - Recommend US Guided PIVs    HOME SITUATION  Type of Home: Independent living facility (Pt in [place] moving to [place])  Home Layout: One level  Lives With: Alone (pt denies support at DC from family)  Toilet and Equipment: Comfort height toilet; Grab bar  Shower/Tub and Equipment: Walk-in shower; Shower chair  Occupation/Status: PLOF: Mod I with RW in apt, and rollator when out in the community  Drives: No  Patient Regularly Uses: Rolling walker    Prior Level of [place]: Mod I with BADLs and assist PRN from family for IADLs. Patient reports she recently gave up driving.     SUBJECTIVE  Patient agreeable to participate in skilled OT. Patient reports, \"my kids think I am talking slower than I normally do\". Patient reports baseline deficits in right shoulder, and right LE, as well as chronic deficits in right eye / vision.     OCCUPATIONAL THERAPY EXAMINATION      OBJECTIVE  Precautions: Bed/chair alarm; Limb alert - right; Cardiac  Fall Risk: High fall risk    PAIN ASSESSMENT  Location: (L) Hip pain - patient reports pain is chronic and resolves when out of bed. Patient did not numerically rate hip pain.  Management Techniques: Body mechanics; Activity promotion; Breathing techniques; Repositioning; Relaxation; Nurse notified      ACTIVITY TOLERANCE  Pulse: 79  Heart  Rate Source: Monitor     BP: 134/72 - pre activity   BP: 120/105 - post activity   BP Location: Left arm  BP Method: Automatic  Patient Position: Sitting    O2 SATURATIONS   3 lpm at present - Denies wearing home O2    SpO2 at 93% with activity     COGNITION  Overall Cognitive Status:  WFL - within functional limits  A x O x 4     VISION  Patient reports (R) eye vision deficits at baseline     PERCEPTION  Right = Impaired   Left = WNL    RANGE OF MOTION   Upper extremity ROM is within functional limits     STRENGTH ASSESSMENT  Upper extremity strength:   Right = Baseline strength deficits per patient report, grossly 3+/5 for shoulder flexion   Left = WNL    COORDINATION  Gross Motor: WFL   Fine Motor: WFL     ACTIVITIES OF DAILY LIVING ASSESSMENT  AM-PAC ‘6-Clicks’ Inpatient Daily Activity Short Form  How much help from another person does the patient currently need…  -   Putting on and taking off regular lower body clothing?: A Lot  -   Bathing (including washing, rinsing, drying)?: A Lot  -   Toileting, which includes using toilet, bedpan or urinal? : A Lot  -   Putting on and taking off regular upper body clothing?: A Little  -   Taking care of personal grooming such as brushing teeth?: A Little  -   Eating meals?: A Little    AM-PAC Score:  Score: 15  Approx Degree of Impairment: 56.46%  Standardized Score (AM-PAC Scale): 34.69  CMS Modifier (G-Code): CK    FUNCTIONAL TRANSFER ASSESSMENT  Sit to Stand: Edge of Bed; Chair  Edge of Bed: Minimal Assist (+ RW)  Chair: Minimal Assist (+ RW)    AMBULATION:   Sit <> stand from edge of bed and stand <> pivot to bedside chair performed only this date. Patient limited by diminished endurance and fatigue. Max cuing provided for energy conservation techniques and pacing principles to maximize activity tolerance.     BED MOBILITY  Supine to Sit : Minimal Assist  Scooting: Minimal Assist  Comments: Increased time required     FUNCTIONAL ADL ASSESSMENT  LB Dressing Seated:  Maximum Assist  Comments: Patient limited by fatigue and generalized weakness. May benefit from AE including reacher and sock aide    EDUCATION PROVIDED  Patient Education : Role of Occupational Therapy; Plan of Care; Discharge Recommendations; DME Recommendations; Functional Transfer Techniques; Fall Prevention; Posture/Positioning; Edema Reduction; Energy Conservation; Proper Body Mechanics  Patient's Response to Education: Verbalized Understanding; Requires Further Education    The patient's Approx Degree of Impairment: 56.46% has been calculated based on documentation in the [facility] '6 clicks' Inpatient Daily Activity Short Form.  Research supports that patients with this level of impairment may benefit from ZEINAB.    Final disposition will be made by interdisciplinary medical team.     Patient End of Session: Up in chair, Needs met, Call light within reach, RN aware of session/findings, All patient questions and concerns addressed, Hospital anti-slip socks, Alarm set    OT Goals  Patients self stated goal is: To improve function and return home      Patient will complete functional transfer with SUP  Comment:     Patient will complete toileting with SUP  Comment:     Patient will tolerate standing for 5 minutes in prep for adls with SUP   Comment:    Patient will complete item retrieval with SUP  Comment:          Goals  on: 25  Frequency: 5  x wk     Patient Evaluation Complexity Level:   Occupational Profile/Medical History LOW - Brief history including review of medical or therapy records    Specific performance deficits impacting engagement in ADL/IADL LOW  1 - 3 performance deficits    Client Assessment/Performance Deficits LOW - No comorbidities nor modifications of tasks    Clinical Decision Making LOW - Analysis of occupational profile, problem-focused assessments, limited treatment options    Overall Complexity LOW     Self-Care Home Management: 8 minutes  Therapeutic Activity: 15  minutes    Iman Coronado, OTR/L  [facility]  PRN - Staff

## 2025-06-08 PROCEDURE — 99233 SBSQ HOSP IP/OBS HIGH 50: CPT | Performed by: HOSPITALIST

## 2025-06-08 NOTE — PROGRESS NOTES
On floor. BP stable. No cp or sob. Nausea better.  Sitting up in chair   Weak   Vitals:    06/08/25 0852   BP: 124/77   Pulse: 72   Resp: 18   Temp: 98.7 °F (37.1 °C)       Intake/Output Summary (Last 24 hours) at 6/8/2025 1322  Last data filed at 6/8/2025 1100  Gross per 24 hour   Intake 440 ml   Output 1500 ml   Net -1060 ml     Wt Readings from Last 1 Encounters:   06/08/25 167 lb (75.8 kg)        General: No acute distress.  Neck: Jugular venous pulsations not seen.  Lungs: Crackles khloe.  Heart: Normal rate. MAGI.  Abdomen: Soft. Non tender  Extremities: No edema.  Neurological: Alert. No focal deficits.  Psychiatric: Appropriate mood and affect.  Current Hospital Medications[1]  Prescriptions Prior to Admission[2]  No results found.                  Assessment / Plan  1. Non-ST Elevation Myocardial Infarction -  Patient is currently on heparin weight-based protocol, oral nitrates, atorvastatin 40 mg daily, and aspirin 325 mg daily. Plan to proceed with cardiac catheterization with possible intervention once renal function improves.     I21.4 Non-ST elevation myocardial infarction           2. Acute Renal Failure -    N17.9 Acute kidney failure, unspecified           3. Chronic Left Ventricular Heart Failure -  Evidence of dehydration with decreased oral intake.     I50.32 Chronic diastolic heart failure           4. Coronary Artery Disease -  Status post percutaneous intervention to mid right coronary artery in 09/2023.     I25.10 Atherosclerotic heart disease of native coronary artery without angina pectoris           Discussion Notes       Start half tablet Entresto as bp was marginal to see if she tolerates now stable, increase to full tablet bid.       [1]   Current Facility-Administered Medications   Medication Dose Route Frequency    sacubitril-valsartan (Entresto) 24-26 MG per tab 1 tablet  1 tablet Oral BID    clotrimazole (Lotrimin) 1 % cream   Topical BID    pantoprazole (Protonix) DR tab 40 mg  40  mg Oral QAM AC    heparin (Porcine) 5000 UNIT/ML injection 5,000 Units  5,000 Units Subcutaneous Q8H GET    acetaminophen (Tylenol Extra Strength) tab 500 mg  500 mg Oral Q4H PRN    metoprolol (Lopressor) 5 mg/5mL injection 5 mg  5 mg Intravenous Q4H PRN    alum-mag hydroxide-simethicone (Maalox) 200-200-20 MG/5ML oral suspension 30 mL  30 mL Oral QID PRN    aspirin tab 325 mg  325 mg Oral Daily    nitroglycerin (Nitrostat) SL tab 0.4 mg  0.4 mg Sublingual Q5 Min PRN    temazepam (Restoril) cap 15 mg  15 mg Oral Nightly PRN    buPROPion ER (Wellbutrin XL) 24 hr tab 150 mg  150 mg Oral Daily    buPROPion ER (Wellbutrin XL) 24 hr tab 300 mg  300 mg Oral Daily    carvedilol (Coreg) tab 3.125 mg  3.125 mg Oral BID with meals    escitalopram (Lexapro) tab 20 mg  20 mg Oral Daily    gabapentin (Neurontin) tab 600 mg  600 mg Oral BID    rOPINIRole (Requip) tab 1 mg  1 mg Oral TID    tiZANidine (Zanaflex) tab 2 mg  2 mg Oral Nightly PRN    venlafaxine ER (Effexor-XR) 24 hr cap 37.5 mg  37.5 mg Oral Daily with breakfast    atorvastatin (Lipitor) tab 40 mg  40 mg Oral Nightly    calcium carbonate (Tums) chewable tab 500 mg  500 mg Oral Q6H PRN   [2]   Medications Prior to Admission   Medication Sig    aspirin 81 MG Oral Tab EC Take 1 tablet (81 mg total) by mouth daily.    carvedilol 3.125 MG Oral Tab Take 1 tablet (3.125 mg total) by mouth 2 (two) times daily with meals.    citalopram 40 MG Oral Tab Take 1 tablet (40 mg total) by mouth daily.    buPROPion  MG Oral Tablet 24 Hr Take 1 tablet (150 mg total) by mouth daily.    buPROPion  MG Oral Tablet 24 Hr Take 1 tablet (300 mg total) by mouth daily.    Alpha Lipoic Acid 200 MG Oral Cap Take 1 capsule by mouth daily.    venlafaxine ER 37.5 MG Oral Capsule SR 24 Hr Take 1 capsule (37.5 mg total) by mouth daily with breakfast.    dapagliflozin (FARXIGA) 10 MG Oral Tab Take 1 tablet (10 mg total) by mouth in the morning.    torsemide 20 MG Oral Tab Take 2 tablets (40  mg total) by mouth daily.    spironolactone 25 MG Oral Tab Take 1 tablet (25 mg total) by mouth daily. (Patient taking differently: Take 0.5 tablets (12.5 mg total) by mouth in the morning.)    gabapentin 600 MG Oral Tab Take 1 tablet (600 mg total) by mouth 2 (two) times daily.    isosorbide dinitrate 10 MG Oral Tab Take 1 tablet (10 mg total) by mouth in the morning and 1 tablet (10 mg total) before bedtime.    rOPINIRole 1 MG Oral Tab Take 1 tablet (1 mg total) by mouth 3 (three) times daily.    pantoprazole 40 MG Oral Tab EC Take 1 tablet (40 mg total) by mouth 2 (two) times daily before meals.    atorvastatin 40 MG Oral Tab Take 1 tablet (40 mg total) by mouth daily with dinner.    acetaminophen 500 MG Oral Tab Take 1 tablet (500 mg total) by mouth every 6 (six) hours as needed for Pain.    tiZANidine HCl 2 MG Oral Tab Take 1 tablet (2 mg total) by mouth nightly as needed (muscle spasms).    prochlorperazine (COMPAZINE) 10 mg tablet Take 1 tablet (10 mg total) by mouth every 6 (six) hours as needed for Nausea.

## 2025-06-08 NOTE — PLAN OF CARE
Problem: Patient Centered Care  Goal: Patient preferences are identified and integrated in the patient's plan of care  Description: Interventions:  - What would you like us to know as we care for you? From [facility]  - Provide timely, complete, and accurate information to patient/family  - Incorporate patient and family knowledge, values, beliefs, and cultural backgrounds into the planning and delivery of care  - Encourage patient/family to participate in care and decision-making at the level they choose  - Honor patient and family perspectives and choices  Outcome: Progressing     Problem: CARDIOVASCULAR - ADULT  Goal: Maintains optimal cardiac output and hemodynamic stability  Description: INTERVENTIONS:  - Monitor vital signs, rhythm, and trends  - Monitor for bleeding, hypotension and signs of decreased cardiac output  - Evaluate effectiveness of vasoactive medications to optimize hemodynamic stability  - Monitor arterial and/or venous puncture sites for bleeding and/or hematoma  - Assess quality of pulses, skin color and temperature  - Assess for signs of decreased coronary artery perfusion - ex. Angina  - Evaluate fluid balance, assess for edema, trend weights  Outcome: Progressing     Problem: RESPIRATORY - ADULT  Goal: Achieves optimal ventilation and oxygenation  Description: INTERVENTIONS:  - Assess for changes in respiratory status  - Assess for changes in mentation and behavior  - Position to facilitate oxygenation and minimize respiratory effort  - Oxygen supplementation based on oxygen saturation or ABGs  - Provide Smoking Cessation handout, if applicable  - Encourage broncho-pulmonary hygiene including cough, deep breathe, Incentive Spirometry  - Assess the need for suctioning and perform as needed  - Assess and instruct to report SOB or any respiratory difficulty  - Respiratory Therapy support as indicated  - Manage/alleviate anxiety  - Monitor for signs/symptoms of CO2  retention  Outcome: Progressing     Problem: METABOLIC/FLUID AND ELECTROLYTES - ADULT  Goal: Electrolytes maintained within normal limits  Description: INTERVENTIONS:  - Monitor labs and rhythm and assess patient for signs and symptoms of electrolyte imbalances  - Administer electrolyte replacement as ordered  - Monitor response to electrolyte replacements, including rhythm and repeat lab results as appropriate  - Fluid restriction as ordered  - Instruct patient on fluid and nutrition restrictions as appropriate  Outcome: Progressing     Problem: HEMATOLOGIC - ADULT  Goal: Maintains hematologic stability  Description: INTERVENTIONS  - Assess for signs and symptoms of bleeding or hemorrhage  - Monitor labs and vital signs for trends  - Administer supportive blood products/factors, fluids and medications as ordered and appropriate  - Administer supportive blood products/factors as ordered and appropriate  Outcome: Progressing  Goal: Free from bleeding injury  Description: (Example usage: patient with low platelets)  INTERVENTIONS:  - Avoid intramuscular injections, enemas and rectal medication administration  - Ensure safe mobilization of patient  - Hold pressure on venipuncture sites to achieve adequate hemostasis  - Assess for signs and symptoms of internal bleeding  - Monitor lab trends  - Patient is to report abnormal signs of bleeding to staff  - Avoid use of toothpicks and dental floss  - Use electric shaver for shaving  - Use soft bristle tooth brush  - Limit straining and forceful nose blowing  Outcome: Progressing     Problem: MUSCULOSKELETAL - ADULT  Goal: Return mobility to safest level of function  Description: INTERVENTIONS:  - Assess patient stability and activity tolerance for standing, transferring and ambulating w/ or w/o assistive devices  - Assist with transfers and ambulation using safe patient handling equipment as needed  - Ensure adequate protection for wounds/incisions during mobilization  -  Obtain PT/OT consults as needed  - Advance activity as appropriate  - Communicate ordered activity level and limitations with patient/family  Outcome: Progressing     Problem: GASTROINTESTINAL - ADULT  Goal: Minimal or absence of nausea and vomiting  Description: INTERVENTIONS:  - Maintain adequate hydration with IV or PO as ordered and tolerated  - Nasogastric tube to low intermittent suction as ordered  - Evaluate effectiveness of ordered antiemetic medications  - Provide nonpharmacologic comfort measures as appropriate  - Advance diet as tolerated, if ordered  - Obtain nutritional consult as needed  - Evaluate fluid balance  Outcome: Progressing     Problem: SKIN/TISSUE INTEGRITY - ADULT  Goal: Skin integrity remains intact  Description: INTERVENTIONS  - Assess and document risk factors for pressure ulcer development  - Assess and document skin integrity  - Monitor for areas of redness and/or skin breakdown  - Initiate interventions, skin care algorithm/standards of care as needed  Outcome: Progressing     Problem: Impaired Functional Mobility  Goal: Achieve highest/safest level of mobility/gait  Description: Interventions:  - Assess patient's functional ability and stability  - Promote increasing activity/tolerance for mobility and gait  - Educate and engage patient/family in tolerated activity level and precautions    Problem: PAIN - ADULT  Goal: Verbalizes/displays adequate comfort level or patient's stated pain goal  Description: INTERVENTIONS:  - Encourage pt to monitor pain and request assistance  - Assess pain using appropriate pain scale  - Administer analgesics based on type and severity of pain and evaluate response  - Implement non-pharmacological measures as appropriate and evaluate response  - Consider cultural and social influences on pain and pain management  - Manage/alleviate anxiety  - Utilize distraction and/or relaxation techniques  - Monitor for opioid side effects  - Notify MD/LIP if  interventions unsuccessful or patient reports new pain  - Anticipate increased pain with activity and pre-medicate as appropriate  Outcome: Progressing     Problem: SAFETY ADULT - FALL  Goal: Free from fall injury  Description: INTERVENTIONS:  - Assess pt frequently for physical needs  - Identify cognitive and physical deficits and behaviors that affect risk of falls.  - [place] fall precautions as indicated by assessment.  - Educate pt/family on patient safety including physical limitations  - Instruct pt to call for assistance with activity based on assessment  - Modify environment to reduce risk of injury  - Provide assistive devices as appropriate  - Consider OT/PT consult to assist with strengthening/mobility  - Encourage toileting schedule  Outcome: Progressing     Outcome: Progressing

## 2025-06-08 NOTE — PROGRESS NOTES
[facility]  part of [facility]  Hospitalist Progress Note     Linda KEANE Ruzicka Patient Status:  Inpatient    1945  80 year old CSN [id number]   Location -A Attending Yaw Bailon MD   Hosp Day # 6 PCP ROLY KAUR     Assessment & Plan:   ----------------------------------  NSTEMI, type I.  Cardiac catheterization  without major obstructive disease findings.  Now chest pain-free.  - Cardiology following  - Echo noted  - Aspirin, Coreg, Lipitor    CKD stage III.  At baseline  - Repeat BMP if clinical change  - Cr improved     Hypotension.  Exact nature unclear.  Treated for septic source with IV fluids, pressors, antibiotics.  However workup has been negative.  Now off pressors.  Doing well.  Does not appear to be cardiogenic based on echo, catheterization results.  - DC antibiotics  - Monitor cultures  - cont IVF at decreased rate    Hiatal hernia.  May be causing her nausea, currently resolved.  She is not interested in surgery if at all possible to avoid this.  - Acid suppression  - Outpatient surgical follow-up if necessary    Other problems  CAD  Hypertension  Hiatal hernia  Anemia of chronic kidney disease  Osteoarthritis  Restless leg syndrome  Chronic back pain    Medically stable for discharge as of , awaiting rehab placement    Supplementary Documentation:   DVT Mechanical Prophylaxis:        DVT Pharmacologic Prophylaxis   Medication    heparin (Porcine) 5000 UNIT/ML injection 5,000 Units    DVT Pharmacologic prophylaxis: Aspirin 325 mg    DVT Pharmacologic prophylaxis: Aspirin 81 mg      Code Status: Full Code  Lorenzo: External urinary catheter in place  Lorenzo Duration (in days):   Central line:    LAURI: 2025    MDM: High     Subjective:   ----------------------------------  Pt was seen and examined  Resting in bed comfortably  No nausea or vomiting  No cp or sob  No acute events overnight       Objective:   Chief Complaint:   Chief Complaint   Patient  presents with    Fall     ----------------------------------  Temp:  [98.3 °F (36.8 °C)-98.7 °F (37.1 °C)] 98.7 °F (37.1 °C)  Pulse:  [57-80] 72  Resp:  [18] 18  BP: (105-165)/(64-99) 124/77  SpO2:  [92 %-100 %] 92 %  Gen: A+Ox3.  No distress.   HEENT: NCAT, neck supple, no carotid bruit.  CV: RRR, S1S2, and intact distal pulses. No gallop, rub, murmur.  Pulm: Effort and breath sounds normal. No distress, wheezes, rales, rhonchi.  Abd: Soft, NTND, BS normal, no mass, no HSM, no rebound/guarding.   Neuro: Normal reflexes, CN. Sensory/motor exams grossly normal deficit.   MS: No joint effusions.  No peripheral edema.  Skin: Skin is warm and dry. No rashes, erythema, diaphoresis.   Psych: Normal mood and affect. Calm, cooperative    Labs:  Lab Results   Component Value Date    HGB 8.7 (L) 06/07/2025    WBC 7.4 06/07/2025    .0 06/07/2025     06/07/2025    K 4.4 06/07/2025    CREATSERUM 1.37 (H) 06/07/2025    AST 26 06/03/2025    ALT 8 (L) 06/03/2025    TROP <0.045 10/01/2020           Scheduled Medications[1]  PRN Medications[2]             [1]    sacubitril-valsartan  0.5 tablet Oral BID    clotrimazole   Topical BID    pantoprazole  40 mg Oral QAM AC    heparin  5,000 Units Subcutaneous Q8H GET    aspirin  325 mg Oral Daily    buPROPion ER  150 mg Oral Daily    buPROPion ER  300 mg Oral Daily    carvedilol  3.125 mg Oral BID with meals    escitalopram  20 mg Oral Daily    gabapentin  600 mg Oral BID    rOPINIRole  1 mg Oral TID    venlafaxine ER  37.5 mg Oral Daily with breakfast    atorvastatin  40 mg Oral Nightly   [2]   acetaminophen    metoprolol    alum-mag hydroxide-simethicone    nitroglycerin    temazepam    tiZANidine    calcium carbonate

## 2025-06-08 NOTE — CM/SW NOTE
06/08/25 1000   CM/SW Referral Data   Referral Source Social Work (self-referral)   Reason for Referral Discharge planning   Informant Patient   Medical Hx   Does patient have an established PCP? Yes   Patient Info   Patient's Current Mental Status at Time of Assessment Alert;Oriented   Patient's Home Environment Independent Living  ([facility])   Patient lives with Alone   Patient Status Prior to Admission   Independent with ADLs and Mobility Yes   Services in place prior to admission DME/Supplies at home   Type of DME/Supplies Rollator Walker;Standard Walker;Straight Cane;Shower Chair   Discharge Needs   Anticipated D/C needs Subacute rehab   Services Requested   Submitted to [facility] Yes   PASRR Level 1 Submitted Yes     SW initiated self referral for discharge planning. SW met with patient, introduced self and role to patient. Patient reports that she just moved into [facility] aprox 1 week ago. Prior to that, patient was living at address on file, alone. Patient reports that she is independent. Patient reports that she has a rollator, walker, cane, shower chair. Patient reports hx of [facility] and Hx of HH services. Anticipated therapy need: Gradual Rehabilitative Therapy. Patient is agreeable. Patient is aware that [facility] may not be available at this time due to limited bed avail. Sw sent out ZEINAB referrals via aidin, PASRR queued at this time, [facility] request sent.      SW/CM will need Rx for Entresto e-scribed to pt's pharmacy 24hrs prior to DC for f/up on coverage and potential OOP costs.      Plan: Pending medical clearance, DC to [facility], *PASRR queued, *list/choice, *entresto RX    SW/CM to remain available for support and/or discharge planning.     Anju Bernal, MSW, LSW   x [phone]

## 2025-06-09 LAB
ALBUMIN SERPL-MCNC: 3.6 G/DL (ref 3.2–4.8)
ANION GAP SERPL CALC-SCNC: 7 MMOL/L (ref 0–18)
BASOPHILS # BLD AUTO: 0.05 X10(3) UL (ref 0–0.2)
BASOPHILS NFR BLD AUTO: 0.7 %
BUN BLD-MCNC: 9 MG/DL (ref 9–23)
BUN/CREAT SERPL: 7.8 (ref 10–20)
CALCIUM BLD-MCNC: 8.7 MG/DL (ref 8.7–10.4)
CHLORIDE SERPL-SCNC: 106 MMOL/L (ref 98–112)
CO2 SERPL-SCNC: 23 MMOL/L (ref 21–32)
CREAT BLD-MCNC: 1.16 MG/DL (ref 0.55–1.02)
DEPRECATED RDW RBC AUTO: 44 FL (ref 35.1–46.3)
EGFRCR SERPLBLD CKD-EPI 2021: 48 ML/MIN/1.73M2 (ref 60–?)
EOSINOPHIL # BLD AUTO: 0.45 X10(3) UL (ref 0–0.7)
EOSINOPHIL NFR BLD AUTO: 5.9 %
ERYTHROCYTE [DISTWIDTH] IN BLOOD BY AUTOMATED COUNT: 12.8 % (ref 11–15)
GLUCOSE BLD-MCNC: 156 MG/DL (ref 70–99)
HCT VFR BLD AUTO: 30.1 % (ref 35–48)
HGB BLD-MCNC: 9.6 G/DL (ref 12–16)
IMM GRANULOCYTES # BLD AUTO: 0.02 X10(3) UL (ref 0–1)
IMM GRANULOCYTES NFR BLD: 0.3 %
LYMPHOCYTES # BLD AUTO: 1.5 X10(3) UL (ref 1–4)
LYMPHOCYTES NFR BLD AUTO: 19.5 %
MAGNESIUM SERPL-MCNC: 1.7 MG/DL (ref 1.6–2.6)
MCH RBC QN AUTO: 30.2 PG (ref 26–34)
MCHC RBC AUTO-ENTMCNC: 31.9 G/DL (ref 31–37)
MCV RBC AUTO: 94.7 FL (ref 80–100)
MONOCYTES # BLD AUTO: 0.41 X10(3) UL (ref 0.1–1)
MONOCYTES NFR BLD AUTO: 5.3 %
NEUTROPHILS # BLD AUTO: 5.26 X10 (3) UL (ref 1.5–7.7)
NEUTROPHILS # BLD AUTO: 5.26 X10(3) UL (ref 1.5–7.7)
NEUTROPHILS NFR BLD AUTO: 68.3 %
OSMOLALITY SERPL CALC.SUM OF ELEC: 284 MOSM/KG (ref 275–295)
PHOSPHATE SERPL-MCNC: 3 MG/DL (ref 2.4–5.1)
PLATELET # BLD AUTO: 234 10(3)UL (ref 150–450)
POTASSIUM SERPL-SCNC: 4.3 MMOL/L (ref 3.5–5.1)
RBC # BLD AUTO: 3.18 X10(6)UL (ref 3.8–5.3)
SODIUM SERPL-SCNC: 136 MMOL/L (ref 136–145)
WBC # BLD AUTO: 7.7 X10(3) UL (ref 4–11)

## 2025-06-09 PROCEDURE — 99232 SBSQ HOSP IP/OBS MODERATE 35: CPT | Performed by: HOSPITALIST

## 2025-06-09 RX ORDER — TRAMADOL HYDROCHLORIDE 50 MG/1
50 TABLET ORAL EVERY 6 HOURS PRN
Status: DISCONTINUED | OUTPATIENT
Start: 2025-06-09 | End: 2025-06-10

## 2025-06-09 RX ORDER — MAGNESIUM OXIDE 400 MG/1
400 TABLET ORAL ONCE
Status: COMPLETED | OUTPATIENT
Start: 2025-06-09 | End: 2025-06-09

## 2025-06-09 NOTE — PROGRESS NOTES
On floor. BP stable. No cp or sob. Nausea better.  Sitting up in chair   Weak   Vitals:    06/09/25 0842   BP: 119/69   Pulse: 67   Resp: 18   Temp: 99 °F (37.2 °C)       Intake/Output Summary (Last 24 hours) at 6/9/2025 1320  Last data filed at 6/9/2025 0300  Gross per 24 hour   Intake --   Output 1900 ml   Net -1900 ml     Wt Readings from Last 1 Encounters:   06/09/25 162 lb 11.2 oz (73.8 kg)        General: No acute distress.  Neck: Jugular venous pulsations not seen.  Lungs: Crackles khloe.  Heart: Normal rate. MAGI.  Abdomen: Soft. Non tender  Extremities: No edema.  Neurological: Alert. No focal deficits.  Psychiatric: Appropriate mood and affect.  Current Hospital Medications[1]  Prescriptions Prior to Admission[2]  No results found.      Lab Results   Component Value Date    WBC 7.7 06/09/2025    HGB 9.6 06/09/2025    HCT 30.1 06/09/2025    .0 06/09/2025    CREATSERUM 1.16 06/09/2025    BUN 9 06/09/2025     06/09/2025    K 4.3 06/09/2025     06/09/2025    CO2 23.0 06/09/2025     06/09/2025    CA 8.7 06/09/2025    ALB 3.6 06/09/2025    MG 1.7 06/09/2025    PHOS 3.0 06/09/2025              Assessment / Plan  1. Non-ST Elevation Myocardial Infarction -  Patient is currently on heparin weight-based protocol, oral nitrates, atorvastatin 40 mg daily, and aspirin 325 mg daily. Plan to proceed with cardiac catheterization with possible intervention once renal function improves.     I21.4 Non-ST elevation myocardial infarction           2. Acute Renal Failure -    N17.9 Acute kidney failure, unspecified           3. Chronic Left Ventricular Heart Failure -  Evidence of dehydration with decreased oral intake.     I50.32 Chronic diastolic heart failure           4. Coronary Artery Disease -  Status post percutaneous intervention to mid right coronary artery in 09/2023.     I25.10 Atherosclerotic heart disease of native coronary artery without angina pectoris           Discussion Notes      Stable from cardiac standpoint for Rehab placement    D/W patient and nursing staff      Jorgito Pollack MD  [facility] cardiovascular       [1]   Current Facility-Administered Medications   Medication Dose Route Frequency    traMADol (Ultram) tab 50 mg  50 mg Oral Q6H PRN    sacubitril-valsartan (Entresto) 24-26 MG per tab 1 tablet  1 tablet Oral BID    clotrimazole (Lotrimin) 1 % cream   Topical BID    pantoprazole (Protonix) DR tab 40 mg  40 mg Oral QAM AC    heparin (Porcine) 5000 UNIT/ML injection 5,000 Units  5,000 Units Subcutaneous Q8H GET    acetaminophen (Tylenol Extra Strength) tab 500 mg  500 mg Oral Q4H PRN    metoprolol (Lopressor) 5 mg/5mL injection 5 mg  5 mg Intravenous Q4H PRN    alum-mag hydroxide-simethicone (Maalox) 200-200-20 MG/5ML oral suspension 30 mL  30 mL Oral QID PRN    aspirin tab 325 mg  325 mg Oral Daily    nitroglycerin (Nitrostat) SL tab 0.4 mg  0.4 mg Sublingual Q5 Min PRN    temazepam (Restoril) cap 15 mg  15 mg Oral Nightly PRN    buPROPion ER (Wellbutrin XL) 24 hr tab 150 mg  150 mg Oral Daily    buPROPion ER (Wellbutrin XL) 24 hr tab 300 mg  300 mg Oral Daily    carvedilol (Coreg) tab 3.125 mg  3.125 mg Oral BID with meals    escitalopram (Lexapro) tab 20 mg  20 mg Oral Daily    gabapentin (Neurontin) tab 600 mg  600 mg Oral BID    rOPINIRole (Requip) tab 1 mg  1 mg Oral TID    tiZANidine (Zanaflex) tab 2 mg  2 mg Oral Nightly PRN    venlafaxine ER (Effexor-XR) 24 hr cap 37.5 mg  37.5 mg Oral Daily with breakfast    atorvastatin (Lipitor) tab 40 mg  40 mg Oral Nightly    calcium carbonate (Tums) chewable tab 500 mg  500 mg Oral Q6H PRN   [2]   Medications Prior to Admission   Medication Sig    aspirin 81 MG Oral Tab EC Take 1 tablet (81 mg total) by mouth daily.    carvedilol 3.125 MG Oral Tab Take 1 tablet (3.125 mg total) by mouth 2 (two) times daily with meals.    citalopram 40 MG Oral Tab Take 1 tablet (40 mg total) by mouth daily.    buPROPion  MG Oral Tablet 24 Hr Take 1  tablet (150 mg total) by mouth daily.    buPROPion  MG Oral Tablet 24 Hr Take 1 tablet (300 mg total) by mouth daily.    Alpha Lipoic Acid 200 MG Oral Cap Take 1 capsule by mouth daily.    venlafaxine ER 37.5 MG Oral Capsule SR 24 Hr Take 1 capsule (37.5 mg total) by mouth daily with breakfast.    dapagliflozin (FARXIGA) 10 MG Oral Tab Take 1 tablet (10 mg total) by mouth in the morning.    torsemide 20 MG Oral Tab Take 2 tablets (40 mg total) by mouth daily.    spironolactone 25 MG Oral Tab Take 1 tablet (25 mg total) by mouth daily. (Patient taking differently: Take 0.5 tablets (12.5 mg total) by mouth in the morning.)    gabapentin 600 MG Oral Tab Take 1 tablet (600 mg total) by mouth 2 (two) times daily.    isosorbide dinitrate 10 MG Oral Tab Take 1 tablet (10 mg total) by mouth in the morning and 1 tablet (10 mg total) before bedtime.    rOPINIRole 1 MG Oral Tab Take 1 tablet (1 mg total) by mouth 3 (three) times daily.    pantoprazole 40 MG Oral Tab EC Take 1 tablet (40 mg total) by mouth 2 (two) times daily before meals.    atorvastatin 40 MG Oral Tab Take 1 tablet (40 mg total) by mouth daily with dinner.    acetaminophen 500 MG Oral Tab Take 1 tablet (500 mg total) by mouth every 6 (six) hours as needed for Pain.    tiZANidine HCl 2 MG Oral Tab Take 1 tablet (2 mg total) by mouth nightly as needed (muscle spasms).    prochlorperazine (COMPAZINE) 10 mg tablet Take 1 tablet (10 mg total) by mouth every 6 (six) hours as needed for Nausea.

## 2025-06-09 NOTE — CARDIAC REHAB
CARDIAC REHAB HEART FAILURE EDUCATION    Handouts provided and reviewed: CHF Booklet.     Activity: Chair for all meals: yes       Ambulation:        Tolerated Activity:          Disease Process: Disease process reviewed.    Reviewed the following: DAILY WEIGHT MONITORING: Reviewed      SODIUM RESTRICTION: Reviewed      FLUID RESTRICTION: Reviewed      RISK FACTORS: Reviewed      SMOKING CESSATION: n/a      HOME EXERCISE ACTIVITY: Reviewed      WHEN TO CONTACT YOUR PHYSICIAN: Reviewed

## 2025-06-09 NOTE — OCCUPATIONAL THERAPY NOTE
OCCUPATIONAL THERAPY TREATMENT NOTE - INPATIENT        Room Number: 339/339-A     Presenting Problem: Generalized weakness, multiple falls, epigastric/central chest heaviness. Patient found to have NSTEMI, type I and is now s/p cardiac catheterization on 6/4/25. PMHx includes but is not limited to hypertension, hyperlipidemia, coronary artery disease post angioplasty and stent placement, congestive heart failure, chronic kidney disease, peripheral neuropathy, osteoporosis, and chronic back pain    Problem List  Principal Problem:    NSTEMI (non-ST elevated myocardial infarction) (HCC)  Active Problems:    Weakness generalized    Frequent falls    Difficult intravenous access - Recommend US Guided PIVs      OCCUPATIONAL THERAPY ASSESSMENT   Patient demonstrates fair progress this session, goals remain in progress.    Patient is requiring minimal assist as a result of the following impairments: medical status, fatigue, decreased standing balance and reach.    Patient continues to function below baseline with ADLs and functional mobility.  Next session anticipate patient to progress LE dressing, functional transfers, item retrieval, grooming in standing at the sink.  Occupational Therapy will continue to follow patient for duration of hospitalization.    Patient continues to benefit from continued skilled OT services: to promote return to prior level of function and safety with continuous assistance and gradual rehabilitative therapy.     PLAN DURING HOSPITALIZATION  OT Device Recommendations: TBD  OT Treatment Plan: Balance activities, Energy conservation/work simplification techniques, ADL training, IADL training, UE strengthening/ROM, Endurance training, Patient/Family education, Patient/Family training, Equipment eval/education, Compensatory technique education, Continued evaluation, Neuromuscluar reeducation     SUBJECTIVE  \"I have so much going on! I'm in the middle of a move to a new place\"      OBJECTIVE  Precautions: Bed/chair alarm    WEIGHT BEARING RESTRICTION     PAIN ASSESSMENT  Location: (L) Hip pain - patient reports pain is chronic and resolves when out of bed. Patient did not numerically rate hip pain.  Management Techniques: Body mechanics; Activity promotion; Breathing techniques; Repositioning; Relaxation; Nurse notified    ACTIVITY TOLERANCE  Pulse: 88                      O2 SATURATIONS  Oxygen Therapy  SPO2% Ambulation on Room Air: 97    ACTIVITIES OF DAILY LIVING ASSESSMENT  AM-PAC ‘6-Clicks’ Inpatient Daily Activity Short Form  How much help from another person does the patient currently need…  -   Putting on and taking off regular lower body clothing?: A Lot  -   Bathing (including washing, rinsing, drying)?: A Lot  -   Toileting, which includes using toilet, bedpan or urinal? : A Lot  -   Putting on and taking off regular upper body clothing?: A Little  -   Taking care of personal grooming such as brushing teeth?: A Little  -   Eating meals?: A Little    AM-PAC Score:  Score: 15  Approx Degree of Impairment: 56.46%  Standardized Score (AM-PAC Scale): 34.69  CMS Modifier (G-Code): CK    FUNCTIONAL TRANSFER ASSESSMENT  Sit to Stand: Edge of Bed; Chair  Edge of Bed: Not Tested  Chair: Minimal Assist    BED MOBILITY  Supine to Sit : Not tested  Scooting: not tested  Functional mobility with Min A and RW    FUNCTIONAL ADL ASSESSMENT  LB Dressing Seated: Min Assist  Pt declined grooming routine at this time    Skilled Therapy Provided: RN cleared pt for participation in occupational therapy session. Upon arrival, pt was seated in bedside chair and agreeable to activity. No family was present during session. Pt benefited from additional time, verbal cues, RW to maximize participation.  Pt completed with overall Min A, RW. No loss of balance and vitals stable. Pt reported activity as moderately taxing.     EDUCATION PROVIDED  Patient Education : Role of Occupational Therapy; Plan of Care;  Discharge Recommendations; DME Recommendations; Functional Transfer Techniques; Fall Prevention; Posture/Positioning; Edema Reduction; Energy Conservation; Proper Body Mechanics  Patient's Response to Education: Verbalized Understanding; Requires Further Education    The patient's Approx Degree of Impairment: 56.46% has been calculated based on documentation in the [facility] '6 clicks' Inpatient Daily Activity Short Form.  Research supports that patients with this level of impairment may benefit from rehab.  Final disposition will be made by interdisciplinary medical team.    Patient End of Session: Up in chair, Needs met, Call light within reach, RN aware of session/findings    OT Goals:  Patients self stated goal is: To improve function and return home       Patient will complete functional transfer with SUP  Comment: ongoing    Patient will complete toileting with SUP  Comment: ongoing    Patient will tolerate standing for 5 minutes in prep for adls with SUP   Comment:ongoing    Patient will complete item retrieval with SUP  Comment:ongoing            Goals  on: 25  Frequency: 5  x wk      OT Session Time: 15 minutes  Therapeutic Activity: 15 minutes

## 2025-06-09 NOTE — CONGREGATE LIVING REVIEW
[facility] Living Authorization    The [facility] Review Committee has reviewed this case and the patient IS APPROVED for discharge to a facility for Short Term Skilled once the following procedure is followed:     - The physician discharge instructions (contained within the MURPHY note for SNF) must inlcude the below appropriate and approved COVID instructions to the facility    For questions regarding CLRC approval process, please contact the CM assigned to the case.  For questions regarding RN discharge workflow, please contact the unit Clinical Leader.

## 2025-06-09 NOTE — PHYSICAL THERAPY NOTE
PHYSICAL THERAPY TREATMENT NOTE - INPATIENT     Room Number: 339/339-A       Presenting Problem: Chest pain / NSTEMI ---3 falls in one day prior to admission . Pt reports feels difficulty with word finding / slower speech and balance changes from baseline .   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      Patient is requiring contact guard assist as a result of the following impairments: decreased functional strength, decreased endurance/aerobic capacity, impaired standing balance, decreased muscular endurance, and medical status.     Patient continues to function below baseline with bed mobility, transfers, and gait.  Next session anticipate patient to progress bed mobility, transfers, and gait.  Physical Therapy will continue to follow patient for duration of hospitalization.    Patient continues to benefit from continued skilled PT services: to promote return to prior level of function and safety with continuous assistance and gradual rehabilitative therapy .    PLAN DURING HOSPITALIZATION  Nursing Mobility Recommendation : 1 Assist  PT Device Recommendation: Rolling walker  PT Treatment Plan: Bed mobility, Body mechanics, Endurance, Energy conservation, Patient education, Family education, Gait training, Balance training, Transfer training  Frequency (Obs): 5x/week     SUBJECTIVE  Agreeable to activity.    OBJECTIVE  Precautions: Bed/chair alarm    WEIGHT BEARING RESTRICTION  none    PAIN ASSESSMENT   Ratin  Location: left hip chronic related to OA  Management  Techniques: Activity promotion, Body mechanics, Breathing techniques, Relaxation, Repositioning    BALANCE  Static Sitting: Good  Dynamic Sitting: Fair +  Static Standing: Fair -  Dynamic Standing: Fair -    ACTIVITY TOLERANCE  Pulse: 88  Heart Rate Source: Monitor    O2 WALK  Oxygen Therapy  SPO2% Ambulation on Room Air: 96    AM-PAC '6-Clicks' INPATIENT SHORT FORM - BASIC MOBILITY  How much difficulty does the patient currently have...  Patient Difficulty: Turning over in bed (including adjusting bedclothes, sheets and blankets)?: A Little   Patient Difficulty: Sitting down on and standing up from a chair with arms (e.g., wheelchair, bedside commode, etc.): A Little   Patient Difficulty: Moving from lying on back to sitting on the side of the bed?: A Little   How much help from another person does the patient currently need...   Help from Another: Moving to and from a bed to a chair (including a wheelchair)?: A Little   Help from Another: Need to walk in hospital room?: A Little   Help from Another: Climbing 3-5 steps with a railing?: A Lot     AM-PAC Score:  Raw Score: 17   Approx Degree of Impairment: 50.57%   Standardized Score (AM-PAC Scale): 42.13   CMS Modifier (G-Code): CK    FUNCTIONAL ABILITY STATUS  Functional Mobility/Gait Assessment  Gait Assistance: Contact guard assist  Distance (ft): 75  Assistive Device: Rolling walker  Pattern: Shuffle (slow pace, R lateral lean (chronic), flexed and kyphotic posture)  Sit to Stand: contact guard assist    Skilled Therapy Provided: Pt received sitting in chair and agreeable to activity. Demos STS from chair with RW and CGA. Ambulated in hallway with RW and CGA, slow and shuffled gait with kyphotic posture, unable to correct. R lateral lean noted however no LOB. Distance limited by fatigue. Returned to room and was left sitting in chair with needs within reach, handoff to RN complete.    The patient's Approx Degree of Impairment: 50.57% has been calculated based on  documentation in the [facility] '6 clicks' Inpatient Daily Activity Short Form.  Research supports that patients with this level of impairment may benefit from rehab facility.  Final disposition will be made by interdisciplinary medical team.    Patient End of Session: Up in chair, Needs met, Call light within reach, RN aware of session/findings, All patient questions and concerns addressed, Hospital anti-slip socks, Alarm set    CURRENT GOALS   Goals to be met by: 6/25/25  Patient Goal Patient's self-stated goal is: rehab    Goal #1 Patient is able to demonstrate supine - sit EOB @ level: CGA      Goal #1   Current Status  NT   Goal #2 Patient is able to demonstrate transfers EOB to/from Chair/Wheelchair at assistance level: CGA with walker - rolling      Goal #2  Current Status NT   Goal #3 Patient is able to ambulate 20 feet with assist device: walker - rolling at assistance level: minimum assistance  ( progress distance pending tolerance )   New goal updated 6/9: patient will ambulate 100 ft with RW and SBA   Goal #3   Current Status Goal met 6/9    Goal #4     Goal #4   Current Status     Goal #5 Patient to demonstrate independence with home activity/exercise instructions provided to patient in preparation for discharge.   Goal #5   Current Status  in progress   Goal #6     Goal #6  Current Status          Therapeutic Activity: 23 minutes

## 2025-06-09 NOTE — PLAN OF CARE
Chief Complaint:  Urinary symptoms, abdominal and flank pain      No past medical history on file.    Past Surgical History:   Procedure Laterality Date    COLONOSCOPY Left 6/7/2021    Procedure: COLONOSCOPY;  Surgeon: Tatianna Bowman MD;  [facility]: [facility]  Service: Endoscopy;  Laterality: Left;    DENTAL SURGERY      ESOPHAGOGASTRODUODENOSCOPY Left 6/7/2021    Procedure: ESOPHAGOGASTRODUODENOSCOPY (EGD);  Surgeon: Tatianna Bowman MD;  [facility]  Service: Endoscopy;  Laterality: Left;  Covid pre-procedure screening scheduled 6/4    TYMPANOSTOMY TUBE PLACEMENT         Review of patient's allergies indicates:   Allergen Reactions    Doxycycline Nausea And Vomiting       No current facility-administered medications on file prior to encounter.     Current Outpatient Medications on File Prior to Encounter   Medication Sig    ALBUTEROL INHL Inhale into the lungs daily as needed.    amoxicillin (AMOXIL) 875 MG tablet Take 875 mg by mouth 2 (two) times daily.    baclofen (LIORESAL) 10 MG tablet Take 10 mg by mouth daily as needed.    DENTA 5000 PLUS 1.1 % Crea BRUSH ON TOOTH 1 TIME PER DAY    ergocalciferol, vitamin D2, (VITAMIN D ORAL) Take by mouth once daily.    EScitalopram oxalate (LEXAPRO) 10 MG tablet Take 15 mg by mouth once daily.     gabapentin (NEURONTIN) 300 MG capsule Take 300 mg by mouth 3 (three) times daily.    hydrocortisone 1 % cream Apply to affected area 2 times daily    levocetirizine (XYZAL) 5 MG tablet Take 5 mg by mouth once daily.    norethindrone-ethinyl estradiol (MICROGESTIN 1/20) 1-20 mg-mcg per tablet         Family History    None       Tobacco Use    Smoking status: Passive Smoke Exposure - Never Smoker    Smokeless tobacco: Never Used   Substance and Sexual Activity    Alcohol use: Never    Drug use: Never    Sexual activity: Never     Review of Systems   Constitutional:  Positive for appetite change and fever.   HENT:  Negative for congestion and  No acute issues overnight.     Problem: Patient Centered Care  Goal: Patient preferences are identified and integrated in the patient's plan of care  Description: Interventions:  - What would you like us to know as we care for you? From [facility]  - Provide timely, complete, and accurate information to patient/family  - Incorporate patient and family knowledge, values, beliefs, and cultural backgrounds into the planning and delivery of care  - Encourage patient/family to participate in care and decision-making at the level they choose  - Honor patient and family perspectives and choices  Outcome: Progressing     Problem: Patient/Family Goals  Goal: Patient/Family Long Term Goal  Description: Patient's Long Term Goal: Go home    Interventions:  - Monitor labs  - Diagnostic testing  - Follow MD orders  - See additional Care Plan goals for specific interventions  Outcome: Progressing  Goal: Patient/Family Short Term Goal  Description: Patient's Short Term Goal: Decreased pain    Interventions:   - Pain management  - Diagnostic testing  - Follow MD orders  - See additional Care Plan goals for specific interventions  Outcome: Progressing     Problem: CARDIOVASCULAR - ADULT  Goal: Maintains optimal cardiac output and hemodynamic stability  Description: INTERVENTIONS:  - Monitor vital signs, rhythm, and trends  - Monitor for bleeding, hypotension and signs of decreased cardiac output  - Evaluate effectiveness of vasoactive medications to optimize hemodynamic stability  - Monitor arterial and/or venous puncture sites for bleeding and/or hematoma  - Assess quality of pulses, skin color and temperature  - Assess for signs of decreased coronary artery perfusion - ex. Angina  - Evaluate fluid balance, assess for edema, trend weights  Outcome: Progressing     Problem: RESPIRATORY - ADULT  Goal: Achieves optimal ventilation and oxygenation  Description: INTERVENTIONS:  - Assess for changes in respiratory status  -  Assess for changes in mentation and behavior  - Position to facilitate oxygenation and minimize respiratory effort  - Oxygen supplementation based on oxygen saturation or ABGs  - Provide Smoking Cessation handout, if applicable  - Encourage broncho-pulmonary hygiene including cough, deep breathe, Incentive Spirometry  - Assess the need for suctioning and perform as needed  - Assess and instruct to report SOB or any respiratory difficulty  - Respiratory Therapy support as indicated  - Manage/alleviate anxiety  - Monitor for signs/symptoms of CO2 retention  Outcome: Progressing     Problem: METABOLIC/FLUID AND ELECTROLYTES - ADULT  Goal: Electrolytes maintained within normal limits  Description: INTERVENTIONS:  - Monitor labs and rhythm and assess patient for signs and symptoms of electrolyte imbalances  - Administer electrolyte replacement as ordered  - Monitor response to electrolyte replacements, including rhythm and repeat lab results as appropriate  - Fluid restriction as ordered  - Instruct patient on fluid and nutrition restrictions as appropriate  Outcome: Progressing     Problem: HEMATOLOGIC - ADULT  Goal: Maintains hematologic stability  Description: INTERVENTIONS  - Assess for signs and symptoms of bleeding or hemorrhage  - Monitor labs and vital signs for trends  - Administer supportive blood products/factors, fluids and medications as ordered and appropriate  - Administer supportive blood products/factors as ordered and appropriate  Outcome: Progressing  Goal: Free from bleeding injury  Description: (Example usage: patient with low platelets)  INTERVENTIONS:  - Avoid intramuscular injections, enemas and rectal medication administration  - Ensure safe mobilization of patient  - Hold pressure on venipuncture sites to achieve adequate hemostasis  - Assess for signs and symptoms of internal bleeding  - Monitor lab trends  - Patient is to report abnormal signs of bleeding to staff  - Avoid use of toothpicks  rhinorrhea.    Eyes:  Negative for redness.   Respiratory:  Negative for cough.    Cardiovascular:  Negative for chest pain.   Gastrointestinal:  Positive for abdominal pain and nausea. Negative for blood in stool, diarrhea and vomiting.   Genitourinary:  Positive for dysuria, flank pain, frequency and urgency. Negative for hematuria.   Musculoskeletal:  Negative for joint swelling.   Skin:  Negative for rash.   Neurological:  Negative for seizures.   Objective:     Vital Signs (Most Recent):  Temp: 98.1 °F (36.7 °C) (07/19/22 0130)  Pulse: (!) 137 (07/19/22 0130)  Resp: 16 (07/19/22 0130)  BP: (!) 107/54 (07/19/22 0130)  SpO2: 96 % (07/19/22 0130)   Vital Signs (24h Range):  Temp:  [98.1 °F (36.7 °C)-102.1 °F (38.9 °C)] 98.1 °F (36.7 °C)  Pulse:  [115-144] 137  Resp:  [10-25] 16  SpO2:  [96 %-100 %] 96 %  BP: ()/(54-67) 107/54     Patient Vitals for the past 72 hrs (Last 3 readings):   Weight   07/18/22 2104 67.1 kg (147 lb 14.9 oz)   07/18/22 1003 67.1 kg (148 lb)     Body mass index is 27.05 kg/m².    Intake/Output - Last 3 Shifts       None            Lines/Drains/Airways       Peripheral Intravenous Line  Duration                  Peripheral IV - Single Lumen 07/18/22 1012 20 G Left Antecubital <1 day         Peripheral IV - Single Lumen 07/18/22 1254 20 G Right Antecubital <1 day                    Physical Exam  Vitals and nursing note reviewed.   HENT:      Head: Normocephalic and atraumatic.      Right Ear: External ear normal.      Left Ear: External ear normal.      Nose: Nose normal.      Mouth/Throat:      Mouth: Mucous membranes are moist.   Eyes:      Extraocular Movements: Extraocular movements intact.   Cardiovascular:      Rate and Rhythm: Normal rate and regular rhythm.      Pulses: Normal pulses.      Heart sounds: Normal heart sounds.   Pulmonary:      Effort: Pulmonary effort is normal.      Breath sounds: Normal breath sounds.   Abdominal:      General: Abdomen is flat.      Palpations:  Abdomen is soft.      Comments: Tender to palpation in all quadrants, most in RUQ, has right-sided CVA tenderness, slight left sided CVA tenderness   Musculoskeletal:         General: Normal range of motion.      Cervical back: Normal range of motion.   Skin:     General: Skin is warm and dry.      Capillary Refill: Capillary refill takes less than 2 seconds.   Neurological:      General: No focal deficit present.      Mental Status: She is alert.       Significant Labs:  Recent Labs   Lab 07/18/22  1007   POCTGLUCOSE 126*       Recent Lab Results  (Last 5 results in the past 24 hours)        07/18/22  1856   07/18/22  1856   07/18/22  1532   07/18/22  1344   07/18/22  1140        Appearance, UA     Hazy           Bacteria, UA     Rare           Bilirubin (UA)     Negative           Blood Culture, Routine         No Growth to date  [P]                No Growth to date  [P]       Color, UA     Yellow           Glucose, UA     Negative           HCG Quant       <1.2  Comment: Quantitative HCG Reference Ranges:  Males........................<5.0 mIU/mL  Non-Pregnant Females.........<5.0 mIU/mL  Pregnancy:  Weeks post-LMP...............Range (mIU/mL)  1-10  weeks.....................202-231,000  11-15 weeks..................22,536-234,990  16-22 weeks...................8,007-50,064  23-40 weeks...................1,600-49,413    NOTE:  This assay is not FDA approved for tumor screening,   diagnosis, or monitoring.           Ketones, UA     Negative           Leukocytes, UA     3+           Microscopic Comment     SEE COMMENT  Comment: Other formed elements not mentioned in the report are not   present in the microscopic examination.              NITRITE UA     Negative           Non-Squam Epith     2           Occult Blood UA     Trace           pH, UA     7.0           Preg Test, Ur Negative               Protein, UA     Trace  Comment: Recommend a 24 hour urine protein or a urine   protein/creatinine ratio if globulin  and dental floss  - Use electric shaver for shaving  - Use soft bristle tooth brush  - Limit straining and forceful nose blowing  Outcome: Progressing     Problem: MUSCULOSKELETAL - ADULT  Goal: Return mobility to safest level of function  Description: INTERVENTIONS:  - Assess patient stability and activity tolerance for standing, transferring and ambulating w/ or w/o assistive devices  - Assist with transfers and ambulation using safe patient handling equipment as needed  - Ensure adequate protection for wounds/incisions during mobilization  - Obtain PT/OT consults as needed  - Advance activity as appropriate  - Communicate ordered activity level and limitations with patient/family  Outcome: Progressing     Problem: PAIN - ADULT  Goal: Verbalizes/displays adequate comfort level or patient's stated pain goal  Description: INTERVENTIONS:  - Encourage pt to monitor pain and request assistance  - Assess pain using appropriate pain scale  - Administer analgesics based on type and severity of pain and evaluate response  - Implement non-pharmacological measures as appropriate and evaluate response  - Consider cultural and social influences on pain and pain management  - Manage/alleviate anxiety  - Utilize distraction and/or relaxation techniques  - Monitor for opioid side effects  - Notify MD/LIP if interventions unsuccessful or patient reports new pain  - Anticipate increased pain with activity and pre-medicate as appropriate  Outcome: Progressing     Problem: RISK FOR INFECTION - ADULT  Goal: Absence of fever/infection during anticipated neutropenic period  Description: INTERVENTIONS  - Monitor WBC  - Administer growth factors as ordered  - Implement neutropenic guidelines  Outcome: Progressing     Problem: SAFETY ADULT - FALL  Goal: Free from fall injury  Description: INTERVENTIONS:  - Assess pt frequently for physical needs  - Identify cognitive and physical deficits and behaviors that affect risk of falls.  -  [place] fall precautions as indicated by assessment.  - Educate pt/family on patient safety including physical limitations  - Instruct pt to call for assistance with activity based on assessment  - Modify environment to reduce risk of injury  - Provide assistive devices as appropriate  - Consider OT/PT consult to assist with strengthening/mobility  - Encourage toileting schedule  Outcome: Progressing     Problem: GASTROINTESTINAL - ADULT  Goal: Minimal or absence of nausea and vomiting  Description: INTERVENTIONS:  - Maintain adequate hydration with IV or PO as ordered and tolerated  - Nasogastric tube to low intermittent suction as ordered  - Evaluate effectiveness of ordered antiemetic medications  - Provide nonpharmacologic comfort measures as appropriate  - Advance diet as tolerated, if ordered  - Obtain nutritional consult as needed  - Evaluate fluid balance  Outcome: Progressing     Problem: SKIN/TISSUE INTEGRITY - ADULT  Goal: Skin integrity remains intact  Description: INTERVENTIONS  - Assess and document risk factors for pressure ulcer development  - Assess and document skin integrity  - Monitor for areas of redness and/or skin breakdown  - Initiate interventions, skin care algorithm/standards of care as needed  Outcome: Progressing     Problem: Impaired Functional Mobility  Goal: Achieve highest/safest level of mobility/gait  Description: Interventions:  - Assess patient's functional ability and stability  - Promote increasing activity/tolerance for mobility and gait  - Educate and engage patient/family in tolerated activity level and precautions  - Recommend use of chair position in bed 3 times per day  Outcome: Progressing     Problem: Impaired Activities of Daily Living  Goal: Achieve highest/safest level of independence in self care  Description: Interventions:  - Assess ability and encourage patient to participate in ADLs to maximize function  - Promote sitting position while performing ADLs such as  induced proteinuria is  clinically suspected.              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  Concurrent pyelonephritis/lobar nephronia/renal abscess is possible.  The small right kidney can be seen on the prior x-ray and seems similar in size. Nonvisualized left ovary.  Follow-up pelvic ultrasound is recommended. Electronically signed by: Adan Cervantes Date:    07/18/2022 Time:    15:19    feeding, grooming, and bathing  - Educate and encourage patient/family in tolerated functional activity level and precautions during self-care  - Encourage patient to incorporate impaired side during daily activities to promote function  Outcome: Progressing

## 2025-06-09 NOTE — CM/SW NOTE
06/09/25 1200   Choice of Post-Acute Provider   Informed patient of right to choose their preferred provider Yes   List of appropriate post-acute services provided to patient/family with quality data Yes   Information given to Patient     Social work was able to meet with the patient at bedside regarding [facility].    The patient was given the [facility] list at bedside.     Social work will follow up for choice.    SW/CM to remain available for support and/or discharge planning.     Rachael Dash MSW, LSW  Discharge Planner [id number]

## 2025-06-09 NOTE — PROGRESS NOTES
[facility]  part of [facility]  Hospitalist Progress Note     Linda KEANE Ruzicka Patient Status:  Inpatient    1945  80 year old CSN [id number]   Location -A Attending Yaw Bailon MD   Hosp Day # 7 PCP ROLY KAUR     Assessment & Plan:   ----------------------------------  NSTEMI, type I.  Cardiac catheterization  without major obstructive disease findings.  Now chest pain-free.  - Cardiology following  - Echo noted  - Aspirin, Coreg, Lipitor    CKD stage III.  At baseline  - Repeat BMP if clinical change  - Cr continues to improve    Hypotension.  Exact nature unclear.  Treated for septic source with IV fluids, pressors, antibiotics.  However workup has been negative.  Now off pressors.  Doing well.  Does not appear to be cardiogenic based on echo, catheterization results.  - DC antibiotics  - Monitor cultures  - cont IVF at decreased rate    Hiatal hernia.  May be causing her nausea, currently resolved.  She is not interested in surgery if at all possible to avoid this.  - Acid suppression  - Outpatient surgical follow-up if necessary    Other problems  CAD  Hypertension  Hiatal hernia  Anemia of chronic kidney disease  Osteoarthritis  Restless leg syndrome  Chronic back pain    Medically stable for discharge as of , awaiting rehab placement    Supplementary Documentation:   DVT Mechanical Prophylaxis:        DVT Pharmacologic Prophylaxis   Medication    heparin (Porcine) 5000 UNIT/ML injection 5,000 Units    DVT Pharmacologic prophylaxis: Aspirin 325 mg    DVT Pharmacologic prophylaxis: Aspirin 81 mg      Code Status: Full Code  Lorenzo: External urinary catheter in place  Lorenzo Duration (in days):   Central line:    LAURI: 2025    MDM: High     Subjective:   ----------------------------------  Pt was seen and examined  Resting in chair comfortably   No nausea or vomiting  No cp or sob  No acute events overnight       Objective:   Chief Complaint:   Chief  Complaint   Patient presents with    Fall     ----------------------------------  Temp:  [98.5 °F (36.9 °C)-99 °F (37.2 °C)] 99 °F (37.2 °C)  Pulse:  [64-70] 67  Resp:  [17-18] 18  BP: (119-167)/(69-92) 119/69  SpO2:  [94 %-98 %] 94 %  Gen: A+Ox3.  No distress.   HEENT: NCAT, neck supple, no carotid bruit.  CV: RRR, S1S2, and intact distal pulses. No gallop, rub, murmur.  Pulm: Effort and breath sounds normal. No distress, wheezes, rales, rhonchi.  Abd: Soft, NTND, BS normal, no mass, no HSM, no rebound/guarding.   Neuro: Normal reflexes, CN. Sensory/motor exams grossly normal deficit.   MS: No joint effusions.  No peripheral edema.  Skin: Skin is warm and dry. No rashes, erythema, diaphoresis.   Psych: Normal mood and affect. Calm, cooperative    Labs:  Lab Results   Component Value Date    HGB 9.6 (L) 06/09/2025    WBC 7.7 06/09/2025    .0 06/09/2025     06/09/2025    K 4.3 06/09/2025    CREATSERUM 1.16 (H) 06/09/2025    AST 26 06/03/2025    ALT 8 (L) 06/03/2025    TROP <0.045 10/01/2020           Scheduled Medications[1]  PRN Medications[2]             [1]    magnesium oxide  400 mg Oral Once    sacubitril-valsartan  1 tablet Oral BID    clotrimazole   Topical BID    pantoprazole  40 mg Oral QAM AC    heparin  5,000 Units Subcutaneous Q8H GET    aspirin  325 mg Oral Daily    buPROPion ER  150 mg Oral Daily    buPROPion ER  300 mg Oral Daily    carvedilol  3.125 mg Oral BID with meals    escitalopram  20 mg Oral Daily    gabapentin  600 mg Oral BID    rOPINIRole  1 mg Oral TID    venlafaxine ER  37.5 mg Oral Daily with breakfast    atorvastatin  40 mg Oral Nightly   [2]   traMADol    acetaminophen    metoprolol    alum-mag hydroxide-simethicone    nitroglycerin    temazepam    tiZANidine    calcium carbonate

## 2025-06-09 NOTE — PROGRESS NOTES
Patient A&Ox3, VSS, denies chest pain and shortness of breath. Up in chair during day, standby assist with walker for bathroom. Call light in reach and safety needs met.

## 2025-06-10 VITALS
OXYGEN SATURATION: 95 % | RESPIRATION RATE: 16 BRPM | WEIGHT: 159 LBS | HEIGHT: 61 IN | HEART RATE: 61 BPM | SYSTOLIC BLOOD PRESSURE: 127 MMHG | DIASTOLIC BLOOD PRESSURE: 83 MMHG | TEMPERATURE: 99 F | BODY MASS INDEX: 30.02 KG/M2

## 2025-06-10 LAB — MAGNESIUM SERPL-MCNC: 1.8 MG/DL (ref 1.6–2.6)

## 2025-06-10 PROCEDURE — 99239 HOSP IP/OBS DSCHRG MGMT >30: CPT | Performed by: HOSPITALIST

## 2025-06-10 RX ORDER — CLOTRIMAZOLE 1 %
1 CREAM (GRAM) TOPICAL 2 TIMES DAILY
Qty: 12 G | Refills: 0 | Status: SHIPPED | OUTPATIENT
Start: 2025-06-10

## 2025-06-10 RX ORDER — SPIRONOLACTONE 25 MG/1
12.5 TABLET ORAL DAILY
Qty: 30 TABLET | Refills: 0 | Status: SHIPPED | OUTPATIENT
Start: 2025-06-10

## 2025-06-10 RX ORDER — TRAMADOL HYDROCHLORIDE 50 MG/1
50 TABLET ORAL EVERY 6 HOURS PRN
Qty: 15 TABLET | Refills: 0 | Status: SHIPPED | OUTPATIENT
Start: 2025-06-10

## 2025-06-10 RX ORDER — ASPIRIN 81 MG/1
81 TABLET ORAL DAILY
Status: DISCONTINUED | OUTPATIENT
Start: 2025-06-11 | End: 2025-06-10

## 2025-06-10 RX ORDER — SPIRONOLACTONE 25 MG/1
12.5 TABLET ORAL DAILY
Status: DISCONTINUED | OUTPATIENT
Start: 2025-06-10 | End: 2025-06-10

## 2025-06-10 RX ORDER — MAGNESIUM OXIDE 400 MG/1
400 TABLET ORAL ONCE
Status: COMPLETED | OUTPATIENT
Start: 2025-06-10 | End: 2025-06-10

## 2025-06-10 NOTE — CM/SW NOTE
06/10/25 1100   Choice of Post-Acute Provider   Informed patient of right to choose their preferred provider Yes   List of appropriate post-acute services provided to patient/family with quality data Yes   Patient/family choice [facility]   Information given to Patient     Social work was able to meet with the patient at bedside to follow up on [facility] choice.    The [facility]'s choice [facility].     [facility] in [facility]. Level 2 on-site still pending.    SW/CM to remain available for support and/or discharge planning.     Rachael Dash MSW, LSW  Discharge Planner [id number]

## 2025-06-10 NOTE — PLAN OF CARE
Problem: CARDIOVASCULAR - ADULT  Goal: Maintains optimal cardiac output and hemodynamic stability  Description: INTERVENTIONS:  - Monitor vital signs, rhythm, and trends  - Monitor for bleeding, hypotension and signs of decreased cardiac output  - Evaluate effectiveness of vasoactive medications to optimize hemodynamic stability  - Monitor arterial and/or venous puncture sites for bleeding and/or hematoma  - Assess quality of pulses, skin color and temperature  - Assess for signs of decreased coronary artery perfusion - ex. Angina  - Evaluate fluid balance, assess for edema, trend weights  Outcome: Progressing     Problem: RESPIRATORY - ADULT  Goal: Achieves optimal ventilation and oxygenation  Description: INTERVENTIONS:  - Assess for changes in respiratory status  - Assess for changes in mentation and behavior  - Position to facilitate oxygenation and minimize respiratory effort  - Oxygen supplementation based on oxygen saturation or ABGs  - Provide Smoking Cessation handout, if applicable  - Encourage broncho-pulmonary hygiene including cough, deep breathe, Incentive Spirometry  - Assess the need for suctioning and perform as needed  - Assess and instruct to report SOB or any respiratory difficulty  - Respiratory Therapy support as indicated  - Manage/alleviate anxiety  - Monitor for signs/symptoms of CO2 retention  Outcome: Progressing     Problem: METABOLIC/FLUID AND ELECTROLYTES - ADULT  Goal: Electrolytes maintained within normal limits  Description: INTERVENTIONS:  - Monitor labs and rhythm and assess patient for signs and symptoms of electrolyte imbalances  - Administer electrolyte replacement as ordered  - Monitor response to electrolyte replacements, including rhythm and repeat lab results as appropriate  - Fluid restriction as ordered  - Instruct patient on fluid and nutrition restrictions as appropriate  Outcome: Progressing     Problem: HEMATOLOGIC - ADULT  Goal: Maintains hematologic  stability  Description: INTERVENTIONS  - Assess for signs and symptoms of bleeding or hemorrhage  - Monitor labs and vital signs for trends  - Administer supportive blood products/factors, fluids and medications as ordered and appropriate  - Administer supportive blood products/factors as ordered and appropriate  Outcome: Progressing     Problem: MUSCULOSKELETAL - ADULT  Goal: Return mobility to safest level of function  Description: INTERVENTIONS:  - Assess patient stability and activity tolerance for standing, transferring and ambulating w/ or w/o assistive devices  - Assist with transfers and ambulation using safe patient handling equipment as needed  - Ensure adequate protection for wounds/incisions during mobilization  - Obtain PT/OT consults as needed  - Advance activity as appropriate  - Communicate ordered activity level and limitations with patient/family  Outcome: Progressing     Problem: PAIN - ADULT  Goal: Verbalizes/displays adequate comfort level or patient's stated pain goal  Description: INTERVENTIONS:  - Encourage pt to monitor pain and request assistance  - Assess pain using appropriate pain scale  - Administer analgesics based on type and severity of pain and evaluate response  - Implement non-pharmacological measures as appropriate and evaluate response  - Consider cultural and social influences on pain and pain management  - Manage/alleviate anxiety  - Utilize distraction and/or relaxation techniques  - Monitor for opioid side effects  - Notify MD/LIP if interventions unsuccessful or patient reports new pain  - Anticipate increased pain with activity and pre-medicate as appropriate  Outcome: Progressing     Problem: RISK FOR INFECTION - ADULT  Goal: Absence of fever/infection during anticipated neutropenic period  Description: INTERVENTIONS  - Monitor WBC  - Administer growth factors as ordered  - Implement neutropenic guidelines  Outcome: Progressing

## 2025-06-10 NOTE — PLAN OF CARE
Problem: Patient Centered Care  Goal: Patient preferences are identified and integrated in the patient's plan of care  Description: Interventions:  - What would you like us to know as we care for you? From [facility]  - Provide timely, complete, and accurate information to patient/family  - Incorporate patient and family knowledge, values, beliefs, and cultural backgrounds into the planning and delivery of care  - Encourage patient/family to participate in care and decision-making at the level they choose  - Honor patient and family perspectives and choices  Outcome: Progressing     Problem: CARDIOVASCULAR - ADULT  Goal: Maintains optimal cardiac output and hemodynamic stability  Description: INTERVENTIONS:  - Monitor vital signs, rhythm, and trends  - Monitor for bleeding, hypotension and signs of decreased cardiac output  - Evaluate effectiveness of vasoactive medications to optimize hemodynamic stability  - Monitor arterial and/or venous puncture sites for bleeding and/or hematoma  - Assess quality of pulses, skin color and temperature  - Assess for signs of decreased coronary artery perfusion - ex. Angina  - Evaluate fluid balance, assess for edema, trend weights  Outcome: Progressing     Problem: RESPIRATORY - ADULT  Goal: Achieves optimal ventilation and oxygenation  Description: INTERVENTIONS:  - Assess for changes in respiratory status  - Assess for changes in mentation and behavior  - Position to facilitate oxygenation and minimize respiratory effort  - Oxygen supplementation based on oxygen saturation or ABGs  - Provide Smoking Cessation handout, if applicable  - Encourage broncho-pulmonary hygiene including cough, deep breathe, Incentive Spirometry  - Assess the need for suctioning and perform as needed  - Assess and instruct to report SOB or any respiratory difficulty  - Respiratory Therapy support as indicated  - Manage/alleviate anxiety  - Monitor for signs/symptoms of CO2  retention  Outcome: Progressing     Problem: HEMATOLOGIC - ADULT  Goal: Maintains hematologic stability  Description: INTERVENTIONS  - Assess for signs and symptoms of bleeding or hemorrhage  - Monitor labs and vital signs for trends  - Administer supportive blood products/factors, fluids and medications as ordered and appropriate  - Administer supportive blood products/factors as ordered and appropriate  Outcome: Progressing  Goal: Free from bleeding injury  Description: (Example usage: patient with low platelets)  INTERVENTIONS:  - Avoid intramuscular injections, enemas and rectal medication administration  - Ensure safe mobilization of patient  - Hold pressure on venipuncture sites to achieve adequate hemostasis  - Assess for signs and symptoms of internal bleeding  - Monitor lab trends  - Patient is to report abnormal signs of bleeding to staff  - Avoid use of toothpicks and dental floss  - Use electric shaver for shaving  - Use soft bristle tooth brush  - Limit straining and forceful nose blowing  Outcome: Progressing     Problem: MUSCULOSKELETAL - ADULT  Goal: Return mobility to safest level of function  Description: INTERVENTIONS:  - Assess patient stability and activity tolerance for standing, transferring and ambulating w/ or w/o assistive devices  - Assist with transfers and ambulation using safe patient handling equipment as needed  - Ensure adequate protection for wounds/incisions during mobilization  - Obtain PT/OT consults as needed  - Advance activity as appropriate  - Communicate ordered activity level and limitations with patient/family  Outcome: Progressing     Problem: GASTROINTESTINAL - ADULT  Goal: Minimal or absence of nausea and vomiting  Description: INTERVENTIONS:  - Maintain adequate hydration with IV or PO as ordered and tolerated  - Nasogastric tube to low intermittent suction as ordered  - Evaluate effectiveness of ordered antiemetic medications  - Provide nonpharmacologic comfort  measures as appropriate  - Advance diet as tolerated, if ordered  - Obtain nutritional consult as needed  - Evaluate fluid balance  Outcome: Progressing     Problem: SKIN/TISSUE INTEGRITY - ADULT  Goal: Skin integrity remains intact  Description: INTERVENTIONS  - Assess and document risk factors for pressure ulcer development  - Assess and document skin integrity  - Monitor for areas of redness and/or skin breakdown  - Initiate interventions, skin care algorithm/standards of care as needed  Outcome: Progressing     Problem: Impaired Activities of Daily Living  Goal: Achieve highest/safest level of independence in self care  Description: Interventions:  - Assess ability and encourage patient to participate in ADLs to maximize function  - Promote sitting position while performing ADLs such as feeding, grooming, and bathing  - Educate and encourage patient/family in tolerated functional activity level and precautions during self-care   Outcome: Progressing     Problem: PAIN - ADULT  Goal: Verbalizes/displays adequate comfort level or patient's stated pain goal  Description: INTERVENTIONS:  - Encourage pt to monitor pain and request assistance  - Assess pain using appropriate pain scale  - Administer analgesics based on type and severity of pain and evaluate response  - Implement non-pharmacological measures as appropriate and evaluate response  - Consider cultural and social influences on pain and pain management  - Manage/alleviate anxiety  - Utilize distraction and/or relaxation techniques  - Monitor for opioid side effects  - Notify MD/LIP if interventions unsuccessful or patient reports new pain  - Anticipate increased pain with activity and pre-medicate as appropriate  Outcome: Progressing

## 2025-06-10 NOTE — DISCHARGE SUMMARY
[facility]  part of [facility]    Discharge Summary    Susan P Ruzicka Patient Status:  Inpatient    1945 MRN [id number]   Location [facility] 3W/SW Attending Yaw Bailon MD   Hosp Day # 8 PCP ROLY AKUR     Date of Admission: 2025 Disposition: Home or Self Care     Date of Discharge: 6/10/25    Admitting Diagnosis: Weakness generalized [R53.1]  NSTEMI (non-ST elevated myocardial infarction) (HCC) [I21.4]  Frequent falls [R29.6]    Hospital Discharge Diagnoses: generalized weakness, NTEMI I, Hypotension, large hiatal hernia     Lace+ Score: 74  59-90 High Risk  29-58 Medium Risk  0-28   Low Risk.    TCM Follow-Up Recommendation:  LACE > 58: High Risk of readmission after discharge from the hospital.    Problem List: Problem List[1]    Reason for Admission: Possible non-ST elevation myocardial infarction.       Physical Exam:   Vitals:    06/10/25 1304   BP: 127/83   Pulse: 61   Resp: 16   Temp:      Gen: A+Ox3.  No distress.   HEENT: NCAT, neck supple, no carotid bruit.  CV: RRR, S1S2, and intact distal pulses. No gallop, rub, murmur.  Pulm: Effort and breath sounds normal. No distress, wheezes, rales, rhonchi.  Abd: Soft, NTND, BS normal, no mass, no HSM, no rebound/guarding.   Neuro:  Normal reflexes, CN. Sensory/motor exams grossly normal deficit.   MS: No joint effusions.  No peripheral edema.  Skin: Skin is warm and dry. No rashes, erythema, diaphoresis.   Psych:   Normal mood and affect. Calm, cooperative    History of Present Illness:   Per Dr. Yanes  The patient is a 80-year-old  female who came in to the emergency department for evaluation of epigastric and midsternal chest tightness on and off since this morning. CBC and chemistry unremarkable. EKG showed left bundle branch block, which is chronic, nonspecific ST-T changes noted on EKG. No significant change from prior. First troponin 172, second troponin 424. The patient was initiated on  IV heparin. She will be admitted to the hospital for further management. CT scan of the cervical spine and CT scan of the brain, both were negative. CT scan of the abdomen showed large hiatal hernia without acute change.     Hospital Course:   NSTEMI, type I.  Cardiac catheterization 6/4 without major obstructive disease findings.  Now chest pain-free.  - Cardiology was following  - Echo noted and unremarkable   - cont Aspirin, Coreg, Lipitor  - stable for dc    CKD stage III.  At baseline  - Cr continues to improve     Hypotension.  Exact nature unclear.  Treated for septic source with IV fluids, pressors, antibiotics.  However workup has been negative.  Now off pressors.  Doing well.  Does not appear to be cardiogenic based on echo, catheterization results.  - DC antibiotics  - Monitor cultures -> NGTD     Hiatal hernia.  May be causing her nausea, currently resolved.  She is not interested in surgery if at all possible to avoid this.  - cont Acid suppression  - Outpatient surgical follow-up if necessary     Other problems  CAD  Hypertension  Hiatal hernia  Anemia of chronic kidney disease  Osteoarthritis  Restless leg syndrome  Chronic back pain    Consultations: Cardiology, Pulm    Procedures: see above    Complications: none    Discharge Condition: Stable    Discharge Medications:      Discharge Medications        START taking these medications        Instructions Prescription details   clotrimazole 1 % Crea  Commonly known as: Lotrimin      Apply 1 Application topically 2 (two) times daily.   Quantity: 12 g  Refills: 0     sacubitril-valsartan 24-26 MG Tabs  Commonly known as: Entresto      Take 1 tablet by mouth 2 (two) times daily.   Quantity: 60 tablet  Refills: 0     traMADol 50 MG Tabs  Commonly known as: Ultram      Take 1 tablet (50 mg total) by mouth every 6 (six) hours as needed.   Quantity: 15 tablet  Refills: 0            CONTINUE taking these medications        Instructions Prescription details    acetaminophen 500 MG Tabs  Commonly known as: Tylenol Extra Strength      Take 1 tablet (500 mg total) by mouth every 6 (six) hours as needed for Pain.   Refills: 0     Alpha Lipoic Acid 200 MG Caps      Take 1 capsule by mouth daily.   Refills: 0     aspirin 81 MG Tbec      Take 1 tablet (81 mg total) by mouth daily.   Refills: 0     atorvastatin 40 MG Tabs  Commonly known as: Lipitor      Take 1 tablet (40 mg total) by mouth daily with dinner.   Refills: 0     buPROPion  MG Tb24  Commonly known as: Wellbutrin XL      Take 1 tablet (150 mg total) by mouth daily.   Refills: 0     buPROPion  MG Tb24  Commonly known as: Wellbutrin XL      Take 1 tablet (300 mg total) by mouth daily.   Refills: 0     carvedilol 3.125 MG Tabs  Commonly known as: Coreg      Take 1 tablet (3.125 mg total) by mouth 2 (two) times daily with meals.   Refills: 0     citalopram 40 MG Tabs  Commonly known as: CeleXA      Take 1 tablet (40 mg total) by mouth daily.   Refills: 0     Farxiga 10 MG Tabs  Generic drug: dapagliflozin      Take 1 tablet (10 mg total) by mouth in the morning.   Refills: 0     gabapentin 600 MG Tabs  Commonly known as: Neurontin      Take 1 tablet (600 mg total) by mouth 2 (two) times daily.   Refills: 0     pantoprazole 40 MG Tbec  Commonly known as: Protonix      Take 1 tablet (40 mg total) by mouth 2 (two) times daily before meals.   Refills: 0     prochlorperazine 10 mg tablet  Commonly known as: Compazine      Take 1 tablet (10 mg total) by mouth every 6 (six) hours as needed for Nausea.   Refills: 0     rOPINIRole 1 MG Tabs  Commonly known as: Requip      Take 1 tablet (1 mg total) by mouth 3 (three) times daily.   Refills: 0     spironolactone 25 MG Tabs  Commonly known as: Aldactone      Take 0.5 tablets (12.5 mg total) by mouth in the morning.   Quantity: 30 tablet  Refills: 0     tiZANidine 2 MG Tabs  Commonly known as: Zanaflex      Take 1 tablet (2 mg total) by mouth nightly as needed (muscle  spasms).   Refills: 0     venlafaxine ER 37.5 MG Cp24  Commonly known as: Effexor-XR      Take 1 capsule (37.5 mg total) by mouth daily with breakfast.   Quantity: 30 capsule  Refills: 0            STOP taking these medications      isosorbide dinitrate 10 MG Tabs  Commonly known as: Isordil        torsemide 20 MG Tabs  Commonly known as: Demadex                  Where to Get Your Medications        These medications were sent to [id number] - [place], [place] - [address] AT [facility] [address], [phone], [phone] [address], [facility] [address]      Phone: [phone]   clotrimazole 1 % Crea  sacubitril-valsartan 24-26 MG Tabs  spironolactone 25 MG Tabs       Please  your prescriptions at the location directed by your doctor or nurse    Bring a paper prescription for each of these medications  traMADol 50 MG Tabs         Greater than 35 minutes spent, >50% spent counseling re: treatment plan and workup     Yaw Bailon MD  6/10/2025           [1]   Patient Active Problem List  Diagnosis    Hemifacial spasm    Chronic low back pain    Leukocytosis    Hyponatremia    Metabolic acidosis    Accelerated hypertension    NSTEMI (non-ST elevated myocardial infarction) (HCC)    Delirium    Encephalopathy    New onset left bundle branch block (LBBB)    Acute chest pain    Acute heart failure, unspecified heart failure type (HCC)    Acute hypoxemic respiratory failure (HCC)    Large hiatal hernia    Acute on chronic congestive heart failure, unspecified heart failure type (HCC)    FARAZ (acute kidney injury)    Hypokalemia    Orthostatic hypotension    Pulmonary edema (HCC)    Acute on chronic diastolic (congestive) heart failure (HCC)    Dyspnea    Acute hypoxic respiratory failure (HCC)    Diarrhea of presumed infectious origin    Altered mental status, unspecified altered mental status type    Nausea vomiting and diarrhea    Moderate episode of recurrent major depressive  disorder (HCC)    Weakness generalized    Frequent falls    Difficult intravenous access - Recommend US Guided PIVs

## 2025-06-10 NOTE — CM/SW NOTE
06/10/25 1314   Discharge disposition   Expected discharge disposition subacute   Post Acute Care Provider [facility]   Discharge transportation [facility] [place]       The patient received a MDO for discharge.    The patient will be transported to [facility] via [facility] [place] at 4pm.  PCS complete.  The quote is $70 and the patient is agreeable.    The number to call report is [phone].  Lillian in admissions is aware of transport time.    RN to inform family.    SW/CM to remain available for support and/or discharge planning.     Rachael Dash MSW, LSW  Discharge Planner [id number]

## 2025-06-10 NOTE — PROGRESS NOTES
On floor. BP stable. No cp or sob. Nausea better.  Sitting up in chair   Weak   Vitals:    06/10/25 0800   BP: 155/79   Pulse: 62   Resp: 16   Temp: 98.7 °F (37.1 °C)       Intake/Output Summary (Last 24 hours) at 6/10/2025 1112  Last data filed at 6/10/2025 0800  Gross per 24 hour   Intake 360 ml   Output 1 ml   Net 359 ml     Wt Readings from Last 1 Encounters:   06/10/25 159 lb (72.1 kg)        General: No acute distress.  Neck: Jugular venous pulsations not seen.  Lungs: Crackles khloe.  Heart: Normal rate. MAGI.  Abdomen: Soft. Non tender  Extremities: Mild edema.  Neurological: Alert. No focal deficits.  Psychiatric: Appropriate mood and affect.  Current Hospital Medications[1]  Prescriptions Prior to Admission[2]  No results found.      Lab Results   Component Value Date    MG 1.8 06/10/2025              Assessment / Plan  1. Non-ST Elevation Myocardial Infarction -  Patient is currently on heparin weight-based protocol, oral nitrates, atorvastatin 40 mg daily, and aspirin 325 mg daily. Plan to proceed with cardiac catheterization with possible intervention once renal function improves.     I21.4 Non-ST elevation myocardial infarction           2. Acute Renal Failure -    N17.9 Acute kidney failure, unspecified           3. Chronic Left Ventricular Heart Failure -  Evidence of dehydration with decreased oral intake.     I50.32 Chronic diastolic heart failure           4. Coronary Artery Disease -  Status post percutaneous intervention to mid right coronary artery in 09/2023.     I25.10 Atherosclerotic heart disease of native coronary artery without angina pectoris           Discussion Notes     Stable from cardiac standpoint for Rehab placement    Add aldactone 12.5 daily       [1]   Current Facility-Administered Medications   Medication Dose Route Frequency    [START ON 6/11/2025] aspirin DR tab 81 mg  81 mg Oral Daily    spironolactone (Aldactone) partial tab 12.5 mg  12.5 mg Oral Daily    traMADol  (Ultram) tab 50 mg  50 mg Oral Q6H PRN    sacubitril-valsartan (Entresto) 24-26 MG per tab 1 tablet  1 tablet Oral BID    clotrimazole (Lotrimin) 1 % cream   Topical BID    pantoprazole (Protonix) DR tab 40 mg  40 mg Oral QAM AC    heparin (Porcine) 5000 UNIT/ML injection 5,000 Units  5,000 Units Subcutaneous Q8H GET    acetaminophen (Tylenol Extra Strength) tab 500 mg  500 mg Oral Q4H PRN    metoprolol (Lopressor) 5 mg/5mL injection 5 mg  5 mg Intravenous Q4H PRN    alum-mag hydroxide-simethicone (Maalox) 200-200-20 MG/5ML oral suspension 30 mL  30 mL Oral QID PRN    nitroglycerin (Nitrostat) SL tab 0.4 mg  0.4 mg Sublingual Q5 Min PRN    temazepam (Restoril) cap 15 mg  15 mg Oral Nightly PRN    buPROPion ER (Wellbutrin XL) 24 hr tab 150 mg  150 mg Oral Daily    buPROPion ER (Wellbutrin XL) 24 hr tab 300 mg  300 mg Oral Daily    carvedilol (Coreg) tab 3.125 mg  3.125 mg Oral BID with meals    escitalopram (Lexapro) tab 20 mg  20 mg Oral Daily    gabapentin (Neurontin) tab 600 mg  600 mg Oral BID    rOPINIRole (Requip) tab 1 mg  1 mg Oral TID    tiZANidine (Zanaflex) tab 2 mg  2 mg Oral Nightly PRN    venlafaxine ER (Effexor-XR) 24 hr cap 37.5 mg  37.5 mg Oral Daily with breakfast    atorvastatin (Lipitor) tab 40 mg  40 mg Oral Nightly    calcium carbonate (Tums) chewable tab 500 mg  500 mg Oral Q6H PRN   [2]   Medications Prior to Admission   Medication Sig    aspirin 81 MG Oral Tab EC Take 1 tablet (81 mg total) by mouth daily.    carvedilol 3.125 MG Oral Tab Take 1 tablet (3.125 mg total) by mouth 2 (two) times daily with meals.    citalopram 40 MG Oral Tab Take 1 tablet (40 mg total) by mouth daily.    buPROPion  MG Oral Tablet 24 Hr Take 1 tablet (150 mg total) by mouth daily.    buPROPion  MG Oral Tablet 24 Hr Take 1 tablet (300 mg total) by mouth daily.    Alpha Lipoic Acid 200 MG Oral Cap Take 1 capsule by mouth daily.    venlafaxine ER 37.5 MG Oral Capsule SR 24 Hr Take 1 capsule (37.5 mg total)  by mouth daily with breakfast.    dapagliflozin (FARXIGA) 10 MG Oral Tab Take 1 tablet (10 mg total) by mouth in the morning.    torsemide 20 MG Oral Tab Take 2 tablets (40 mg total) by mouth daily.    spironolactone 25 MG Oral Tab Take 1 tablet (25 mg total) by mouth daily. (Patient taking differently: Take 0.5 tablets (12.5 mg total) by mouth in the morning.)    gabapentin 600 MG Oral Tab Take 1 tablet (600 mg total) by mouth 2 (two) times daily.    isosorbide dinitrate 10 MG Oral Tab Take 1 tablet (10 mg total) by mouth in the morning and 1 tablet (10 mg total) before bedtime.    rOPINIRole 1 MG Oral Tab Take 1 tablet (1 mg total) by mouth 3 (three) times daily.    pantoprazole 40 MG Oral Tab EC Take 1 tablet (40 mg total) by mouth 2 (two) times daily before meals.    atorvastatin 40 MG Oral Tab Take 1 tablet (40 mg total) by mouth daily with dinner.    acetaminophen 500 MG Oral Tab Take 1 tablet (500 mg total) by mouth every 6 (six) hours as needed for Pain.    tiZANidine HCl 2 MG Oral Tab Take 1 tablet (2 mg total) by mouth nightly as needed (muscle spasms).    prochlorperazine (COMPAZINE) 10 mg tablet Take 1 tablet (10 mg total) by mouth every 6 (six) hours as needed for Nausea.

## 2025-06-10 NOTE — PLAN OF CARE
Patient discharging at 4pm to [facility] via [facility]. Daughter Trish notified. No IV access present this shift. AVS copy provided to patient and with transport packet. All questions answered.    Problem: Patient Centered Care  Goal: Patient preferences are identified and integrated in the patient's plan of care  Description: Interventions:  - What would you like us to know as we care for you? From [facility]  - Provide timely, complete, and accurate information to patient/family  - Incorporate patient and family knowledge, values, beliefs, and cultural backgrounds into the planning and delivery of care  - Encourage patient/family to participate in care and decision-making at the level they choose  - Honor patient and family perspectives and choices  Outcome: Adequate for Discharge     Problem: Patient/Family Goals  Goal: Patient/Family Long Term Goal  Description: Patient's Long Term Goal: Go home    Interventions:  - Monitor labs  - Diagnostic testing  - Follow MD orders  - See additional Care Plan goals for specific interventions  Outcome: Adequate for Discharge  Goal: Patient/Family Short Term Goal  Description: Patient's Short Term Goal: Decreased pain    Interventions:   - Pain management  - Diagnostic testing  - Follow MD orders  - See additional Care Plan goals for specific interventions  Outcome: Adequate for Discharge     Problem: CARDIOVASCULAR - ADULT  Goal: Maintains optimal cardiac output and hemodynamic stability  Description: INTERVENTIONS:  - Monitor vital signs, rhythm, and trends  - Monitor for bleeding, hypotension and signs of decreased cardiac output  - Evaluate effectiveness of vasoactive medications to optimize hemodynamic stability  - Monitor arterial and/or venous puncture sites for bleeding and/or hematoma  - Assess quality of pulses, skin color and temperature  - Assess for signs of decreased coronary artery perfusion - ex. Angina  - Evaluate fluid balance, assess for edema,  trend weights  Outcome: Adequate for Discharge     Problem: RESPIRATORY - ADULT  Goal: Achieves optimal ventilation and oxygenation  Description: INTERVENTIONS:  - Assess for changes in respiratory status  - Assess for changes in mentation and behavior  - Position to facilitate oxygenation and minimize respiratory effort  - Oxygen supplementation based on oxygen saturation or ABGs  - Provide Smoking Cessation handout, if applicable  - Encourage broncho-pulmonary hygiene including cough, deep breathe, Incentive Spirometry  - Assess the need for suctioning and perform as needed  - Assess and instruct to report SOB or any respiratory difficulty  - Respiratory Therapy support as indicated  - Manage/alleviate anxiety  - Monitor for signs/symptoms of CO2 retention  Outcome: Adequate for Discharge     Problem: METABOLIC/FLUID AND ELECTROLYTES - ADULT  Goal: Electrolytes maintained within normal limits  Description: INTERVENTIONS:  - Monitor labs and rhythm and assess patient for signs and symptoms of electrolyte imbalances  - Administer electrolyte replacement as ordered  - Monitor response to electrolyte replacements, including rhythm and repeat lab results as appropriate  - Fluid restriction as ordered  - Instruct patient on fluid and nutrition restrictions as appropriate  Outcome: Adequate for Discharge     Problem: HEMATOLOGIC - ADULT  Goal: Maintains hematologic stability  Description: INTERVENTIONS  - Assess for signs and symptoms of bleeding or hemorrhage  - Monitor labs and vital signs for trends  - Administer supportive blood products/factors, fluids and medications as ordered and appropriate  - Administer supportive blood products/factors as ordered and appropriate  Outcome: Adequate for Discharge  Goal: Free from bleeding injury  Description: (Example usage: patient with low platelets)  INTERVENTIONS:  - Avoid intramuscular injections, enemas and rectal medication administration  - Ensure safe mobilization of  patient  - Hold pressure on venipuncture sites to achieve adequate hemostasis  - Assess for signs and symptoms of internal bleeding  - Monitor lab trends  - Patient is to report abnormal signs of bleeding to staff  - Avoid use of toothpicks and dental floss  - Use electric shaver for shaving  - Use soft bristle tooth brush  - Limit straining and forceful nose blowing  Outcome: Adequate for Discharge     Problem: MUSCULOSKELETAL - ADULT  Goal: Return mobility to safest level of function  Description: INTERVENTIONS:  - Assess patient stability and activity tolerance for standing, transferring and ambulating w/ or w/o assistive devices  - Assist with transfers and ambulation using safe patient handling equipment as needed  - Ensure adequate protection for wounds/incisions during mobilization  - Obtain PT/OT consults as needed  - Advance activity as appropriate  - Communicate ordered activity level and limitations with patient/family  Outcome: Adequate for Discharge     Problem: PAIN - ADULT  Goal: Verbalizes/displays adequate comfort level or patient's stated pain goal  Description: INTERVENTIONS:  - Encourage pt to monitor pain and request assistance  - Assess pain using appropriate pain scale  - Administer analgesics based on type and severity of pain and evaluate response  - Implement non-pharmacological measures as appropriate and evaluate response  - Consider cultural and social influences on pain and pain management  - Manage/alleviate anxiety  - Utilize distraction and/or relaxation techniques  - Monitor for opioid side effects  - Notify MD/LIP if interventions unsuccessful or patient reports new pain  - Anticipate increased pain with activity and pre-medicate as appropriate  Outcome: Adequate for Discharge     Problem: RISK FOR INFECTION - ADULT  Goal: Absence of fever/infection during anticipated neutropenic period  Description: INTERVENTIONS  - Monitor WBC  - Administer growth factors as ordered  - Implement  neutropenic guidelines  Outcome: Adequate for Discharge     Problem: SAFETY ADULT - FALL  Goal: Free from fall injury  Description: INTERVENTIONS:  - Assess pt frequently for physical needs  - Identify cognitive and physical deficits and behaviors that affect risk of falls.  - [place] fall precautions as indicated by assessment.  - Educate pt/family on patient safety including physical limitations  - Instruct pt to call for assistance with activity based on assessment  - Modify environment to reduce risk of injury  - Provide assistive devices as appropriate  - Consider OT/PT consult to assist with strengthening/mobility  - Encourage toileting schedule  Outcome: Adequate for Discharge     Problem: GASTROINTESTINAL - ADULT  Goal: Minimal or absence of nausea and vomiting  Description: INTERVENTIONS:  - Maintain adequate hydration with IV or PO as ordered and tolerated  - Nasogastric tube to low intermittent suction as ordered  - Evaluate effectiveness of ordered antiemetic medications  - Provide nonpharmacologic comfort measures as appropriate  - Advance diet as tolerated, if ordered  - Obtain nutritional consult as needed  - Evaluate fluid balance  Outcome: Adequate for Discharge     Problem: SKIN/TISSUE INTEGRITY - ADULT  Goal: Skin integrity remains intact  Description: INTERVENTIONS  - Assess and document risk factors for pressure ulcer development  - Assess and document skin integrity  - Monitor for areas of redness and/or skin breakdown  - Initiate interventions, skin care algorithm/standards of care as needed  Outcome: Adequate for Discharge     Problem: Impaired Functional Mobility  Goal: Achieve highest/safest level of mobility/gait  Description: Interventions:  - Assess patient's functional ability and stability  - Promote increasing activity/tolerance for mobility and gait  - Educate and engage patient/family in tolerated activity level and precautions  - Recommend use of  RW for transfers and  ambulation  Outcome: Adequate for Discharge     Problem: Impaired Activities of Daily Living  Goal: Achieve highest/safest level of independence in self care  Description: Interventions:  - Assess ability and encourage patient to participate in ADLs to maximize function  - Promote sitting position while performing ADLs such as feeding, grooming, and bathing  - Educate and encourage patient/family in tolerated functional activity level and precautions during self-care  Outcome: Adequate for Discharge

## 2025-07-16 ENCOUNTER — HOSPITAL ENCOUNTER (OUTPATIENT)
Facility: HOSPITAL | Age: 80
Setting detail: OBSERVATION
Discharge: HOME HEALTH | End: 2025-07-18
Attending: EMERGENCY MEDICINE | Admitting: HOSPITALIST
Payer: MEDICARE

## 2025-07-16 ENCOUNTER — APPOINTMENT (OUTPATIENT)
Dept: CT IMAGING | Facility: HOSPITAL | Age: 80
End: 2025-07-16
Attending: EMERGENCY MEDICINE
Payer: MEDICARE

## 2025-07-16 DIAGNOSIS — R26.2 INABILITY TO WALK: Primary | ICD-10-CM

## 2025-07-16 DIAGNOSIS — S09.90XA INJURY OF HEAD, INITIAL ENCOUNTER: ICD-10-CM

## 2025-07-16 DIAGNOSIS — R53.1 WEAKNESS GENERALIZED: ICD-10-CM

## 2025-07-16 PROBLEM — R26.9 IMPAIRED GAIT: Status: ACTIVE | Noted: 2025-07-16

## 2025-07-16 LAB
ANION GAP SERPL CALC-SCNC: 9 MMOL/L (ref 0–18)
ATRIAL RATE: 74 BPM
BASOPHILS # BLD AUTO: 0.08 X10(3) UL (ref 0–0.2)
BASOPHILS NFR BLD AUTO: 0.9 %
BUN BLD-MCNC: 17 MG/DL (ref 9–23)
BUN/CREAT SERPL: 11.6 (ref 10–20)
CALCIUM BLD-MCNC: 9.4 MG/DL (ref 8.7–10.4)
CHLORIDE SERPL-SCNC: 105 MMOL/L (ref 98–112)
CO2 SERPL-SCNC: 25 MMOL/L (ref 21–32)
CREAT BLD-MCNC: 1.47 MG/DL (ref 0.55–1.02)
DEPRECATED RDW RBC AUTO: 42.1 FL (ref 35.1–46.3)
EGFRCR SERPLBLD CKD-EPI 2021: 36 ML/MIN/1.73M2 (ref 60–?)
EOSINOPHIL # BLD AUTO: 0.32 X10(3) UL (ref 0–0.7)
EOSINOPHIL NFR BLD AUTO: 3.5 %
ERYTHROCYTE [DISTWIDTH] IN BLOOD BY AUTOMATED COUNT: 12.8 % (ref 11–15)
GLUCOSE BLD-MCNC: 80 MG/DL (ref 70–99)
HCT VFR BLD AUTO: 31.3 % (ref 35–48)
HGB BLD-MCNC: 10.3 G/DL (ref 12–16)
IMM GRANULOCYTES # BLD AUTO: 0.02 X10(3) UL (ref 0–1)
IMM GRANULOCYTES NFR BLD: 0.2 %
LYMPHOCYTES # BLD AUTO: 1.95 X10(3) UL (ref 1–4)
LYMPHOCYTES NFR BLD AUTO: 21.3 %
MCH RBC QN AUTO: 29.3 PG (ref 26–34)
MCHC RBC AUTO-ENTMCNC: 32.9 G/DL (ref 31–37)
MCV RBC AUTO: 89.2 FL (ref 80–100)
MONOCYTES # BLD AUTO: 0.6 X10(3) UL (ref 0.1–1)
MONOCYTES NFR BLD AUTO: 6.6 %
NEUTROPHILS # BLD AUTO: 6.17 X10 (3) UL (ref 1.5–7.7)
NEUTROPHILS # BLD AUTO: 6.17 X10(3) UL (ref 1.5–7.7)
NEUTROPHILS NFR BLD AUTO: 67.5 %
OSMOLALITY SERPL CALC.SUM OF ELEC: 289 MOSM/KG (ref 275–295)
P AXIS: 40 DEGREES
P-R INTERVAL: 158 MS
PLATELET # BLD AUTO: 272 10(3)UL (ref 150–450)
POTASSIUM SERPL-SCNC: 4.1 MMOL/L (ref 3.5–5.1)
Q-T INTERVAL: 434 MS
QRS DURATION: 154 MS
QTC CALCULATION (BEZET): 481 MS
R AXIS: -4 DEGREES
RBC # BLD AUTO: 3.51 X10(6)UL (ref 3.8–5.3)
SODIUM SERPL-SCNC: 139 MMOL/L (ref 136–145)
T AXIS: 119 DEGREES
TROPONIN I SERPL HS-MCNC: 5 NG/L (ref ?–34)
VENTRICULAR RATE: 74 BPM
WBC # BLD AUTO: 9.1 X10(3) UL (ref 4–11)

## 2025-07-16 PROCEDURE — 99222 1ST HOSP IP/OBS MODERATE 55: CPT | Performed by: HOSPITALIST

## 2025-07-16 PROCEDURE — 70450 CT HEAD/BRAIN W/O DYE: CPT | Performed by: EMERGENCY MEDICINE

## 2025-07-16 RX ORDER — METOCLOPRAMIDE HYDROCHLORIDE 5 MG/ML
5 INJECTION INTRAMUSCULAR; INTRAVENOUS EVERY 8 HOURS PRN
Status: DISCONTINUED | OUTPATIENT
Start: 2025-07-16 | End: 2025-07-18

## 2025-07-16 RX ORDER — HEPARIN SODIUM 5000 [USP'U]/ML
5000 INJECTION, SOLUTION INTRAVENOUS; SUBCUTANEOUS EVERY 12 HOURS SCHEDULED
Status: DISCONTINUED | OUTPATIENT
Start: 2025-07-16 | End: 2025-07-18

## 2025-07-16 RX ORDER — PERPHENAZINE 16 MG
1 TABLET ORAL
COMMUNITY

## 2025-07-16 RX ORDER — ONDANSETRON 2 MG/ML
4 INJECTION INTRAMUSCULAR; INTRAVENOUS EVERY 6 HOURS PRN
Status: DISCONTINUED | OUTPATIENT
Start: 2025-07-16 | End: 2025-07-18

## 2025-07-16 RX ORDER — ACETAMINOPHEN 500 MG
500 TABLET ORAL EVERY 4 HOURS PRN
Status: DISCONTINUED | OUTPATIENT
Start: 2025-07-16 | End: 2025-07-18

## 2025-07-16 RX ORDER — LIDOCAINE 4 G/G
PATCH TOPICAL
COMMUNITY

## 2025-07-16 NOTE — ED INITIAL ASSESSMENT (HPI)
Pt arrived via EMS, arrives via EMS with c/o fall. Pt wheel on her walker got stuck and pt fell sideways and hit her head. Denies LOC, thinners. Pt c/o HA. Per pt, post fall reports legs feel very shaky and speech is not normal. AOX4. Speaking in full sentences. + thinners

## 2025-07-16 NOTE — ED PROVIDER NOTES
Patient Seen in: [facility] Emergency Department        History  Chief Complaint   Patient presents with    Fall     Stated Complaint: fall    Subjective:   HPI            80-year-old female with history of hypertension, hyperlipidemia, coronary artery disease, congestive heart disease, chronic kidney disease, anemia, esophageal reflux, and unsteadiness on feet presents after a fall.  The patient states that she was walking through a doorway with a high threshold.  She was attempting to lift her wheeled walker over the threshold when she fell over to the right side.  She reports striking the right side of her head on the doorway and wall as she fell to the ground.  She complains of right sided headache primarily behind her right ear.  She denies neck or back pain.  She reports feeling shakiness throughout her body along with weakness to bilateral legs.  She denies any pain to her extremities.      Objective:     Past Medical History:    Congestive heart disease (HCC)    Diverticulitis    Esophageal reflux    Fibromyalgia    High blood pressure    Osteoarthrosis, unspecified whether generalized or localized, unspecified site    RLS (restless legs syndrome)    Unspecified essential hypertension              Past Surgical History:   Procedure Laterality Date    Cath drug eluting stent      Excis lumbar disk,one level      Removal gallbladder                  Social History     Socioeconomic History    Marital status:    Tobacco Use    Smoking status: Former     Types: Cigarettes     Passive exposure: Past    Smokeless tobacco: Never   Vaping Use    Vaping status: Never Used   Substance and Sexual Activity    Alcohol use: No    Drug use: Never     Social Drivers of Health     Food Insecurity: No Food Insecurity (6/2/2025)    NCSS - Food Insecurity     Worried About Running Out of Food in the Last Year: No     Ran Out of Food in the Last Year: No   Transportation Needs: No Transportation Needs (6/2/2025)     NCSS - Transportation     Lack of Transportation: No   Housing Stability: Not At Risk (6/2/2025)    NCSS - Housing/Utilities     Has Housing: Yes     Worried About Losing Housing: No     Unable to Get Utilities: No                                Physical Exam    ED Triage Vitals [07/16/25 1609]   BP (!) 173/82   Pulse 78   Resp 18   Temp    Temp src    SpO2 93 %   O2 Device None (Room air)       Current Vitals:   Vital Signs  BP: (!) 164/87  Pulse: 81  Resp: 20  MAP (mmHg): (!) 109    Oxygen Therapy  SpO2: 98 %  O2 Device: None (Room air)            Physical Exam      General Appearance: alert, no distress  Eyes: pupils equal and round no injection  Respiratory: chest is non tender to palpation, breath sounds are equal  Cardiac: regular rate and rhythm  Gastrointestinal:  soft and non tender, there is no evidence of external or internal trauma by exam.  Neurological: Speech normal.  Motor and sensation is intact and symmetric to bilateral upper and lower extremities.  Skin: No laceration or abrasions.  Musculoskeletal                Head: Tenderness to palpation of the right parietal scalp.  No lacerations, abrasions, or bony deformities noted.  No facial tenderness noted.                Neck: The cervical spine is non-tender and there is no pain with active range of motion                Back: there is no thoracic or lumbar spine or paraspinal tenderness                Extremities are non tender to palpation and there is full range of motion of the joints.    DIFFERENTIAL DIAGNOSIS: after history and physical exam differential diagnosis was considered for trauma post fall including head injury including concussion, skull fracture, intraparenchymal contusion and subdural hematoma            ED Course  Labs Reviewed   BASIC METABOLIC PANEL (8) - Abnormal; Notable for the following components:       Result Value    Creatinine 1.47 (*)     eGFR-Cr 36 (*)     All other components within normal limits   CBC WITH  DIFFERENTIAL WITH PLATELET - Abnormal; Notable for the following components:    RBC 3.51 (*)     HGB 10.3 (*)     HCT 31.3 (*)     All other components within normal limits   TROPONIN I HIGH SENSITIVITY - Normal   RAINBOW DRAW LAVENDER   RAINBOW DRAW LIGHT GREEN   RAINBOW DRAW BLUE   RAINBOW DRAW GOLD     EKG    Rate, intervals and axes as noted on EKG Report.  Rate: 74  Rhythm: Sinus Rhythm  Axis: Normal  Reading: Left bundle branch block, left bundle branch block was present on EKG from 6/3/2025, nonspecific EKG                              MDM     Brain CT was reviewed independently by me.  No hemorrhage or intracranial injury noted.  Lab results noted.  Patient reports feeling well at present.  Attempted to have the patient walk with a walker in the ED.  She attempted multiple times but is unable to stand saying her legs are shaky and weak.  She reports weakness to bilateral legs.  No focal weakness found on examination.  Given the patient's inability to walk, will admit for observation.  Discussed with Dr. Yanes, hospitalist.    Admission disposition: 7/16/2025  7:12 PM           Medical Decision Making      Disposition and Plan     Clinical Impression:  1. Inability to walk    2. Injury of head, initial encounter    3. Weakness generalized         Disposition:  Admit  7/16/2025  7:12 pm    Follow-up:  No follow-up provider specified.  We recommend that you schedule follow up care with a primary care provider within the next three months to obtain basic health screening including reassessment of your blood pressure.      Medications Prescribed:  Current Discharge Medication List                Supplementary Documentation:         Hospital Problems       Present on Admission           ICD-10-CM Noted POA    * (Principal) Inability to walk R26.2 7/16/2025 Unknown    Impaired gait R26.9 7/16/2025 Unknown

## 2025-07-17 LAB
ANION GAP SERPL CALC-SCNC: 6 MMOL/L (ref 0–18)
BASOPHILS # BLD AUTO: 0.05 X10(3) UL (ref 0–0.2)
BASOPHILS NFR BLD AUTO: 0.8 %
BUN BLD-MCNC: 17 MG/DL (ref 9–23)
BUN/CREAT SERPL: 12.2 (ref 10–20)
CALCIUM BLD-MCNC: 8.4 MG/DL (ref 8.7–10.4)
CHLORIDE SERPL-SCNC: 108 MMOL/L (ref 98–112)
CO2 SERPL-SCNC: 27 MMOL/L (ref 21–32)
CREAT BLD-MCNC: 1.39 MG/DL (ref 0.55–1.02)
DEPRECATED RDW RBC AUTO: 43.6 FL (ref 35.1–46.3)
EGFRCR SERPLBLD CKD-EPI 2021: 39 ML/MIN/1.73M2 (ref 60–?)
EOSINOPHIL # BLD AUTO: 0.35 X10(3) UL (ref 0–0.7)
EOSINOPHIL NFR BLD AUTO: 5.8 %
ERYTHROCYTE [DISTWIDTH] IN BLOOD BY AUTOMATED COUNT: 13.1 % (ref 11–15)
GLUCOSE BLD-MCNC: 101 MG/DL (ref 70–99)
HCT VFR BLD AUTO: 28.4 % (ref 35–48)
HGB BLD-MCNC: 9.1 G/DL (ref 12–16)
IMM GRANULOCYTES # BLD AUTO: 0.01 X10(3) UL (ref 0–1)
IMM GRANULOCYTES NFR BLD: 0.2 %
LYMPHOCYTES # BLD AUTO: 1.16 X10(3) UL (ref 1–4)
LYMPHOCYTES NFR BLD AUTO: 19.2 %
MCH RBC QN AUTO: 29.3 PG (ref 26–34)
MCHC RBC AUTO-ENTMCNC: 32 G/DL (ref 31–37)
MCV RBC AUTO: 91.3 FL (ref 80–100)
MONOCYTES # BLD AUTO: 0.47 X10(3) UL (ref 0.1–1)
MONOCYTES NFR BLD AUTO: 7.8 %
NEUTROPHILS # BLD AUTO: 3.99 X10 (3) UL (ref 1.5–7.7)
NEUTROPHILS # BLD AUTO: 3.99 X10(3) UL (ref 1.5–7.7)
NEUTROPHILS NFR BLD AUTO: 66.2 %
OSMOLALITY SERPL CALC.SUM OF ELEC: 294 MOSM/KG (ref 275–295)
PLATELET # BLD AUTO: 243 10(3)UL (ref 150–450)
POTASSIUM SERPL-SCNC: 4.6 MMOL/L (ref 3.5–5.1)
RBC # BLD AUTO: 3.11 X10(6)UL (ref 3.8–5.3)
SODIUM SERPL-SCNC: 141 MMOL/L (ref 136–145)
WBC # BLD AUTO: 6 X10(3) UL (ref 4–11)

## 2025-07-17 PROCEDURE — 99233 SBSQ HOSP IP/OBS HIGH 50: CPT | Performed by: HOSPITALIST

## 2025-07-17 RX ORDER — ROPINIROLE 1 MG/1
1 TABLET, FILM COATED ORAL 3 TIMES DAILY
Status: DISCONTINUED | OUTPATIENT
Start: 2025-07-17 | End: 2025-07-18

## 2025-07-17 RX ORDER — ATORVASTATIN CALCIUM 40 MG/1
40 TABLET, FILM COATED ORAL EVERY EVENING
Status: DISCONTINUED | OUTPATIENT
Start: 2025-07-17 | End: 2025-07-18

## 2025-07-17 RX ORDER — ASPIRIN 81 MG/1
81 TABLET ORAL DAILY
Status: DISCONTINUED | OUTPATIENT
Start: 2025-07-18 | End: 2025-07-18

## 2025-07-17 RX ORDER — GABAPENTIN 600 MG/1
600 TABLET ORAL 2 TIMES DAILY
Status: DISCONTINUED | OUTPATIENT
Start: 2025-07-17 | End: 2025-07-18

## 2025-07-17 RX ORDER — BUPROPION HYDROCHLORIDE 150 MG/1
150 TABLET ORAL DAILY
Status: DISCONTINUED | OUTPATIENT
Start: 2025-07-17 | End: 2025-07-18

## 2025-07-17 RX ORDER — CLOTRIMAZOLE 1 %
1 CREAM (GRAM) TOPICAL 2 TIMES DAILY
Status: DISCONTINUED | OUTPATIENT
Start: 2025-07-17 | End: 2025-07-18

## 2025-07-17 RX ORDER — BUPROPION HYDROCHLORIDE 150 MG/1
300 TABLET ORAL DAILY
Status: DISCONTINUED | OUTPATIENT
Start: 2025-07-17 | End: 2025-07-18

## 2025-07-17 RX ORDER — VENLAFAXINE HYDROCHLORIDE 37.5 MG/1
37.5 CAPSULE, EXTENDED RELEASE ORAL
Status: DISCONTINUED | OUTPATIENT
Start: 2025-07-18 | End: 2025-07-18

## 2025-07-17 RX ORDER — PANTOPRAZOLE SODIUM 40 MG/1
40 TABLET, DELAYED RELEASE ORAL
Status: DISCONTINUED | OUTPATIENT
Start: 2025-07-17 | End: 2025-07-18

## 2025-07-17 NOTE — ED QUICK NOTES
Orders for admission, patient is aware of plan and ready to go upstairs. Any questions, please call ED RN Madison at extension [phone].     Patient Covid vaccination status: Fully vaccinated     COVID Test Ordered in ED: None    COVID Suspicion at Admission: N/A    Running Infusions: Medication Infusions[1] None    Mental Status/LOC at time of transport: A/O x3    Other pertinent information:   CIWA score: N/A   NIH score:  N/A             [1]

## 2025-07-17 NOTE — PHYSICAL THERAPY NOTE
PHYSICAL THERAPY EVALUATION - INPATIENT     Room Number: 510/510-A  Evaluation Date: 7/17/2025  Type of Evaluation: Initial   Physician Order: PT Eval and Treat    Presenting Problem: inability to walk, fall     Reason for Therapy: Mobility Dysfunction and Discharge Planning    PHYSICAL THERAPY ASSESSMENT   Patient is a 80 year old female admitted 7/16/2025 for fall, inability to walk.  Prior to admission, patient's baseline is independent with ADLs and functional mobility with RW.  Patient is currently functioning below baseline with bed mobility, transfers, gait, stair negotiation, standing prolonged periods, and performing household tasks.  Patient is requiring contact guard assist as a result of the following impairments: decreased functional strength, impaired standing balance, decreased muscular endurance, and medical status.  Physical Therapy will continue to follow for duration of hospitalization.    Patient will benefit from continued skilled PT Services at discharge to promote prior level of function and safety with additional support and return home with home health PT.    PLAN DURING HOSPITALIZATION  Nursing Mobility Recommendation : 1 Assist  PT Device Recommendation: Rolling walker  PT Treatment Plan: Body mechanics, Bed mobility, Endurance, Energy conservation, Patient education, Gait training, Strengthening, Stair training, Transfer training, Balance training  Rehab Potential : Good  Frequency (Obs): 5x/week     PHYSICAL THERAPY MEDICAL/SOCIAL HISTORY     Problem List  Principal Problem:    Inability to walk  Active Problems:    Weakness generalized    Impaired gait    Injury of head, initial encounter    HOME SITUATION  Type of Home: Independent living facility  Home Layout: One level  Lives With: Alone    Drives: Yes   Patient Regularly Uses: Glasses, Rolling walker     SUBJECTIVE  Agreeable to activity.     PHYSICAL THERAPY EXAMINATION   OBJECTIVE  Precautions: Bed/chair alarm  Fall Risk: Standard  fall risk    WEIGHT BEARING RESTRICTION  none    PAIN ASSESSMENT  Ratin    COGNITION  Overall Cognitive Status:  WFL - within functional limits    RANGE OF MOTION AND STRENGTH ASSESSMENT  Upper extremity ROM and strength are within functional limits.  Lower extremity ROM is within functional limits.  Lower extremity strength is within functional limits.    BALANCE  Static Sitting: Good  Dynamic Sitting: Fair +  Static Standing: Fair  Dynamic Standing: Fair -    ACTIVITY TOLERANCE  BP: 159/74  BP Location: Right arm  BP Method: Automatic  Patient Position: Lying    AM-PAC '6-Clicks' INPATIENT SHORT FORM - BASIC MOBILITY  How much difficulty does the patient currently have...  Patient Difficulty: Turning over in bed (including adjusting bedclothes, sheets and blankets)?: A Little   Patient Difficulty: Sitting down on and standing up from a chair with arms (e.g., wheelchair, bedside commode, etc.): A Little   Patient Difficulty: Moving from lying on back to sitting on the side of the bed?: A Little   How much help from another person does the patient currently need...   Help from Another: Moving to and from a bed to a chair (including a wheelchair)?: A Little   Help from Another: Need to walk in hospital room?: A Little   Help from Another: Climbing 3-5 steps with a railing?: A Little     AM-PAC Score:  Raw Score: 18   Approx Degree of Impairment: 46.58%   Standardized Score (AM-PAC Scale): 43.63   CMS Modifier (G-Code): CK    FUNCTIONAL ABILITY STATUS  Functional Mobility/Gait Assessment  Gait Assistance: Contact guard assist  Distance (ft): 15 x 2  Assistive Device: Rolling walker  Pattern: Shuffle (slow pace, flexed posture)  Supine to Sit: stand-by assist  Sit to Stand: contact guard assist    Exercise/Education Provided:  Education Provided To: Patient     Patient Education: Role of Physical Therapy, Plan of Care, DME Recommendations, Functional Transfer Techniques, Fall Prevention, Posture/Positioning, Energy  Conservation, Proper Body Mechanics, Gait Training     Patient's Response to Education: Verbalized Understanding, Requires Further Education     Skilled Therapy Provided: Pt received resting in bed and agreeable to activity. Demos bed mobility with SBA. STS to/from bed and toilet with CGA. Assisted to the bathroom and with don/doff brief in standing; demos good static and dynamic standing balance. Ambulated in-room distance with RW and CGA, slow but steady gait overall, no LOB. Pt was left sitting in chair with needs within reach, handoff to RN complete.     The patient's Approx Degree of Impairment: 46.58% has been calculated based on documentation in the [facility] '6 clicks' Inpatient Basic Mobility Short Form.  Research supports that patients with this level of impairment may benefit from home with  PT.  Final disposition will be made by interdisciplinary medical team.    Patient End of Session: Up in chair, Needs met, Call light within reach, RN aware of session/findings, All patient questions and concerns addressed, Hospital anti-slip socks, Alarm set    CURRENT GOALS  Goals to be met by: 7/24/25  Patient Goal Patient's self-stated goal is: go home   Goal #1 Patient is able to demonstrate supine - sit EOB @ level: supervision     Goal #1   Current Status    Goal #2 Patient is able to demonstrate transfers Sit to/from Stand at assistance level: supervision with walker - rolling     Goal #2  Current Status    Goal #3 Patient is able to ambulate 150 feet with assist device: walker - rolling at assistance level: supervision   Goal #3   Current Status    Goal #4 Patient will negotiate 3 stairs/one curb w/ assistive device and supervision   Goal #4   Current Status    Goal #5 Patient to demonstrate independence with home activity/exercise instructions provided to patient in preparation for discharge.   Goal #5   Current Status    Goal #6    Goal #6  Current Status      Patient Evaluation Complexity Level:  History  Moderate - 1 or 2 personal factors and/or co-morbidities   Examination of body systems Moderate - addressing a total of 3 or more elements   Clinical Presentation  Moderate - Evolving   Clinical Decision Making  Moderate Complexity     Therapeutic Activity:  23 minutes

## 2025-07-17 NOTE — PLAN OF CARE
Problem: Patient Centered Care  Goal: Patient preferences are identified and integrated in the patient's plan of care  Description: Interventions:  - What would you like us to know as we care for you? From [facility]  - Provide timely, complete, and accurate information to patient/family  - Incorporate patient and family knowledge, values, beliefs, and cultural backgrounds into the planning and delivery of care  - Encourage patient/family to participate in care and decision-making at the level they choose  - Honor patient and family perspectives and choices  Outcome: Progressing     Problem: Patient/Family Goals  Goal: Patient/Family Long Term Goal  Description: Patient's Long Term Goal: Discharge    Interventions:  - Plan of care  - See additional Care Plan goals for specific interventions  Outcome: Progressing  Goal: Patient/Family Short Term Goal  Description: Patient's Short Term Goal: Free of fall    Interventions:   - Monitor vitals signs, high fall precaution in place  - See additional Care Plan goals for specific interventions  Outcome: Progressing     Problem: PAIN - ADULT  Goal: Verbalizes/displays adequate comfort level or patient's stated pain goal  Description: INTERVENTIONS:  - Encourage pt to monitor pain and request assistance  - Assess pain using appropriate pain scale  - Administer analgesics based on type and severity of pain and evaluate response  - Implement non-pharmacological measures as appropriate and evaluate response  - Consider cultural and social influences on pain and pain management  - Manage/alleviate anxiety  - Utilize distraction and/or relaxation techniques  - Monitor for opioid side effects  - Notify MD/LIP if interventions unsuccessful or patient reports new pain  - Anticipate increased pain with activity and pre-medicate as appropriate  Outcome: Progressing     Problem: RISK FOR INFECTION - ADULT  Goal: Absence of fever/infection during anticipated neutropenic  period  Description: INTERVENTIONS  - Monitor WBC  - Administer growth factors as ordered  - Implement neutropenic guidelines  Outcome: Progressing     Problem: SAFETY ADULT - FALL  Goal: Free from fall injury  Description: INTERVENTIONS:  - Assess pt frequently for physical needs  - Identify cognitive and physical deficits and behaviors that affect risk of falls.  - [place] fall precautions as indicated by assessment.  - Educate pt/family on patient safety including physical limitations  - Instruct pt to call for assistance with activity based on assessment  - Modify environment to reduce risk of injury  - Provide assistive devices as appropriate  - Consider OT/PT consult to assist with strengthening/mobility  - Encourage toileting schedule  Outcome: Progressing     Problem: DISCHARGE PLANNING  Goal: Discharge to home or other facility with appropriate resources  Description: INTERVENTIONS:  - Identify barriers to discharge w/pt and caregiver  - Include patient/family/discharge partner in discharge planning  - Arrange for needed discharge resources and transportation as appropriate  - Identify discharge learning needs (meds, wound care, etc)  - Arrange for interpreters to assist at discharge as needed  - Consider post-discharge preferences of patient/family/discharge partner  - Complete POLST form as appropriate  - Assess patient's ability to be responsible for managing their own health  - Refer to Case Management Department for coordinating discharge planning if the patient needs post-hospital services based on physician/LIP order or complex needs related to functional status, cognitive ability or social support system  Outcome: Progressing     Problem: Altered Communication/Language Barrier  Goal: Patient/Family is able to understand and participate in their care  Description: Interventions:  - Assess communication ability and preferred communication style  - Implement communication aides and strategies  - Use  visual cues when possible  - Listen attentively, be patient, do not interrupt  - Minimize distractions  - Allow time for understanding and response  - Establish method for patient to ask for assistance (call light)  - Provide an  as needed  - Communicate barriers and strategies to overcome with those who interact with patient  Outcome: Progressing

## 2025-07-17 NOTE — OCCUPATIONAL THERAPY NOTE
OCCUPATIONAL THERAPY EVALUATION - INPATIENT     Room Number: 510/510-A  Evaluation Date: 2025  Type of Evaluation: Initial  Presenting Problem: fall with walker, difficulty speaking    Physician Order: IP Consult to Occupational Therapy  Reason for Therapy: ADL/IADL Dysfunction and Discharge Planning    OCCUPATIONAL THERAPY ASSESSMENT   Patient is a 80 year old female admitted 2025 for fall.  Prior to admission, patient's baseline is Mod I with ADLs and functional mobility with RW.  Patient is currently functioning near baseline with ADLs and functional mobility.  Patient is requiring contact guard assist as a result of the following impairments: generalized weakness, decreased standing balance. Occupational Therapy will continue to follow for duration of hospitalization.    Patient will benefit from continued skilled OT Services at discharge to promote prior level of function and safety with additional support and return home with home health OT.    PLAN DURING HOSPITALIZATION  OT Device Recommendations: Shower chair  OT Treatment Plan: Balance activities, Energy conservation/work simplification techniques, ADL training, Functional transfer training, Endurance training, Equipment eval/education, Compensatory technique education     OCCUPATIONAL THERAPY MEDICAL/SOCIAL HISTORY   Problem List  Principal Problem:    Inability to walk  Active Problems:    Weakness generalized    Impaired gait    Injury of head, initial encounter    HOME SITUATION  Type of Home: Independent living facility  Home Layout: One level  Lives With: Alone  Drives: Yes  Patient Regularly Uses: Glasses; Rolling walker    SUBJECTIVE  \"I was having trouble talking\"     OCCUPATIONAL THERAPY EXAMINATION      OBJECTIVE  Precautions: Bed/chair alarm  Fall Risk: Standard fall risk      PAIN ASSESSMENT  Ratin      ACTIVITY TOLERANCE  Pulse: 68                      O2 SATURATIONS  Oxygen Therapy  SPO2% Ambulation on Room Air:  97    COGNITION  WF    Communication: making needs known at pace wfl, pt reported talking is better but still mildly difficult to think of words     RANGE OF MOTION   Upper extremity ROM is within functional limits     STRENGTH ASSESSMENT  Upper extremity strength is within functional limits     ACTIVITIES OF DAILY LIVING ASSESSMENT  AM-PAC ‘6-Clicks’ Inpatient Daily Activity Short Form  How much help from another person does the patient currently need…  -   Putting on and taking off regular lower body clothing?: A Lot  -   Bathing (including washing, rinsing, drying)?: A Little  -   Toileting, which includes using toilet, bedpan or urinal? : A Little  -   Putting on and taking off regular upper body clothing?: A Little  -   Taking care of personal grooming such as brushing teeth?: A Little  -   Eating meals?: None    AM-PAC Score:  Score: 18  Approx Degree of Impairment: 46.65%  Standardized Score (AM-PAC Scale): 38.66  CMS Modifier (G-Code): CK    FUNCTIONAL TRANSFER ASSESSMENT  Sit to Stand: Edge of Bed  Edge of Bed: Contact Guard Assist  Toilet Transfer: Contact Guard Assist    BED MOBILITY  Supine to Sit : Contact Guard Assist    FUNCTIONAL ADL ASSESSMENT  Grooming Standing: Contact Guard Assist  LB Dressing Seated: Minimal Assist  Toileting Seated: Contact Guard Assist    Skilled Therapy Provided: RN cleared pt for participation in occupational therapy session. Upon arrival, pt was supine in bed and agreeable to activity. No family was present during session. Pt benefited from additional time, verbal cues, RW to maximize participation.    To assess pt's safe participation during daily tasks while also providing intermittent education to maximize safety and participation, occupational therapist facilitated the following: bed mobility, toilet transfer, toileting including kwabena care and d/d briefs, grooming in standing at the sink, functional mobility in room with RW. No loss of balance. Vitals stable with  activity.     EDUCATION PROVIDED  Patient Education : Role of Occupational Therapy; Plan of Care  Patient's Response to Education: Verbalized Understanding; Returned Demonstration    The patient's Approx Degree of Impairment: 46.65% has been calculated based on documentation in the [facility] '6 clicks' Inpatient Daily Activity Short Form.  Research supports that patients with this level of impairment may benefit from HHOT.  Final disposition will be made by interdisciplinary medical team.     Patient End of Session: Up in chair, Needs met, Call light within reach, RN aware of session/findings, Alarm set    OT Goals  Patients self stated goal is: home     Patient will complete functional transfer with Mod I  Comment:     Patient will complete toileting with Mod I  Comment:     Patient will tolerate standing for 4 minutes in prep for adls with Mod I   Comment:    Patient will complete item retrieval with Mod I  Comment:          Goals  on: 25  Frequency: 3-5x/w    Patient Evaluation Complexity Level:   Occupational Profile/Medical History LOW - Brief history including review of medical or therapy records    Specific performance deficits impacting engagement in ADL/IADL LOW  1 - 3 performance deficits    Client Assessment/Performance Deficits LOW - No comorbidities nor modifications of tasks    Clinical Decision Making LOW - Analysis of occupational profile, problem-focused assessments, limited treatment options    Overall Complexity LOW     OT Session Time: 25 minutes  Self-Care Home Management: 25 minutes

## 2025-07-17 NOTE — H&P
[facility]    PATIENT'S NAME: RUZICKA, SUSAN P   ATTENDING PHYSICIAN: Sudeep Wells MD   PATIENT ACCOUNT#:   [id number]    LOCATION:  [address]  MEDICAL RECORD #:   [id number]       YOB: 1945  ADMISSION DATE:       07/16/2025    HISTORY AND PHYSICAL EXAMINATION    CHIEF COMPLAINT:  Gait imbalance.    HISTORY OF PRESENT ILLNESS:  The patient is a 80-year-old  female who was using her rolling walker today at the nursing facility and she tried to maneuver around an object, leaned too much to the side and lost her balance falling on the floor.  She did not hit her buttocks, but she thinks she hit her head against the wall.  She was helped with the nursing staff to get up and sit on a chair and after that she could not get up on her own.  Brought into the emergency department for evaluation.  CBC and chemistry unremarkable.  GFR is 36.  CT scan of the brain showed no acute intracranial abnormalities.  EKG showed normal sinus rhythm with left bundle branch block which is chronic.  Patient could not ambulate on her own even with a help of a walker.  She will be admitted to the hospital for further management.    PAST MEDICAL HISTORY:  Coronary artery disease status post RCA PCI stent with mild nonischemic cardiomyopathy, ejection fraction 45% to 50%.  Recent angiogram showed patent RCA stent.  Hypertension, hyperlipidemia, large hiatal hernia, chronic kidney disease stage 3, anemia of chronic kidney disease, generalized osteoarthritis, osteoporosis, peripheral neuropathy, restless legs syndrome, and chronic back pain.    PAST SURGICAL HISTORY:  Multiple lumbar laminectomies and fusions, spinal cord stimulator, 2 C-sections, hemicolectomy for diverticulitis.    MEDICATIONS:  Please see medication reconciliation list.    ALLERGIES:  No known drug allergies.    FAMILY HISTORY:  Positive for hypertension.    SOCIAL HISTORY:  No tobacco, alcohol, or drug use.  Lives in a nursing  facility.  Requires some assistance in her basic activities of daily living.  Uses a rolling walker.    REVIEW OF SYSTEMS:  Patient said initially the fall was mechanical.  She lost her balance when she was trying to maneuver around an object when her walker got stuck.  After falling, she denies any back pain.  No lightheadedness.  No chest pain.  She had some discomfort around her left knee, but she was able to bear weight on it. Other 12-point review of systems is negative.       PHYSICAL EXAMINATION:    GENERAL:  Alert and oriented to time, place, and person.  No acute distress.  VITAL SIGNS:  Temperature 98.0, pulse 81, respiratory rate 20, blood pressure 164/87, pulse ox 98% on room air.    HEENT:  Atraumatic.  Oropharynx clear.  Dry mucous membranes.  Ears, nose normal.  Eyes:  Anicteric sclerae.  NECK:  Supple.  No lymphadenopathy.  Trachea midline.   LUNGS:  Clear to auscultation bilaterally.  Normal respiratory effort.    HEART:  Regular rate, rhythm.  S1 and S2 auscultated.  No murmur.   ABDOMEN:  Soft, nondistended.  No tenderness.  Positive bowel sounds.    EXTREMITIES:  No peripheral edema, clubbing, or cyanosis.  NEUROLOGIC:  Motor and sensory intact.     ASSESSMENT:    1.   Gait imbalance secondary to musculoskeletal deconditioning.  No new acute pain or low back pain.  2.   Essential hypertension.  3.   Mild nonischemic cardiomyopathy.  4.   Chronic kidney disease stage 3.    PLAN:  Patient will be admitted to general medical floor for observation.  Obtain physical and occupational therapy.  Fall precautions.  Further recommendations to follow.     Dictated By Tarsha Yanes MD  d: 07/16/2025 19:08:37  t: 07/16/2025 19:19:00  Job [id number]  /

## 2025-07-17 NOTE — PROGRESS NOTES
[facility]  part of [facility]    Progress Note    Susan P Ruzicka Patient Status:  Observation    1945 MRN [id number]   Location [facility]5W Attending Jackson Adrian MD   Hosp Day # 0 PCP ROLY KAUR       Subjective:     Pt feels well now and walked today.  Pt says she has had two falls and both times her legs felt like \"jello\" and her arms and legs were shaking.   Pt denies LOC.   No hx of seizures.     Pt says she then wakes up the next day and is fine.       Objective:   Blood pressure 117/66, pulse 73, temperature 98.3 °F (36.8 °C), temperature source Oral, resp. rate 18, height 61\", weight 150 lb (68 kg), SpO2 97%.    Gen:   NAD.  A and O x 3  CV:   RRR, no m/g/r  Pulm:   CTA bilat  Abd:   +bs, soft, NT, ND  LE:   No c/c/e  Neuro:   nonfocal    Results:     Lab Results   Component Value Date    WBC 6.0 2025    HGB 9.1 (L) 2025    HCT 28.4 (L) 2025    .0 2025    CREATSERUM 1.39 (H) 2025    BUN 17 2025     2025    K 4.6 2025     2025    CO2 27.0 2025     (H) 2025    CA 8.4 (L) 2025    ALB 3.6 2025    ALKPHO 79 2025    BILT 0.6 2025    TP 5.4 (L) 2025    AST 26 2025    ALT 8 (L) 2025    PTT 61.6 (H) 2025    TSH 3.320 2023    LIP 30 2025    DDIMER 1.02 (H) 2023    MG 1.8 06/10/2025    PHOS 3.0 2025    TROP <0.045 10/01/2020       CT BRAIN OR HEAD (CPT=70450)  Result Date: 2025  CONCLUSION: 1. No acute intracranial process. 2. There are mild microvascular white matter ischemic changes, likely related to long-standing hypertension and/or diabetes. 3. Calcific atherosclerosis within the anterior and posterior circulations. Electronically Verified and Signed by Attending Radiologist: Jordi Tsai MD 2025 5:28 PM Workstation: [id number]    EKG 12 Lead  Result Date: 2025  Normal sinus rhythm Left  bundle branch block Abnormal ECG When compared with ECG of 03-JUN-2025 22:22, T wave inversion no longer evident in Inferior leads T wave inversion less evident in Anterior-lateral leads QT has shortened Confirmed by REBA JULIAN ([id number]) on 7/16/2025 5:15:09 PM      Assessment and Plan:     Gait imbalance secondary to musculoskeletal deconditioning.  Pt says she has had two falls and both times her legs felt like \"jello\" and her arms and legs were shaking.   Pt denies LOC.   No hx of seizures.     Pt says she then wakes up the next day and is fine.   - neurology consult  - PT/OT  - possibly too many BP meds  - hold BP meds and monitor BP    Hx of HTN  BP on low side.  - possibly too many BP meds  - hold BP meds and monitor BP    Hx of mild nonischemic cardiomyopathy  - hold coreg, entresto, aldactone for now due to low BP    Hx of CKD3    Hx of RLS    Hx of fibromyalgia     dvt proph:    heparin      Code status:    Full       MDM:    High Jackson Howell MD  7/17/2025

## 2025-07-17 NOTE — CM/SW NOTE
Linda is current with [facility].  New F2F and referral sent in Aidin.    Alina Washburn MBA BSN RN CRRCASSANDRA LOVELL  RN Case Manager PMU

## 2025-07-18 ENCOUNTER — APPOINTMENT (OUTPATIENT)
Dept: ULTRASOUND IMAGING | Facility: HOSPITAL | Age: 80
End: 2025-07-18
Attending: Other
Payer: MEDICARE

## 2025-07-18 VITALS
HEART RATE: 70 BPM | BODY MASS INDEX: 28.32 KG/M2 | WEIGHT: 150 LBS | HEIGHT: 61 IN | RESPIRATION RATE: 17 BRPM | OXYGEN SATURATION: 97 % | TEMPERATURE: 98 F | SYSTOLIC BLOOD PRESSURE: 128 MMHG | DIASTOLIC BLOOD PRESSURE: 67 MMHG

## 2025-07-18 PROCEDURE — 99223 1ST HOSP IP/OBS HIGH 75: CPT | Performed by: OTHER

## 2025-07-18 PROCEDURE — 93880 EXTRACRANIAL BILAT STUDY: CPT | Performed by: OTHER

## 2025-07-18 PROCEDURE — 99239 HOSP IP/OBS DSCHRG MGMT >30: CPT | Performed by: HOSPITALIST

## 2025-07-18 NOTE — PLAN OF CARE
Discharge education provided, and IV removed at bedside. Patient discharged via superior  Problem: Patient Centered Care  Goal: Patient preferences are identified and integrated in the patient's plan of care  Description: Interventions:  - What would you like us to know as we care for you? From [facility]  - Provide timely, complete, and accurate information to patient/family  - Incorporate patient and family knowledge, values, beliefs, and cultural backgrounds into the planning and delivery of care  - Encourage patient/family to participate in care and decision-making at the level they choose  - Honor patient and family perspectives and choices  Outcome: Progressing     Problem: Patient/Family Goals  Goal: Patient/Family Long Term Goal  Description: Patient's Long Term Goal: Discharge    Interventions:  - Follow MD orders  - See additional Care Plan goals for specific interventions  Outcome: Progressing  Goal: Patient/Family Short Term Goal  Description: Patient's Short Term Goal: Discharge    Interventions:   - Follow MD orders  - See additional Care Plan goals for specific interventions  Outcome: Progressing     Problem: PAIN - ADULT  Goal: Verbalizes/displays adequate comfort level or patient's stated pain goal  Description: INTERVENTIONS:  - Encourage pt to monitor pain and request assistance  - Assess pain using appropriate pain scale  - Administer analgesics based on type and severity of pain and evaluate response  - Implement non-pharmacological measures as appropriate and evaluate response  - Consider cultural and social influences on pain and pain management  - Manage/alleviate anxiety  - Utilize distraction and/or relaxation techniques  - Monitor for opioid side effects  - Notify MD/LIP if interventions unsuccessful or patient reports new pain  - Anticipate increased pain with activity and pre-medicate as appropriate  Outcome: Progressing     Problem: RISK FOR INFECTION - ADULT  Goal:  Absence of fever/infection during anticipated neutropenic period  Description: INTERVENTIONS  - Monitor WBC  - Administer growth factors as ordered  - Implement neutropenic guidelines  Outcome: Progressing     Problem: SAFETY ADULT - FALL  Goal: Free from fall injury  Description: INTERVENTIONS:  - Assess pt frequently for physical needs  - Identify cognitive and physical deficits and behaviors that affect risk of falls.  - [place] fall precautions as indicated by assessment.  - Educate pt/family on patient safety including physical limitations  - Instruct pt to call for assistance with activity based on assessment  - Modify environment to reduce risk of injury  - Provide assistive devices as appropriate  - Consider OT/PT consult to assist with strengthening/mobility  - Encourage toileting schedule  Outcome: Progressing     Problem: DISCHARGE PLANNING  Goal: Discharge to home or other facility with appropriate resources  Description: INTERVENTIONS:  - Identify barriers to discharge w/pt and caregiver  - Include patient/family/discharge partner in discharge planning  - Arrange for needed discharge resources and transportation as appropriate  - Identify discharge learning needs (meds, wound care, etc)  - Arrange for interpreters to assist at discharge as needed  - Consider post-discharge preferences of patient/family/discharge partner  - Complete POLST form as appropriate  - Assess patient's ability to be responsible for managing their own health  - Refer to Case Management Department for coordinating discharge planning if the patient needs post-hospital services based on physician/LIP order or complex needs related to functional status, cognitive ability or social support system  Outcome: Progressing     Problem: Altered Communication/Language Barrier  Goal: Patient/Family is able to understand and participate in their care  Description: Interventions:  - Assess communication ability and preferred communication  style  - Implement communication aides and strategies  - Use visual cues when possible  - Listen attentively, be patient, do not interrupt  - Minimize distractions  - Allow time for understanding and response  - Establish method for patient to ask for assistance (call light)  - Provide an  as needed  - Communicate barriers and strategies to overcome with those who interact with patient  Outcome: Progressing

## 2025-07-18 NOTE — CONSULTS
[facility] NEUROSCIENCES [address], [address]  [facility] [address]  [phone]            Susan P Ruzicka Patient Status:  Observation    1945 MRN [id number]   Location [facility]5W Attending Jackson Adrian MD   Hosp Day # 0 PCP ROLY KAUR     Date of Admission:  2025  Date of Consult:  2025  Reason for Consultation:   Frequent falls    History of Present Illness:   Patient is a 80 year old female who was admitted to the hospital for Inability to walk:  The patient presented in May 2025 outside neurologist with a persistent headache localized to the back of the neck, which had been worsening over the last few months. She has been under the care of a pain specialist and uses a spinal stimulator and pain pump. Her medication regimen includes gabapentin and Cymbalta for pain management, with Requip recently added for restless leg syndrome. The patient developed a number of years right hand tremors, particularly noticeable during fine motor activities such as sewing.    In 2025, the patient experienced a fall while lifting her walker over a threshold, striking the right side of her head. Following this incident, she reported a right-sided headache behind her right ear and felt shaky. She also experienced leg shaking, hand tremors, slurred speech, and difficulty finding words.     She is unable to undergo MRI scans due to her spinal stimulator.   Her balance been poor for a number of months if not years.    Past Medical History  Past Medical History[1]    Past Surgical History  Past Surgical History[2]    Family History  Family History[3]    Social History  Pediatric History   Patient Parents    Not on file     Other Topics Concern    Not on file   Social History Narrative    Not on file           Current Medications:  Current Hospital Medications[4]  Prescriptions Prior to Admission[5]    Allergies  Allergies[6]    Review of Systems:   As in HPI,  the rest of the 14 system review was done and was negative    Physical Exam:     Vitals:    07/17/25 1455 07/17/25 2136 07/18/25 0622 07/18/25 0818   BP: 117/66 119/66 121/63 118/79   Pulse: 73 69 63 65   Resp: 18 18 18 18   Temp: 98.3 °F (36.8 °C) 98.5 °F (36.9 °C) 97.8 °F (36.6 °C) 98.2 °F (36.8 °C)   TempSrc: Oral Oral Oral Oral   SpO2: 97% 95% 93% 93%   Weight:       Height:           General: No apparent distress, well nourished, well groomed.  Head- Normocephalic, atraumatic  Eyes- No redness or swelling  ENT- Hearing intake, smell preserved, normal glutition  Neck- No masses or adenopathy  Cv: pulses were palpable and normal, no cyanosis or edema     Neurological:     Mental Status- Alert and oriented x3.  Normal attention span and concentration  Thought process intact  Memory intact- recent and remote  Mood intact  Fund of knowledge appropriate for education and age    Language intact including: comprehension, naming, repetition, vocabulary    Cranial Nerves:    III, IV, VI- EOM intact, BERNARDA  V. Facial sensation intact  VII. Face symmetric, no facial weakness  VIII. Hearing intact.  IX. Pallet elevates symmetrically.  XI. Shoulder shrug is intact  XII. Tongue is midline    Motor Exam:  Muscle tone normal  No atrophy or fasciculations  Strength- upper extremities 5/5 proximally and distally                  - lower  extremities 5/5 proximally and distally    Sensory Exam:  Light touch sensation- intact in all 4 extremities    Coordination:  Finger to nose intact  Rapid alternating movements intact      Results:     Laboratory Data:  Lab Results   Component Value Date    WBC 6.0 07/17/2025    HGB 9.1 (L) 07/17/2025    HCT 28.4 (L) 07/17/2025    .0 07/17/2025    CREATSERUM 1.39 (H) 07/17/2025    BUN 17 07/17/2025     07/17/2025    K 4.6 07/17/2025     07/17/2025    CO2 27.0 07/17/2025     (H) 07/17/2025    CA 8.4 (L) 07/17/2025    ALB 3.6 06/09/2025    ALKPHO 79 06/03/2025    TP 5.4  (L) 06/03/2025    AST 26 06/03/2025    ALT 8 (L) 06/03/2025    PTT 61.6 (H) 06/04/2025    TSH 3.320 05/28/2023    LIP 30 02/13/2025    DDIMER 1.02 (H) 01/30/2023    MG 1.8 06/10/2025    PHOS 3.0 06/09/2025    TROP <0.045 10/01/2020         Imaging:    CT BRAIN OR HEAD (CPT=70450)  Result Date: 7/16/2025  CONCLUSION: 1. No acute intracranial process. 2. There are mild microvascular white matter ischemic changes, likely related to long-standing hypertension and/or diabetes. 3. Calcific atherosclerosis within the anterior and posterior circulations. Electronically Verified and Signed by Attending Radiologist: Jordi Tsai MD 7/16/2025 5:28 PM Workstation: [facility] [employer]    EKG 12 Lead  Result Date: 7/16/2025  Normal sinus rhythm Left bundle branch block Abnormal ECG When compared with ECG of 03-JUN-2025 22:22, T wave inversion no longer evident in Inferior leads T wave inversion less evident in Anterior-lateral leads QT has shortened Confirmed by REBA JULIAN ([id number]) on 7/16/2025 5:15:09 PM        Impression:     Inability to walk  With significant improvement next day.  CT of the head was not revealing.  Unfortunately MRI of the brain is not possible.  Patient is already taking aspirin and statin, her LDL is well-controlled.  Well possibility of TIA could be entertained more likely this ongoing balance problem that fluctuates resulting in falls.  Most likely multifactorial, neuropathy, radiculopathy, deconditioning.  Emphasized importance of exercise regiment.  The pancreatitis will be done to finish the workup for the TIA.  Echocardiogram was done recently that was not revealing.  Follow-up with her established neurologist on outpatient basis.      Otherwise no other neurologic recommendations, patient can be discharged from neurology standpoint with therapy recommendations.    Thank you for allowing me to participate in the care of your patient.    Gilbert Wren MD  7/18/2025           [1]   Past Medical  History:   Congestive heart disease (HCC)    Diverticulitis    Esophageal reflux    Fibromyalgia    High blood pressure    Osteoarthrosis, unspecified whether generalized or localized, unspecified site    RLS (restless legs syndrome)    Unspecified essential hypertension   [2]   Past Surgical History:  Procedure Laterality Date    Cath drug eluting stent      Excis lumbar disk,one level      Removal gallbladder     [3] No family history on file.  [4]   Current Facility-Administered Medications   Medication Dose Route Frequency    venlafaxine ER (Effexor-XR) 24 hr cap 37.5 mg  37.5 mg Oral Daily with breakfast    gabapentin (Neurontin) tab 600 mg  600 mg Oral BID    pantoprazole (Protonix) DR tab 40 mg  40 mg Oral BID AC    rOPINIRole (Requip) tab 1 mg  1 mg Oral TID    buPROPion ER (Wellbutrin XL) 24 hr tab 150 mg  150 mg Oral Daily    buPROPion ER (Wellbutrin XL) 24 hr tab 300 mg  300 mg Oral Daily    atorvastatin (Lipitor) tab 40 mg  40 mg Oral QPM    aspirin DR tab 81 mg  81 mg Oral Daily    clotrimazole (Lotrimin) 1 % cream 1 Application  1 Application Topical BID    heparin (Porcine) 5000 UNIT/ML injection 5,000 Units  5,000 Units Subcutaneous 2 times per day    acetaminophen (Tylenol Extra Strength) tab 500 mg  500 mg Oral Q4H PRN    ondansetron (Zofran) 4 MG/2ML injection 4 mg  4 mg Intravenous Q6H PRN    metoclopramide (Reglan) 5 mg/mL injection 5 mg  5 mg Intravenous Q8H PRN   [5]   Medications Prior to Admission   Medication Sig    Alpha-Lipoic Acid 600 MG Oral Cap Take 1 capsule by mouth every 3 (three) days.    lidocaine 4 % External Patch Apply one patch topically to lower back once daily. Keep patch on for 12 hours, and remove and keep off for 12 hours per schedule.    spironolactone 25 MG Oral Tab Take 0.5 tablets (12.5 mg total) by mouth in the morning.    clotrimazole 1 % External Cream Apply 1 Application topically 2 (two) times daily. (Patient taking differently: Apply 1 Application topically 2  (two) times daily as needed.)    sacubitril-valsartan 24-26 MG Oral Tab Take 1 tablet by mouth 2 (two) times daily.    traMADol 50 MG Oral Tab Take 1 tablet (50 mg total) by mouth every 6 (six) hours as needed.    aspirin 81 MG Oral Tab EC Take 1 tablet (81 mg total) by mouth daily.    carvedilol 3.125 MG Oral Tab Take 1 tablet (3.125 mg total) by mouth 2 (two) times daily with meals. Hold for SBP < 110.    citalopram 40 MG Oral Tab Take 1 tablet (40 mg total) by mouth daily.    buPROPion  MG Oral Tablet 24 Hr Take 1 tablet (150 mg total) by mouth daily.    buPROPion  MG Oral Tablet 24 Hr Take 1 tablet (300 mg total) by mouth daily.    venlafaxine ER 37.5 MG Oral Capsule SR 24 Hr Take 1 capsule (37.5 mg total) by mouth daily with breakfast.    dapagliflozin (FARXIGA) 10 MG Oral Tab Take 1 tablet (10 mg total) by mouth in the morning.    gabapentin 600 MG Oral Tab Take 1 tablet (600 mg total) by mouth 2 (two) times daily.    rOPINIRole 1 MG Oral Tab Take 1 tablet (1 mg total) by mouth 3 (three) times daily.    pantoprazole 40 MG Oral Tab EC Take 1 tablet (40 mg total) by mouth 2 (two) times daily before meals.    atorvastatin 40 MG Oral Tab Take 1 tablet (40 mg total) by mouth every evening.    acetaminophen 500 MG Oral Tab Take 2 tablets (1,000 mg total) by mouth every 6 (six) hours as needed for Pain.   [6]   Allergies  Allergen Reactions    Bumetanide SWELLING    Hydromorphone OTHER (SEE COMMENTS)     States, \"doesn't work.\"  Not true allergy

## 2025-07-18 NOTE — CM/SW NOTE
07/18/25 1557   Discharge disposition   Expected discharge disposition Home-Health   Post Acute Care Provider Home   Additional Home Care/Hospice Provider Other  (Sammie HOLLIDAY)   Discharge transportation [facility]     Sammie  notified of dc today.  Patient notified of $40.00 transport cost.    Alina Washburn MBA BSN RN CRRCASSANDRA LOVELL  RN Case Manager PMU    I was present for and supervised the key and critical aspects of the procedures performed during the care of the patient.

## 2025-07-18 NOTE — DISCHARGE SUMMARY
[facility]  part of [facility]    Discharge Summary    Susan P Ruzicka Patient Status:  Observation    1945 MRN [id number]   Location [facility]5W Attending Jackson Adrian MD   Hosp Day # 0 PCP ROLY KAUR     Date of Admission: 2025 Disposition:    Home with [facility]    Date of Discharge:   2025     Admitting Diagnosis:   Weakness generalized [R53.1]  Inability to walk [R26.2]  Injury of head, initial encounter [S09.90XA]    Hospital Discharge Diagnoses:    Gait imbalance secondary   Musculoskeletal deconditioning.  Hx of HTN  Low blood pressure   Hx of mild nonischemic cardiomyopathy  Hx of CKD3  Hx of RLS  Hx of fibromyalgia        Problem List:     Problem List[1]    Physical Exam:      /79 (BP Location: Right arm)   Pulse 65   Temp 98.2 °F (36.8 °C) (Oral)   Resp 18   Ht 61\"   Wt 150 lb (68 kg)   SpO2 93%   BMI 28.34 kg/m²     Gen:  NAD.  A and O x  3  CV:   RRR.  No m/g/r  Pulm:   CTA bilat  Abd:   +bs, soft, NT, ND  LE:  No c/c/e  Neuro:  nonfocal      Reason for Admission:   Gait imbalance.     HISTORY OF PRESENT ILLNESS:  The patient is a 80-year-old  female who was using her rolling walker today at the nursing facility and she tried to maneuver around an object, leaned too much to the side and lost her balance falling on the floor.  She did not hit her buttocks, but she thinks she hit her head against the wall.  She was helped with the nursing staff to get up and sit on a chair and after that she could not get up on her own.  Brought into the emergency department for evaluation.  CBC and chemistry unremarkable.  GFR is 36.  CT scan of the brain showed no acute intracranial abnormalities.  EKG showed normal sinus rhythm with left bundle branch block which is chronic.  Patient could not ambulate on her own even with a help of a walker.  She will be admitted to the hospital for further management.    Hospital Course:     Gait imbalance  secondary to musculoskeletal deconditioning.  Also possibly too many BP meds.  BP ok desite holding meds.  Pt says she has had two falls and both times her legs felt like \"jello\" and her arms and legs were shaking.   Pt denies LOC.   No hx of seizures.     Pt says she then wakes up the next day and is fine.   - neurology was on consult  - PT/OT  - home with [facility]  PCP and cardiology f/u.  Hold entresto and aldactone.  Cont coreg.    D/w pt.     Hx of HTN  BP on low side.  - possibly too many BP meds  Hold entresto and aldactone.  Cont coreg.    D/w pt.  PCP and cards f/u.     Hx of mild nonischemic cardiomyopathy  Meds as above.  Cards f/u.     Hx of CKD3     Hx of RLS     Hx of fibromyalgia     dvt proph:    heparin       Code status:    Full       Consultations:   Neurology    Discharge Condition:  Good    Discharge Medications:      Discharge Medications        CONTINUE taking these medications        Instructions Prescription details   acetaminophen 500 MG Tabs  Commonly known as: Tylenol Extra Strength      Take 2 tablets (1,000 mg total) by mouth every 6 (six) hours as needed for Pain.   Refills: 0     Alpha-Lipoic Acid 600 MG Caps      Take 1 capsule by mouth every 3 (three) days.   Refills: 0     aspirin 81 MG Tbec      Take 1 tablet (81 mg total) by mouth daily.   Refills: 0     atorvastatin 40 MG Tabs  Commonly known as: Lipitor      Take 1 tablet (40 mg total) by mouth every evening.   Refills: 0     buPROPion  MG Tb24  Commonly known as: Wellbutrin XL      Take 1 tablet (150 mg total) by mouth daily.   Refills: 0     buPROPion  MG Tb24  Commonly known as: Wellbutrin XL      Take 1 tablet (300 mg total) by mouth daily.   Refills: 0     carvedilol 3.125 MG Tabs  Commonly known as: Coreg      Take 1 tablet (3.125 mg total) by mouth 2 (two) times daily with meals. Hold for SBP < 110.   Refills: 0     citalopram 40 MG Tabs  Commonly known as: CeleXA      Take 1 tablet (40 mg total) by mouth daily.    Refills: 0     clotrimazole 1 % Crea  Commonly known as: Lotrimin      Apply 1 Application topically 2 (two) times daily.   Quantity: 12 g  Refills: 0     Farxiga 10 MG Tabs  Generic drug: dapagliflozin      Take 1 tablet (10 mg total) by mouth in the morning.   Refills: 0     gabapentin 600 MG Tabs  Commonly known as: Neurontin      Take 1 tablet (600 mg total) by mouth 2 (two) times daily.   Refills: 0     lidocaine 4 % Ptch  Commonly known as: LIDODERM      Apply one patch topically to lower back once daily. Keep patch on for 12 hours, and remove and keep off for 12 hours per schedule.   Refills: 0     pantoprazole 40 MG Tbec  Commonly known as: Protonix      Take 1 tablet (40 mg total) by mouth 2 (two) times daily before meals.   Refills: 0     rOPINIRole 1 MG Tabs  Commonly known as: Requip      Take 1 tablet (1 mg total) by mouth 3 (three) times daily.   Refills: 0     traMADol 50 MG Tabs  Commonly known as: Ultram      Take 1 tablet (50 mg total) by mouth every 6 (six) hours as needed.   Quantity: 15 tablet  Refills: 0     venlafaxine ER 37.5 MG Cp24  Commonly known as: Effexor-XR      Take 1 capsule (37.5 mg total) by mouth daily with breakfast.   Quantity: 30 capsule  Refills: 0            STOP taking these medications      sacubitril-valsartan 24-26 MG Tabs  Commonly known as: Entresto        spironolactone 25 MG Tabs  Commonly known as: Aldactone                 Follow up Visits:     Follow-up Information       Whit Waters Schedule an appointment as soon as possible for a visit in 1 week(s).    Specialty: Internal Medicine  Contact information:  [address]  [address]  [facility] [address] [phone]               Gilbert Wren MD Follow up.    Specialty: NEUROLOGY  Why: As needed for neurology.  Contact information:  [address]  [facility] [address] [phone]               Juan José Singh DO. Schedule an appointment as soon as possible for a visit in 1 week(s).     Specialties: Cardiovascular Diseases, CARDIOLOGY  Contact information:  [address]  [facility] [address]  [phone]                             Hospital Discharge Diagnoses: Gait imbalance    Lace+ Score: 72  59-90 High Risk  29-58 Medium Risk  0-28   Low Risk.    TCM Follow-Up Recommendation:  LACE > 58: High Risk of readmission after discharge from the hospital.    >35 minutes spent preparing this discharge.    Jackson Howell MD  7/18/2025  3:08 PM        [1]   Patient Active Problem List  Diagnosis    Hemifacial spasm    Chronic low back pain    Leukocytosis    Hyponatremia    Metabolic acidosis    Accelerated hypertension    NSTEMI (non-ST elevated myocardial infarction) (HCC)    Delirium    Encephalopathy    New onset left bundle branch block (LBBB)    Acute chest pain    Acute heart failure, unspecified heart failure type (HCC)    Acute hypoxemic respiratory failure (HCC)    Large hiatal hernia    Acute on chronic congestive heart failure, unspecified heart failure type (HCC)    FARAZ (acute kidney injury)    Hypokalemia    Orthostatic hypotension    Pulmonary edema (HCC)    Acute on chronic diastolic (congestive) heart failure (HCC)    Dyspnea    Acute hypoxic respiratory failure (HCC)    Diarrhea of presumed infectious origin    Altered mental status, unspecified altered mental status type    Nausea vomiting and diarrhea    Moderate episode of recurrent major depressive disorder (HCC)    Weakness generalized    Frequent falls    Difficult intravenous access - Recommend US Guided PIVs    Impaired gait    Inability to walk    Injury of head, initial encounter

## 2025-08-21 ENCOUNTER — HOSPITAL ENCOUNTER (EMERGENCY)
Facility: HOSPITAL | Age: 80
Discharge: HOME | End: 2025-08-21
Attending: EMERGENCY MEDICINE

## 2025-08-21 VITALS
RESPIRATION RATE: 10 BRPM | OXYGEN SATURATION: 99 % | HEIGHT: 61 IN | TEMPERATURE: 99 F | WEIGHT: 150 LBS | SYSTOLIC BLOOD PRESSURE: 172 MMHG | DIASTOLIC BLOOD PRESSURE: 87 MMHG | HEART RATE: 67 BPM | BODY MASS INDEX: 28.32 KG/M2

## 2025-08-21 DIAGNOSIS — R19.7 DIARRHEA, UNSPECIFIED TYPE: Primary | ICD-10-CM

## 2025-08-21 DIAGNOSIS — N30.00 ACUTE CYSTITIS WITHOUT HEMATURIA: ICD-10-CM

## 2025-08-21 LAB
ALBUMIN SERPL-MCNC: 4.5 G/DL (ref 3.2–4.8)
ALBUMIN/GLOB SERPL: 1.7 (ref 1–2)
ALP LIVER SERPL-CCNC: 112 U/L (ref 55–142)
ALT SERPL-CCNC: <7 U/L (ref 10–49)
ANION GAP SERPL CALC-SCNC: 13 MMOL/L (ref 0–18)
AST SERPL-CCNC: 14 U/L (ref ?–34)
BASOPHILS # BLD AUTO: 0.05 X10(3) UL (ref 0–0.2)
BASOPHILS NFR BLD AUTO: 0.6 %
BILIRUB SERPL-MCNC: 0.6 MG/DL (ref 0.2–1.1)
BILIRUB UR QL: NEGATIVE
BUN BLD-MCNC: 15 MG/DL (ref 9–23)
BUN/CREAT SERPL: 10.7 (ref 10–20)
CALCIUM BLD-MCNC: 9.3 MG/DL (ref 8.7–10.4)
CHLORIDE SERPL-SCNC: 104 MMOL/L (ref 98–112)
CO2 SERPL-SCNC: 18 MMOL/L (ref 21–32)
COLOR UR: YELLOW
CREAT BLD-MCNC: 1.4 MG/DL (ref 0.55–1.02)
DEPRECATED RDW RBC AUTO: 41.7 FL (ref 35.1–46.3)
EGFRCR SERPLBLD CKD-EPI 2021: 39 ML/MIN/1.73M2 (ref 60–?)
EOSINOPHIL # BLD AUTO: 0.08 X10(3) UL (ref 0–0.7)
EOSINOPHIL NFR BLD AUTO: 1 %
ERYTHROCYTE [DISTWIDTH] IN BLOOD BY AUTOMATED COUNT: 13 % (ref 11–15)
GLOBULIN PLAS-MCNC: 2.7 G/DL (ref 2–3.5)
GLUCOSE BLD-MCNC: 89 MG/DL (ref 70–99)
GLUCOSE UR-MCNC: NORMAL MG/DL
HCT VFR BLD AUTO: 35.3 % (ref 35–48)
HGB BLD-MCNC: 11.9 G/DL (ref 12–16)
HGB UR QL STRIP.AUTO: NEGATIVE
IMM GRANULOCYTES # BLD AUTO: 0.02 X10(3) UL (ref 0–1)
IMM GRANULOCYTES NFR BLD: 0.2 %
KETONES UR-MCNC: 80 MG/DL
LEUKOCYTE ESTERASE UR QL STRIP.AUTO: 500
LIPASE SERPL-CCNC: 30 U/L (ref 12–53)
LYMPHOCYTES # BLD AUTO: 1.67 X10(3) UL (ref 1–4)
LYMPHOCYTES NFR BLD AUTO: 20.2 %
MCH RBC QN AUTO: 29.4 PG (ref 26–34)
MCHC RBC AUTO-ENTMCNC: 33.7 G/DL (ref 31–37)
MCV RBC AUTO: 87.2 FL (ref 80–100)
MONOCYTES # BLD AUTO: 0.4 X10(3) UL (ref 0.1–1)
MONOCYTES NFR BLD AUTO: 4.8 %
NEUTROPHILS # BLD AUTO: 6.03 X10 (3) UL (ref 1.5–7.7)
NEUTROPHILS # BLD AUTO: 6.03 X10(3) UL (ref 1.5–7.7)
NEUTROPHILS NFR BLD AUTO: 73.2 %
NITRITE UR QL STRIP.AUTO: NEGATIVE
OSMOLALITY SERPL CALC.SUM OF ELEC: 280 MOSM/KG (ref 275–295)
PH UR: 8.5 (ref 5–8)
PLATELET # BLD AUTO: 340 10(3)UL (ref 150–450)
POTASSIUM SERPL-SCNC: 4.1 MMOL/L (ref 3.5–5.1)
PROT SERPL-MCNC: 7.2 G/DL (ref 5.7–8.2)
PROT UR-MCNC: 50 MG/DL
RBC # BLD AUTO: 4.05 X10(6)UL (ref 3.8–5.3)
SODIUM SERPL-SCNC: 135 MMOL/L (ref 136–145)
SP GR UR STRIP: 1.03 (ref 1–1.03)
UROBILINOGEN UR STRIP-ACNC: 2
WBC # BLD AUTO: 8.3 X10(3) UL (ref 4–11)

## 2025-08-21 PROCEDURE — 87077 CULTURE AEROBIC IDENTIFY: CPT | Performed by: EMERGENCY MEDICINE

## 2025-08-21 PROCEDURE — 80053 COMPREHEN METABOLIC PANEL: CPT | Performed by: EMERGENCY MEDICINE

## 2025-08-21 PROCEDURE — 87086 URINE CULTURE/COLONY COUNT: CPT | Performed by: EMERGENCY MEDICINE

## 2025-08-21 PROCEDURE — 36415 COLL VENOUS BLD VENIPUNCTURE: CPT

## 2025-08-21 PROCEDURE — 99283 EMERGENCY DEPT VISIT LOW MDM: CPT

## 2025-08-21 PROCEDURE — 87186 SC STD MICRODIL/AGAR DIL: CPT | Performed by: EMERGENCY MEDICINE

## 2025-08-21 PROCEDURE — 83690 ASSAY OF LIPASE: CPT | Performed by: EMERGENCY MEDICINE

## 2025-08-21 PROCEDURE — 85025 COMPLETE CBC W/AUTO DIFF WBC: CPT | Performed by: EMERGENCY MEDICINE

## 2025-08-21 PROCEDURE — 99284 EMERGENCY DEPT VISIT MOD MDM: CPT

## 2025-08-21 PROCEDURE — 81001 URINALYSIS AUTO W/SCOPE: CPT | Performed by: EMERGENCY MEDICINE

## 2025-08-21 RX ORDER — LOPERAMIDE HYDROCHLORIDE 2 MG/1
2 TABLET ORAL AS NEEDED
Qty: 20 TABLET | Refills: 0 | Status: SHIPPED | OUTPATIENT
Start: 2025-08-21 | End: 2025-09-20

## 2025-08-21 RX ORDER — CEPHALEXIN 500 MG/1
500 CAPSULE ORAL 3 TIMES DAILY
Qty: 21 CAPSULE | Refills: 0 | Status: SHIPPED | OUTPATIENT
Start: 2025-08-21 | End: 2025-08-28

## 2025-08-21 RX ORDER — LOPERAMIDE HYDROCHLORIDE 2 MG/1
2 CAPSULE ORAL ONCE
Status: COMPLETED | OUTPATIENT
Start: 2025-08-21 | End: 2025-08-21

## (undated) NOTE — LETTER
No information on file.    Consent for Diagnostic Services         Date of Procedure:     The physician has ordered a * No procedures found * to determine heart function. The information obtained will aid in detecting the possible presence of heart disease, to determine why related symptoms may be occurring, and to determine an appropriate plan of medical management.    The risks associated with any exercise test or pharmacological stress test are similar to the risks associated with exercise, including an abnormal blood pressure, dizziness, fainting, abnormal heart rate and in very rare instances, heart attack, stroke, or death. During the test, every effort will be made to minimize such risks by careful observation, however any unusual feeling or symptoms experienced should be reported. Emergency equipment and trained personnel are available to assist with unusual situations, which may arise, and these personnel have permission to perform treatment.    During the treadmill or nuclear stress test, the exercise intensity begins at a low level and advances in stages depending on fitness level. The test may stop at any time because of signs of fatigue, changes in heart rate, electrocardiogram, or blood pressure, or any other symptoms experienced.     If a pharmacological stress test has been ordered, symptoms may include: headache, shortness of breath, flushing, warmth, rapid heart rate, or a bitter taste in the mouth. Sometimes symptoms may not be experienced. Prompt reporting of any symptoms is very important. This could include either regadenoson or dobutamine.    During a regadenoson stress test, an injection of regadenoson is administered. Immediately after the regadenoson is given, there is an injection of a radioactive isotope. During a dobutamine stress test, dobutamine infuses over approximately fifteen minutes. A radioactive isotope is injected during the infusion. After either pharmacological stress test, a  nuclear scan or an echocardiogram will be performed.    The information obtained during testing will be treated as privileged and confidential. The information obtained will be given to my doctor, may be used for insurance purposes or to track and trend data as part of  with privacy retained.    I have read and understand the above information. I voluntarily agree and consent to the above ordered test. Furthermore, no guarantees or assurances have been given by anyone as to the results that may be obtained from this procedure. Any questions that may have occurred to me have been answered to my satisfaction.      [  ]  Patient denies pregnancy or not applicable     [  ]  Patient denies breastfeeding    Signature of Patient:   _______________________________________________________________  Responsible person in case of minor, unconscious: ________________________________________  Relationship to patient: ______________________________________________________________    Signature of Witness: _________________________________Date: ___________Time: __________      Patient Name: Linda KEANE Ruzicka : 1945  Printed: Constanza 3, 2025   Medical Record #: [id number]

## (undated) NOTE — IP AVS SNAPSHOT
[facility]            (For Outpatient Use Only) Initial Admit Date: 2023   Inpt/Obs Admit Date: Inpt: N/A / Obs: 23   Discharge Date:    Hospital Acct:  [de-identified]   MRN: [de-identified]   CSN: [id number]   CEID: [id number]        ENCOUNTER  Patient Class: Observation Admitting Provider: No admitting provider for patient encounter. Unit: [address] [place] [address] Service: Cardiac Telemetry Attending Provider: Penne Collet, MD   Bed: 926-N   Visit Type:   Referring Physician: No ref. provider found Billing Flag:    Admit Diagnosis: Acute chest pain [R07.9]      PATIENT  Legal Name:   Hayes Albarado   Legal Sex: Female  Gender ID:              Pref Name:    PCP:  Rashida Officer: [phone]   Address:  [address] [place] [address] : 1945 (77 yrs) Mobile: [phone]         City/State/Zip: [facility] [address] Marital:  Language: Javid Goon: Ina Foots: [id number] Jainism: [address] Not Isabelle Arms*     Race: White Ethnicity: Non  Or [address] [place] [address] Street   Name Relationship Legal Guardian? Home Phone Work Phone Mobile Phone   John Franco Daughter  Daughter No  No       [phone]  [phone]     GUARANTOR  Guarantor: [address] : 1945 Home Phone: [phone]   Address: [address] [place] [address]  Sex: Female Work Phone:    City/State/Zip: [place]ana Ankur, [address] [place] [address]   Rel. to Patient: Self Guarantor ID: [id number]   GUARANTOR EMPLOYER   Employer:  Status: RETIRED     COVERAGE  PRIMARY INSURANCE   Payor: MEDICARE Plan: MEDICARE [employer]   Group Number:  Insurance Type: INDEMNITY   Subscriber Name: Alcides lo Subscriber : 1945   Subscriber ID: [id number] Pt Rel to Subscriber: Self   SECONDARY INSURANCE   Payor: [address] [facility] [address]: [address] [facility]   Group Number: [id number] Insurance Type: [address] Name: Valerie Flor : 1945   Subscriber ID: [id number] Pt Rel to Subscriber: SELF TERTIARY INSURANCE   Payor:  Plan:    Group Number:  Insurance Type:    Subscriber Name:  Subscriber :    Subscriber ID:  Pt Rel to Subscriber:    Hospital Account Financial Class: Medicare    2023

## (undated) NOTE — LETTER
[address] [facility]    Consent for Diagnostic Services    Your physician has ordered one of the following tests to determine the function of your heart: Regadenoson Sestamibi. The information obtained will aid your physician in detecting the possible presence of heart disease, to determine why you may have such symptoms such as chest pain or shortness of breath and to determine an appropriate plan of medical management. The risks associated with any exercise test or pharmacological stress test are similar to the risks associated with exercise, including an abnormal blood pressure, dizziness, fainting, abnormal heart rate and in very rear instances heart attach, stroke, or death. During the test you must report any unusual feeling or symptoms you experience during the test. Every effort will be made to minimize such risks by careful observation during the test. Emergency equipment and trained personnel are available to deal with unusual situations, which may arise and these personnel have permission to treat you. During the treadmill or nuclear stress test, the exercise intensity begins at a low level and advances in stages depending on your fitness level. We may stop the test at any time because of signs of fatigue or changes in your heart rate, electrocardiogram, or blood pressure, or other symptoms you may experience. If your doctor has ordered a pharmacological stress test, you may experience a headache, shortness of breath, flushing, warmth, rapid heart rate, or a bitter taste in your mouth. Sometimes you may not experience any symptoms. Your prompt reporting of any symptoms is very important. During a Regadenoson stress test, you are given an injection of Regadenoson. Immediately after the Regadenoson is given, you will be injected with a radioactive isotope. During a Dobutamine stress test, Dobutamine infuses over approximately fifteen minutes.  A radioactive isotope is injected during the infusion. After either pharmacological stress test, you will have either a nuclear scan or an echocardiogram.    The information that is obtained during your testing will be treated as privileged and confidential. The information obtained will be given to your doctor and may be used for insurance purposes or to track and trend data as part of  with your privacy retained. I have read and understand the above information. I voluntarily agree and consent to the above ordered test. Furthermore, no guarantees or assurances have been given by anyone as to the results that may be obtained from this procedure. Any questions that may have occurred to me have been answered to my satisfaction.      Signature of Patient:   ____________________________________________________________    Signature of Witness: _______________________________Date: ___________Time: ________

## (undated) NOTE — LETTER
[facility], [place]  Authorization for Invasive Procedures  1.  I hereby authorize Dr. Gerard Sr, my physician and whomever may be designated as the doctor's assistant, to perform the following operation and/or procedure: Cardiac c 4. Should the need arise during my operation or immediate post-operative period; I also consent to the administration of blood and/or blood products.  Further, I understand that despite careful testing and screening of blood and blood products, I may still 9. Patients having a sterilization procedure: I understand that if the procedure is successful the results will be permanent and it will therefore be impossible for me to inseminate, conceive or bear children.  I also understand that the procedure is intend

## (undated) NOTE — LETTER
[place] [address]  Authorization for Invasive Procedures  Date: 06/03/2025           Time: 1420    I hereby authorize Dr White, my physician and his/her assistants (if applicable), which may include medical students, residents, and/or fellows, to perform the following surgical operation/ procedure and administer such anesthesia as may be determined necessary by my physician:  Operation/Procedure name (s)  Cardiac Catheterization, Left Ventricular Cineangiography, Bilateral Selective Coronary Angiography and/or Right Heart Catheterization; possible Percutaneous Transluminal Coronary Angioplasty, Coronary Atherectomy, Coronary Stent, Intracoronary Thrombolytic therapy, Antiplatelet therapy and/or Intravascular Ultrasound on Linda KEANE Ruzicka   2.   I recognize that during the surgical operation/procedure, unforeseen conditions may necessitate additional or different procedures than those listed above.  I, therefore, further authorize and request that the above-named surgeon, assistants, or designees perform such procedures as are, in their judgment, necessary and desirable.    3.   My surgeon/physician has discussed prior to my surgery the potential benefits, risks and side effects of this procedure; the likelihood of achieving goals; and potential problems that might occur during recuperation.  They also discussed reasonable alternatives to the procedure, including risks, benefits, and side effects related to the alternatives and risks related to not receiving this procedure.  I have had all my questions answered and I acknowledge that no guarantee has been made as to the result that may be obtained.    4.   Should the need arise during my operation/procedure, which includes change of level of care prior to discharge, I also consent to the administration of blood and/or blood products.  Further, I understand that despite careful testing and screening of blood or blood products by  collecting agencies, I may still be subject to ill effects as a result of receiving a blood transfusion and/or blood products.  The following are some, but not all, of the potential risks that can occur: fever and allergic reactions, hemolytic reactions, transmission of diseases such as Hepatitis, AIDS and Cytomegalovirus (CMV) and fluid overload.  In the event that I wish to have an autologous transfusion of my own blood, or a directed donor transfusion, I will discuss this with my physician.  Check only if Refusing Blood or Blood Products  I understand refusal of blood or blood products as deemed necessary by my physician may have serious consequences to my condition to include possible death. I hereby assume responsibility for my refusal and release the hospital, its personnel, and my physicians from any responsibility for the consequences of my refusal.          o  Refuse      5.   I authorize the use of any specimen, organs, tissues, body parts or foreign objects that may be removed from my body during the operation/procedure for diagnosis, research or teaching purposes and their subsequent disposal by hospital authorities.  I also authorize the release of specimen test results and/or written reports to my treating physician on the hospital medical staff or other referring or consulting physicians involved in my care, at the discretion of the Pathologist or my treating physician.    6.   I consent to the photographing or videotaping of the operations or procedures to be performed, including appropriate portions of my body for medical, scientific, or educational purposes, provided my identity is not revealed by the pictures or by descriptive texts accompanying them.  If the procedure has been photographed/videotaped, the surgeon will obtain the original picture, image, videotape or CD.  The hospital will not be responsible for storage, release or maintenance of the picture, image, tape or CD.    7.   I consent  to the presence of a  or observers in the operating room as deemed necessary by my physician or their designees.    8.   I recognize that in the event my procedure results in extended X-Ray/fluoroscopy time, I may develop a skin reaction.    9. If I have a Do Not Attempt Resuscitation (DNAR) order in place, that status will be suspended while in the operating room, procedural suite, and during the recovery period unless otherwise explicitly stated by me (or a person authorized to consent on my behalf). The surgeon or my attending physician will determine when the applicable recovery period ends for purposes of reinstating the DNAR order.  10. Patients having a sterilization procedure: I understand that if the procedure is successful the results will be permanent and it will therefore be impossible for me to inseminate, conceive, or bear children.  I also understand that the procedure is intended to result in sterility, although the result has not been guaranteed.   11. I acknowledge that my physician has explained sedation/analgesia administration to me including the risk and benefits I consent to the administration of sedation/analgesia as may be necessary or desirable in the judgment of my physician.    I CERTIFY THAT I HAVE READ AND FULLY UNDERSTAND THE ABOVE CONSENT TO OPERATION and/or OTHER PROCEDURE.        ____________________________________       _________________________________      ______________________________  Signature of Patient         Signature of Responsible Person        Printed Name of Responsible Person    ____________________________________      _________________________________      ______________________________       Signature of Witness          Relationship to Patient                       Date                                       Time  Patient Name: Susan P Ruzicka  : 1945    Reviewed: 2024   Printed: Constanza 3, 2025  Medical Record #: [id number] Page 1 of  2           STATEMENT OF PHYSICIAN My signature below affirms that prior to the time of the procedure; I have explained to the patient and/or his/her legal representative, the risks and benefits involved in the proposed treatment and any reasonable alternative to the proposed treatment. I have also explained the risks and benefits involved in refusal of the proposed treatment and alternatives to the proposed treatment and have answered the patient's questions. If I have a significant financial interest in a co-management agreement or a significant financial interest in any product or implant, or other significant relationship used in this procedure/surgery, I have disclosed this and had a discussion with my patient.     _______________________________________________________________ _____________________________  (Signature of Physician)                                                                                         (Date)                                   (Time)  Patient Name: Linda KEANE Ruzicka  : 1945    Reviewed: 2024   Printed: Constanza 3, 2025  Medical Record #: [id number] Page 2 of 2

## (undated) NOTE — LETTER
[address], [place]  Authorization for Invasive Procedures  Date: 09/24/2023           Time: 1400    I hereby authorize Dr. Rafi Castro, my physician and his/her assistants (if applicable), which may include medical students, residents, and/or fellows, to perform the following surgical operation/ procedure and administer such anesthesia as may be determined necessary by my physician: Left Heart Catheterization with possible intervention/stents on Shira Crooks  2. I recognize that during the surgical operation/procedure, unforeseen conditions may necessitate additional or different procedures than those listed above. I, therefore, further authorize and request that the above-named surgeon, assistants, or designees perform such procedures as are, in their judgment, necessary and desirable. 3.   My surgeon/physician has discussed prior to my surgery the potential benefits, risks and side effects of this procedure; the likelihood of achieving goals; and potential problems that might occur during recuperation. They also discussed reasonable alternatives to the procedure, including risks, benefits, and side effects related to the alternatives and risks related to not receiving this procedure. I have had all my questions answered and I acknowledge that no guarantee has been made as to the result that may be obtained. 4.   Should the need arise during my operation/procedure, which includes change of level of care prior to discharge, I also consent to the administration of blood and/or blood products. Further, I understand that despite careful testing and screening of blood or blood products by collecting agencies, I may still be subject to ill effects as a result of receiving a blood transfusion and/or blood products.   The following are some, but not all, of the potential risks that can occur: fever and allergic reactions, hemolytic reactions, transmission of diseases such as Hepatitis, AIDS and Cytomegalovirus (CMV) and fluid overload. In the event that I wish to have an autologous transfusion of my own blood, or a directed donor transfusion, I will discuss this with my physician. Check only if Refusing Blood or Blood Products  I understand refusal of blood or blood products as deemed necessary by my physician may have serious consequences to my condition to include possible death. I hereby assume responsibility for my refusal and release the hospital, its personnel, and my physicians from any responsibility for the consequences of my refusal.         o  Refuse         5. I authorize the use of any specimen, organs, tissues, body parts or foreign objects that may be removed from my body during the operation/procedure for diagnosis, research or teaching purposes and their subsequent disposal by hospital authorities. I also authorize the release of specimen test results and/or written reports to my treating physician on the hospital medical staff or other referring or consulting physicians involved in my care, at the discretion of the Pathologist or my treating physician. 6.   I consent to the photographing or videotaping of the operations or procedures to be performed, including appropriate portions of my body for medical, scientific, or educational purposes, provided my identity is not revealed by the pictures or by descriptive texts accompanying them. If the procedure has been photographed/videotaped, the surgeon will obtain the original picture, image, videotape or CD. The hospital will not be responsible for storage, release or maintenance of the picture, image, tape or CD.    7.   I consent to the presence of a  or observers in the operating room as deemed necessary by my physician or their designees. 8.   I recognize that in the event my procedure results in extended X-Ray/fluoroscopy time, I may develop a skin reaction. 9.  If I have a Do Not Attempt Resuscitation (DNAR) order in place, that status will be suspended while in the operating room, procedural suite, and during the recovery period unless otherwise explicitly stated by me (or a person authorized to consent on my behalf). The surgeon or my attending physician will determine when the applicable recovery period ends for purposes of reinstating the DNAR order. 10. Patients having a sterilization procedure: I understand that if the procedure is successful the results will be permanent and it will therefore be impossible for me to inseminate, conceive, or bear children. I also understand that the procedure is intended to result in sterility, although the result has not been guaranteed. 11. I acknowledge that my physician has explained sedation/analgesia administration to me including the risk and benefits I consent to the administration of sedation/analgesia as may be necessary or desirable in the judgment of my physician.     I CERTIFY THAT I HAVE READ AND FULLY UNDERSTAND THE ABOVE CONSENT TO OPERATION and/or OTHER PROCEDURE.        ____________________________________       _________________________________      ______________________________  Signature of Patient         Signature of Responsible Person        Printed Name of Responsible Person        ____________________________________      _________________________________      ______________________________       Signature of Witness          Relationship to Patient                       Date                                       Time    Patient Name: Omer De La Rosa     : 1945                 Printed: 2023      Medical Record #: [id number]                      Page 1 of 2          [place] My signature below affirms that prior to the time of the procedure; I have explained to the patient and/or his/her legal representative, the risks and benefits involved in the proposed treatment and any reasonable alternative to the proposed treatment. I have also explained the risks and benefits involved in refusal of the proposed treatment and alternatives to the proposed treatment and have answered the patient's questions.  If I have a significant financial interest in a co-management agreement or a significant financial interest in any product or implant, or other significant relationship used in this procedure/surgery, I have disclosed this and had a discussion with my patient.     _______________________________________________________________ _____________________________  [place] of Physician)                                                                                         (Date)                                   (Time)    Patient Name: Sita Bell     : 1945                 Printed: 2023      Medical Record #: [id number]                      Page 2 of 2

## (undated) NOTE — IP AVS SNAPSHOT
[facility]            (For Outpatient Use Only) Initial Admit Date: 2022   Inpt/Obs Admit Date: Inpt: 22 / Obs: N/A   Discharge Date:    Cristofer Holcomb:  [de-identified]   MRN: [de-identified]   CSN: [id number]   CEID: [id number]        ENCOUNTER  Patient Class: Inpatient Admitting Provider: Myles Tang MD Unit: [address] [place] [address]   Hospital Service: Cardiac Telemetry Attending Provider: Myles Tang MD   Bed: 308-A   Visit Type:   Referring Physician: No ref. provider found Billing Flag:    Admit Diagnosis: Acute respiratory failure with hypoxia [facility]) [J96.01]      PATIENT  Legal Name:   Lindsay Lovelace   Legal Sex: Female  Gender ID:              [address] [place] [address],[address] Name:    PCP:  Flaco Pili: [phone]   Address:   [facility] : 1945 (77 yrs) Mobile: [phone]         City/State/Zip: [place] [address] Marital:  Language: [address] [place] [address] Drive: [employer] Lo: [id number] Restorationism: [address] Not Radha Spittle*     Race: White Ethnicity: Non  Or [address] [place] [address]   Name Relationship Legal Guardian? Home Phone Work Phone Mobile Phone   1. Shedrick Cabot Daughter  Daughter No  No       [phone]  [phone]     GUARANTOR  Guarantor: Karla Mead : 1945 Home Phone: [phone]   Address:  [facility]  Sex: Female Work Phone:    City/State/Zip: [place], [address] [place] [address]   Rel. to Patient: Self Guarantor ID: [id number]   GUARANTOR EMPLOYER   Employer:  Status: RETIRED     COVERAGE  PRIMARY INSURANCE   Payor: MEDICARE Plan: MEDICARE RAILROAD   Group Number:  Insurance Type: INDEMNITY   Subscriber Name: Karla Mead Subscriber : 1945   Subscriber ID: [id number] Pt Rel to Subscriber: Self   SECONDARY INSURANCE   Payor: [address] [place] [address] Drive: [address] [place] [address]   Group Number: [id number] Insurance Type: [id number] Name: Darby Band : 1945   Subscriber ID: [id number] Pt Rel to Subscriber: SELF TERTIARY INSURANCE   Payor:  Plan:    Group Number:  Insurance Type:    Subscriber Name:  Subscriber :    Subscriber ID:  Pt Rel to Subscriber:    Hospital Account Financial Class: Medicare    2022

## (undated) NOTE — LETTER
[place] [address]    Consent for Diagnostic Services    Your physician has ordered one of the following tests to determine the function of your heart: Regadenoson Sestamibi. The information obtained will aid your physician in detecting the possible presence of heart disease, to determine why you may have such symptoms such as chest pain or shortness of breath and to determine an appropriate plan of medical management.    The risks associated with any exercise test or pharmacological stress test are similar to the risks associated with exercise, including an abnormal blood pressure, dizziness, fainting, abnormal heart rate and in very rear instances heart attach, stroke, or death. During the test you must report any unusual feeling or symptoms you experience during the test. Every effort will be made to minimize such risks by careful observation during the test. Emergency equipment and trained personnel are available to deal with unusual situations, which may arise and these personnel have permission to treat you.     During the treadmill or nuclear stress test, the exercise intensity begins at a low level and advances in stages depending on your fitness level. We may stop the test at any time because of signs of fatigue or changes in your heart rate, electrocardiogram, or blood pressure, or other symptoms you may experience.     If your doctor has ordered a pharmacological stress test, you may experience a headache, shortness of breath, flushing, warmth, rapid heart rate, or a bitter taste in your mouth. Sometimes you may not experience any symptoms. Your prompt reporting of any symptoms is very important.     During a Regadenoson stress test, you are given an injection of Regadenoson. Immediately after the Regadenoson is given, you will be injected with a radioactive isotope. During a Dobutamine stress test, Dobutamine infuses over approximately fifteen minutes. A radioactive isotope is  injected during the infusion. After either pharmacological stress test, you will have either a nuclear scan or an echocardiogram.    The information that is obtained during your testing will be treated as privileged and confidential. The information obtained will be given to your doctor and may be used for insurance purposes or to track and trend data as part of  with your privacy retained.    I have read and understand the above information. I voluntarily agree and consent to the above ordered test. Furthermore, no guarantees or assurances have been given by anyone as to the results that may be obtained from this procedure. Any questions that may have occurred to me have been answered to my satisfaction.     Signature of Patient:   ____________________________________________________________    Signature of Witness: _______________________________Date: ___________Time: ________

## (undated) NOTE — IP AVS SNAPSHOT
Patient Demographics     Address  [address] [address] Phone  [phone] [facility])  [phone] (Mobile) *Preferred*      Emergency Contact(s)     Name Relation Home Work Mobile    [facility] Daughter   [phone]    [facility] gabapentin 600 MG Tabs  Commonly known as:  NEURONTIN  Next dose due: Tonight 9/23      Take 1 tablet (600 mg total) by mouth 3 (three) times daily.    Alyse Auguste MD         levofloxacin 250 MG Tabs  Commonly known as:  LEVAQUIN      Take 1 tablet (25 - [address] AT [facility] [address], [phone], [address] [place] [address] 44, [address] [facility] [address]    Phone:  [phone]   aspirin 81 MG Tbec  carvedilol 6.25 MG Tabs  Clopidogrel Bisulfate 75 MG Tabs  gabapentin 600 MG Tabs  lisinopril 2.5 MG [id number] lisinopril (PRINIVIL,ZESTRIL) tab 2.5 mg 09/23/19 0901 Given      [id number] rOPINIRole HCl (REQUIP) tab 1 mg 09/22/19 2020 Given      [id number] spironolactone (ALDACTONE) tab 12.5 mg 09/23/19 1236 Given      [id number] traMADol HCl (ULTRAM) tab 5 Blood — 09/22/19 0608          Components    Component Value Reference Range Flag Lab   Iron 38 50 - 170 ug/dL  [facility]   Transferrin 218 200 - 360 mg/dL [facility]   Total Iron Binding Capacity 325 240 - 450 ug/dL — [facility]   % Saturation 12 Nitrofurantoin 128  Resistant    Piperacillin + Tazobactam <=4  Sensitive    Trimethoprim/Sulfa <=20  Sensitive                   MRSA Screen by PCR Once [[id number]]     Order Status:  Sent Lab Status:  No result     Specimen:  Other from [place]          H History reviewed. No pertinent surgical history. [SA.2]  Family history: Unable to obtain secondary to patient's current mental status[SA.3]   reports that she has never smoked.  She has never used smokeless tobacco. She reports that she does not drink alcoh ROPINIROLE HCL OR Taking  Yes Yes   Sig: Take 1 mg by mouth one time. Rosuvastatin Calcium (CRESTOR) 20 MG Oral Tab   Yes No   Sig: Take 20 mg by mouth daily. TRIAMTERENE OR   Yes No   Sig: by Does not apply route.    atorvastatin 40 MG Oral Tab Viry non-labored, breath sounds are equal, symmetrical chest wall expansion. Cardiovascular:  Normal rate, regular rhythm, no murmur, no edema. Gastrointestinal:  Soft, non-tender, non-distended, normal bowel sounds, no organomegaly.   Lymphatics:  No lymphade Unclear cause,[SA. 1] possibly hypertensive encephalopathy versus pain medication related, will hold pain meds muscle relaxers and evaluate response. N[SA.3]eurology has been consulted consider MRI if no improvement.     Accelerated hypertension  We will us LVEF 30-35% and also had a cath with PCI to RCA patient is confused and I was not able to get much history from her. Past Medical History[KS. 1]  Past Medical History:   Diagnosis Date   • High blood pressure    • Osteoarthrosis, unspecified whether gene metoprolol Tartrate (LOPRESSOR) 5 MG/5ML injection 5 mg 5 mg Intravenous Q5 Min PRN       Medications Prior to Admission:  atorvastatin 40 MG Oral Tab atorvastatin 40 mg tablet   ergocalciferol 98543 units Oral Cap Every sunday   Magnesium 200 MG Oral Tab FENTANYL by Does not apply route. ROPINIROLE HCL OR Take 1 mg by mouth one time.[KS.2]         Allergies[KS. 1]  No Known Allergies[KS. 2]    Review of Systems:    Pertinent items are noted in HPI.     Physical Exam:[KS.1]   Blood pressure (!) 147/102, puls  (L) 09/17/2019    K 3.4 (L) 09/17/2019     09/17/2019    CO2 18.0 (L) 09/17/2019     (H) 09/17/2019    CA 9.6 09/17/2019    ALB 4.2 09/17/2019    ALKPHO 119 09/17/2019    TP 8.7 (H) 09/17/2019    AST 50 (H) 09/17/2019    ALT 25 09/17/2 2. Aortic valve: Trivial regurgitation. Peak velocity (S):     1.17m/sec. Mean gradient (S): 3mm Hg. Peak gradient (S): 5mm Hg.     Valve area (VTI): 1.48cm^2. Valve area (Vmax): 1.6cm^2. 3. Mitral valve: Mildly calcified annulus.  Normal thickened, mildly Room Number: 222/222-A       Presenting Problem: accelerated HTN and new life vest    Problem List  Principal Problem:    Accelerated hypertension  Active Problems:    Leukocytosis    Hyponatremia    Metabolic acidosis    NSTEMI (non-ST elevated myocardial O2 WALK                  AM-PAC '6-Clicks' INPATIENT SHORT FORM - BASIC MOBILITY  How much difficulty does the patient currently have. ..  -   Turning over in bed (including adjusting bedclothes, sheets and blankets)?: A Little   -   Sitting down on and sta Attribution Peralta    ST. 1 Manuel Renteria, PT on 9/21/2019 12:10 PM                        Occupational Therapy Notes (last 72 hours) (Notes from 9/20/2019  1:04 PM through 9/23/2019  1:04 PM)      Occupational Therapy Note signed by Solo Sampson, OT functional status, and fall risk, pt may benefit from ZEINAB for fall prevention, increased activity tolerance, balance and functional engagement in ADLs.      DISCHARGE RECOMMENDATIONS[SS.1]  OT Discharge Recommendations: Sub-acute rehabilitation  OT Device R FUNCTIONAL ADL ASSESSMENT  Grooming: Stood at the sink x 1-2 min, then sat to complete remaining OFH due to back pain.    Feeding:[SS.1] NT[SS.3]  Bathing:[SS.1] NT[SS.3]  Toileting:[SS.1] SBA hygiene[SS.3]  Upper Extremity Dressing:[SS.1] Minimal assist[ RN approved pt participation in OT tx session on this date. BP assessed with activity and did rise appropriately with activity to 101/64.  Pt able to complete supine>sit at EOB with independence, increased time to complete, STS from bed level with CGA, pt s ACTIVITY TOLERANCE           BP: 101/64  BP Location: Right arm  BP Method: Automatic  Patient Position: Sitting      ACTIVITIES OF DAILY LIVING ASSESSMENT  AM-PAC ‘6-Clicks’ Inpatient Daily Activity Short Form  How much help from another person does the p Botulinum Toxin Type A Per Unit, Im Inj  04/03/15     Botulinum Toxin Type A Per Unit, Im Inj  10/17/14     Botulinum Toxin Type A Per Unit, Im Inj  02/15/13     Botulinum Toxin Type A Per Unit, Im Inj  11/02/12

## (undated) NOTE — LETTER
[address], [place]  Authorization for Invasive Procedures  1.  I hereby authorize Dr. Zaria Ram , my physician and whomever may be designated as the doctor's assistant, to perform the following operation and/or procedure:  *** on Linda 4. Should the need arise during my operation or immediate post-operative period; I also consent to the administration of blood and/or blood products.  Further, I understand that despite careful testing and screening of blood and blood products, I may still 9. Patients having a sterilization procedure: I understand that if the procedure is successful the results will be permanent and it will therefore be impossible for me to inseminate, conceive or bear children.  I also understand that the procedure is intend

## (undated) NOTE — LETTER
[facility], [place]  Authorization for Invasive Procedures  1.  I hereby authorize Dr. Tara Mcneill , my physician and whomever may be designated as the doctor's assistant, to perform the following operation and/or procedure:  Cardiac the event that I wish to have autologous transfusions of my own blood, or a directed donor transfusion, I will discuss this with my physician.      5. I consent to the photographing of the operations or procedures to be performed for the purposes of advanci Responsible person in case of minor or unconscious: _____________________________Relationship: ____________     Witness Signature: ____________________________________________ Date: __________ Time: ___________    Statement of Physician  My signature below

## (undated) NOTE — IP AVS SNAPSHOT
[facility]            (For Outpatient Use Only) Initial Admit Date: 9/16/2019   Inpt/Obs Admit Date: Inpt: 9/17/19 / Obs: N/A   Discharge Date:    Yaa Colon:  [de-identified]   MRN: [de-identified]   CSN: [id number]   CEID: [id number]        [facility] Subscriber ID:  Pt Rel to Subscriber:    Hospital Account Financial Class: Medicare    September 23, 2019

## (undated) NOTE — Clinical Note
[facility]  [address], [place], [place]    Authorization for Surgical Operation and Procedure                               1. I hereby authorize * Surgery not found *, my physician and his/her assistants (if applicable), which may include medical students, residents, and/or fellows, to perform the following surgical operation/ procedure and administer such anesthesia as may be determined necessary by my physician: Operation/Procedure name (s)  on Susan P Ruzicka   2.   I recognize that during the surgical operation/procedure, unforeseen conditions may necessitate additional or different procedures than those listed above.  I, therefore, further authorize and request that the above-named surgeon, assistants, or designees perform such procedures as are, in their judgment, necessary and desirable.    3.   My surgeon/physician has discussed prior to my surgery the potential benefits, risks and side effects of this procedure; the likelihood of achieving goals; and potential problems that might occur during recuperation.  They also discussed reasonable alternatives to the procedure, including risks, benefits, and side effects related to the alternatives and risks related to not receiving this procedure.  I have had all my questions answered and I acknowledge that no guarantee has been made as to the result that may be obtained.    4.   Should the need arise during my operation/procedure, which includes change of level of care prior to discharge, I also consent to the administration of blood and/or blood products.  Further, I understand that despite careful testing and screening of blood or blood products by collecting agencies, I may still be subject to ill effects as a result of receiving a blood transfusion and/or blood products.  The following are some, but not all, of the potential risks that can occur: fever and allergic reactions, hemolytic reactions, transmission of diseases such as  Hepatitis, AIDS and Cytomegalovirus (CMV) and fluid overload.  In the event that I wish to have an autologous transfusion of my own blood, or a directed donor transfusion, I will discuss this with my physician.  Check only if Refusing Blood or Blood Products  I understand refusal of blood or blood products as deemed necessary by my physician may have serious consequences to my condition to include possible death. I hereby assume responsibility for my refusal and release the hospital, its personnel, and my physicians from any responsibility for the consequences of my refusal.    o  Refuse   5.   I authorize the use of any specimen, organs, tissues, body parts or foreign objects that may be removed from my body during the operation/procedure for diagnosis, research or teaching purposes and their subsequent disposal by hospital authorities.  I also authorize the release of specimen test results and/or written reports to my treating physician on the hospital medical staff or other referring or consulting physicians involved in my care, at the discretion of the Pathologist or my treating physician.    6.   I consent to the photographing or videotaping of the operations or procedures to be performed, including appropriate portions of my body for medical, scientific, or educational purposes, provided my identity is not revealed by the pictures or by descriptive texts accompanying them.  If the procedure has been photographed/videotaped, the surgeon will obtain the original picture, image, videotape or CD.  The hospital will not be responsible for storage, release or maintenance of the picture, image, tape or CD.    7.   I consent to the presence of a  or observers in the operating room as deemed necessary by my physician or their designees.    8.   I recognize that in the event my procedure results in extended X-Ray/fluoroscopy time, I may develop a skin reaction.    9. If I have a Do Not Attempt  Resuscitation (DNAR) order in place, that status will be suspended while in the operating room, procedural suite, and during the recovery period unless otherwise explicitly stated by me (or a person authorized to consent on my behalf). The surgeon or my attending physician will determine when the applicable recovery period ends for purposes of reinstating the DNAR order.  10. Patients having a sterilization procedure: I understand that if the procedure is successful the results will be permanent and it will therefore be impossible for me to inseminate, conceive, or bear children.  I also understand that the procedure is intended to result in sterility, although the result has not been guaranteed.   11. I acknowledge that my physician has explained sedation/analgesia administration to me including the risk and benefits I consent to the administration of sedation/analgesia as may be necessary or desirable in the judgment of my physician.    I CERTIFY THAT I HAVE READ AND FULLY UNDERSTAND THE ABOVE CONSENT TO OPERATION and/or OTHER PROCEDURE.     ____________________________________  _________________________________        ______________________________  Signature of Patient    Signature of Responsible Person                Printed Name of Responsible Person                                      ____________________________________  _____________________________                ________________________________  Signature of Witness        Date  Time         Relationship to Patient    STATEMENT OF PHYSICIAN My signature below affirms that prior to the time of the procedure; I have explained to the patient and/or his/her legal representative, the risks and benefits involved in the proposed treatment and any reasonable alternative to the proposed treatment. I have also explained the risks and benefits involved in refusal of the proposed treatment and alternatives to the proposed treatment and have answered the patient's  questions. If I have a significant financial interest in a co-management agreement or a significant financial interest in any product or implant, or other significant relationship used in this procedure/surgery, I have disclosed this and had a discussion with my patient.     _____________________________________________________              _____________________________  (Signature of Physician)                                                                                         (Date)                                   (Time)  Patient Name: Linda KEANE Ruzicka      : 1945      Printed: 6/3/2025     Medical Record #: [id number]                                      Page 1 of 1